# Patient Record
Sex: FEMALE | Race: BLACK OR AFRICAN AMERICAN | NOT HISPANIC OR LATINO | Employment: UNEMPLOYED | URBAN - METROPOLITAN AREA
[De-identification: names, ages, dates, MRNs, and addresses within clinical notes are randomized per-mention and may not be internally consistent; named-entity substitution may affect disease eponyms.]

---

## 2020-08-07 ENCOUNTER — HOSPITAL ENCOUNTER (EMERGENCY)
Facility: HOSPITAL | Age: 55
Discharge: HOME | End: 2020-08-07
Attending: EMERGENCY MEDICINE
Payer: COMMERCIAL

## 2020-08-07 VITALS
TEMPERATURE: 98.1 F | RESPIRATION RATE: 17 BRPM | HEART RATE: 74 BPM | SYSTOLIC BLOOD PRESSURE: 192 MMHG | DIASTOLIC BLOOD PRESSURE: 93 MMHG | BODY MASS INDEX: 37.2 KG/M2 | OXYGEN SATURATION: 99 % | WEIGHT: 237 LBS | HEIGHT: 67 IN
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
## 2020-08-07 DIAGNOSIS — L30.9 DERMATITIS: Primary | ICD-10-CM

## 2020-08-07 DIAGNOSIS — R51.9 HEADACHE: ICD-10-CM

## 2020-08-07 PROCEDURE — 99284 EMERGENCY DEPT VISIT MOD MDM: CPT | Performed by: EMERGENCY MEDICINE

## 2020-08-07 PROCEDURE — 99284 EMERGENCY DEPT VISIT MOD MDM: CPT

## 2020-08-07 RX ORDER — TRIAMCINOLONE ACETONIDE 1 MG/G
CREAM TOPICAL 2 TIMES DAILY
Qty: 30 G | Refills: 0 | Status: SHIPPED | OUTPATIENT
Start: 2020-08-07 | End: 2021-12-09
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
## 2020-08-08 NOTE — ED NOTES
Discharged reviewed with pt  Pt verbalized understanding and has no further questions at this time  Pt ambulatory off unit with steady gait       Stormy Neighbor, RN  08/07/20 8591

## 2021-01-10 ENCOUNTER — APPOINTMENT (EMERGENCY)
Dept: RADIOLOGY | Facility: HOSPITAL | Age: 56
DRG: 177 | End: 2021-01-10
Payer: COMMERCIAL

## 2021-01-10 ENCOUNTER — HOSPITAL ENCOUNTER (INPATIENT)
Facility: HOSPITAL | Age: 56
LOS: 7 days | Discharge: HOME HEALTH | DRG: 177 | End: 2021-01-18
Attending: EMERGENCY MEDICINE | Admitting: INTERNAL MEDICINE
Payer: COMMERCIAL

## 2021-01-10 DIAGNOSIS — R06.00 DYSPNEA: Primary | ICD-10-CM

## 2021-01-10 DIAGNOSIS — J18.9 PNEUMONIA: ICD-10-CM

## 2021-01-10 DIAGNOSIS — U07.1 COVID-19: ICD-10-CM

## 2021-01-10 DIAGNOSIS — E11.9 TYPE 2 DIABETES MELLITUS (HCC): ICD-10-CM

## 2021-01-10 PROBLEM — E87.6 HYPOKALEMIA: Status: ACTIVE | Noted: 2021-01-10

## 2021-01-10 PROBLEM — R73.09 ELEVATED GLUCOSE: Status: ACTIVE | Noted: 2021-01-10

## 2021-01-10 PROBLEM — J12.82 PNEUMONIA DUE TO COVID-19 VIRUS: Status: ACTIVE | Noted: 2021-01-10

## 2021-01-10 LAB
ABO GROUP BLD: NORMAL
ALBUMIN SERPL BCP-MCNC: 3.2 G/DL (ref 3.5–5)
ALP SERPL-CCNC: 84 U/L (ref 46–116)
ALT SERPL W P-5'-P-CCNC: 43 U/L (ref 12–78)
ANION GAP SERPL CALCULATED.3IONS-SCNC: 10 MMOL/L (ref 4–13)
ANION GAP SERPL CALCULATED.3IONS-SCNC: 9 MMOL/L (ref 4–13)
AST SERPL W P-5'-P-CCNC: 35 U/L (ref 5–45)
ATRIAL RATE: 98 BPM
BASOPHILS # BLD AUTO: 0.01 THOUSANDS/ΜL (ref 0–0.1)
BASOPHILS NFR BLD AUTO: 0 % (ref 0–1)
BILIRUB DIRECT SERPL-MCNC: 0.11 MG/DL (ref 0–0.2)
BILIRUB SERPL-MCNC: 0.3 MG/DL (ref 0.2–1)
BUN SERPL-MCNC: 10 MG/DL (ref 5–25)
BUN SERPL-MCNC: 9 MG/DL (ref 5–25)
CALCIUM SERPL-MCNC: 8.3 MG/DL (ref 8.3–10.1)
CALCIUM SERPL-MCNC: 8.6 MG/DL (ref 8.3–10.1)
CHLORIDE SERPL-SCNC: 100 MMOL/L (ref 100–108)
CHLORIDE SERPL-SCNC: 101 MMOL/L (ref 100–108)
CK SERPL-CCNC: 84 U/L (ref 26–192)
CO2 SERPL-SCNC: 27 MMOL/L (ref 21–32)
CO2 SERPL-SCNC: 27 MMOL/L (ref 21–32)
CREAT SERPL-MCNC: 0.82 MG/DL (ref 0.6–1.3)
CREAT SERPL-MCNC: 0.84 MG/DL (ref 0.6–1.3)
CRP SERPL QL: 48.8 MG/L
D DIMER PPP FEU-MCNC: <0.27 UG/ML FEU
EOSINOPHIL # BLD AUTO: 0 THOUSAND/ΜL (ref 0–0.61)
EOSINOPHIL NFR BLD AUTO: 0 % (ref 0–6)
ERYTHROCYTE [DISTWIDTH] IN BLOOD BY AUTOMATED COUNT: 12.7 % (ref 11.6–15.1)
EST. AVERAGE GLUCOSE BLD GHB EST-MCNC: 269 MG/DL
FERRITIN SERPL-MCNC: 176 NG/ML (ref 8–388)
FLUAV RNA RESP QL NAA+PROBE: NEGATIVE
FLUBV RNA RESP QL NAA+PROBE: NEGATIVE
GFR SERPL CREATININE-BSD FRML MDRD: 89 ML/MIN/1.73SQ M
GFR SERPL CREATININE-BSD FRML MDRD: 92 ML/MIN/1.73SQ M
GLUCOSE P FAST SERPL-MCNC: 269 MG/DL (ref 65–99)
GLUCOSE SERPL-MCNC: 181 MG/DL (ref 65–140)
GLUCOSE SERPL-MCNC: 269 MG/DL (ref 65–140)
HBA1C MFR BLD: 11 %
HBV CORE AB SER QL: NORMAL
HBV CORE IGM SER QL: NORMAL
HBV SURFACE AG SER QL: NORMAL
HCT VFR BLD AUTO: 42.1 % (ref 34.8–46.1)
HCV AB SER QL: NORMAL
HGB BLD-MCNC: 14.1 G/DL (ref 11.5–15.4)
IMM GRANULOCYTES # BLD AUTO: 0.02 THOUSAND/UL (ref 0–0.2)
IMM GRANULOCYTES NFR BLD AUTO: 1 % (ref 0–2)
LYMPHOCYTES # BLD AUTO: 1.39 THOUSANDS/ΜL (ref 0.6–4.47)
LYMPHOCYTES NFR BLD AUTO: 33 % (ref 14–44)
MAGNESIUM SERPL-MCNC: 2.6 MG/DL (ref 1.6–2.6)
MCH RBC QN AUTO: 30.8 PG (ref 26.8–34.3)
MCHC RBC AUTO-ENTMCNC: 33.5 G/DL (ref 31.4–37.4)
MCV RBC AUTO: 92 FL (ref 82–98)
MONOCYTES # BLD AUTO: 0.38 THOUSAND/ΜL (ref 0.17–1.22)
MONOCYTES NFR BLD AUTO: 9 % (ref 4–12)
NEUTROPHILS # BLD AUTO: 2.45 THOUSANDS/ΜL (ref 1.85–7.62)
NEUTS SEG NFR BLD AUTO: 57 % (ref 43–75)
NRBC BLD AUTO-RTO: 0 /100 WBCS
NT-PROBNP SERPL-MCNC: 70 PG/ML
P AXIS: 52 DEGREES
PLATELET # BLD AUTO: 206 THOUSANDS/UL (ref 149–390)
PMV BLD AUTO: 9.5 FL (ref 8.9–12.7)
POTASSIUM SERPL-SCNC: 2.8 MMOL/L (ref 3.5–5.3)
POTASSIUM SERPL-SCNC: 4.4 MMOL/L (ref 3.5–5.3)
PR INTERVAL: 160 MS
PROCALCITONIN SERPL-MCNC: <0.05 NG/ML
PROT SERPL-MCNC: 7.8 G/DL (ref 6.4–8.2)
QRS AXIS: 52 DEGREES
QRSD INTERVAL: 92 MS
QT INTERVAL: 344 MS
QTC INTERVAL: 439 MS
RBC # BLD AUTO: 4.58 MILLION/UL (ref 3.81–5.12)
RH BLD: NEGATIVE
RSV RNA RESP QL NAA+PROBE: NEGATIVE
SARS-COV-2 RNA RESP QL NAA+PROBE: POSITIVE
SODIUM SERPL-SCNC: 137 MMOL/L (ref 136–145)
SODIUM SERPL-SCNC: 137 MMOL/L (ref 136–145)
T WAVE AXIS: -30 DEGREES
TROPONIN I SERPL-MCNC: <0.02 NG/ML
VENTRICULAR RATE: 98 BPM
WBC # BLD AUTO: 4.25 THOUSAND/UL (ref 4.31–10.16)

## 2021-01-10 PROCEDURE — 86900 BLOOD TYPING SEROLOGIC ABO: CPT | Performed by: PHYSICIAN ASSISTANT

## 2021-01-10 PROCEDURE — 82728 ASSAY OF FERRITIN: CPT | Performed by: PHYSICIAN ASSISTANT

## 2021-01-10 PROCEDURE — 71045 X-RAY EXAM CHEST 1 VIEW: CPT

## 2021-01-10 PROCEDURE — 94640 AIRWAY INHALATION TREATMENT: CPT

## 2021-01-10 PROCEDURE — 93010 ELECTROCARDIOGRAM REPORT: CPT | Performed by: INTERNAL MEDICINE

## 2021-01-10 PROCEDURE — 96374 THER/PROPH/DIAG INJ IV PUSH: CPT

## 2021-01-10 PROCEDURE — 36415 COLL VENOUS BLD VENIPUNCTURE: CPT | Performed by: EMERGENCY MEDICINE

## 2021-01-10 PROCEDURE — 99285 EMERGENCY DEPT VISIT HI MDM: CPT | Performed by: EMERGENCY MEDICINE

## 2021-01-10 PROCEDURE — 85379 FIBRIN DEGRADATION QUANT: CPT | Performed by: PHYSICIAN ASSISTANT

## 2021-01-10 PROCEDURE — 0241U HB NFCT DS VIR RESP RNA 4 TRGT: CPT | Performed by: EMERGENCY MEDICINE

## 2021-01-10 PROCEDURE — 83735 ASSAY OF MAGNESIUM: CPT | Performed by: PHYSICIAN ASSISTANT

## 2021-01-10 PROCEDURE — 99220 PR INITIAL OBSERVATION CARE/DAY 70 MINUTES: CPT | Performed by: INTERNAL MEDICINE

## 2021-01-10 PROCEDURE — 83036 HEMOGLOBIN GLYCOSYLATED A1C: CPT | Performed by: PHYSICIAN ASSISTANT

## 2021-01-10 PROCEDURE — 82550 ASSAY OF CK (CPK): CPT | Performed by: PHYSICIAN ASSISTANT

## 2021-01-10 PROCEDURE — 86803 HEPATITIS C AB TEST: CPT | Performed by: PHYSICIAN ASSISTANT

## 2021-01-10 PROCEDURE — 80048 BASIC METABOLIC PNL TOTAL CA: CPT | Performed by: PHYSICIAN ASSISTANT

## 2021-01-10 PROCEDURE — 85025 COMPLETE CBC W/AUTO DIFF WBC: CPT | Performed by: EMERGENCY MEDICINE

## 2021-01-10 PROCEDURE — 84484 ASSAY OF TROPONIN QUANT: CPT | Performed by: EMERGENCY MEDICINE

## 2021-01-10 PROCEDURE — 83880 ASSAY OF NATRIURETIC PEPTIDE: CPT | Performed by: EMERGENCY MEDICINE

## 2021-01-10 PROCEDURE — 87340 HEPATITIS B SURFACE AG IA: CPT | Performed by: PHYSICIAN ASSISTANT

## 2021-01-10 PROCEDURE — 86901 BLOOD TYPING SEROLOGIC RH(D): CPT | Performed by: PHYSICIAN ASSISTANT

## 2021-01-10 PROCEDURE — 80076 HEPATIC FUNCTION PANEL: CPT | Performed by: EMERGENCY MEDICINE

## 2021-01-10 PROCEDURE — 93005 ELECTROCARDIOGRAM TRACING: CPT

## 2021-01-10 PROCEDURE — 84145 PROCALCITONIN (PCT): CPT | Performed by: PHYSICIAN ASSISTANT

## 2021-01-10 PROCEDURE — 86705 HEP B CORE ANTIBODY IGM: CPT | Performed by: PHYSICIAN ASSISTANT

## 2021-01-10 PROCEDURE — 80048 BASIC METABOLIC PNL TOTAL CA: CPT | Performed by: EMERGENCY MEDICINE

## 2021-01-10 PROCEDURE — 99285 EMERGENCY DEPT VISIT HI MDM: CPT

## 2021-01-10 PROCEDURE — 86140 C-REACTIVE PROTEIN: CPT | Performed by: PHYSICIAN ASSISTANT

## 2021-01-10 PROCEDURE — 86704 HEP B CORE ANTIBODY TOTAL: CPT | Performed by: PHYSICIAN ASSISTANT

## 2021-01-10 RX ORDER — ACETAMINOPHEN 325 MG/1
975 TABLET ORAL EVERY 6 HOURS PRN
Status: DISCONTINUED | OUTPATIENT
Start: 2021-01-10 | End: 2021-01-18 | Stop reason: HOSPADM

## 2021-01-10 RX ORDER — MAGNESIUM SULFATE HEPTAHYDRATE 40 MG/ML
2 INJECTION, SOLUTION INTRAVENOUS ONCE
Status: COMPLETED | OUTPATIENT
Start: 2021-01-10 | End: 2021-01-10

## 2021-01-10 RX ORDER — DOXYCYCLINE HYCLATE 100 MG/1
100 CAPSULE ORAL EVERY 12 HOURS SCHEDULED
Status: DISCONTINUED | OUTPATIENT
Start: 2021-01-10 | End: 2021-01-11

## 2021-01-10 RX ORDER — ALBUTEROL SULFATE 2.5 MG/3ML
5 SOLUTION RESPIRATORY (INHALATION) ONCE
Status: COMPLETED | OUTPATIENT
Start: 2021-01-10 | End: 2021-01-10

## 2021-01-10 RX ORDER — MULTIVITAMIN/IRON/FOLIC ACID 18MG-0.4MG
1 TABLET ORAL DAILY
Status: DISCONTINUED | OUTPATIENT
Start: 2021-01-17 | End: 2021-01-18 | Stop reason: HOSPADM

## 2021-01-10 RX ORDER — LANOLIN ALCOHOL/MO/W.PET/CERES
6 CREAM (GRAM) TOPICAL
Status: DISCONTINUED | OUTPATIENT
Start: 2021-01-10 | End: 2021-01-12

## 2021-01-10 RX ORDER — CALCIUM CARBONATE 200(500)MG
1000 TABLET,CHEWABLE ORAL DAILY PRN
Status: DISCONTINUED | OUTPATIENT
Start: 2021-01-10 | End: 2021-01-18 | Stop reason: HOSPADM

## 2021-01-10 RX ORDER — POTASSIUM CHLORIDE 20 MEQ/1
40 TABLET, EXTENDED RELEASE ORAL
Status: COMPLETED | OUTPATIENT
Start: 2021-01-10 | End: 2021-01-10

## 2021-01-10 RX ORDER — DEXAMETHASONE SODIUM PHOSPHATE 10 MG/ML
10 INJECTION, SOLUTION INTRAMUSCULAR; INTRAVENOUS ONCE
Status: COMPLETED | OUTPATIENT
Start: 2021-01-10 | End: 2021-01-10

## 2021-01-10 RX ORDER — ASCORBIC ACID 500 MG
1000 TABLET ORAL EVERY 12 HOURS SCHEDULED
Status: COMPLETED | OUTPATIENT
Start: 2021-01-10 | End: 2021-01-16

## 2021-01-10 RX ORDER — ZINC SULFATE 50(220)MG
220 CAPSULE ORAL DAILY
Status: COMPLETED | OUTPATIENT
Start: 2021-01-10 | End: 2021-01-16

## 2021-01-10 RX ORDER — GUAIFENESIN/DEXTROMETHORPHAN 100-10MG/5
10 SYRUP ORAL EVERY 4 HOURS PRN
Status: DISCONTINUED | OUTPATIENT
Start: 2021-01-10 | End: 2021-01-18 | Stop reason: HOSPADM

## 2021-01-10 RX ORDER — FAMOTIDINE 20 MG/1
20 TABLET, FILM COATED ORAL 2 TIMES DAILY
Status: DISCONTINUED | OUTPATIENT
Start: 2021-01-10 | End: 2021-01-18 | Stop reason: HOSPADM

## 2021-01-10 RX ORDER — GUAIFENESIN 600 MG
600 TABLET, EXTENDED RELEASE 12 HR ORAL EVERY 12 HOURS SCHEDULED
Status: DISCONTINUED | OUTPATIENT
Start: 2021-01-10 | End: 2021-01-18 | Stop reason: HOSPADM

## 2021-01-10 RX ORDER — MELATONIN
2000 DAILY
Status: DISCONTINUED | OUTPATIENT
Start: 2021-01-10 | End: 2021-01-18 | Stop reason: HOSPADM

## 2021-01-10 RX ORDER — ONDANSETRON 2 MG/ML
4 INJECTION INTRAMUSCULAR; INTRAVENOUS EVERY 6 HOURS PRN
Status: DISCONTINUED | OUTPATIENT
Start: 2021-01-10 | End: 2021-01-18 | Stop reason: HOSPADM

## 2021-01-10 RX ORDER — ALBUTEROL SULFATE 90 UG/1
2 AEROSOL, METERED RESPIRATORY (INHALATION) EVERY 4 HOURS PRN
Status: DISCONTINUED | OUTPATIENT
Start: 2021-01-10 | End: 2021-01-18 | Stop reason: HOSPADM

## 2021-01-10 RX ORDER — LOSARTAN POTASSIUM 100 MG/1
100 TABLET ORAL
COMMUNITY

## 2021-01-10 RX ADMIN — ALBUTEROL SULFATE 5 MG: 2.5 SOLUTION RESPIRATORY (INHALATION) at 01:13

## 2021-01-10 RX ADMIN — Medication 2000 UNITS: at 08:34

## 2021-01-10 RX ADMIN — OXYCODONE HYDROCHLORIDE AND ACETAMINOPHEN 1000 MG: 500 TABLET ORAL at 20:25

## 2021-01-10 RX ADMIN — MAGNESIUM SULFATE HEPTAHYDRATE 2 G: 40 INJECTION, SOLUTION INTRAVENOUS at 04:19

## 2021-01-10 RX ADMIN — DOXYCYCLINE 100 MG: 100 CAPSULE ORAL at 03:31

## 2021-01-10 RX ADMIN — IPRATROPIUM BROMIDE 0.5 MG: 0.5 SOLUTION RESPIRATORY (INHALATION) at 01:13

## 2021-01-10 RX ADMIN — FAMOTIDINE 20 MG: 20 TABLET ORAL at 18:19

## 2021-01-10 RX ADMIN — ZINC SULFATE 220 MG (50 MG) CAPSULE 220 MG: CAPSULE at 08:35

## 2021-01-10 RX ADMIN — POTASSIUM CHLORIDE 40 MEQ: 1500 TABLET, EXTENDED RELEASE ORAL at 06:26

## 2021-01-10 RX ADMIN — CEFTRIAXONE SODIUM 1000 MG: 10 INJECTION, POWDER, FOR SOLUTION INTRAVENOUS at 03:42

## 2021-01-10 RX ADMIN — GUAIFENESIN 600 MG: 600 TABLET ORAL at 08:35

## 2021-01-10 RX ADMIN — POTASSIUM CHLORIDE 40 MEQ: 1500 TABLET, EXTENDED RELEASE ORAL at 03:30

## 2021-01-10 RX ADMIN — GUAIFENESIN AND DEXTROMETHORPHAN 10 ML: 100; 10 SYRUP ORAL at 08:37

## 2021-01-10 RX ADMIN — ENOXAPARIN SODIUM 40 MG: 40 INJECTION SUBCUTANEOUS at 08:36

## 2021-01-10 RX ADMIN — GUAIFENESIN 600 MG: 600 TABLET ORAL at 20:25

## 2021-01-10 RX ADMIN — FAMOTIDINE 20 MG: 20 TABLET ORAL at 08:34

## 2021-01-10 RX ADMIN — DEXAMETHASONE SODIUM PHOSPHATE 10 MG: 10 INJECTION, SOLUTION INTRAMUSCULAR; INTRAVENOUS at 01:10

## 2021-01-10 RX ADMIN — MELATONIN 6 MG: at 20:25

## 2021-01-10 RX ADMIN — DOXYCYCLINE 100 MG: 100 CAPSULE ORAL at 15:49

## 2021-01-10 RX ADMIN — OXYCODONE HYDROCHLORIDE AND ACETAMINOPHEN 1000 MG: 500 TABLET ORAL at 08:35

## 2021-01-10 RX ADMIN — ACETAMINOPHEN 975 MG: 325 TABLET, FILM COATED ORAL at 20:25

## 2021-01-10 NOTE — H&P
H&P- Rachel Worthy 1965, 60 y o  female MRN: [id number]    Unit/Bed#: ED 13 Encounter: [id number]    Primary Care Provider: No primary care provider on file  Date and time admitted to hospital: 1/10/2021 12:22 AM        * Pneumonia due to COVID-19 virus  Assessment & Plan  · Reports productive cough and chest congestion x 2 days  Reports has had several episodes where she coughs until vomits phlegm  Denies SOB, but states "sometimes I just have a hard time breathing " (+) diarrhea  (+) decreased appetite  O2 sat 90% on RA after breathing treatment  (+) hx of mild asthma not on maintenance meds  · COVID (+) in ED  · CXR with hazy area to LLL  Awaiting official read  · Mild disease pathway initiated  Initial labs pending  · While initiate on Rocephin and Doxycycline pending results of procalcitonin levels  · Respiratory protocol  · Incentive spirometry  · Mucinex BID  · Robitussin PRN  · Maintain precautions    Hypokalemia  Assessment & Plan  · Potassium 2 8 on admission  · K dur 20meq PO x 2 doses  · Mag 2g IV x 1  · Repeat BMP at 0800  · Telemetry monitoring    Elevated glucose  Assessment & Plan  · Glucose 181 on admission  No history of DM  · A1C in am      VTE Prophylaxis: Enoxaparin (Lovenox)  / sequential compression device   Code Status: Level 1 Full Code  POLST: POLST form is not discussed and not completed at this time  Discussion with family: NA    Anticipated Length of Stay:  Patient will be admitted on an Observation basis with an anticipated length of stay of  Less than 2 midnights  Justification for Hospital Stay: See AP above    Total Time for Visit, including Counseling / Coordination of Care: 45 minutes  Greater than 50% of this total time spent on direct patient counseling and coordination of care  Chief Complaint:   cough    History of Present Illness:    Rachel Worthy is a 60 y o  female who presents with productive cough and chest congestion x 2 days   Reports has had several episodes where she coughs until vomits phlegm  Denies SOB, but states "sometimes I just have a hard time breathing " (+) diarrhea  (+) decreased appetite  O2 sat 90% on RA after breathing treatment  (+) hx of mild asthma not on maintenance meds  Review of Systems:    Review of Systems   Constitutional: Positive for appetite change  Negative for chills and fever  HENT: Positive for congestion  Negative for ear pain, sinus pressure and sore throat  Eyes: Negative for visual disturbance  Respiratory: Positive for cough  Negative for shortness of breath and wheezing  Cardiovascular: Negative for chest pain, palpitations and leg swelling  Gastrointestinal: Positive for diarrhea and vomiting  Negative for abdominal pain, constipation and nausea  Genitourinary: Negative for difficulty urinating, dysuria, frequency and urgency  Musculoskeletal: Negative for neck pain and neck stiffness  Neurological: Negative for dizziness, syncope, light-headedness and headaches  All other systems reviewed and are negative  Past Medical and Surgical History:     Past Medical History:   Diagnosis Date    Asthma        Past Surgical History:   Procedure Laterality Date    HERNIA REPAIR         Meds/Allergies:    Prior to Admission medications    Medication Sig Start Date End Date Taking? Authorizing Provider   triamcinolone (KENALOG) 0 1 % cream Apply topically 2 (two) times a day for 7 days 8/7/20 8/14/20  Alex Rajput MD     I have reviewed home medications with patient personally  Allergies:    Allergies   Allergen Reactions    Sulfa Antibiotics Rash       Social History:     Marital Status: Single   Patient Pre-hospital Living Situation: Lives w son  Patient Pre-hospital Level of Mobility: Ambulatory  Patient Pre-hospital Diet Restrictions: None  Substance Use History:   Social History     Substance and Sexual Activity   Alcohol Use Yes    Frequency: 2-4 times a month    Binge frequency: Never Social History     Tobacco Use   Smoking Status Former Smoker   Smokeless Tobacco Never Used     Social History     Substance and Sexual Activity   Drug Use Never       Family History:    Family History   Family history unknown: Yes       Physical Exam:     Vitals:   Blood Pressure: 151/81 (01/10/21 0027)  Pulse: 101 (01/10/21 0200)  Temperature: 99 7 °F (37 6 °C) (01/10/21 0028)  Temp Source: Oral (01/10/21 0028)  Respirations: (!) 26 (01/10/21 0200)  SpO2: 97 % (01/10/21 0200)    Physical Exam  Constitutional:       General: She is not in acute distress  Appearance: Normal appearance  HENT:      Mouth/Throat:      Mouth: Mucous membranes are moist    Eyes:      Extraocular Movements: Extraocular movements intact  Neck:      Musculoskeletal: Normal range of motion and neck supple  Cardiovascular:      Rate and Rhythm: Regular rhythm  Tachycardia present  Pulses: Normal pulses  Heart sounds: Normal heart sounds  No murmur  No friction rub  No gallop  Pulmonary:      Effort: Pulmonary effort is normal  No respiratory distress  Breath sounds: Normal breath sounds  No wheezing or rhonchi  Abdominal:      Palpations: Abdomen is soft  Tenderness: There is no abdominal tenderness  There is no guarding or rebound  Musculoskeletal: Normal range of motion  General: No swelling or tenderness  Skin:     General: Skin is warm and dry  Neurological:      General: No focal deficit present  Mental Status: She is alert and oriented to person, place, and time  Psychiatric:         Mood and Affect: Mood normal          Behavior: Behavior normal          Thought Content: Thought content normal        Additional Data:     Lab Results: I have personally reviewed pertinent reports        Results from last 7 days   Lab Units 01/10/21  0059   WBC Thousand/uL 4 25*   HEMOGLOBIN g/dL 14 1   HEMATOCRIT % 42 1   PLATELETS Thousands/uL 206   NEUTROS PCT % 57   LYMPHS PCT % 33   MONOS PCT % 9   EOS PCT % 0     Results from last 7 days   Lab Units 01/10/21  0059   SODIUM mmol/L 137   POTASSIUM mmol/L 2 8*   CHLORIDE mmol/L 100   CO2 mmol/L 27   BUN mg/dL 9   CREATININE mg/dL 0 82   ANION GAP mmol/L 10   CALCIUM mg/dL 8 3   ALBUMIN g/dL 3 2*   TOTAL BILIRUBIN mg/dL 0 30   ALK PHOS U/L 84   ALT U/L 43   AST U/L 35   GLUCOSE RANDOM mg/dL 181*                       Imaging: I have personally reviewed pertinent films in PACS    XR chest 1 view portable    (Results Pending)       EKG, Pathology, and Other Studies Reviewed on Admission:   · EKG: NA    Allscripts / Epic Records Reviewed: Yes     ** Please Note: This note has been constructed using a voice recognition system   **

## 2021-01-10 NOTE — ASSESSMENT & PLAN NOTE
· Potassium 2 8 on admission  · K dur 20meq PO x 2 doses  · Mag 2g IV x 1  · Repeat BMP at 0800  · Telemetry monitoring

## 2021-01-10 NOTE — ASSESSMENT & PLAN NOTE
· Reports productive cough and chest congestion x 2 days  Reports has had several episodes where she coughs until vomits phlegm  Denies SOB, but states "sometimes I just have a hard time breathing " (+) diarrhea  (+) decreased appetite  O2 sat 90% on RA after breathing treatment  (+) hx of mild asthma not on maintenance meds  · COVID (+) in ED  · CXR with hazy area to LLL  Awaiting official read  · Mild disease pathway initiated   Initial labs pending  · While initiate on Rocephin and Doxycycline pending results of procalcitonin levels  · Respiratory protocol  · Incentive spirometry  · Mucinex BID  · Robitussin PRN  · Maintain precautions

## 2021-01-10 NOTE — ED NOTES
SLIM at bedside     [facility], [address] [place] [address]  01/10/21 9471

## 2021-01-10 NOTE — UTILIZATION REVIEW
Initial Clinical Review    Admission: Date/Time/Statement:   Admission Orders (From admission, onward)     Ordered        01/10/21 0205  Place in Observation  Once                   Orders Placed This Encounter   Procedures    Place in Observation     Standing Status:   Standing     Number of Occurrences:   1     Order Specific Question:   Admitting Physician     Answer:   Suzy Scott [[id number]]     Order Specific Question:   Level of Care     Answer:   Med Surg [16]     ED Arrival Information     Expected Arrival Acuity Means of Arrival Escorted By Service Admission Type    - 1/10/2021 00:15 Urgent Walk-In Family Member General Medicine Urgent    Arrival Complaint    cough sob        Chief Complaint   Patient presents with    Shortness of Breath     SOB x2 days states that when she coughs she spits up   Assessment/Plan:   58y Female to ED presents with  productive cough and chest congestion x 2 days  In addition c/o multiple episodes of cough with vomiting phlegm, (+) diarrhea  (+) decreased appetite  O2 sat 90% on RA after breathing treatment  (+) hx of mild asthma not on maintenance meds  COVID (+) in ED  Admit Observation level of care for Pneumonia due to COVID-19 virus  Hypokalemia and Elevated glucose  Start COVID treatment  Initiate Iv antibiotics  Procal pending  Incentive spirometry  Mucinex BID and Robitussin prn  K 2 8 on admit, replace with K dur 20 meq po x2 doses  Mag 2g IV x1  Repeat BMP at 0800  Tele monitoring  Glucose 181 on admit  A1c in am 1/11  Contact and Airborne isolation      ED Triage Vitals   Temperature Pulse Respirations Blood Pressure SpO2   01/10/21 0028 01/10/21 0027 01/10/21 0027 01/10/21 0027 01/10/21 0027   99 7 °F (37 6 °C) 94 18 151/81 95 %      Temp Source Heart Rate Source Patient Position - Orthostatic VS BP Location FiO2 (%)   01/10/21 0028 01/10/21 0027 01/10/21 0027 01/10/21 0027 --   Oral Monitor Lying Right arm       Pain Score       01/10/21 0445       No Pain Wt Readings from Last 1 Encounters:   08/07/20 108 kg (237 lb)     Additional Vital Signs:   01/10/21 0815  --  93  22  139/77  101  94 %  28  2 L/min  Nasal cannula  Lying   01/10/21 0627  --  81  26Abnormal   142/67  --  95 %  28  2 L/min  Nasal cannula  Lying   01/10/21 0200  --  101  26Abnormal   --  --  97 %  28  2 L/min  Nasal cannula  --   01/10/21 0145  --  103  26Abnormal   --  --  90 %  --  --  None (Room air)       Pertinent Labs/Diagnostic Test Results:   1/10 PCXR -     Results from last 7 days   Lab Units 01/10/21  0058   SARS-COV-2  Positive*     Results from last 7 days   Lab Units 01/10/21  0059   WBC Thousand/uL 4 25*   HEMOGLOBIN g/dL 14 1   HEMATOCRIT % 42 1   PLATELETS Thousands/uL 206   NEUTROS ABS Thousands/µL 2 45         Results from last 7 days   Lab Units 01/10/21  0803 01/10/21  0059   SODIUM mmol/L 137 137   POTASSIUM mmol/L 4 4 2 8*   CHLORIDE mmol/L 101 100   CO2 mmol/L 27 27   ANION GAP mmol/L 9 10   BUN mg/dL 10 9   CREATININE mg/dL 0 84 0 82   EGFR ml/min/1 73sq m 89 92   CALCIUM mg/dL 8 6 8 3   MAGNESIUM mg/dL 2 6  --      Results from last 7 days   Lab Units 01/10/21  0059   AST U/L 35   ALT U/L 43   ALK PHOS U/L 84   TOTAL PROTEIN g/dL 7 8   ALBUMIN g/dL 3 2*   TOTAL BILIRUBIN mg/dL 0 30   BILIRUBIN DIRECT mg/dL 0 11         Results from last 7 days   Lab Units 01/10/21  0803 01/10/21  0059   GLUCOSE RANDOM mg/dL 269* 181*       Results from last 7 days   Lab Units 01/10/21  0342   CK TOTAL U/L 84     Results from last 7 days   Lab Units 01/10/21  0059   TROPONIN I ng/mL <0 02     Results from last 7 days   Lab Units 01/10/21  0342   D-DIMER QUANTITATIVE ug/ml FEU <0 27       Results from last 7 days   Lab Units 01/10/21  0059   NT-PRO BNP pg/mL 70       Results from last 7 days   Lab Units 01/10/21  0342   CRP mg/L 48 8*       Results from last 7 days   Lab Units 01/10/21  0058   INFLUENZA A PCR  Negative   INFLUENZA B PCR  Negative   RSV PCR  Negative       ED Treatment: Medication Administration from 01/10/2021 0014 to 01/10/2021 0932       Date/Time Order Dose Route Action     01/10/2021 0113 ipratropium (ATROVENT) 0 02 % inhalation solution 0 5 mg 0 5 mg Nebulization Given     01/10/2021 0113 albuterol inhalation solution 5 mg 5 mg Nebulization Given     01/10/2021 0110 dexamethasone (PF) (DECADRON) injection 10 mg 10 mg Intravenous Given     01/10/2021 0342 ceftriaxone (ROCEPHIN) 1 g/50 mL in dextrose IVPB 1,000 mg Intravenous New Bag     01/10/2021 0331 doxycycline hyclate (VIBRAMYCIN) capsule 100 mg 100 mg Oral Given     01/10/2021 0419 magnesium sulfate 2 g/50 mL IVPB (premix) 2 g 2 g Intravenous New Bag     01/10/2021 0626 potassium chloride (K-DUR,KLOR-CON) CR tablet 40 mEq 40 mEq Oral Given     01/10/2021 0330 potassium chloride (K-DUR,KLOR-CON) CR tablet 40 mEq 40 mEq Oral Given     01/10/2021 0836 enoxaparin (LOVENOX) subcutaneous injection 40 mg 40 mg Subcutaneous Given     01/10/2021 0834 cholecalciferol (VITAMIN D3) tablet 2,000 Units 2,000 Units Oral Given     01/10/2021 5343 ascorbic acid (VITAMIN C) tablet 1,000 mg 1,000 mg Oral Given     01/10/2021 0835 zinc sulfate (ZINCATE) capsule 220 mg 220 mg Oral Given     01/10/2021 0834 famotidine (PEPCID) tablet 20 mg 20 mg Oral Given     01/10/2021 0835 guaiFENesin (MUCINEX) 12 hr tablet 600 mg 600 mg Oral Given     01/10/2021 0390 dextromethorphan-guaiFENesin (ROBITUSSIN DM) oral syrup 10 mL 10 mL Oral Given        Past Medical History:   Diagnosis Date    Asthma      Present on Admission:   Pneumonia due to COVID-19 virus   Hypokalemia   Elevated glucose      Admitting Diagnosis: SOB (shortness of breath) [R06 02]  Age/Sex: 60 y o  female     Admission Orders:  Scheduled Medications:  ascorbic acid, 1,000 mg, Oral, Q12H [facility]  cefTRIAXone, 1,000 mg, Intravenous, Q24H  cholecalciferol, 2,000 Units, Oral, Daily  doxycycline hyclate, 100 mg, Oral, Q12H [facility]  enoxaparin, 40 mg, Subcutaneous, Daily  famotidine, 20 mg, Oral, BID  guaiFENesin, 600 mg, Oral, Q12H [address]  melatonin, 6 mg, Oral, HS  zinc sulfate, 220 mg, Oral, Daily    Followed by  Annette Lafleur ON 1/17/2021] multivitamin-minerals, 1 tablet, Oral, Daily      Continuous IV Infusions: None     PRN Meds:  acetaminophen, 975 mg, Oral, Q6H PRN  calcium carbonate, 1,000 mg, Oral, Daily PRN  dextromethorphan-guaiFENesin, 10 mL, Oral, Q4H PRN  ondansetron, 4 mg, Intravenous, Q6H PRN      Network Utilization Review Department  ATTENTION: Please call with any questions or concerns to [phone] and carefully listen to the prompts so that you are directed to the right person  All voicemails are confidential   Roque Wynn all requests for admission clinical reviews, approved or denied determinations and any other requests to dedicated fax number below belonging to the campus where the patient is receiving treatment   List of dedicated fax numbers for the Facilities:  [address] DENIALS (Administrative/Medical Necessity) [phone]   [address] (Maternity/NICU/Pediatrics) [phone]   [address] Marnie Esparza 8993 (Henny Islas "Promise" 103) [address] [place] [address] Kiana Chava Galan [address] [place] [address] [phone]

## 2021-01-11 PROBLEM — J96.01 ACUTE HYPOXEMIC RESPIRATORY FAILURE DUE TO COVID-19 (HCC): Status: ACTIVE | Noted: 2021-01-11

## 2021-01-11 PROBLEM — U07.1 ACUTE HYPOXEMIC RESPIRATORY FAILURE DUE TO COVID-19 (HCC): Status: ACTIVE | Noted: 2021-01-11

## 2021-01-11 LAB
ANION GAP SERPL CALCULATED.3IONS-SCNC: 8 MMOL/L (ref 4–13)
BUN SERPL-MCNC: 13 MG/DL (ref 5–25)
CALCIUM SERPL-MCNC: 8.5 MG/DL (ref 8.3–10.1)
CHLORIDE SERPL-SCNC: 105 MMOL/L (ref 100–108)
CO2 SERPL-SCNC: 26 MMOL/L (ref 21–32)
CREAT SERPL-MCNC: 0.67 MG/DL (ref 0.6–1.3)
ERYTHROCYTE [DISTWIDTH] IN BLOOD BY AUTOMATED COUNT: 12.7 % (ref 11.6–15.1)
GFR SERPL CREATININE-BSD FRML MDRD: 113 ML/MIN/1.73SQ M
GLUCOSE P FAST SERPL-MCNC: 248 MG/DL (ref 65–99)
GLUCOSE SERPL-MCNC: 222 MG/DL (ref 65–140)
GLUCOSE SERPL-MCNC: 225 MG/DL (ref 65–140)
GLUCOSE SERPL-MCNC: 248 MG/DL (ref 65–140)
GLUCOSE SERPL-MCNC: 267 MG/DL (ref 65–140)
GLUCOSE SERPL-MCNC: 334 MG/DL (ref 65–140)
HCT VFR BLD AUTO: 41.1 % (ref 34.8–46.1)
HGB BLD-MCNC: 13.6 G/DL (ref 11.5–15.4)
MCH RBC QN AUTO: 31.1 PG (ref 26.8–34.3)
MCHC RBC AUTO-ENTMCNC: 33.1 G/DL (ref 31.4–37.4)
MCV RBC AUTO: 94 FL (ref 82–98)
PLATELET # BLD AUTO: 191 THOUSANDS/UL (ref 149–390)
PMV BLD AUTO: 9.8 FL (ref 8.9–12.7)
POTASSIUM SERPL-SCNC: 3.4 MMOL/L (ref 3.5–5.3)
PROCALCITONIN SERPL-MCNC: <0.05 NG/ML
RBC # BLD AUTO: 4.37 MILLION/UL (ref 3.81–5.12)
SODIUM SERPL-SCNC: 139 MMOL/L (ref 136–145)
WBC # BLD AUTO: 4.56 THOUSAND/UL (ref 4.31–10.16)

## 2021-01-11 PROCEDURE — 82948 REAGENT STRIP/BLOOD GLUCOSE: CPT

## 2021-01-11 PROCEDURE — 84145 PROCALCITONIN (PCT): CPT | Performed by: PHYSICIAN ASSISTANT

## 2021-01-11 PROCEDURE — 80048 BASIC METABOLIC PNL TOTAL CA: CPT | Performed by: INTERNAL MEDICINE

## 2021-01-11 PROCEDURE — XW033E5 INTRODUCTION OF REMDESIVIR ANTI-INFECTIVE INTO PERIPHERAL VEIN, PERCUTANEOUS APPROACH, NEW TECHNOLOGY GROUP 5: ICD-10-PCS | Performed by: STUDENT IN AN ORGANIZED HEALTH CARE EDUCATION/TRAINING PROGRAM

## 2021-01-11 PROCEDURE — 85027 COMPLETE CBC AUTOMATED: CPT | Performed by: INTERNAL MEDICINE

## 2021-01-11 PROCEDURE — 99233 SBSQ HOSP IP/OBS HIGH 50: CPT | Performed by: INTERNAL MEDICINE

## 2021-01-11 RX ORDER — DEXAMETHASONE SODIUM PHOSPHATE 4 MG/ML
6 INJECTION, SOLUTION INTRA-ARTICULAR; INTRALESIONAL; INTRAMUSCULAR; INTRAVENOUS; SOFT TISSUE EVERY 24 HOURS
Status: DISCONTINUED | OUTPATIENT
Start: 2021-01-11 | End: 2021-01-16

## 2021-01-11 RX ORDER — INSULIN GLARGINE 100 [IU]/ML
24 INJECTION, SOLUTION SUBCUTANEOUS
Status: DISCONTINUED | OUTPATIENT
Start: 2021-01-11 | End: 2021-01-13

## 2021-01-11 RX ADMIN — INSULIN LISPRO 2 UNITS: 100 INJECTION, SOLUTION INTRAVENOUS; SUBCUTANEOUS at 18:47

## 2021-01-11 RX ADMIN — ALBUTEROL SULFATE 2 PUFF: 90 AEROSOL, METERED RESPIRATORY (INHALATION) at 22:38

## 2021-01-11 RX ADMIN — FAMOTIDINE 20 MG: 20 TABLET ORAL at 18:47

## 2021-01-11 RX ADMIN — DEXAMETHASONE SODIUM PHOSPHATE 6 MG: 4 INJECTION, SOLUTION INTRAMUSCULAR; INTRAVENOUS at 10:15

## 2021-01-11 RX ADMIN — ALBUTEROL SULFATE 2 PUFF: 90 AEROSOL, METERED RESPIRATORY (INHALATION) at 05:44

## 2021-01-11 RX ADMIN — REMDESIVIR 200 MG: 100 INJECTION, POWDER, LYOPHILIZED, FOR SOLUTION INTRAVENOUS at 11:33

## 2021-01-11 RX ADMIN — Medication 2000 UNITS: at 10:16

## 2021-01-11 RX ADMIN — CEFTRIAXONE SODIUM 1000 MG: 10 INJECTION, POWDER, FOR SOLUTION INTRAVENOUS at 02:38

## 2021-01-11 RX ADMIN — DOXYCYCLINE 100 MG: 100 CAPSULE ORAL at 02:38

## 2021-01-11 RX ADMIN — ACETAMINOPHEN 975 MG: 325 TABLET, FILM COATED ORAL at 05:42

## 2021-01-11 RX ADMIN — OXYCODONE HYDROCHLORIDE AND ACETAMINOPHEN 1000 MG: 500 TABLET ORAL at 20:54

## 2021-01-11 RX ADMIN — MELATONIN 6 MG: at 20:54

## 2021-01-11 RX ADMIN — INSULIN LISPRO 8 UNITS: 100 INJECTION, SOLUTION INTRAVENOUS; SUBCUTANEOUS at 18:47

## 2021-01-11 RX ADMIN — ENOXAPARIN SODIUM 40 MG: 40 INJECTION SUBCUTANEOUS at 10:16

## 2021-01-11 RX ADMIN — ZINC SULFATE 220 MG (50 MG) CAPSULE 220 MG: CAPSULE at 10:16

## 2021-01-11 RX ADMIN — GUAIFENESIN 600 MG: 600 TABLET ORAL at 20:54

## 2021-01-11 RX ADMIN — GUAIFENESIN 600 MG: 600 TABLET ORAL at 10:16

## 2021-01-11 RX ADMIN — INSULIN GLARGINE 24 UNITS: 100 INJECTION, SOLUTION SUBCUTANEOUS at 20:59

## 2021-01-11 RX ADMIN — GUAIFENESIN AND DEXTROMETHORPHAN 10 ML: 100; 10 SYRUP ORAL at 22:37

## 2021-01-11 RX ADMIN — FAMOTIDINE 20 MG: 20 TABLET ORAL at 10:16

## 2021-01-11 RX ADMIN — OXYCODONE HYDROCHLORIDE AND ACETAMINOPHEN 1000 MG: 500 TABLET ORAL at 10:15

## 2021-01-11 NOTE — PROGRESS NOTES
Progress Note - Sabrina Riding 1965, 60 y o  female MRN: [id number]    Unit/Bed#: -01 Encounter: [id number]    Primary Care Provider: No primary care provider on file  Date and time admitted to hospital: 1/10/2021 12:22 AM        Acute hypoxemic respiratory failure due to COVID-19 [facility])  Assessment & Plan  Patient developed hypoxic respiratory failure due to COVID-19 pneumonia and his saturations had dropped to 88%  Yesterday I reduced the oxygen from 2 L to 1 L per the patient became more short of breath today and I had to increase it back to 2 L  Will start the patient on steroids and remdesivir  Changing the patient from observation status to full admit because the patient is having worsening shortness of breath     Elevated glucose  Assessment & Plan  · Glucose 181 on admission  No history of DM  · Will need insulin as the patient is being started on steroids  · Will monitor blood sugars closely    Hypokalemia  Assessment & Plan  · Potassium 2 8 on admission- but improved after supplementation  · Will monitor BMP again tomorrow    * Pneumonia due to COVID-19 virus  Assessment & Plan  · Reports productive cough and chest congestion x 2 days  Reports has had several episodes where she coughs until vomits phlegm  Denies SOB, but states "sometimes I just have a hard time breathing " (+) diarrhea  (+) decreased appetite  O2 sat 90% on RA after breathing treatment  (+) hx of mild asthma not on maintenance meds  · COVID (+) in ED  · CXR with hazy area to LLL  Awaiting official read  · Mild disease pathway initiated  Initial labs pending  · Respiratory protocol  · Incentive spirometry  · Mucinex BID  · Robitussin PRN  · Maintain precautions      TE Pharmacologic Prophylaxis: Enoxaparin (Lovenox)    Patient Centered Rounds: I have performed bedside rounds with nursing staff today    Discussions with Specialists or Other Care Team Provider:  Discussed with nursing team and resident  Education and Discussions with Family / Patient:  With patient    Current Length of Stay: 0 day(s)  Current Patient Status: Inpatient     Certification Statement: The patient will continue to require additional inpatient hospital stay due to Pneumonia due to COVID-19 virus  Discharge Plan / Estimated Discharge Date:  2-3 days    Code Status: Level 1 - Full Code  ______________________________________________________________________________    Subjective:   Patient seen and examined by me  Patient feels worse today compared to yesterday  She says her shortness of breath has worsened  I increase the oxygen from 1 day to 2 L  Patient does not have any chest pain, palpitations or diaphoresis at this point of time  No nausea or vomiting  Patient does have weakness of the weakness is generalized  Patient does not have any high fever  Objective:   Vitals: Blood pressure 102/62, pulse 80, temperature 98 6 °F (37 °C), resp  rate 18, height 5' 6" (1 676 m), weight 108 kg (237 lb), SpO2 95 %      Physical Exam:   General exam- looks a little weak  HEENT - atraumatic and normocephalic  Neck- supple  Skin - no fresh rash  Extremities no fresh focal deformities  Cardiovascular- No visible JVD  Respiratory- no accessory muscles of respiration being used  Skin - no fresh rash  Abdomen - no visible mass  CNS- No fresh focal deficits  Psych- no acute psychosis    Additional Data:   Labs:  Results from last 7 days   Lab Units 01/11/21  0536 01/10/21  0059   WBC Thousand/uL 4 56 4 25*   HEMOGLOBIN g/dL 13 6 14 1   HEMATOCRIT % 41 1 42 1   MCV fL 94 92   PLATELETS Thousands/uL 191 206     Results from last 7 days   Lab Units 01/11/21  0536 01/10/21  0803 01/10/21  0059   SODIUM mmol/L 139 137 137   POTASSIUM mmol/L 3 4* 4 4 2 8*   CHLORIDE mmol/L 105 101 100   CO2 mmol/L 26 27 27   ANION GAP mmol/L 8 9 10   BUN mg/dL 13 10 9   CREATININE mg/dL 0 67 0 84 0 82   CALCIUM mg/dL 8 5 8 6 8 3   ALBUMIN g/dL  --   --  3 2*   TOTAL BILIRUBIN mg/dL  -- --  0 30   ALK PHOS U/L  --   --  84   ALT U/L  --   --  43   AST U/L  --   --  35   EGFR ml/min/1 73sq m 113 89 92   GLUCOSE RANDOM mg/dL 248* 269* 181*     Results from last 7 days   Lab Units 01/10/21  0803   MAGNESIUM mg/dL 2 6     Results from last 7 days   Lab Units 01/10/21  0342 01/10/21  0059   CK TOTAL U/L 84  --    TROPONIN I ng/mL  --  <0 02     Results from last 7 days   Lab Units 01/10/21  0059   NT-PRO BNP pg/mL 70      Results from last 7 days   Lab Units 01/10/21  0342   PROCALCITONIN ng/ml <0 05     Results from last 7 days   Lab Units 01/11/21  0536   POC GLUCOSE mg/dl 222*     Results from last 7 days   Lab Units 01/10/21  0803   HEMOGLOBIN A1C % 11 0*         * I Have Reviewed All Lab Data Listed Above  Cultures:   Results from last 7 days   Lab Units 01/10/21  0058   INFLUENZA A PCR  Negative     Results from last 7 days   Lab Units 01/10/21  0058   INFLUENZA A PCR  Negative   INFLUENZA B PCR  Negative   RSV PCR  Negative         Imaging:  Imaging Reports Reviewed Today Include:   Xr Chest 1 View Portable    Result Date: 1/11/2021  Impression: No acute cardiopulmonary disease   Workstation performed: [id number]     Scheduled Meds:  Current Facility-Administered Medications   Medication Dose Route Frequency Provider Last Rate    acetaminophen  975 mg Oral Q6H PRN Victorino Cali PA-C      albuterol  2 puff Inhalation Q4H PRN Ellie Mejia MD      ascorbic acid  1,000 mg Oral Q12H [address] Victorino Cali PA-C      calcium carbonate  1,000 mg Oral Daily PRN Victorino Cali PA-C      cholecalciferol  2,000 Units Oral Daily [place], [place]      dexamethasone  6 mg Intravenous Q24H Chad Coffye MD      dextromethorphan-guaiFENesin  10 mL Oral Q4H PRN Victorino Cali PA-C      enoxaparin  40 mg Subcutaneous Daily [place], [place]      famotidine  20 mg Oral BID Victorino Cali PA-C      guaiFENesin  600 mg Oral Q12H [address] [facility], PA-C      melatonin  6 mg Oral HS Rizwana Brooks PA-C      zinc sulfate  220 mg Oral Daily Rizwana Brooks PA-C      Followed by   Claudene Durham ON 1/17/2021] multivitamin-minerals  1 tablet Oral Daily Rizwana Brooks PA-C      ondansetron  4 mg Intravenous Q6H PRN Rizwana Brooks PA-C      [START ON 1/12/2021] remdesivir  100 mg Intravenous Q24H MD Mick Jeff MD Mikeal [facility]    ** Please Note: This note has been constructed using a voice recognition system   **

## 2021-01-11 NOTE — ASSESSMENT & PLAN NOTE
Patient developed hypoxic respiratory failure due to COVID-19 pneumonia and his saturations had dropped to 88%  Yesterday I reduced the oxygen from 2 L to 1 L per the patient became more short of breath today and I had to increase it back to 2 L    Will start the patient on steroids and remdesivir  Changing the patient from observation status to full admit because the patient is having worsening shortness of breath

## 2021-01-11 NOTE — QUICK NOTE
Patient's hemoglobin A1c was [place] and patient has been started on IV dexamethasone for her COVID-19 infection    Will start the patient on Lantus 24 units subcu daily and NovoLog 8 units subcu 3 times a day with meals

## 2021-01-11 NOTE — PLAN OF CARE
Problem: Potential for Falls  Goal: Patient will remain free of falls  Description: INTERVENTIONS:  - Assess patient frequently for physical needs  -  Identify cognitive and physical deficits and behaviors that affect risk of falls    -  [place] fall precautions as indicated by assessment   - Educate patient/family on patient safety including physical limitations  - Instruct patient to call for assistance with activity based on assessment  - Modify environment to reduce risk of injury  - Consider OT/PT consult to assist with strengthening/mobility  Outcome: Progressing     Problem: RESPIRATORY - ADULT  Goal: Achieves optimal ventilation and oxygenation  Description: INTERVENTIONS:  - Assess for changes in respiratory status  - Assess for changes in mentation and behavior  - Position to facilitate oxygenation and minimize respiratory effort  - Oxygen administered by appropriate delivery if ordered  - Initiate smoking cessation education as indicated  - Encourage broncho-pulmonary hygiene including cough, deep breathe, Incentive Spirometry  - Assess the need for suctioning and aspirate as needed  - Assess and instruct to report SOB or any respiratory difficulty  - Respiratory Therapy support as indicated  Outcome: Progressing     Problem: METABOLIC, FLUID AND ELECTROLYTES - ADULT  Goal: Electrolytes maintained within normal limits  Description: INTERVENTIONS:  - Monitor labs and assess patient for signs and symptoms of electrolyte imbalances  - Administer electrolyte replacement as ordered  - Monitor response to electrolyte replacements, including repeat lab results as appropriate  - Instruct patient on fluid and nutrition as appropriate  Outcome: Progressing  Goal: Fluid balance maintained  Description: INTERVENTIONS:  - Monitor labs   - Monitor I/O and WT  - Instruct patient on fluid and nutrition as appropriate  - Assess for signs & symptoms of volume excess or deficit  Outcome: Progressing  Goal: Glucose maintained within target range  Description: INTERVENTIONS:  - Monitor Blood Glucose as ordered  - Assess for signs and symptoms of hyperglycemia and hypoglycemia  - Administer ordered medications to maintain glucose within target range  - Assess nutritional intake and initiate nutrition service referral as needed  Outcome: Progressing

## 2021-01-11 NOTE — PLAN OF CARE
Problem: Potential for Falls  Goal: Patient will remain free of falls  Description: INTERVENTIONS:  - Assess patient frequently for physical needs  -  Identify cognitive and physical deficits and behaviors that affect risk of falls    -  [place] fall precautions as indicated by assessment   - Educate patient/family on patient safety including physical limitations  - Instruct patient to call for assistance with activity based on assessment  - Modify environment to reduce risk of injury  - Consider OT/PT consult to assist with strengthening/mobility  Outcome: Progressing     Problem: RESPIRATORY - ADULT  Goal: Achieves optimal ventilation and oxygenation  Description: INTERVENTIONS:  - Assess for changes in respiratory status  - Assess for changes in mentation and behavior  - Position to facilitate oxygenation and minimize respiratory effort  - Oxygen administered by appropriate delivery if ordered  - Initiate smoking cessation education as indicated  - Encourage broncho-pulmonary hygiene including cough, deep breathe, Incentive Spirometry  - Assess the need for suctioning and aspirate as needed  - Assess and instruct to report SOB or any respiratory difficulty  - Respiratory Therapy support as indicated  Outcome: Progressing     Problem: METABOLIC, FLUID AND ELECTROLYTES - ADULT  Goal: Electrolytes maintained within normal limits  Description: INTERVENTIONS:  - Monitor labs and assess patient for signs and symptoms of electrolyte imbalances  - Administer electrolyte replacement as ordered  - Monitor response to electrolyte replacements, including repeat lab results as appropriate  - Instruct patient on fluid and nutrition as appropriate  Outcome: Progressing  Goal: Fluid balance maintained  Description: INTERVENTIONS:  - Monitor labs   - Monitor I/O and WT  - Instruct patient on fluid and nutrition as appropriate  - Assess for signs & symptoms of volume excess or deficit  Outcome: Progressing  Goal: Glucose maintained within target range  Description: INTERVENTIONS:  - Monitor Blood Glucose as ordered  - Assess for signs and symptoms of hyperglycemia and hypoglycemia  - Administer ordered medications to maintain glucose within target range  - Assess nutritional intake and initiate nutrition service referral as needed  Outcome: Progressing

## 2021-01-11 NOTE — CASE MANAGEMENT
CM spoke to pt via phone-she is Covid +  Pt is under OBS status and is on workqueue  OBS status reviewed with pt and questions answered  Copy left in pt's chart and copy to MR for scanning  Pt lives with her mother Shanika Roman and son Balaji Newell in a 2 story house, but her bedroom is on the first floor and there are no RADHA  Balaji Newell is scheduled to leave next week for the [facility] and will not be able to provide assistance  She is able to navigate steps and is independent with ADL's  She has a cane to use prn  She has gone to OP/PT through the [place] for knee issues  She was never an in-patient and has never used [place] services  Denies substance abuse  She does have some depression and was recently started on a anti-depressant through the [place], but she never started the med  Her PCP is through the [place]  She gets her medications through the [facility] and also uses the [employer] in [facility] for immed need medications  She does not have a POA or Advanced Directive  CM left info in chart  Pt does not work-is disabled, but she still drives  Balaji Newell will transport home when she is medically cleared  CM discussed d/c needs and pt feels she will need [place] services on d/c for PT and Nursing  CM will place referrals and keep pt informed of acceptances  Understands that it is her preference and that she has freedom of choice  CM will continue to follow  CM reviewed discharge planning process including the following: identifying help at home, patient preference for discharge planning needs, pharmacy preference, and availability of treatment team to discuss questions or concerns patient and/or family may have regarding understanding medications and recognizing signs and symptoms once discharged  CM also encouraged patient to follow up with all recommended appointments after discharge  Patient advised of importance for patient and family to participate in managing patients medical well being

## 2021-01-11 NOTE — ASSESSMENT & PLAN NOTE
· Glucose 181 on admission   No history of DM  · Will need insulin as the patient is being started on steroids  · Will monitor blood sugars closely

## 2021-01-11 NOTE — ASSESSMENT & PLAN NOTE
· Reports productive cough and chest congestion x 2 days  Reports has had several episodes where she coughs until vomits phlegm  Denies SOB, but states "sometimes I just have a hard time breathing " (+) diarrhea  (+) decreased appetite  O2 sat 90% on RA after breathing treatment  (+) hx of mild asthma not on maintenance meds  · COVID (+) in ED  · CXR with hazy area to LLL  Awaiting official read  · Mild disease pathway initiated   Initial labs pending  · Respiratory protocol  · Incentive spirometry  · Mucinex BID  · Robitussin PRN  · Maintain precautions

## 2021-01-11 NOTE — UTILIZATION REVIEW
Continued Stay Review  OBSERVATION 1/10/21 @ 0205 CONVERTED TO INPATIENT 1/11/21 @ 0949 DUE TO ACUTE HYPOXIC RESPIRATORY FAILURE DUE TO COVID 9, PNEUMONIA DUE TO COVID 19 REQUIRING OXYGEN THERAPY AND IV REMDESIVIR AND STEROIDS  01/11/21 0950  Inpatient Admission Once     Transfer Service: General Medicine       Question Answer Comment   Admitting Physician [facility]    Level of Care Med Surg    Estimated length of stay More than 2 Midnights    Certification I certify that inpatient services are medically necessary for this patient for a duration of greater than two midnights  See H&P and MD Progress Notes for additional information about the patient's course of treatment  covid, hypoxia       01/11/21 1954       Date: 1/11                    Current Patient Class: OBS TO INPATIENT  Current Level of Care: Med/surg    HPI:55 y o  female initially admitted on 1/10    Assessment/Plan:   SHortness of breath worsening  Increase in oxygen demands from 1 liter to [facility]  COntinue with iv steroids and remdesivir  Pertinent Labs/Diagnostic Results:   1/11 CXR: No acute cardiopulmonary disease     Results from last 7 days   Lab Units 01/10/21  0058   SARS-COV-2  Positive*     Results from last 7 days   Lab Units 01/11/21  0536 01/10/21  0059   WBC Thousand/uL 4 56 4 25*   HEMOGLOBIN g/dL 13 6 14 1   HEMATOCRIT % 41 1 42 1   PLATELETS Thousands/uL 191 206   NEUTROS ABS Thousands/µL  --  2 45         Results from last 7 days   Lab Units 01/11/21  0536 01/10/21  0803 01/10/21  0059   SODIUM mmol/L 139 137 137   POTASSIUM mmol/L 3 4* 4 4 2 8*   CHLORIDE mmol/L 105 101 100   CO2 mmol/L 26 27 27   ANION GAP mmol/L 8 9 10   BUN mg/dL 13 10 9   CREATININE mg/dL 0 67 0 84 0 82   EGFR ml/min/1 73sq m 113 89 92   CALCIUM mg/dL 8 5 8 6 8 3   MAGNESIUM mg/dL  --  2 6  --      Results from last 7 days   Lab Units 01/10/21  0059   AST U/L 35   ALT U/L 43   ALK PHOS U/L 84   TOTAL PROTEIN g/dL 7 8   ALBUMIN g/dL 3 2*   TOTAL BILIRUBIN mg/dL 0 30   BILIRUBIN DIRECT mg/dL 0 11     Results from last 7 days   Lab Units 01/11/21  0536   POC GLUCOSE mg/dl 222*     Results from last 7 days   Lab Units 01/11/21  0536 01/10/21  0803 01/10/21  0059   GLUCOSE RANDOM mg/dL 248* 269* 181*         Results from last 7 days   Lab Units 01/10/21  0803   HEMOGLOBIN A1C % 11 0*   EAG mg/dl 269         Results from last 7 days   Lab Units 01/10/21  0342   CK TOTAL U/L 84     Results from last 7 days   Lab Units 01/10/21  0059   TROPONIN I ng/mL <0 02     Results from last 7 days   Lab Units 01/10/21  0342   D-DIMER QUANTITATIVE ug/ml FEU <0 27       Results from last 7 days   Lab Units 01/10/21  0342   PROCALCITONIN ng/ml <0 05     Results from last 7 days   Lab Units 01/10/21  0059   NT-PRO BNP pg/mL 70     Results from last 7 days   Lab Units 01/10/21  0342   FERRITIN ng/mL 176     Results from last 7 days   Lab Units 01/10/21  0342   HEP B S AG  Non-reactive   HEP C AB  Non-reactive   HEP B C IGM  Non-reactive   HEP B C TOTAL AB  Non-reactive     Results from last 7 days   Lab Units 01/10/21  0342   CRP mg/L 48 8*       Results from last 7 days   Lab Units 01/10/21  0058   INFLUENZA A PCR  Negative   INFLUENZA B PCR  Negative   RSV PCR  Negative       Vital Signs:   Time  Temp Pulse Resp  BP  MAP (mmHg)  SpO2  Calculated FIO2 (%) - Nasal Cannula  Nasal Cannula O2 Flow Rate (L/min)  O2 Device Patient Position - Orthostatic VS   01/11/21 14:16:02  98 5 °F (36 9 °C) 95 19  130/87  101  93 %  --  --  -- --   01/11/21 0900  -- -- --  --  --  95 %  28  2 L/min  Nasal cannula --   01/11/21 05:37:50  98 6 °F (37 °C) 80 --  102/62  75  93 %  --  --  -- --   01/10/21 22:18:45  98 3 °F (36 8 °C) 72 18  112/61  78  96 %  --  --  Nasal cannula Lying   01/10/21 22:18:25  98 3 °F (36 8 °C) 75 --  112/61  78  94 %  --  --  -- --   01/10/21 2100  -- -- --  --  --  --  28  2 L/min  Nasal cannula --   01/10/21 20:29:45  97 9 °F (36 6 °C) 74 18  123/70  88  97 %  --  -- -- --   01/10/21 18:19:30  98 4 °F (36 9 °C) 83 19  124/77  93  95 %  --  --  -- --   01/10/21 0815  -- 93 22  139/77  101  94 %  28  2 L/min  Nasal cannula Lying   01/10/21 0627  -- 81 26Abnormal   142/67  --  95 %  28  2 L/min  Nasal cannula Lying   01/10/21 0200  -- 101 26Abnormal   --  --  97 %  28  2 L/min  Nasal cannula --   01/10/21 0145  -- 103 26Abnormal   --  --  90 %  --  --  None (Room air) --   01/10/21 0028  99 7 °F (37 6 °C) -- --  --  --  --  --  --  -- --       Medications:   Scheduled Medications:  ascorbic acid, 1,000 mg, Oral, Q12H [facility]  cholecalciferol, 2,000 Units, Oral, Daily  dexamethasone, 6 mg, Intravenous, Q24H  enoxaparin, 40 mg, Subcutaneous, Daily  famotidine, 20 mg, Oral, BID  guaiFENesin, 600 mg, Oral, Q12H [facility]  melatonin, 6 mg, Oral, HS  zinc sulfate, 220 mg, Oral, Daily    Followed by  Papo Antunez ON 1/17/2021] multivitamin-minerals, 1 tablet, Oral, Daily  [START ON 1/12/2021] remdesivir, 100 mg, Intravenous, Q24H      Continuous IV Infusions:     PRN Meds:  acetaminophen, 975 mg, Oral, Q6H PRN  albuterol, 2 puff, Inhalation, Q4H PRN  calcium carbonate, 1,000 mg, Oral, Daily PRN  dextromethorphan-guaiFENesin, 10 mL, Oral, Q4H PRN  ondansetron, 4 mg, Intravenous, Q6H PRN        Discharge Plan: D  Network Utilization Review Department  ATTENTION: Please call with any questions or concerns to [phone] and carefully listen to the prompts so that you are directed to the right person  All voicemails are confidential   Mat Bicker all requests for admission clinical reviews, approved or denied determinations and any other requests to dedicated fax number below belonging to the campus where the patient is receiving treatment   List of dedicated fax numbers for the Facilities:  [address] DENIALS (Administrative/Medical Necessity) [phone]   [address] (Maternity/NICU/Pediatrics) [address] [place] [address],[facility] Floor [phone]   Iliana Cuevas [address]  [phone]   Jorge Esparza [address] ([address] [address] [place] [address] Kiana Galan [address] [phone]   [place] [id number] [phone]

## 2021-01-12 PROBLEM — E11.9 TYPE 2 DIABETES MELLITUS (HCC): Status: ACTIVE | Noted: 2021-01-12

## 2021-01-12 LAB
ANION GAP SERPL CALCULATED.3IONS-SCNC: 9 MMOL/L (ref 4–13)
BASOPHILS # BLD AUTO: 0.01 THOUSANDS/ΜL (ref 0–0.1)
BASOPHILS NFR BLD AUTO: 0 % (ref 0–1)
BUN SERPL-MCNC: 13 MG/DL (ref 5–25)
CALCIUM SERPL-MCNC: 8.4 MG/DL (ref 8.3–10.1)
CHLORIDE SERPL-SCNC: 103 MMOL/L (ref 100–108)
CO2 SERPL-SCNC: 26 MMOL/L (ref 21–32)
CREAT SERPL-MCNC: 0.73 MG/DL (ref 0.6–1.3)
D DIMER PPP FEU-MCNC: 0.36 UG/ML FEU
EOSINOPHIL # BLD AUTO: 0 THOUSAND/ΜL (ref 0–0.61)
EOSINOPHIL NFR BLD AUTO: 0 % (ref 0–6)
ERYTHROCYTE [DISTWIDTH] IN BLOOD BY AUTOMATED COUNT: 12.8 % (ref 11.6–15.1)
FERRITIN SERPL-MCNC: 143 NG/ML (ref 8–388)
GFR SERPL CREATININE-BSD FRML MDRD: 106 ML/MIN/1.73SQ M
GLUCOSE SERPL-MCNC: 148 MG/DL (ref 65–140)
GLUCOSE SERPL-MCNC: 163 MG/DL (ref 65–140)
GLUCOSE SERPL-MCNC: 169 MG/DL (ref 65–140)
GLUCOSE SERPL-MCNC: 197 MG/DL (ref 65–140)
GLUCOSE SERPL-MCNC: 263 MG/DL (ref 65–140)
GLUCOSE SERPL-MCNC: 321 MG/DL (ref 65–140)
HCT VFR BLD AUTO: 38.6 % (ref 34.8–46.1)
HGB BLD-MCNC: 12.8 G/DL (ref 11.5–15.4)
IMM GRANULOCYTES # BLD AUTO: 0.03 THOUSAND/UL (ref 0–0.2)
IMM GRANULOCYTES NFR BLD AUTO: 1 % (ref 0–2)
LYMPHOCYTES # BLD AUTO: 1.41 THOUSANDS/ΜL (ref 0.6–4.47)
LYMPHOCYTES NFR BLD AUTO: 35 % (ref 14–44)
MCH RBC QN AUTO: 30.8 PG (ref 26.8–34.3)
MCHC RBC AUTO-ENTMCNC: 33.2 G/DL (ref 31.4–37.4)
MCV RBC AUTO: 93 FL (ref 82–98)
MONOCYTES # BLD AUTO: 0.26 THOUSAND/ΜL (ref 0.17–1.22)
MONOCYTES NFR BLD AUTO: 6 % (ref 4–12)
NEUTROPHILS # BLD AUTO: 2.38 THOUSANDS/ΜL (ref 1.85–7.62)
NEUTS SEG NFR BLD AUTO: 58 % (ref 43–75)
NRBC BLD AUTO-RTO: 0 /100 WBCS
PLATELET # BLD AUTO: 202 THOUSANDS/UL (ref 149–390)
PMV BLD AUTO: 9.7 FL (ref 8.9–12.7)
POTASSIUM SERPL-SCNC: 2.9 MMOL/L (ref 3.5–5.3)
PROCALCITONIN SERPL-MCNC: <0.05 NG/ML
RBC # BLD AUTO: 4.15 MILLION/UL (ref 3.81–5.12)
SODIUM SERPL-SCNC: 138 MMOL/L (ref 136–145)
WBC # BLD AUTO: 4.09 THOUSAND/UL (ref 4.31–10.16)

## 2021-01-12 PROCEDURE — 99232 SBSQ HOSP IP/OBS MODERATE 35: CPT | Performed by: STUDENT IN AN ORGANIZED HEALTH CARE EDUCATION/TRAINING PROGRAM

## 2021-01-12 PROCEDURE — 82948 REAGENT STRIP/BLOOD GLUCOSE: CPT

## 2021-01-12 PROCEDURE — 85025 COMPLETE CBC W/AUTO DIFF WBC: CPT | Performed by: INTERNAL MEDICINE

## 2021-01-12 PROCEDURE — 85379 FIBRIN DEGRADATION QUANT: CPT | Performed by: INTERNAL MEDICINE

## 2021-01-12 PROCEDURE — 80048 BASIC METABOLIC PNL TOTAL CA: CPT | Performed by: INTERNAL MEDICINE

## 2021-01-12 PROCEDURE — 84145 PROCALCITONIN (PCT): CPT | Performed by: INTERNAL MEDICINE

## 2021-01-12 PROCEDURE — 82728 ASSAY OF FERRITIN: CPT | Performed by: INTERNAL MEDICINE

## 2021-01-12 RX ORDER — POTASSIUM CHLORIDE 14.9 MG/ML
20 INJECTION INTRAVENOUS ONCE
Status: COMPLETED | OUTPATIENT
Start: 2021-01-12 | End: 2021-01-12

## 2021-01-12 RX ORDER — POTASSIUM CHLORIDE 20 MEQ/1
40 TABLET, EXTENDED RELEASE ORAL 2 TIMES DAILY
Status: DISCONTINUED | OUTPATIENT
Start: 2021-01-12 | End: 2021-01-18 | Stop reason: HOSPADM

## 2021-01-12 RX ORDER — DIPHENHYDRAMINE HCL 25 MG
25 TABLET ORAL
Status: DISCONTINUED | OUTPATIENT
Start: 2021-01-12 | End: 2021-01-18 | Stop reason: HOSPADM

## 2021-01-12 RX ADMIN — GUAIFENESIN 600 MG: 600 TABLET ORAL at 21:14

## 2021-01-12 RX ADMIN — DIPHENHYDRAMINE HYDROCHLORIDE 25 MG: 25 TABLET ORAL at 23:30

## 2021-01-12 RX ADMIN — Medication 2000 UNITS: at 08:45

## 2021-01-12 RX ADMIN — OXYCODONE HYDROCHLORIDE AND ACETAMINOPHEN 1000 MG: 500 TABLET ORAL at 08:45

## 2021-01-12 RX ADMIN — GUAIFENESIN 600 MG: 600 TABLET ORAL at 08:45

## 2021-01-12 RX ADMIN — REMDESIVIR 100 MG: 100 INJECTION, POWDER, LYOPHILIZED, FOR SOLUTION INTRAVENOUS at 15:58

## 2021-01-12 RX ADMIN — ENOXAPARIN SODIUM 40 MG: 40 INJECTION SUBCUTANEOUS at 08:47

## 2021-01-12 RX ADMIN — INSULIN LISPRO 8 UNITS: 100 INJECTION, SOLUTION INTRAVENOUS; SUBCUTANEOUS at 18:01

## 2021-01-12 RX ADMIN — POTASSIUM CHLORIDE 20 MEQ: 14.9 INJECTION, SOLUTION INTRAVENOUS at 11:06

## 2021-01-12 RX ADMIN — OXYCODONE HYDROCHLORIDE AND ACETAMINOPHEN 1000 MG: 500 TABLET ORAL at 21:14

## 2021-01-12 RX ADMIN — ACETAMINOPHEN 975 MG: 325 TABLET, FILM COATED ORAL at 06:28

## 2021-01-12 RX ADMIN — DIPHENHYDRAMINE HYDROCHLORIDE 25 MG: 25 TABLET ORAL at 01:57

## 2021-01-12 RX ADMIN — INSULIN LISPRO 3 UNITS: 100 INJECTION, SOLUTION INTRAVENOUS; SUBCUTANEOUS at 18:00

## 2021-01-12 RX ADMIN — DEXAMETHASONE SODIUM PHOSPHATE 6 MG: 4 INJECTION, SOLUTION INTRAMUSCULAR; INTRAVENOUS at 11:14

## 2021-01-12 RX ADMIN — INSULIN LISPRO 1 UNITS: 100 INJECTION, SOLUTION INTRAVENOUS; SUBCUTANEOUS at 12:54

## 2021-01-12 RX ADMIN — POTASSIUM CHLORIDE 40 MEQ: 1500 TABLET, EXTENDED RELEASE ORAL at 11:06

## 2021-01-12 RX ADMIN — FAMOTIDINE 20 MG: 20 TABLET ORAL at 08:45

## 2021-01-12 RX ADMIN — INSULIN LISPRO 8 UNITS: 100 INJECTION, SOLUTION INTRAVENOUS; SUBCUTANEOUS at 12:55

## 2021-01-12 RX ADMIN — POTASSIUM CHLORIDE 40 MEQ: 1500 TABLET, EXTENDED RELEASE ORAL at 17:56

## 2021-01-12 RX ADMIN — FAMOTIDINE 20 MG: 20 TABLET ORAL at 17:56

## 2021-01-12 RX ADMIN — INSULIN LISPRO 8 UNITS: 100 INJECTION, SOLUTION INTRAVENOUS; SUBCUTANEOUS at 08:49

## 2021-01-12 RX ADMIN — GUAIFENESIN AND DEXTROMETHORPHAN 10 ML: 100; 10 SYRUP ORAL at 23:30

## 2021-01-12 RX ADMIN — ZINC SULFATE 220 MG (50 MG) CAPSULE 220 MG: CAPSULE at 08:45

## 2021-01-12 RX ADMIN — INSULIN LISPRO 2 UNITS: 100 INJECTION, SOLUTION INTRAVENOUS; SUBCUTANEOUS at 21:16

## 2021-01-12 RX ADMIN — INSULIN GLARGINE 24 UNITS: 100 INJECTION, SOLUTION SUBCUTANEOUS at 17:58

## 2021-01-12 RX ADMIN — INSULIN LISPRO 1 UNITS: 100 INJECTION, SOLUTION INTRAVENOUS; SUBCUTANEOUS at 03:55

## 2021-01-12 NOTE — ASSESSMENT & PLAN NOTE
· Reports productive cough and chest congestion x 2 days  Reports has had several episodes where she coughs until vomits phlegm  Denies SOB, but states "sometimes I just have a hard time breathing " (+) diarrhea  (+) decreased appetite  O2 sat 90% on RA after breathing treatment  (+) hx of mild asthma not on maintenance meds  · COVID (+) in ED  · CXR with hazy area to LLL  · Mild disease pathway initiated   Initial labs pending  · Respiratory protocol  · Incentive spirometry  · Mucinex BID  · Robitussin PRN  · Maintain precautions

## 2021-01-12 NOTE — PROGRESS NOTES
Progress Note - Vince Parker 1965, 60 y o  female MRN: [id number]    Unit/Bed#: -Srinivas Encounter: [id number]    Primary Care Provider: No primary care provider on file  Date and time admitted to hospital: 1/10/2021 12:22 AM    Type 2 diabetes mellitus [facility])  Assessment & Plan  Lab Results   Component Value Date    HGBA1C 11 0 (H) 01/10/2021       Recent Labs     01/11/21  2050 01/12/21  0112 01/12/21  0612 01/12/21  1118   POCGLU 225* 197* 148* 169*       Blood Sugar Average: Last 72 hrs:  (P) 223 [id number]     Poorly controlled diabetes mellitus with A1c of 11  Hyperglycemia in the setting of IV steroid use  Continue glargine 24 units daily and 8 units t i d  Lispro with meals, continue correctional scale insulin algorithm 3 add meals and 2 with bedtime  Acute hypoxemic respiratory failure due to COVID-19 [facility])  Assessment & Plan  Patient developed hypoxic respiratory failure due to COVID-19 pneumonia and his saturations had dropped to 88%  Oxygen-continue O2 via nasal cannula to maintain saturations of 88-92%  Zinc/vitamin-C /vitamin-D/statin/ famotidine for 7 days  Dexamethasone 6 mg IV once daily for 10 days-started on 01/11/2021  Remdesivir- for total of 5 doses started on 01/11/2021  Convalescent  plasma-hold off for now  Respiratory protocol-  Anticoagulation - lovenox SC ppx  Trend inflammatory markers, CMP while on remdesivir  Continue contact and airborne precautions        * Pneumonia due to COVID-19 virus  Assessment & Plan  · Reports productive cough and chest congestion x 2 days  Reports has had several episodes where she coughs until vomits phlegm  Denies SOB, but states "sometimes I just have a hard time breathing " (+) diarrhea  (+) decreased appetite  O2 sat 90% on RA after breathing treatment  (+) hx of mild asthma not on maintenance meds  · COVID (+) in ED  · CXR with hazy area to LLL  · Mild disease pathway initiated   Initial labs pending  · Respiratory protocol  · Incentive spirometry  · Mucinex BID  · Robitussin PRN  · Maintain precautions        VTE Pharmacologic Prophylaxis:   Pharmacologic: Enoxaparin (Lovenox)  Mechanical VTE Prophylaxis in Place: Yes      Education and Discussions with Family / Patient:  PATIENT REQUESTS A NOTE TO UPDATE AND THAT [facility]  Current Length of Stay: 1 day(s)    Current Patient Status: Inpatient     Discharge Plan / Estimated Discharge Date:  Pending improvement in hypoxia  Code Status: Level 1 - Full Code      Subjective:   Patient assessed at bedside  Patient reports she had a difficulty breathing overnight  She was febrile overnight with T-max of 101 1° F  Was able to ambulate minimally within the room from bed to bathroom  Feels winded easily  No chest pain  No cough today  No abdominal pain  Tolerating diet  Ambulating unassisted within the room  Objective:     Vitals:   Temp (24hrs), Av 2 °F (37 3 °C), Min:98 2 °F (36 8 °C), Max:101 6 °F (38 7 °C)    Temp:  [98 2 °F (36 8 °C)-101 6 °F (38 7 °C)] 98 8 °F (37 1 °C)  HR:  [83-93] 83  Resp:  [19-24] 24  BP: (113-133)/(67-86) 114/67  SpO2:  [89 %-96 %] 91 %  Body mass index is 38 25 kg/m²  Input and Output Summary (last 24 hours): Intake/Output Summary (Last 24 hours) at 2021 1549  Last data filed at 2021 0500  Gross per 24 hour   Intake 960 ml   Output --   Net 960 ml       Physical Exam:     Physical Exam    General:  Appears stated age,  female, resting in bed with O2 via nasal cannula  HEENT: atraumatic, PERRLA  Respiratory:  Decreased air entry bilaterally  Intermittent rales in the right more than the left  Cardiovascular: RRR, no murmur, Normal S1 and S2, no pedal edema    Abdomen: Soft, non-tender, non-distended, normal bowel sounds  Musculoskeletal: normal ROM in upper and lower extremities  Integumentary: warm, dry  Neurological: a/o x3  Psychiatric: cooperative     Additional Data: Labs:    Results from last 7 days   Lab Units 01/12/21  0754   WBC Thousand/uL 4 09*   HEMOGLOBIN g/dL 12 8   HEMATOCRIT % 38 6   PLATELETS Thousands/uL 202   NEUTROS PCT % 58   LYMPHS PCT % 35   MONOS PCT % 6   EOS PCT % 0     Results from last 7 days   Lab Units 01/12/21  0754  01/10/21  0059   POTASSIUM mmol/L 2 9*   < > 2 8*   CHLORIDE mmol/L 103   < > 100   CO2 mmol/L 26   < > 27   BUN mg/dL 13   < > 9   CREATININE mg/dL 0 73   < > 0 82   CALCIUM mg/dL 8 4   < > 8 3   ALK PHOS U/L  --   --  84   ALT U/L  --   --  43   AST U/L  --   --  35    < > = values in this interval not displayed  * I Have Reviewed All Lab Data Listed Above  * Additional Pertinent Lab Tests Reviewed:  Sophy Rico Admission Reviewed      Recent Cultures (last 7 days):           Last 24 Hours Medication List:   Current Facility-Administered Medications   Medication Dose Route Frequency Provider Last Rate    acetaminophen  975 mg Oral Q6H PRN Beryle Brittle, PA-C      albuterol  2 puff Inhalation Q4H PRN Akbar Reid MD      ascorbic acid  1,000 mg Oral Q12H [facility] Beryle Brittle, PA-C      calcium carbonate  1,000 mg Oral Daily PRN Beryle Brittle, PA-C      cholecalciferol  2,000 Units Oral Daily Beryle Brittle, [place]      dexamethasone  6 mg Intravenous Q24H Gena García MD      dextromethorphan-guaiFENesin  10 mL Oral Q4H PRN Beryle Brittle, PA-C      diphenhydrAMINE  25 mg Oral HS PRN Ava Eduardo PA-C      enoxaparin  40 mg Subcutaneous Daily Marty Salazar PA-C      famotidine  20 mg Oral BID Beryle Brittle, PA-C      guaiFENesin  600 mg Oral Q12H [facility] Beryle Brittle, PA-C      insulin glargine  24 Units Subcutaneous Daily With Kavya Patton MD      insulin lispro  1-5 Units Subcutaneous TID [facility] Gena García MD      insulin lispro  1-5 Units Subcutaneous HS MD Amanda Hammer insulin lispro  8 Units Subcutaneous TID With Meals Elio Lezama MD  zinc sulfate  220 mg Oral Daily Victorino Cali PA-C      Followed by   Tricia Cesar ON 1/17/2021] multivitamin-minerals  1 tablet Oral Daily Vitcorino Cali PA-C      ondansetron  4 mg Intravenous Q6H PRN Victorino Cali PA-C      potassium chloride  40 mEq Oral BID Fatoumata Wilson MD      remdesivir  100 mg Intravenous Q24H Chad Coffey MD          Today, Patient Was Seen By: Pippa Melvin MD    ** Please Note: This note has been constructed using a voice recognition system   **

## 2021-01-12 NOTE — ASSESSMENT & PLAN NOTE
Patient developed hypoxic respiratory failure due to COVID-19 pneumonia and his saturations had dropped to 88%  Oxygen-continue O2 via nasal cannula to maintain saturations of 88-92%  Zinc/vitamin-C /vitamin-D/statin/ famotidine for 7 days  Dexamethasone 6 mg IV once daily for 10 days-started on 01/11/2021  Remdesivir- for total of 5 doses started on 01/11/2021  Convalescent  plasma-hold off for now  Respiratory protocol-  Anticoagulation - lovenox SC ppx  Trend inflammatory markers, CMP while on remdesivir    Continue contact and airborne precautions

## 2021-01-12 NOTE — ASSESSMENT & PLAN NOTE
Lab Results   Component Value Date    HGBA1C 11 0 (H) 01/10/2021       Recent Labs     01/11/21 2050 01/12/21  0112 01/12/21  0612 01/12/21  1118   POCGLU 225* 197* 148* 169*       Blood Sugar Average: Last 72 hrs:  (P) 223 [id number]     Poorly controlled diabetes mellitus with A1c of 11  Hyperglycemia in the setting of IV steroid use  Continue glargine 24 units daily and 8 units t i d  Lispro with meals, continue correctional scale insulin algorithm 3 add meals and 2 with bedtime

## 2021-01-13 LAB
ALBUMIN SERPL BCP-MCNC: 2.5 G/DL (ref 3.5–5)
ALP SERPL-CCNC: 75 U/L (ref 46–116)
ALT SERPL W P-5'-P-CCNC: 39 U/L (ref 12–78)
ANION GAP SERPL CALCULATED.3IONS-SCNC: 8 MMOL/L (ref 4–13)
AST SERPL W P-5'-P-CCNC: 34 U/L (ref 5–45)
BILIRUB SERPL-MCNC: 0.2 MG/DL (ref 0.2–1)
BUN SERPL-MCNC: 12 MG/DL (ref 5–25)
CALCIUM ALBUM COR SERPL-MCNC: 9.8 MG/DL (ref 8.3–10.1)
CALCIUM SERPL-MCNC: 8.6 MG/DL (ref 8.3–10.1)
CHLORIDE SERPL-SCNC: 107 MMOL/L (ref 100–108)
CO2 SERPL-SCNC: 26 MMOL/L (ref 21–32)
CREAT SERPL-MCNC: 0.69 MG/DL (ref 0.6–1.3)
GFR SERPL CREATININE-BSD FRML MDRD: 112 ML/MIN/1.73SQ M
GLUCOSE SERPL-MCNC: 139 MG/DL (ref 65–140)
GLUCOSE SERPL-MCNC: 206 MG/DL (ref 65–140)
GLUCOSE SERPL-MCNC: 227 MG/DL (ref 65–140)
GLUCOSE SERPL-MCNC: 289 MG/DL (ref 65–140)
GLUCOSE SERPL-MCNC: 310 MG/DL (ref 65–140)
POTASSIUM SERPL-SCNC: 3.4 MMOL/L (ref 3.5–5.3)
PROCALCITONIN SERPL-MCNC: <0.05 NG/ML
PROT SERPL-MCNC: 6.9 G/DL (ref 6.4–8.2)
SODIUM SERPL-SCNC: 141 MMOL/L (ref 136–145)

## 2021-01-13 PROCEDURE — 99232 SBSQ HOSP IP/OBS MODERATE 35: CPT | Performed by: STUDENT IN AN ORGANIZED HEALTH CARE EDUCATION/TRAINING PROGRAM

## 2021-01-13 PROCEDURE — 84145 PROCALCITONIN (PCT): CPT | Performed by: INTERNAL MEDICINE

## 2021-01-13 PROCEDURE — 80053 COMPREHEN METABOLIC PANEL: CPT | Performed by: INTERNAL MEDICINE

## 2021-01-13 PROCEDURE — 82948 REAGENT STRIP/BLOOD GLUCOSE: CPT

## 2021-01-13 RX ORDER — INSULIN LISPRO 100 [IU]/ML
5 INJECTION, SOLUTION INTRAVENOUS; SUBCUTANEOUS
Qty: 5 PEN | Refills: 0 | Status: SHIPPED | OUTPATIENT
Start: 2021-01-13 | End: 2021-01-15 | Stop reason: SDUPTHER

## 2021-01-13 RX ORDER — GLUCOSAMINE HCL/CHONDROITIN SU 500-400 MG
CAPSULE ORAL 4 TIMES DAILY
Qty: 120 EACH | Refills: 2 | Status: SHIPPED | OUTPATIENT
Start: 2021-01-13 | End: 2021-01-18 | Stop reason: SDUPTHER

## 2021-01-13 RX ORDER — INSULIN GLARGINE 100 [IU]/ML
28 INJECTION, SOLUTION SUBCUTANEOUS
Status: DISCONTINUED | OUTPATIENT
Start: 2021-01-13 | End: 2021-01-15

## 2021-01-13 RX ORDER — POLYVINYL ALCOHOL 14 MG/ML
1 SOLUTION/ DROPS OPHTHALMIC
Status: DISCONTINUED | OUTPATIENT
Start: 2021-01-13 | End: 2021-01-18 | Stop reason: HOSPADM

## 2021-01-13 RX ORDER — INSULIN GLARGINE 100 [IU]/ML
25 INJECTION, SOLUTION SUBCUTANEOUS
Qty: 5 PEN | Refills: 0 | Status: SHIPPED | OUTPATIENT
Start: 2021-01-13 | End: 2021-01-15 | Stop reason: SDUPTHER

## 2021-01-13 RX ADMIN — DEXAMETHASONE SODIUM PHOSPHATE 6 MG: 4 INJECTION, SOLUTION INTRAMUSCULAR; INTRAVENOUS at 09:50

## 2021-01-13 RX ADMIN — GUAIFENESIN 600 MG: 600 TABLET ORAL at 21:18

## 2021-01-13 RX ADMIN — GUAIFENESIN 600 MG: 600 TABLET ORAL at 09:44

## 2021-01-13 RX ADMIN — ZINC SULFATE 220 MG (50 MG) CAPSULE 220 MG: CAPSULE at 09:44

## 2021-01-13 RX ADMIN — INSULIN GLARGINE 28 UNITS: 100 INJECTION, SOLUTION SUBCUTANEOUS at 21:18

## 2021-01-13 RX ADMIN — FAMOTIDINE 20 MG: 20 TABLET ORAL at 18:04

## 2021-01-13 RX ADMIN — INSULIN LISPRO 3 UNITS: 100 INJECTION, SOLUTION INTRAVENOUS; SUBCUTANEOUS at 21:18

## 2021-01-13 RX ADMIN — POTASSIUM CHLORIDE 40 MEQ: 1500 TABLET, EXTENDED RELEASE ORAL at 09:44

## 2021-01-13 RX ADMIN — POLYVINYL ALCOHOL 1 DROP: 14 SOLUTION/ DROPS OPHTHALMIC at 22:45

## 2021-01-13 RX ADMIN — OXYCODONE HYDROCHLORIDE AND ACETAMINOPHEN 1000 MG: 500 TABLET ORAL at 09:44

## 2021-01-13 RX ADMIN — Medication 2000 UNITS: at 09:44

## 2021-01-13 RX ADMIN — OXYCODONE HYDROCHLORIDE AND ACETAMINOPHEN 1000 MG: 500 TABLET ORAL at 21:18

## 2021-01-13 RX ADMIN — ENOXAPARIN SODIUM 40 MG: 40 INJECTION SUBCUTANEOUS at 09:55

## 2021-01-13 RX ADMIN — REMDESIVIR 100 MG: 100 INJECTION, POWDER, LYOPHILIZED, FOR SOLUTION INTRAVENOUS at 13:50

## 2021-01-13 RX ADMIN — INSULIN LISPRO 1 UNITS: 100 INJECTION, SOLUTION INTRAVENOUS; SUBCUTANEOUS at 09:45

## 2021-01-13 RX ADMIN — DIPHENHYDRAMINE HYDROCHLORIDE 25 MG: 25 TABLET ORAL at 22:45

## 2021-01-13 RX ADMIN — FAMOTIDINE 20 MG: 20 TABLET ORAL at 09:44

## 2021-01-13 RX ADMIN — POTASSIUM CHLORIDE 40 MEQ: 1500 TABLET, EXTENDED RELEASE ORAL at 18:04

## 2021-01-13 RX ADMIN — INSULIN LISPRO 2 UNITS: 100 INJECTION, SOLUTION INTRAVENOUS; SUBCUTANEOUS at 18:07

## 2021-01-13 RX ADMIN — GUAIFENESIN AND DEXTROMETHORPHAN 10 ML: 100; 10 SYRUP ORAL at 15:24

## 2021-01-13 RX ADMIN — GUAIFENESIN AND DEXTROMETHORPHAN 10 ML: 100; 10 SYRUP ORAL at 22:45

## 2021-01-13 NOTE — ASSESSMENT & PLAN NOTE
Likely secondary to diarrhea, which seems to be much better controlled  Persists, with a potassium today of 2 9     - Continue potassium supplementation  - monitor BMP

## 2021-01-13 NOTE — CASE MANAGEMENT
[facility] with [facility] to call [facility] regarding the possible [facility] and O2 need on [phone]    Awaiting call back

## 2021-01-13 NOTE — CASE MANAGEMENT
CM was directed to call the [place] through the [facility] in [facility] where this pt is [phone]  Pt's PCP through the [place] is Dr Loren Velasco ([email or web address])  CM had put a referral in for [facility] to see pt and received message that the [place] PCP had to be contacted if pt wanted the [place] to pay for VNA services  CM spoke to [facility] who will put the message into Dr Loren Velasco that pt is a tentative d/c for tomorrow and will need VNA services and possible home O2  CM will await call back

## 2021-01-13 NOTE — PLAN OF CARE
Problem: Potential for Falls  Goal: Patient will remain free of falls  Description: INTERVENTIONS:  - Assess patient frequently for physical needs  -  Identify cognitive and physical deficits and behaviors that affect risk of falls    -  [place] fall precautions as indicated by assessment   - Educate patient/family on patient safety including physical limitations  - Instruct patient to call for assistance with activity based on assessment  - Modify environment to reduce risk of injury  - Consider OT/PT consult to assist with strengthening/mobility  Outcome: Progressing     Problem: RESPIRATORY - ADULT  Goal: Achieves optimal ventilation and oxygenation  Description: INTERVENTIONS:  - Assess for changes in respiratory status  - Assess for changes in mentation and behavior  - Position to facilitate oxygenation and minimize respiratory effort  - Oxygen administered by appropriate delivery if ordered  - Initiate smoking cessation education as indicated  - Encourage broncho-pulmonary hygiene including cough, deep breathe, Incentive Spirometry  - Assess the need for suctioning and aspirate as needed  - Assess and instruct to report SOB or any respiratory difficulty  - Respiratory Therapy support as indicated  Outcome: Progressing     Problem: METABOLIC, FLUID AND ELECTROLYTES - ADULT  Goal: Electrolytes maintained within normal limits  Description: INTERVENTIONS:  - Monitor labs and assess patient for signs and symptoms of electrolyte imbalances  - Administer electrolyte replacement as ordered  - Monitor response to electrolyte replacements, including repeat lab results as appropriate  - Instruct patient on fluid and nutrition as appropriate  Outcome: Progressing  Goal: Fluid balance maintained  Description: INTERVENTIONS:  - Monitor labs   - Monitor I/O and WT  - Instruct patient on fluid and nutrition as appropriate  - Assess for signs & symptoms of volume excess or deficit  Outcome: Progressing  Goal: Glucose maintained within target range  Description: INTERVENTIONS:  - Monitor Blood Glucose as ordered  - Assess for signs and symptoms of hyperglycemia and hypoglycemia  - Administer ordered medications to maintain glucose within target range  - Assess nutritional intake and initiate nutrition service referral as needed  Outcome: Progressing     Problem: Nutrition/Hydration-ADULT  Goal: Nutrient/Hydration intake appropriate for improving, restoring or maintaining nutritional needs  Description: Monitor and assess patient's nutrition/hydration status for malnutrition  Collaborate with interdisciplinary team and initiate plan and interventions as ordered  Monitor patient's weight and dietary intake as ordered or per policy  Utilize nutrition screening tool and intervene as necessary  Determine patient's food preferences and provide high-protein, high-caloric foods as appropriate       INTERVENTIONS:  - Monitor oral intake, urinary output, labs, and treatment plans  - Assess nutrition and hydration status and recommend course of action  - Evaluate amount of meals eaten  - Assist patient with eating if necessary   - Allow adequate time for meals  - Recommend/ encourage appropriate diets, oral nutritional supplements, and vitamin/mineral supplements  - Order, calculate, and assess calorie counts as needed  - Recommend, monitor, and adjust tube feedings based on assessed needs  - Assess need for intravenous fluids  - Provide  nutrition/hydration education as appropriate  - Include patient/family/caregiver in decisions related to nutrition  Outcome: Progressing

## 2021-01-13 NOTE — CASE MANAGEMENT
CM spoke to Dr Zackery Cortez via phone and she will put in a referrals to VNA ([facility]) and she will also contact pt on discharge and make sure she f/u with pulmonary at the [facility]  She states that if O2 needs to be ordered, [facility] uses [facility] out of [place]  She was not able to provide any additional info

## 2021-01-13 NOTE — ASSESSMENT & PLAN NOTE
Patient admitted with secondary pneumonia as result of COVID-19 infection  Patient status post antibiotic therapy with doxycycline ceftriaxone a significant improvement on moderate COVID pathway  No respiratory distress reported patient saturating around 93%  1 L of oxygen via nasal cannula  Patient remains afebrile and white blood cell count within normal limits  Patient did however indicated episode of desaturations on attempt to ambulate the restroom; reports that her O2 saturations dropped to  82%  - Continue to monitor vitals, O2 saturations  - Patient currently receiving supplemental oxygen at 1 L/min by nasal cannula  Obtain home O2 evaluation prior to discharge  - Patient on day 3 of remdesivir  Will continue until time of discharge  - Continue dexamethasone 6 mg IV acute 24 hours; will discharge on a tapering dose of prednisone   - Continue VTE prophylaxis with enoxaparin   - Continue vitamins C, D and famotidine

## 2021-01-13 NOTE — ASSESSMENT & PLAN NOTE
Respiratory status stable  Currently on 1 L of oxygen per minute via nasal cannula and saturating at 90%; reference assessment and plan for pneumonia due to COVID-19 virus

## 2021-01-13 NOTE — ASSESSMENT & PLAN NOTE
Lab Results   Component Value Date    HGBA1C 11 0 (H) 01/10/2021       Recent Labs     01/12/21  1700 01/12/21  2048 01/13/21  0558 01/13/21  1141   POCGLU 321* 263* 206* 139       Blood Sugar Average: Last 72 hrs:  (P) 229 [id number]     Patient has history of type 2 diabetes mellitus most recent A1c of 11  Patient currently on IV steroids for COVID-19 treatment, which predispose the patient to hyperglycemia  Patient switched to insulin basal-bolus plus sliding-scale correction on admission, with her preadmission metformin held  Discussion about uncontrolled DM type 2 and insulin requirement was conducted with patient   Patient consents for continuation of insulin regimen on d/c     - Given patient's elevated hemoglobin A1c and concurrent use of steroids patient will be discharged with insulin regimen, in addition to restarting her metformin 500 mg p o  B i d   - Provide insulin-dependent diabetic education  - Continue basal bolus regimen and hospital   - Monitor POC glucose a c  and HS

## 2021-01-13 NOTE — QUICK NOTE
Patient's son Lilia Quiroga) was contacted today and an update was provided with regards to Ms Roberts medical status and informed of potential discharge tomorrow; All questions and concerns were  addressed at this time

## 2021-01-14 LAB
ALBUMIN SERPL BCP-MCNC: 2.5 G/DL (ref 3.5–5)
ALP SERPL-CCNC: 79 U/L (ref 46–116)
ALT SERPL W P-5'-P-CCNC: 42 U/L (ref 12–78)
ANION GAP SERPL CALCULATED.3IONS-SCNC: 8 MMOL/L (ref 4–13)
AST SERPL W P-5'-P-CCNC: 38 U/L (ref 5–45)
BASOPHILS # BLD AUTO: 0.02 THOUSANDS/ΜL (ref 0–0.1)
BASOPHILS NFR BLD AUTO: 0 % (ref 0–1)
BILIRUB SERPL-MCNC: 0.2 MG/DL (ref 0.2–1)
BUN SERPL-MCNC: 14 MG/DL (ref 5–25)
CALCIUM ALBUM COR SERPL-MCNC: 9.9 MG/DL (ref 8.3–10.1)
CALCIUM SERPL-MCNC: 8.7 MG/DL (ref 8.3–10.1)
CHLORIDE SERPL-SCNC: 106 MMOL/L (ref 100–108)
CO2 SERPL-SCNC: 26 MMOL/L (ref 21–32)
CREAT SERPL-MCNC: 0.71 MG/DL (ref 0.6–1.3)
EOSINOPHIL # BLD AUTO: 0.02 THOUSAND/ΜL (ref 0–0.61)
EOSINOPHIL NFR BLD AUTO: 0 % (ref 0–6)
ERYTHROCYTE [DISTWIDTH] IN BLOOD BY AUTOMATED COUNT: 12.8 % (ref 11.6–15.1)
GFR SERPL CREATININE-BSD FRML MDRD: 109 ML/MIN/1.73SQ M
GLUCOSE SERPL-MCNC: 207 MG/DL (ref 65–140)
GLUCOSE SERPL-MCNC: 209 MG/DL (ref 65–140)
GLUCOSE SERPL-MCNC: 233 MG/DL (ref 65–140)
GLUCOSE SERPL-MCNC: 329 MG/DL (ref 65–140)
GLUCOSE SERPL-MCNC: 382 MG/DL (ref 65–140)
HCT VFR BLD AUTO: 39.7 % (ref 34.8–46.1)
HGB BLD-MCNC: 13.2 G/DL (ref 11.5–15.4)
IMM GRANULOCYTES # BLD AUTO: 0.05 THOUSAND/UL (ref 0–0.2)
IMM GRANULOCYTES NFR BLD AUTO: 1 % (ref 0–2)
LYMPHOCYTES # BLD AUTO: 2.18 THOUSANDS/ΜL (ref 0.6–4.47)
LYMPHOCYTES NFR BLD AUTO: 48 % (ref 14–44)
MCH RBC QN AUTO: 31.3 PG (ref 26.8–34.3)
MCHC RBC AUTO-ENTMCNC: 33.2 G/DL (ref 31.4–37.4)
MCV RBC AUTO: 94 FL (ref 82–98)
MONOCYTES # BLD AUTO: 0.3 THOUSAND/ΜL (ref 0.17–1.22)
MONOCYTES NFR BLD AUTO: 7 % (ref 4–12)
NEUTROPHILS # BLD AUTO: 2.05 THOUSANDS/ΜL (ref 1.85–7.62)
NEUTS SEG NFR BLD AUTO: 44 % (ref 43–75)
NRBC BLD AUTO-RTO: 0 /100 WBCS
PLATELET # BLD AUTO: 230 THOUSANDS/UL (ref 149–390)
PMV BLD AUTO: 9.5 FL (ref 8.9–12.7)
POTASSIUM SERPL-SCNC: 3.6 MMOL/L (ref 3.5–5.3)
PROT SERPL-MCNC: 6.9 G/DL (ref 6.4–8.2)
RBC # BLD AUTO: 4.22 MILLION/UL (ref 3.81–5.12)
SODIUM SERPL-SCNC: 140 MMOL/L (ref 136–145)
WBC # BLD AUTO: 4.62 THOUSAND/UL (ref 4.31–10.16)

## 2021-01-14 PROCEDURE — 82948 REAGENT STRIP/BLOOD GLUCOSE: CPT

## 2021-01-14 PROCEDURE — 94762 N-INVAS EAR/PLS OXIMTRY CONT: CPT

## 2021-01-14 PROCEDURE — 80053 COMPREHEN METABOLIC PANEL: CPT | Performed by: INTERNAL MEDICINE

## 2021-01-14 PROCEDURE — 99232 SBSQ HOSP IP/OBS MODERATE 35: CPT | Performed by: STUDENT IN AN ORGANIZED HEALTH CARE EDUCATION/TRAINING PROGRAM

## 2021-01-14 PROCEDURE — 85025 COMPLETE CBC W/AUTO DIFF WBC: CPT | Performed by: INTERNAL MEDICINE

## 2021-01-14 RX ADMIN — OXYCODONE HYDROCHLORIDE AND ACETAMINOPHEN 1000 MG: 500 TABLET ORAL at 21:41

## 2021-01-14 RX ADMIN — OXYCODONE HYDROCHLORIDE AND ACETAMINOPHEN 1000 MG: 500 TABLET ORAL at 09:35

## 2021-01-14 RX ADMIN — FAMOTIDINE 20 MG: 20 TABLET ORAL at 09:35

## 2021-01-14 RX ADMIN — GUAIFENESIN 600 MG: 600 TABLET ORAL at 21:41

## 2021-01-14 RX ADMIN — INSULIN GLARGINE 28 UNITS: 100 INJECTION, SOLUTION SUBCUTANEOUS at 21:42

## 2021-01-14 RX ADMIN — GUAIFENESIN 600 MG: 600 TABLET ORAL at 09:35

## 2021-01-14 RX ADMIN — ZINC SULFATE 220 MG (50 MG) CAPSULE 220 MG: CAPSULE at 09:35

## 2021-01-14 RX ADMIN — INSULIN LISPRO 4 UNITS: 100 INJECTION, SOLUTION INTRAVENOUS; SUBCUTANEOUS at 17:23

## 2021-01-14 RX ADMIN — POTASSIUM CHLORIDE 40 MEQ: 1500 TABLET, EXTENDED RELEASE ORAL at 09:35

## 2021-01-14 RX ADMIN — INSULIN LISPRO 3 UNITS: 100 INJECTION, SOLUTION INTRAVENOUS; SUBCUTANEOUS at 21:42

## 2021-01-14 RX ADMIN — FAMOTIDINE 20 MG: 20 TABLET ORAL at 17:22

## 2021-01-14 RX ADMIN — REMDESIVIR 100 MG: 100 INJECTION, POWDER, LYOPHILIZED, FOR SOLUTION INTRAVENOUS at 12:26

## 2021-01-14 RX ADMIN — INSULIN LISPRO 1 UNITS: 100 INJECTION, SOLUTION INTRAVENOUS; SUBCUTANEOUS at 12:27

## 2021-01-14 RX ADMIN — DEXAMETHASONE SODIUM PHOSPHATE 6 MG: 4 INJECTION, SOLUTION INTRAMUSCULAR; INTRAVENOUS at 09:35

## 2021-01-14 RX ADMIN — POTASSIUM CHLORIDE 40 MEQ: 1500 TABLET, EXTENDED RELEASE ORAL at 17:22

## 2021-01-14 RX ADMIN — INSULIN LISPRO 1 UNITS: 100 INJECTION, SOLUTION INTRAVENOUS; SUBCUTANEOUS at 09:36

## 2021-01-14 RX ADMIN — ENOXAPARIN SODIUM 40 MG: 40 INJECTION SUBCUTANEOUS at 09:35

## 2021-01-14 RX ADMIN — DIPHENHYDRAMINE HYDROCHLORIDE 25 MG: 25 TABLET ORAL at 21:51

## 2021-01-14 RX ADMIN — Medication 2000 UNITS: at 09:35

## 2021-01-14 RX ADMIN — GUAIFENESIN AND DEXTROMETHORPHAN 10 ML: 100; 10 SYRUP ORAL at 21:51

## 2021-01-14 NOTE — ASSESSMENT & PLAN NOTE
Patient admitted with secondary pneumonia as result of COVID-19 infection  Patient status post antibiotic therapy with doxycycline ceftriaxone a significant improvement on moderate COVID pathway  No respiratory distress reported  patient saturating between 90-95% on  1 5 L of oxygen via nasal cannula  Patient remains afebrile and white blood cell count within normal limits  Indicates some dyspnea on today's encounter and patient noticed that she was desaturating when ambulating, in addition to cough  Mild expiratory wheezes appreciated and decreased air movement on PEx  Patient has a history of asthma, however had not received prn albuterol inhaler in 3 days  Home oxygen evaluation indicated patient will need 2L/min oxygen on discharge  Patient is with the [place] and will get meds and therapy through [place] on d/c     - F/u with case management about home oxygen distribution from the [place] in anticipation of d/c   - Provide albuterol prn and robitussin prn for cough and wheezing   - Continue to monitor vitals, O2 saturations  - Patient on day 4/5 of remdesivir    - Continue dexamethasone 6 mg IV acute 24 hours; will discharge on a tapering dose of prednisone   - Continue VTE prophylaxis with enoxaparin   - Continue vitamins C, D and famotidine

## 2021-01-14 NOTE — ASSESSMENT & PLAN NOTE
Likely secondary to diarrhea  Patient states that very high resolved  Potassium improved with a potassium today of 3 4, increased from 2 9 yesterday  - Continue potassium supplementation with potassium chloride 40 mEq b i d   - monitor BMP

## 2021-01-14 NOTE — QUICK NOTE
Patients' son Bianca Rojas was contacted today, by this author, and updated with regards to his mother's medical status  All questions and concerns were answered at this time

## 2021-01-14 NOTE — ASSESSMENT & PLAN NOTE
Mild dyspnea on exertion but in no overt respiratory distress  Currently on 1 5 L of oxygen per minute via nasal cannula and saturating at 90%-95%; reference assessment and plan for pneumonia due to COVID-19 virus

## 2021-01-14 NOTE — RESPIRATORY THERAPY NOTE
Home Oxygen Qualifying Test       Patient name: oCreen Mars        : 1965   Date of Test:  2021  Diagnosis: covid     Home Oxygen Test:    **Medicare Guidelines require item(s) 1-5 on all ambulatory patients or 1 and 2 on non-ambulatory patients  1   Baseline SPO2 on Room Air at rest 90 %  2   SPO2 during exercise on Room Air 86 %  During exercise monitor SpO2  If SPO2 increases >=89% with ambulation do not add supplemental             oxygen  If <= 88% on room air add O2 via NC and titrate patient  Patient must be ambulated with O2 and titrated to > 88% with exertion  3   SPO2 on Oxygen at rest 94 % 2 lpm     4   SPO2 during exercise on Oxygen  88% a liter flow of 2 lpm     5   Exercise performed:          walking, duration 5 (min)          [x]  Supplemental Home Oxygen is indicated  []  Client does not qualify for home oxygen  Respiratory Additional Notes- Spo2 drops to 86% during walk on room air  2LNC placed on patient  Patient maintain an spo2 of 88% or better for duration of walk       Josef Morton, RT

## 2021-01-14 NOTE — ASSESSMENT & PLAN NOTE
Lab Results   Component Value Date    HGBA1C 11 0 (H) 01/10/2021       Recent Labs     01/13/21  1548 01/13/21  2037 01/14/21  0614 01/14/21  1107   POCGLU 289* 310* 207* 209*       Blood Sugar Average: Last 72 hrs:  (P) 149 [id number]     Patient has history of type 2 diabetes mellitus most recent A1c of 11  Patient currently on IV steroids for COVID-19 treatment, which predispose the patient to hyperglycemia  Patient switched to insulin basal-bolus plus sliding-scale correction on admission, with her preadmission metformin held  Discussion about uncontrolled DM type 2 and insulin requirement was conducted with patient  Patient consents for continuation of insulin regimen on d/c       - Follow-up with case management regards to availability for coverage of insulin pen from the [facility] in preparation for discharge    - Given patient's elevated hemoglobin A1c and concurrent use of steroids patient will be discharged with insulin regimen, in addition to restarting her metformin 500 mg p o  B i d   - Provide insulin-dependent diabetic education  - Continue basal bolus regimen and hospital; currently receiving glargine 28 units HS + lispro 15 units t i d  A c  + sliding scale correction  - Monitor POC glucose a c  and HS

## 2021-01-14 NOTE — PROGRESS NOTES
Progress Note - Pasha Solid 1965, 60 y o  female MRN: [id number]    Unit/Bed#: -01 Encounter: [id number]    Primary Care Provider: No primary care provider on file  Date and time admitted to hospital: 1/10/2021 12:22 AM        * Pneumonia due to COVID-19 virus  Assessment & Plan  Patient admitted with secondary pneumonia as result of COVID-19 infection  Patient status post antibiotic therapy with doxycycline ceftriaxone a significant improvement on moderate COVID pathway  No respiratory distress reported  patient saturating between 90-95% on  1 5 L of oxygen via nasal cannula  Patient remains afebrile and white blood cell count within normal limits  Indicates some dyspnea on today's encounter and patient noticed that she was desaturating when ambulating, in addition to cough  Mild expiratory wheezes appreciated and decreased air movement on PEx  Patient has a history of asthma, however had not received prn albuterol inhaler in 3 days  Home oxygen evaluation indicated patient will need 2L/min oxygen on discharge  Patient is with the [place] and will get meds and therapy through [place] on d/c     - F/u with case management about home oxygen distribution from the [place] in anticipation of d/c   - Provide albuterol prn and robitussin prn for cough and wheezing   - Continue to monitor vitals, O2 saturations  - Patient on day 4/5 of remdesivir    - Continue dexamethasone 6 mg IV acute 24 hours; will discharge on a tapering dose of prednisone   - Continue VTE prophylaxis with enoxaparin   - Continue vitamins C, D and famotidine  Acute hypoxemic respiratory failure due to COVID-19 [facility])  Assessment & Plan  Mild dyspnea on exertion but in no overt respiratory distress  Currently on 1 5 L of oxygen per minute via nasal cannula and saturating at 90%-95%; reference assessment and plan for pneumonia due to COVID-19 virus      Type 2 diabetes mellitus [facility])  Assessment & Plan  Lab Results   Component Value Date HGBA1C 11 0 (H) 01/10/2021       Recent Labs     01/13/21  1548 01/13/21  2037 01/14/21  0614 01/14/21  1107   POCGLU 289* 310* 207* 209*       Blood Sugar Average: Last 72 hrs:  (P) 749 [id number]     Patient has history of type 2 diabetes mellitus most recent A1c of 11  Patient currently on IV steroids for COVID-19 treatment, which predispose the patient to hyperglycemia  Patient switched to insulin basal-bolus plus sliding-scale correction on admission, with her preadmission metformin held  Discussion about uncontrolled DM type 2 and insulin requirement was conducted with patient  Patient consents for continuation of insulin regimen on d/c       - Follow-up with case management regards to availability for coverage of insulin pen from the [facility] in preparation for discharge  - Given patient's elevated hemoglobin A1c and concurrent use of steroids patient will be discharged with insulin regimen, in addition to restarting her metformin 500 mg p o  B i d   - Provide insulin-dependent diabetic education  - Continue basal bolus regimen and hospital; currently receiving glargine 28 units HS + lispro 15 units t i d  A c  + sliding scale correction  - Monitor POC glucose a c  and HS    Elevated glucose  Assessment & Plan  Reference assessment and plan for type 2 diabetes mellitus  Hypokalemia  Assessment & Plan  Likely secondary to diarrhea  Patient states that very high resolved  Potassium improved with a potassium today of 3 4, increased from 2 9 yesterday  - Continue potassium supplementation with potassium chloride 40 mEq b i d   - monitor BMP  VTE Pharmacologic Prophylaxis:   Pharmacologic: Enoxaparin (Lovenox)  Mechanical VTE Prophylaxis in Place: Yes    Patient Centered Rounds: I have performed bedside rounds with nursing staff today  Discussions with Specialists or Other Care Team Provider:  Case management input appreciated      Education and Discussions with Family / Patient: None     Time Spent for Care: 45 minutes  More than 50% of total time spent on counseling and coordination of care as described above  Current Length of Stay: 3 day(s)    Current Patient Status: Inpatient   Certification Statement: The patient will continue to require additional inpatient hospital stay due to Management of COVID-19 infection and authorization for necessary post-discharge therapeutics  Discharge Plan / Estimated Discharge Date:  As above /estimated discharge date on or before 2021  Code Status: Level 1 - Full Code      Subjective:   Patient interviewed at the bedside necessary airborne precuations taken  Patient states that she he has some shortness of breath while trying to ambulate earlier which was mild, and noticed that she desaturated to the mid 80s  At the time of the encounter patient reported mild shortness of breath, but indicated no respiratory distress  Patient states she has a cough  Denied any fevers, chills, chest pain, palpitations, headaches, dizziness or generalized weakness  Patient provided fax number for her  [facility] doctor, Dr Kena Morales requested that her discharge summary be sent to her PCP [phone]  Objective:     Vitals:   Temp (24hrs), Av 1 °F (36 7 °C), Min:97 2 °F (36 2 °C), Max:98 5 °F (36 9 °C)    Temp:  [97 2 °F (36 2 °C)-98 5 °F (36 9 °C)] 98 1 °F (36 7 °C)  HR:  [68-79] 79  BP: (114-131)/(64-75) 123/74  SpO2:  [88 %-95 %] 90 %  Body mass index is 38 25 kg/m²  Input and Output Summary (last 24 hours): Intake/Output Summary (Last 24 hours) at 2021 1528  Last data filed at 2021 2201  Gross per 24 hour   Intake 840 ml   Output --   Net 840 ml       Physical Exam:     Physical Exam  Constitutional:       General: She is not in acute distress  Appearance: Normal appearance  She is obese  She is not toxic-appearing  HENT:      Head: Normocephalic and atraumatic        Nose:      Comments: Nasal cannula in place  delivering oxygen at 1 5 L/ min  Mouth/Throat:      Mouth: Mucous membranes are moist    Eyes:      General: No scleral icterus  Extraocular Movements: Extraocular movements intact  Neck:      Musculoskeletal: Neck supple  Cardiovascular:      Rate and Rhythm: Normal rate and regular rhythm  Heart sounds: S1 normal and S2 normal    Pulmonary:      Effort: No tachypnea, accessory muscle usage, prolonged expiration, respiratory distress or retractions  Breath sounds: Decreased air movement present  No stridor  Examination of the right-middle field reveals wheezing  Examination of the left-middle field reveals wheezing  Examination of the right-lower field reveals wheezing  Examination of the left-lower field reveals wheezing  Wheezing present  Abdominal:      Palpations: Abdomen is soft  Musculoskeletal: Normal range of motion  Skin:     General: Skin is dry  Neurological:      Mental Status: She is alert and oriented to person, place, and time  Psychiatric:         Mood and Affect: Mood normal          Behavior: Behavior normal          Additional Data:     Labs:    Results from last 7 days   Lab Units 01/14/21  0703   WBC Thousand/uL 4 62   HEMOGLOBIN g/dL 13 2   HEMATOCRIT % 39 7   PLATELETS Thousands/uL 230   NEUTROS PCT % 44   LYMPHS PCT % 48*   MONOS PCT % 7   EOS PCT % 0     Results from last 7 days   Lab Units 01/14/21  0703   POTASSIUM mmol/L 3 6   CHLORIDE mmol/L 106   CO2 mmol/L 26   BUN mg/dL 14   CREATININE mg/dL 0 71   CALCIUM mg/dL 8 7   ALK PHOS U/L 79   ALT U/L 42   AST U/L 38           * I Have Reviewed All Lab Data Listed Above  * Additional Pertinent Lab Tests Reviewed: All Labs Within Last 24 Hours Reviewed    Imaging:    Imaging Reports Reviewed Today Include:  None  Imaging Personally Reviewed by Myself Includes:  None        Recent Cultures (last 7 days):           Last 24 Hours Medication List:   Current Facility-Administered Medications   Medication Dose Route Frequency Provider Last Rate    acetaminophen  975 mg Oral Q6H PRN Majorie Cheadle, PA-C      albuterol  2 puff Inhalation Q4H PRN Sury Camejo MD      ascorbic acid  1,000 mg Oral Q12H [address] Majorie Cheadle, [place]      calcium carbonate  1,000 mg Oral Daily PRN Majorie Cheadle, PA-C      cholecalciferol  2,000 Units Oral Daily Majorie Cheadle, [place]      dexamethasone  6 mg Intravenous Q24H Evelyn Castaneda MD      dextromethorphan-guaiFENesin  10 mL Oral Q4H PRN Majorie Cheadle, PA-C      diphenhydrAMINE  25 mg Oral HS PRN Irma Rodriguez PA-C      enoxaparin  40 mg Subcutaneous Daily Marty BerumenDana, [place]      famotidine  20 mg Oral BID Majorie Cheadle, [place]      guaiFENesin  600 mg Oral Q12H [address] Majorie Cheadle, [place]      insulin glargine  28 Units Subcutaneous HS Judith Abraham MD      insulin lispro  1-5 Units Subcutaneous TID [facility] Evelyn Castaneda MD     [facility] insulin lispro  1-5 Units Subcutaneous HS Evelyn Castaneda MD     Soniamayelin Garcia insulin lispro  15 Units Subcutaneous TID With Meals Judith Abraham MD      zinc sulfate  220 mg Oral Daily Majorie Cheadle, PA-C      Followed by   Maurizio Storey ON 1/17/2021] multivitamin-minerals  1 tablet Oral Daily Majorie Cheadle, PA-C      ondansetron  4 mg Intravenous Q6H PRN Majorie Cheadle, PA-C      polyvinyl alcohol  1 drop Both Eyes Q3H PRN Majorie Cheadle, PA-C      potassium chloride  40 mEq Oral BID Jerome Neil MD      remdesivir  100 mg Intravenous Q24H Evelyn Castaneda  mg (01/12/21 7790)        Today, Patient Was Seen By: Lakshmi Bernal MD    ** Please Note: Dragon 360 Dictation voice to text software may have been used in the creation of this document   **

## 2021-01-15 PROBLEM — B37.31 VAGINAL CANDIDIASIS: Status: ACTIVE | Noted: 2021-01-15

## 2021-01-15 PROBLEM — B37.3 VAGINAL CANDIDIASIS: Status: ACTIVE | Noted: 2021-01-15

## 2021-01-15 LAB
ALBUMIN SERPL BCP-MCNC: 2.5 G/DL (ref 3.5–5)
ALP SERPL-CCNC: 75 U/L (ref 46–116)
ALT SERPL W P-5'-P-CCNC: 40 U/L (ref 12–78)
ANION GAP SERPL CALCULATED.3IONS-SCNC: 8 MMOL/L (ref 4–13)
AST SERPL W P-5'-P-CCNC: 41 U/L (ref 5–45)
BASOPHILS # BLD MANUAL: 0 THOUSAND/UL (ref 0–0.1)
BASOPHILS NFR MAR MANUAL: 0 % (ref 0–1)
BILIRUB SERPL-MCNC: 0.3 MG/DL (ref 0.2–1)
BUN SERPL-MCNC: 15 MG/DL (ref 5–25)
CALCIUM ALBUM COR SERPL-MCNC: 10 MG/DL (ref 8.3–10.1)
CALCIUM SERPL-MCNC: 8.8 MG/DL (ref 8.3–10.1)
CHLORIDE SERPL-SCNC: 105 MMOL/L (ref 100–108)
CO2 SERPL-SCNC: 27 MMOL/L (ref 21–32)
CREAT SERPL-MCNC: 0.64 MG/DL (ref 0.6–1.3)
EOSINOPHIL # BLD MANUAL: 0 THOUSAND/UL (ref 0–0.4)
EOSINOPHIL NFR BLD MANUAL: 0 % (ref 0–6)
ERYTHROCYTE [DISTWIDTH] IN BLOOD BY AUTOMATED COUNT: 12.7 % (ref 11.6–15.1)
GFR SERPL CREATININE-BSD FRML MDRD: 115 ML/MIN/1.73SQ M
GLUCOSE SERPL-MCNC: 181 MG/DL (ref 65–140)
GLUCOSE SERPL-MCNC: 203 MG/DL (ref 65–140)
GLUCOSE SERPL-MCNC: 212 MG/DL (ref 65–140)
GLUCOSE SERPL-MCNC: 418 MG/DL (ref 65–140)
GLUCOSE SERPL-MCNC: 429 MG/DL (ref 65–140)
HCT VFR BLD AUTO: 39.9 % (ref 34.8–46.1)
HGB BLD-MCNC: 13.2 G/DL (ref 11.5–15.4)
LYMPHOCYTES # BLD AUTO: 1.57 THOUSAND/UL (ref 0.6–4.47)
LYMPHOCYTES # BLD AUTO: 28 % (ref 14–44)
MCH RBC QN AUTO: 31.3 PG (ref 26.8–34.3)
MCHC RBC AUTO-ENTMCNC: 33.1 G/DL (ref 31.4–37.4)
MCV RBC AUTO: 95 FL (ref 82–98)
METAMYELOCYTES NFR BLD MANUAL: 2 % (ref 0–1)
MONOCYTES # BLD AUTO: 0.34 THOUSAND/UL (ref 0–1.22)
MONOCYTES NFR BLD: 6 % (ref 4–12)
NEUTROPHILS # BLD MANUAL: 3.24 THOUSAND/UL (ref 1.85–7.62)
NEUTS BAND NFR BLD MANUAL: 10 % (ref 0–8)
NEUTS SEG NFR BLD AUTO: 48 % (ref 43–75)
NRBC BLD AUTO-RTO: 0 /100 WBCS
PLATELET # BLD AUTO: 264 THOUSANDS/UL (ref 149–390)
PLATELET BLD QL SMEAR: ADEQUATE
PMV BLD AUTO: 9.9 FL (ref 8.9–12.7)
POTASSIUM SERPL-SCNC: 4.3 MMOL/L (ref 3.5–5.3)
PROT SERPL-MCNC: 7 G/DL (ref 6.4–8.2)
RBC # BLD AUTO: 4.22 MILLION/UL (ref 3.81–5.12)
SODIUM SERPL-SCNC: 140 MMOL/L (ref 136–145)
TOTAL CELLS COUNTED SPEC: 100
VARIANT LYMPHS # BLD AUTO: 6 %
WBC # BLD AUTO: 5.59 THOUSAND/UL (ref 4.31–10.16)

## 2021-01-15 PROCEDURE — 99232 SBSQ HOSP IP/OBS MODERATE 35: CPT | Performed by: STUDENT IN AN ORGANIZED HEALTH CARE EDUCATION/TRAINING PROGRAM

## 2021-01-15 PROCEDURE — 85007 BL SMEAR W/DIFF WBC COUNT: CPT | Performed by: INTERNAL MEDICINE

## 2021-01-15 PROCEDURE — 85027 COMPLETE CBC AUTOMATED: CPT | Performed by: INTERNAL MEDICINE

## 2021-01-15 PROCEDURE — 80053 COMPREHEN METABOLIC PANEL: CPT | Performed by: INTERNAL MEDICINE

## 2021-01-15 PROCEDURE — 82948 REAGENT STRIP/BLOOD GLUCOSE: CPT

## 2021-01-15 RX ORDER — INSULIN GLARGINE 100 [IU]/ML
35 INJECTION, SOLUTION SUBCUTANEOUS
Status: DISCONTINUED | OUTPATIENT
Start: 2021-01-15 | End: 2021-01-16

## 2021-01-15 RX ORDER — INSULIN LISPRO 100 [IU]/ML
10 INJECTION, SOLUTION INTRAVENOUS; SUBCUTANEOUS
Qty: 5 PEN | Refills: 0 | Status: SHIPPED | OUTPATIENT
Start: 2021-01-15 | End: 2021-01-18 | Stop reason: HOSPADM

## 2021-01-15 RX ORDER — INSULIN GLARGINE 100 [IU]/ML
25 INJECTION, SOLUTION SUBCUTANEOUS
Qty: 5 PEN | Refills: 0 | Status: SHIPPED | OUTPATIENT
Start: 2021-01-15 | End: 2021-01-18 | Stop reason: SDUPTHER

## 2021-01-15 RX ORDER — INSULIN GLARGINE 100 [IU]/ML
32 INJECTION, SOLUTION SUBCUTANEOUS
Status: DISCONTINUED | OUTPATIENT
Start: 2021-01-15 | End: 2021-01-15

## 2021-01-15 RX ORDER — INSULIN LISPRO 100 [IU]/ML
5 INJECTION, SOLUTION INTRAVENOUS; SUBCUTANEOUS
Qty: 5 PEN | Refills: 0 | Status: SHIPPED | OUTPATIENT
Start: 2021-01-15 | End: 2021-01-15 | Stop reason: SDUPTHER

## 2021-01-15 RX ORDER — FLUCONAZOLE 100 MG/1
200 TABLET ORAL DAILY
Status: DISCONTINUED | OUTPATIENT
Start: 2021-01-15 | End: 2021-01-18 | Stop reason: HOSPADM

## 2021-01-15 RX ORDER — INSULIN GLARGINE 100 [IU]/ML
25 INJECTION, SOLUTION SUBCUTANEOUS
Qty: 5 PEN | Refills: 0 | Status: SHIPPED | OUTPATIENT
Start: 2021-01-15 | End: 2021-01-15 | Stop reason: SDUPTHER

## 2021-01-15 RX ADMIN — ALBUTEROL SULFATE 2 PUFF: 90 AEROSOL, METERED RESPIRATORY (INHALATION) at 08:37

## 2021-01-15 RX ADMIN — GUAIFENESIN 600 MG: 600 TABLET ORAL at 08:39

## 2021-01-15 RX ADMIN — Medication 2000 UNITS: at 08:39

## 2021-01-15 RX ADMIN — ZINC SULFATE 220 MG (50 MG) CAPSULE 220 MG: CAPSULE at 08:39

## 2021-01-15 RX ADMIN — INSULIN LISPRO 4 UNITS: 100 INJECTION, SOLUTION INTRAVENOUS; SUBCUTANEOUS at 21:13

## 2021-01-15 RX ADMIN — INSULIN LISPRO 2 UNITS: 100 INJECTION, SOLUTION INTRAVENOUS; SUBCUTANEOUS at 08:51

## 2021-01-15 RX ADMIN — INSULIN LISPRO 4 UNITS: 100 INJECTION, SOLUTION INTRAVENOUS; SUBCUTANEOUS at 17:18

## 2021-01-15 RX ADMIN — DIPHENHYDRAMINE HYDROCHLORIDE 25 MG: 25 TABLET ORAL at 21:13

## 2021-01-15 RX ADMIN — POTASSIUM CHLORIDE 40 MEQ: 1500 TABLET, EXTENDED RELEASE ORAL at 17:19

## 2021-01-15 RX ADMIN — FAMOTIDINE 20 MG: 20 TABLET ORAL at 08:38

## 2021-01-15 RX ADMIN — OXYCODONE HYDROCHLORIDE AND ACETAMINOPHEN 1000 MG: 500 TABLET ORAL at 08:39

## 2021-01-15 RX ADMIN — OXYCODONE HYDROCHLORIDE AND ACETAMINOPHEN 1000 MG: 500 TABLET ORAL at 21:13

## 2021-01-15 RX ADMIN — FAMOTIDINE 20 MG: 20 TABLET ORAL at 17:19

## 2021-01-15 RX ADMIN — ENOXAPARIN SODIUM 40 MG: 40 INJECTION SUBCUTANEOUS at 08:48

## 2021-01-15 RX ADMIN — INSULIN GLARGINE 35 UNITS: 100 INJECTION, SOLUTION SUBCUTANEOUS at 21:23

## 2021-01-15 RX ADMIN — INSULIN LISPRO 1 UNITS: 100 INJECTION, SOLUTION INTRAVENOUS; SUBCUTANEOUS at 12:02

## 2021-01-15 RX ADMIN — POTASSIUM CHLORIDE 40 MEQ: 1500 TABLET, EXTENDED RELEASE ORAL at 08:39

## 2021-01-15 RX ADMIN — GUAIFENESIN 600 MG: 600 TABLET ORAL at 21:13

## 2021-01-15 RX ADMIN — REMDESIVIR 100 MG: 100 INJECTION, POWDER, LYOPHILIZED, FOR SOLUTION INTRAVENOUS at 10:26

## 2021-01-15 RX ADMIN — FLUCONAZOLE 200 MG: 100 TABLET ORAL at 10:23

## 2021-01-15 RX ADMIN — DEXAMETHASONE SODIUM PHOSPHATE 6 MG: 4 INJECTION, SOLUTION INTRAMUSCULAR; INTRAVENOUS at 10:25

## 2021-01-15 NOTE — CASE MANAGEMENT
SHAHBAZ spoke to Dr Zackery Cortez via phone  She spoke to her pulmonary department at the [place] and they will order O2  [facility] will deliver portable tank to the hospital for transport home  Dr Zackery Cortez will also call [facility] and verbally give the ok to start [facility]

## 2021-01-15 NOTE — CASE MANAGEMENT
CM spoke to [facility] who informs CM that they must have a verbal authorization from PCP-Dr Zackery Cortez [facility]  Dr Zackery Cortez needs to call [phone] to authorize  -Pt will need O2-2lpm on discharge  CM contacted [facility] and they will be able to deliver a portable tank to the hospital (it will take 2 hrs) but need the order to come from PCP-Dr Ford Leslie will approve an insulin Pen and the rx has to be faxed to [phone] with instructions  That pt needs to  from local pharmacy  -CM sent an E-mail to Dr Zackery Cortez along with O2 testing results and also left a message for her on her voicemail to call back to discuss the discharge needs

## 2021-01-15 NOTE — PLAN OF CARE
Problem: Potential for Falls  Goal: Patient will remain free of falls  Description: INTERVENTIONS:  - Assess patient frequently for physical needs  -  Identify cognitive and physical deficits and behaviors that affect risk of falls    -  [place] fall precautions as indicated by assessment   - Educate patient/family on patient safety including physical limitations  - Instruct patient to call for assistance with activity based on assessment  - Modify environment to reduce risk of injury  - Consider OT/PT consult to assist with strengthening/mobility  Outcome: Progressing     Problem: RESPIRATORY - ADULT  Goal: Achieves optimal ventilation and oxygenation  Description: INTERVENTIONS:  - Assess for changes in respiratory status  - Assess for changes in mentation and behavior  - Position to facilitate oxygenation and minimize respiratory effort  - Oxygen administered by appropriate delivery if ordered  - Initiate smoking cessation education as indicated  - Encourage broncho-pulmonary hygiene including cough, deep breathe, Incentive Spirometry  - Assess the need for suctioning and aspirate as needed  - Assess and instruct to report SOB or any respiratory difficulty  - Respiratory Therapy support as indicated  Outcome: Progressing     Problem: METABOLIC, FLUID AND ELECTROLYTES - ADULT  Goal: Electrolytes maintained within normal limits  Description: INTERVENTIONS:  - Monitor labs and assess patient for signs and symptoms of electrolyte imbalances  - Administer electrolyte replacement as ordered  - Monitor response to electrolyte replacements, including repeat lab results as appropriate  - Instruct patient on fluid and nutrition as appropriate  Outcome: Progressing  Goal: Fluid balance maintained  Description: INTERVENTIONS:  - Monitor labs   - Monitor I/O and WT  - Instruct patient on fluid and nutrition as appropriate  - Assess for signs & symptoms of volume excess or deficit  Outcome: Progressing  Goal: Glucose maintained within target range  Description: INTERVENTIONS:  - Monitor Blood Glucose as ordered  - Assess for signs and symptoms of hyperglycemia and hypoglycemia  - Administer ordered medications to maintain glucose within target range  - Assess nutritional intake and initiate nutrition service referral as needed  Outcome: Progressing     Problem: Nutrition/Hydration-ADULT  Goal: Nutrient/Hydration intake appropriate for improving, restoring or maintaining nutritional needs  Description: Monitor and assess patient's nutrition/hydration status for malnutrition  Collaborate with interdisciplinary team and initiate plan and interventions as ordered  Monitor patient's weight and dietary intake as ordered or per policy  Utilize nutrition screening tool and intervene as necessary  Determine patient's food preferences and provide high-protein, high-caloric foods as appropriate       INTERVENTIONS:  - Monitor oral intake, urinary output, labs, and treatment plans  - Assess nutrition and hydration status and recommend course of action  - Evaluate amount of meals eaten  - Assist patient with eating if necessary   - Allow adequate time for meals  - Recommend/ encourage appropriate diets, oral nutritional supplements, and vitamin/mineral supplements  - Order, calculate, and assess calorie counts as needed  - Recommend, monitor, and adjust tube feedings based on assessed needs  - Assess need for intravenous fluids  - Provide  nutrition/hydration education as appropriate  - Include patient/family/caregiver in decisions related to nutrition  Outcome: Progressing     Problem: PAIN - ADULT  Goal: Verbalizes/displays adequate comfort level or baseline comfort level  Description: Interventions:  - Encourage patient to monitor pain and request assistance  - Assess pain using appropriate pain scale  - Administer analgesics based on type and severity of pain and evaluate response  - Implement non-pharmacological measures as appropriate and evaluate response  - Consider cultural and social influences on pain and pain management  - Notify physician/advanced practitioner if interventions unsuccessful or patient reports new pain  Outcome: Progressing     Problem: INFECTION - ADULT  Goal: Absence or prevention of progression during hospitalization  Description: INTERVENTIONS:  - Assess and monitor for signs and symptoms of infection  - Monitor lab/diagnostic results  - Monitor all insertion sites, i e  indwelling lines, tubes, and drains  - Monitor endotracheal if appropriate and nasal secretions for changes in amount and color  - [place] appropriate cooling/warming therapies per order  - Administer medications as ordered  - Instruct and encourage patient and family to use good hand hygiene technique  - Identify and instruct in appropriate isolation precautions for identified infection/condition  Outcome: Progressing     Problem: SAFETY ADULT  Goal: Patient will remain free of falls  Description: INTERVENTIONS:  - Assess patient frequently for physical needs  -  Identify cognitive and physical deficits and behaviors that affect risk of falls    -  [place] fall precautions as indicated by assessment   - Educate patient/family on patient safety including physical limitations  - Instruct patient to call for assistance with activity based on assessment  - Modify environment to reduce risk of injury  - Consider OT/PT consult to assist with strengthening/mobility  Outcome: Progressing  Goal: Maintain or return to baseline ADL function  Description: INTERVENTIONS:  -  Assess patient's ability to carry out ADLs; assess patient's baseline for ADL function and identify physical deficits which impact ability to perform ADLs (bathing, care of mouth/teeth, toileting, grooming, dressing, etc )  - Assess/evaluate cause of self-care deficits   - Assess range of motion  - Assess patient's mobility; develop plan if impaired  - Assess patient's need for assistive devices and provide as appropriate  - Encourage maximum independence but intervene and supervise when necessary  - Involve family in performance of ADLs  - Assess for home care needs following discharge   - Consider OT consult to assist with ADL evaluation and planning for discharge  - Provide patient education as appropriate  Outcome: Progressing  Goal: Maintain or return mobility status to optimal level  Description: INTERVENTIONS:  - Assess patient's baseline mobility status (ambulation, transfers, stairs, etc )    - Identify cognitive and physical deficits and behaviors that affect mobility  - Identify mobility aids required to assist with transfers and/or ambulation (gait belt, sit-to-stand, lift, walker, cane, etc )  - [place] fall precautions as indicated by assessment  - Record patient progress and toleration of activity level on Mobility SBAR; progress patient to next Phase/Stage  - Instruct patient to call for assistance with activity based on assessment  - Consider rehabilitation consult to assist with strengthening/weightbearing, etc   Outcome: Progressing     Problem: DISCHARGE PLANNING  Goal: Discharge to home or other facility with appropriate resources  Description: INTERVENTIONS:  - Identify barriers to discharge w/patient and caregiver  - Arrange for needed discharge resources and transportation as appropriate  - Identify discharge learning needs (meds, wound care, etc )  - Arrange for interpretive services to assist at discharge as needed  - Refer to Case Management Department for coordinating discharge planning if the patient needs post-hospital services based on physician/advanced practitioner order or complex needs related to functional status, cognitive ability, or social support system  Outcome: Progressing     Problem: Knowledge Deficit  Goal: Patient/family/caregiver demonstrates understanding of disease process, treatment plan, medications, and discharge instructions  Description: Complete learning assessment and assess knowledge base    Interventions:  - Provide teaching at level of understanding  - Provide teaching via preferred learning methods  Outcome: Progressing

## 2021-01-15 NOTE — QUICK NOTE
Patient's son, Sanchez Senkelly was contacted today and an update with regards to the patient's health status provided  All questions and concerns answered at this time

## 2021-01-15 NOTE — ASSESSMENT & PLAN NOTE
Patient admitted with secondary pneumonia as result of COVID-19 infection  Patient status post antibiotic therapy with doxycycline ceftriaxone a significant improvement on moderate COVID pathway  No respiratory distress reported  patient saturating around 93% on  1 5 - 2L of oxygen via nasal cannula  Patient remains afebrile and white blood cell count within normal limits  Still indicates some shortness of breath and desaturation with ambulation  Patient is stable enough from a medical standpoint to be discharged with home oxygen of 2L/min via [place] as determined from home oxygen eval, however she obtains her medications through the [place] and will get meds from this source;  this process is limiting her discharge  - F/u with case management about home oxygen distribution from the [place]:  Scripts provided to case management to fax to [place] for insulin and supplies  - Provide albuterol prn and robitussin prn for cough and wheezing   - Continue to monitor vitals, O2 saturations  - Remdesivir completed today  - Continue dexamethasone 6 mg IV acute 24 hours; will discharge on a tapering dose of prednisone   - Continue VTE prophylaxis with enoxaparin   - Continue vitamins C, D and famotidine

## 2021-01-15 NOTE — CASE MANAGEMENT
SHAHBAZ contacted [facility] and they inform CM that insulin has to be ordered by Dr Salina Howard CM E-mailed the rxs to her so she could see what was to be ordered  CM contacted [facility] and they still have not received the order for O2  SHAHBAZ spoke to numerous people at the [facility] and eventually was transferred to [address] [place] [address] Nab  [email or web address])  She will try to assist with coordination of discharge needs

## 2021-01-15 NOTE — ASSESSMENT & PLAN NOTE
Lab Results   Component Value Date    HGBA1C 11 0 (H) 01/10/2021       Recent Labs     01/14/21  2031 01/15/21  0701 01/15/21  1156 01/15/21  1550   POCGLU 329* 203* 181* 429*       Blood Sugar Average: Last 72 hrs:  (P) 248 875     Patient has history of type 2 diabetes mellitus most recent A1c of 11  Patient currently on IV steroids for COVID-19 treatment, which predispose the patient to hyperglycemia  Patient switched to insulin basal-bolus plus sliding-scale correction on admission, with her preadmission metformin held  Discussion about uncontrolled DM type 2 and insulin requirement was conducted with patient  Patient consents for continuation of insulin regimen on d/c  Blood sugar continues to be outside of target        - Rx for insulin and supplies provided to  and to be faxed to [place]; [place] does not process Rx overnight   - Given patient's elevated hemoglobin A1c and concurrent use of steroids patient will be discharged with insulin regimen, in addition to restarting her metformin 500 mg p o  B i d   - Provide insulin-dependent diabetic education  - Continue basal bolus regimen and hospital; currently increased regimen to glargine 35 units HS + lispro 22 units t i d  A c  + sliding scale correction  - Monitor POC glucose a c  and HS

## 2021-01-15 NOTE — PROGRESS NOTES
Progress Note - Fer Saavedra 1965, 60 y o  female MRN: [id number]    Unit/Bed#: -[facility]rin[facility] Encounter: [id number]    Primary Care Provider: No primary care provider on file  Date and time admitted to hospital: 1/10/2021 12:22 AM        * Pneumonia due to COVID-19 virus  Assessment & Plan  Patient admitted with secondary pneumonia as result of COVID-19 infection  Patient status post antibiotic therapy with doxycycline ceftriaxone a significant improvement on moderate COVID pathway  No respiratory distress reported  patient saturating around 93% on  1 5 - 2L of oxygen via nasal cannula  Patient remains afebrile and white blood cell count within normal limits  Still indicates some shortness of breath and desaturation with ambulation  Patient is stable enough from a medical standpoint to be discharged with home oxygen of 2L/min via [place] as determined from home oxygen eval, however she obtains her medications through the [facility] and will get meds from this source;  this process is limiting her discharge  - F/u with case management about home oxygen distribution from the [facility]:  Scripts provided to case management to fax to [facility] for insulin and supplies  - Provide albuterol prn and robitussin prn for cough and wheezing   - Continue to monitor vitals, O2 saturations  - Remdesivir completed today  - Continue dexamethasone 6 mg IV acute 24 hours; will discharge on a tapering dose of prednisone   - Continue VTE prophylaxis with enoxaparin   - Continue vitamins C, D and famotidine          Acute hypoxemic respiratory failure due to COVID-19 [facility])  Assessment & Plan  See assessment and plan for 'pneumonia due to COVID-19 virus'    Type 2 diabetes mellitus [facility])  Assessment & Plan  Lab Results   Component Value Date    HGBA1C 11 0 (H) 01/10/2021       Recent Labs     01/14/21  2031 01/15/21  0701 01/15/21  1156 01/15/21  1550   POCGLU 329* 203* 181* 429*       Blood Sugar Average: Last 72 hrs:  (P) 248 875     Patient has history of type 2 diabetes mellitus most recent A1c of 11  Patient currently on IV steroids for COVID-19 treatment, which predispose the patient to hyperglycemia  Patient switched to insulin basal-bolus plus sliding-scale correction on admission, with her preadmission metformin held  Discussion about uncontrolled DM type 2 and insulin requirement was conducted with patient  Patient consents for continuation of insulin regimen on d/c  Blood sugar continues to be outside of target  - Rx for insulin and supplies provided to  and to be faxed to [place]; [place] does not process Rx overnight   - Given patient's elevated hemoglobin A1c and concurrent use of steroids patient will be discharged with insulin regimen, in addition to restarting her metformin 500 mg p o  B i d   - Provide insulin-dependent diabetic education  - Continue basal bolus regimen and hospital; currently increased regimen to glargine 35 units HS + lispro 22 units t i d  A c  + sliding scale correction  - Monitor POC glucose a c  and HS    Hypokalemia  Assessment & Plan  Likely secondary to diarrhea  Patient states that very high resolved  Potassium replacement provided with improvement to a potassium today of 4 3 increased from 3 4 yesterday  - Resolved; may hold potassium supplementation  Vaginal candidiasis  Assessment & Plan  Provide Diflucan  VTE Pharmacologic Prophylaxis:   Pharmacologic: Enoxaparin (Lovenox)  Mechanical VTE Prophylaxis in Place: Yes    Patient Centered Rounds: I have performed bedside rounds with nursing staff today  Discussions with Specialists or Other Care Team Provider:  None  Education and Discussions with Family / Patient:  None  Time Spent for Care: 45 minutes  More than 50% of total time spent on counseling and coordination of care as described above      Current Length of Stay: 4 day(s)    Current Patient Status: Inpatient   Certification Statement: The patient will continue to require additional inpatient hospital stay due to discharge currently limited by patient's ability to get post-discharge medications and oxygen supplies  Discharge Plan / Estimated Discharge Date:  As above /estimated discharge date on or before 2020  Code Status: Level 1 - Full Code      Subjective:   Patient reports some mild dyspnea on exertion, but in no respiratory distress  Indicates that she has a vaginal yeast infection  Denies fever, chills, chest pain, palpitations abdominal pain, diarrhea or generalized weakness  Objective:     Vitals:   Temp (24hrs), Av 5 °F (36 4 °C), Min:96 7 °F (35 9 °C), Max:98 3 °F (36 8 °C)    Temp:  [96 7 °F (35 9 °C)-98 3 °F (36 8 °C)] 98 3 °F (36 8 °C)  HR:  [61-83] 78  Resp:  [16-18] 16  BP: (125-132)/(72-85) 132/85  SpO2:  [89 %-94 %] 90 %  Body mass index is 38 25 kg/m²  Input and Output Summary (last 24 hours): Intake/Output Summary (Last 24 hours) at 1/15/2021 1749  Last data filed at 1/15/2021 0319  Gross per 24 hour   Intake 1440 ml   Output --   Net 1440 ml       Physical Exam:     Physical Exam  Vitals signs reviewed  Constitutional:       Appearance: Normal appearance  She is obese  HENT:      Head: Normocephalic and atraumatic  Eyes:      General: No scleral icterus  Neck:      Musculoskeletal: Neck supple  Cardiovascular:      Heart sounds: S1 normal and S2 normal    Pulmonary:      Effort: Pulmonary effort is normal  No tachypnea or respiratory distress  Abdominal:      Palpations: Abdomen is soft  Musculoskeletal:         General: No swelling or tenderness  Skin:     General: Skin is warm and dry  Neurological:      Mental Status: She is alert and oriented to person, place, and time     Psychiatric:         Mood and Affect: Mood normal          Behavior: Behavior normal          Additional Data:     Labs:    Results from last 7 days   Lab Units 01/15/21  0454 21  0703   WBC Thousand/uL 5 59 4 62   HEMOGLOBIN g/dL 13 2 13 2 HEMATOCRIT % 39 9 39 7   PLATELETS Thousands/uL 264 230   NEUTROS PCT %  --  44   LYMPHS PCT %  --  48*   LYMPHO PCT % 28  --    MONOS PCT %  --  7   MONO PCT % 6  --    EOS PCT % 0 0     Results from last 7 days   Lab Units 01/15/21  0454   POTASSIUM mmol/L 4 3   CHLORIDE mmol/L 105   CO2 mmol/L 27   BUN mg/dL 15   CREATININE mg/dL 0 64   CALCIUM mg/dL 8 8   ALK PHOS U/L 75   ALT U/L 40   AST U/L 41           * I Have Reviewed All Lab Data Listed Above  * Additional Pertinent Lab Tests Reviewed: All Labs Within Last 24 Hours Reviewed    Imaging:    Imaging Reports Reviewed Today Include:  None  Imaging Personally Reviewed by Myself Includes:  None        Recent Cultures (last 7 days):           Last 24 Hours Medication List:   Current Facility-Administered Medications   Medication Dose Route Frequency Provider Last Rate    acetaminophen  975 mg Oral Q6H PRN Guero Jung PA-C      albuterol  2 puff Inhalation Q4H PRN Quin Issa MD      ascorbic acid  1,000 mg Oral Q12H [facility] Guero Jung PA-C      calcium carbonate  1,000 mg Oral Daily PRN Guero Jugn PA-C      cholecalciferol  2,000 Units Oral Daily Ethan Cutler      dexamethasone  6 mg Intravenous Q24H Dereck Bergman MD      dextromethorphan-guaiFENesin  10 mL Oral Q4H PRN Guero Jung PA-C      diphenhydrAMINE  25 mg Oral HS PRN Boy Bazzi PA-C      enoxaparin  40 mg Subcutaneous Daily Marty Salazar PA-C      famotidine  20 mg Oral BID Guero Jung PA-C      fluconazole  200 mg Oral Daily Be Goel MD      guaiFENesin  600 mg Oral Q12H [facility] Marty Salazar PA-C      insulin glargine  35 Units Subcutaneous HS Noé WELCH MD      insulin lispro  1-5 Units Subcutaneous TID AC Dereck Bergman MD      insulin lispro  1-5 Units Subcutaneous HS Dereck Bergman MD      insulin lispro  22 Units Subcutaneous TID With Meals Josi Nguyen MD      zinc sulfate  220 mg Oral Daily Brenda Millan PA-C      Followed by   Ozie Po ON 1/17/2021] multivitamin-minerals  1 tablet Oral Daily Brenda Millan PA-C      ondansetron  4 mg Intravenous Q6H PRN Brenda Millan PA-C      polyvinyl alcohol  1 drop Both Eyes Q3H PRN Brenda Millan PA-C      potassium chloride  40 mEq Oral BID Albert Gomez MD          Today, Patient Was Seen By: Joyce Scanlon MD    ** Please Note: Dragon 360 Dictation voice to text software may have been used in the creation of this document   **

## 2021-01-15 NOTE — ASSESSMENT & PLAN NOTE
Likely secondary to diarrhea  Patient states that very high resolved  Potassium replacement provided with improvement to a potassium today of 4 3 increased from 3 4 yesterday  - Resolved; may hold potassium supplementation

## 2021-01-15 NOTE — CASE MANAGEMENT
SHAHBAZ contacted Shari Delaney at [facility] and he confirms that he has received the order for O2, but the concentrator and tanks are being delivered to the pt's home- no portable tank to the hospital   CM asked if a tank could be delivered to the hospital and was told that not without the PCP order  CM e-mailed Dr Eriberto Melendez to see if she could order a portable tank to be delivered to the hospital for transport home  CM will also contact family to bring portable tank to the hospital on discharge if no tanks are delivered to the hospital   -SHAHBAZ contacted [facility] [phone] and also received an e-mail from Dr Eriberto Melendez that the [place] does not overnight medications  CM e-mailed Dr Eriberto Melendez to make sure she ordered insulin and also the Pen Needles-32G x 6 MM  CM left message with [employer] to make sure they definitely received the order from Dr Eriberto Melendez for [facility] services  Awaiting call back

## 2021-01-15 NOTE — ED PROVIDER NOTES
History  Chief Complaint   Patient presents with    Shortness of Breath     SOB x2 days states that when she coughs she spits up   60year old female patient presents emergency department for evaluation of shortness of breath  The patient has a history of asthma, states that she felt that she is having a typical asthma exacerbation  She has unilateral adventitious breath sounds indicative of pneumonia  History provided by:  Patient   used: No    Shortness of Breath  Severity:  Mild  Onset quality:  Gradual  Timing:  Constant  Progression:  Worsening  Chronicity:  New  Context: not animal exposure, not emotional upset and not weather changes    Relieved by:  Nothing  Worsened by:  Nothing  Ineffective treatments:  None tried  Associated symptoms: no claudication, no PND and no syncope        Prior to Admission Medications   Prescriptions Last Dose Informant Patient Reported? Taking?   losartan (COZAAR) 100 MG tablet   Yes Yes   Sig: Take 100 mg by mouth daily before dinner   metFORMIN (GLUCOPHAGE) 1000 MG tablet  Self Yes Yes   Sig: Take 1,000 mg by mouth 2 (two) times a day with meals   triamcinolone (KENALOG) 0 1 % cream   No No   Sig: Apply topically 2 (two) times a day for 7 days      Facility-Administered Medications: None       Past Medical History:   Diagnosis Date    Asthma        Past Surgical History:   Procedure Laterality Date    HERNIA REPAIR         Family History   Family history unknown: Yes     I have reviewed and agree with the history as documented      E-Cigarette/Vaping    E-Cigarette Use Never User      E-Cigarette/Vaping Substances    Nicotine No     THC No     CBD No     Flavoring No     Other No     Unknown No      Social History     Tobacco Use    Smoking status: Former Smoker    Smokeless tobacco: Never Used   Substance Use Topics    Alcohol use: Yes     Frequency: 2-4 times a month     Binge frequency: Never    Drug use: Never       Review of Systems   Respiratory: Positive for shortness of breath  Cardiovascular: Negative for claudication, syncope and PND  All other systems reviewed and are negative  Physical Exam  Physical Exam  Vitals signs and nursing note reviewed  Constitutional:       Appearance: She is well-developed  HENT:      Head: Normocephalic and atraumatic  Right Ear: External ear normal       Left Ear: External ear normal    Eyes:      Conjunctiva/sclera: Conjunctivae normal    Neck:      Thyroid: No thyromegaly  Vascular: No JVD  Trachea: No tracheal deviation  Cardiovascular:      Rate and Rhythm: Normal rate  Pulmonary:      Effort: Pulmonary effort is normal       Breath sounds: No stridor  Wheezing and rhonchi present  Abdominal:      General: There is no distension  Palpations: Abdomen is soft  There is no mass  Tenderness: There is no abdominal tenderness  There is no guarding  Hernia: No hernia is present  Musculoskeletal: Normal range of motion  General: No tenderness or deformity  Lymphadenopathy:      Cervical: No cervical adenopathy  Skin:     General: Skin is warm  Coloration: Skin is not pale  Findings: No erythema or rash  Neurological:      Mental Status: She is alert and oriented to person, place, and time     Psychiatric:         Behavior: Behavior normal          Vital Signs  ED Triage Vitals   Temperature Pulse Respirations Blood Pressure SpO2   01/10/21 0028 01/10/21 0027 01/10/21 0027 01/10/21 0027 01/10/21 0027   99 7 °F (37 6 °C) 94 18 151/81 95 %      Temp Source Heart Rate Source Patient Position - Orthostatic VS BP Location FiO2 (%)   01/10/21 0028 01/10/21 0027 01/10/21 0027 01/10/21 0027 --   Oral Monitor Lying Right arm       Pain Score       01/10/21 0445       No Pain           Vitals:    01/13/21 2122 01/13/21 2240 01/14/21 0703 01/14/21 1402   BP: 117/64 131/75 114/68 123/74   Pulse: 71 70 68 79   Patient Position - Orthostatic VS: Visual Acuity  Visual Acuity      Most Recent Value   L Pupil Size (mm)  3   R Pupil Size (mm)  3   L Pupil Shape  Round   R Pupil Shape  Round          ED Medications  Medications   acetaminophen (TYLENOL) tablet 975 mg (975 mg Oral Given 1/12/21 0628)   ondansetron (ZOFRAN) injection 4 mg (has no administration in time range)   calcium carbonate (TUMS) chewable tablet 1,000 mg (has no administration in time range)   enoxaparin (LOVENOX) subcutaneous injection 40 mg (40 mg Subcutaneous Given 1/14/21 0935)   cholecalciferol (VITAMIN D3) tablet 2,000 Units (2,000 Units Oral Given 1/14/21 0935)   ascorbic acid (VITAMIN C) tablet 1,000 mg (1,000 mg Oral Given 1/14/21 0935)   zinc sulfate (ZINCATE) capsule 220 mg (220 mg Oral Given 1/14/21 0935)     Followed by   multivitamin-minerals (CENTRUM ADULTS) tablet 1 tablet (has no administration in time range)   famotidine (PEPCID) tablet 20 mg (20 mg Oral Given 1/14/21 1722)   guaiFENesin (MUCINEX) 12 hr tablet 600 mg (600 mg Oral Given 1/14/21 0935)   dextromethorphan-guaiFENesin (ROBITUSSIN DM) oral syrup 10 mL (10 mL Oral Given 1/13/21 2245)   albuterol (PROVENTIL HFA,VENTOLIN HFA) inhaler 2 puff (2 puffs Inhalation Given 1/11/21 2238)   remdesivir (Veklury) 200 mg in sodium chloride 0 9 % 250 mL IVPB (0 mg Intravenous Stopped 1/12/21 0048)     Followed by   remdesivir Zamudio Griffin) 100 mg in sodium chloride 0 9 % 250 mL IVPB (100 mg Intravenous New Bag 1/14/21 1226)   dexamethasone (DECADRON) injection 6 mg (6 mg Intravenous Given 1/14/21 0935)   insulin lispro (HumaLOG) 100 units/mL subcutaneous injection 1-5 Units (4 Units Subcutaneous Given 1/14/21 1723)   insulin lispro (HumaLOG) 100 units/mL subcutaneous injection 1-5 Units (3 Units Subcutaneous Given 1/13/21 2118)   diphenhydrAMINE (BENADRYL) tablet 25 mg (25 mg Oral Given 1/13/21 8575)   potassium chloride (K-DUR,KLOR-CON) CR tablet 40 mEq (40 mEq Oral Given 1/14/21 1722)   insulin glargine (LANTUS) subcutaneous injection 28 Units 0 28 mL (28 Units Subcutaneous Given 1/13/21 2118)   polyvinyl alcohol (LIQUIFILM TEARS) 1 4 % ophthalmic solution 1 drop (1 drop Both Eyes Given 1/13/21 2245)   insulin lispro (HumaLOG) 100 units/mL subcutaneous injection 15 Units (15 Units Subcutaneous Given 1/14/21 1722)   ipratropium (ATROVENT) 0 02 % inhalation solution 0 5 mg (0 5 mg Nebulization Given 1/10/21 0113)   albuterol inhalation solution 5 mg (5 mg Nebulization Given 1/10/21 0113)   dexamethasone (PF) (DECADRON) injection 10 mg (10 mg Intravenous Given 1/10/21 0110)   magnesium sulfate 2 g/50 mL IVPB (premix) 2 g (0 g Intravenous Stopped 1/10/21 0603)   potassium chloride (K-DUR,KLOR-CON) CR tablet 40 mEq (40 mEq Oral Given 1/10/21 0626)   potassium chloride 20 mEq IVPB (premix) (20 mEq Intravenous New Bag 1/12/21 1106)       Diagnostic Studies  Results Reviewed     Procedure Component Value Units Date/Time    Procalcitonin Reflex [[id number]]  (Normal) Collected: 01/11/21 0924    Lab Status: Final result Specimen: Blood from Hand, Left Updated: 01/11/21 1526     Procalcitonin <0 05 ng/ml     Basic metabolic panel [[id number]]  (Abnormal) Collected: 01/11/21 0536    Lab Status: Final result Specimen: Blood from Arm, Right Updated: 01/11/21 0618     Sodium 139 mmol/L      Potassium 3 4 mmol/L      Chloride 105 mmol/L      CO2 26 mmol/L      ANION GAP 8 mmol/L      BUN 13 mg/dL      Creatinine 0 67 mg/dL      Glucose 248 mg/dL      Glucose, Fasting 248 mg/dL      Calcium 8 5 mg/dL      eGFR 113 ml/min/1 73sq m     Narrative:      [place] guidelines for Chronic Kidney Disease (CKD):     Stage 1 with normal or high GFR (GFR > 90 mL/min/1 73 square meters)    Stage 2 Mild CKD (GFR = 60-89 mL/min/1 73 square meters)    Stage 3A Moderate CKD (GFR = 45-59 mL/min/1 73 square meters)    Stage 3B Moderate CKD (GFR = 30-44 mL/min/1 73 square meters)    Stage 4 Severe CKD (GFR = 15-29 mL/min/1 73 square meters)    Stage 5 End Stage CKD (GFR <15 mL/min/1 73 square meters)  Note: GFR calculation is accurate only with a steady state creatinine    CBC [[id number]]  (Normal) Collected: 01/11/21 0536    Lab Status: Final result Specimen: Blood from Arm, Right Updated: 01/11/21 0555     WBC 4 56 Thousand/uL      RBC 4 37 Million/uL      Hemoglobin 13 6 g/dL      Hematocrit 41 1 %      MCV 94 fL      MCH 31 1 pg      MCHC 33 1 g/dL      RDW 12 7 %      Platelets 465 Thousands/uL      MPV 9 8 fL     Procalcitonin with AM Reflex [[id number]]  (Normal) Collected: 01/10/21 0342    Lab Status: Final result Specimen: Blood from Arm, Right Updated: 01/10/21 1444     Procalcitonin <0 05 ng/ml     Chronic Hepatitis Panel [[id number]]  (Normal) Collected: 01/10/21 0342    Lab Status: Final result Specimen: Blood from Arm, Right Updated: 01/10/21 1414     Hepatitis B Surface Ag Non-reactive     Hepatitis C Ab Non-reactive     Hep B C IgM Non-reactive     Hep B Core Total Ab Non-reactive    Hemoglobin A1C w/ EAG Estimation [[id number]]  (Abnormal) Collected: 01/10/21 0803    Lab Status: Final result Specimen: Blood from Arm, Right Updated: 01/10/21 1357     Hemoglobin A1C 11 0 %       mg/dl     Ferritin [[id number]]  (Normal) Collected: 01/10/21 0342    Lab Status: Final result Specimen: Blood from Arm, Right Updated: 01/10/21 1327     Ferritin 176 ng/mL     Basic metabolic panel [[id number]]  (Abnormal) Collected: 01/10/21 0803    Lab Status: Final result Specimen: Blood from Arm, Right Updated: 01/10/21 0835     Sodium 137 mmol/L      Potassium 4 4 mmol/L      Chloride 101 mmol/L      CO2 27 mmol/L      ANION GAP 9 mmol/L      BUN 10 mg/dL      Creatinine 0 84 mg/dL      Glucose 269 mg/dL      Glucose, Fasting 269 mg/dL      Calcium 8 6 mg/dL      eGFR 89 ml/min/1 73sq m     Narrative:      [place] guidelines for Chronic Kidney Disease (CKD):     Stage 1 with normal or high GFR (GFR > 90 mL/min/1 73 square meters)    Stage 2 Mild CKD (GFR = 60-89 mL/min/1 73 square meters)    Stage 3A Moderate CKD (GFR = 45-59 mL/min/1 73 square meters)    Stage 3B Moderate CKD (GFR = 30-44 mL/min/1 73 square meters)    Stage 4 Severe CKD (GFR = 15-29 mL/min/1 73 square meters)    Stage 5 End Stage CKD (GFR <15 mL/min/1 73 square meters)  Note: GFR calculation is accurate only with a steady state creatinine    Magnesium [[id number]]  (Normal) Collected: 01/10/21 0803    Lab Status: Final result Specimen: Blood from Arm, Right Updated: 01/10/21 0835     Magnesium 2 6 mg/dL     C-reactive protein [[id number]]  (Abnormal) Collected: 01/10/21 0342    Lab Status: Final result Specimen: Blood from Arm, Right Updated: 01/10/21 0433     CRP 48 8 mg/L     CK (with reflex to MB) [[id number]]  (Normal) Collected: 01/10/21 0342    Lab Status: Final result Specimen: Blood from Arm, Right Updated: 01/10/21 0430     Total CK 84 U/L     D-dimer, quantitative [[id number]]  (Normal) Collected: 01/10/21 0342    Lab Status: Final result Specimen: Blood from Arm, Right Updated: 01/10/21 0416     D-Dimer, Quant <0 27 ug/ml FEU     COVID19, Influenza A/B, RSV PCR, [facility] [[id number]]  (Abnormal) Collected: 01/10/21 0058    Lab Status: Final result Specimen: Nares from Nasopharyngeal Swab Updated: 01/10/21 0145     SARS-CoV-2 Positive     INFLUENZA A PCR Negative     INFLUENZA B PCR Negative     RSV PCR Negative    Narrative: This test has been authorized by FDA under an EUA (Emergency Use Assay) for use by authorized laboratories  Clinical caution and judgement should be used with the interpretation of these results with consideration of the clinical impression and other laboratory testing  Testing reported as "Positive" or "Negative" has been proven to be accurate according to standard laboratory validation requirements  All testing is performed with control materials showing appropriate reactivity at standard intervals  Hepatic function panel [[id number]]  (Abnormal) Collected: 01/10/21 0059    Lab Status: Final result Specimen: Blood from Arm, Right Updated: 01/10/21 0128     Total Bilirubin 0 30 mg/dL      Bilirubin, Direct 0 11 mg/dL      Alkaline Phosphatase 84 U/L      AST 35 U/L      ALT 43 U/L      Total Protein 7 8 g/dL      Albumin 3 2 g/dL     NT-BNP PRO [[id number]]  (Normal) Collected: 01/10/21 0059    Lab Status: Final result Specimen: Blood from Arm, Right Updated: 01/10/21 0128     NT-proBNP 70 pg/mL     Troponin I [[id number]]  (Normal) Collected: 01/10/21 0059    Lab Status: Final result Specimen: Blood from Arm, Right Updated: 01/10/21 0124     Troponin I <0 02 ng/mL     Basic metabolic panel [[id number]]  (Abnormal) Collected: 01/10/21 0059    Lab Status: Final result Specimen: Blood from Arm, Right Updated: 01/10/21 0121     Sodium 137 mmol/L      Potassium 2 8 mmol/L      Chloride 100 mmol/L      CO2 27 mmol/L      ANION GAP 10 mmol/L      BUN 9 mg/dL      Creatinine 0 82 mg/dL      Glucose 181 mg/dL      Calcium 8 3 mg/dL      eGFR 92 ml/min/1 73sq m     Narrative:      [place] guidelines for Chronic Kidney Disease (CKD):     Stage 1 with normal or high GFR (GFR > 90 mL/min/1 73 square meters)    Stage 2 Mild CKD (GFR = 60-89 mL/min/1 73 square meters)    Stage 3A Moderate CKD (GFR = 45-59 mL/min/1 73 square meters)    Stage 3B Moderate CKD (GFR = 30-44 mL/min/1 73 square meters)    Stage 4 Severe CKD (GFR = 15-29 mL/min/1 73 square meters)    Stage 5 End Stage CKD (GFR <15 mL/min/1 73 square meters)  Note: GFR calculation is accurate only with a steady state creatinine    CBC and differential [[id number]]  (Abnormal) Collected: 01/10/21 0059    Lab Status: Final result Specimen: Blood from Arm, Right Updated: 01/10/21 0105     WBC 4 25 Thousand/uL      RBC 4 58 Million/uL      Hemoglobin 14 1 g/dL      Hematocrit 42 1 %      MCV 92 fL      MCH 30 8 pg      MCHC 33 5 g/dL      RDW 12 7 %      MPV 9 5 fL      Platelets 232 Thousands/uL      nRBC 0 /100 WBCs      Neutrophils Relative 57 %      Immat GRANS % 1 %      Lymphocytes Relative 33 %      Monocytes Relative 9 %      Eosinophils Relative 0 %      Basophils Relative 0 %      Neutrophils Absolute 2 45 Thousands/µL      Immature Grans Absolute 0 02 Thousand/uL      Lymphocytes Absolute 1 39 Thousands/µL      Monocytes Absolute 0 38 Thousand/µL      Eosinophils Absolute 0 00 Thousand/µL      Basophils Absolute 0 01 Thousands/µL                  XR chest 1 view portable   Final Result by Babar Almaguer MD (01/11 0726)      No acute cardiopulmonary disease  Workstation performed: [id number]                    Procedures  Procedures         ED Course                             SBIRT 20yo+      Most Recent Value   SBIRT (22 yo +)   In order to provide better care to our patients, we are screening all of our patients for alcohol and drug use  Would it be okay to ask you these screening questions? Yes Filed at: 01/10/2021 0031   Initial Alcohol Screen: US AUDIT-C    1  How often do you have a drink containing alcohol?  0 Filed at: 01/10/2021 0031   2  How many drinks containing alcohol do you have on a typical day you are drinking? 0 Filed at: 01/10/2021 0031   3b  FEMALE Any Age, or MALE 65+: How often do you have 4 or more drinks on one occassion? 0 Filed at: 01/10/2021 0031   Audit-C Score  0 Filed at: 01/10/2021 0031   RODGER: How many times in the past year have you    Used an illegal drug or used a prescription medication for non-medical reasons?   Never Filed at: 01/10/2021 0031                    MDM  Number of Diagnoses or Management Options  COVID-19: new and requires workup  Dyspnea: new and requires workup  Pneumonia: new and requires workup     Amount and/or Complexity of Data Reviewed  Clinical lab tests: ordered and reviewed  Tests in the radiology section of CPT®: ordered and reviewed  Decide to obtain previous medical records or to obtain history from someone other than the patient: yes  Review and summarize past medical records: yes    Patient Progress  Patient progress: stable      Disposition  Final diagnoses:   Dyspnea   Pneumonia   COVID-19     Time reflects when diagnosis was documented in both MDM as applicable and the Disposition within this note     Time User Action Codes Description Comment    1/10/2021  2:04 AM Sree Beck Add [R06 00] Dyspnea     1/10/2021  2:04 AM Sree Beck Add [J18 9] Pneumonia     1/10/2021  2:04 AM Sree Beck Add [U07 1] COVID-19     1/13/2021  1:13 PM [place], MARTÍNcourtney[address] [E11 9] Type 2 diabetes mellitus [facility])       ED Disposition     ED Disposition Condition Date/Time Comment    Admit Stable Sun Michael 10, 2021  2:04 AM Case was discussed with Author Ortega and the patient's admission status was agreed to be Admission Status: observation status to the service of Dr Solomon Bhatti           Follow-up Information    None         Current Discharge Medication List      START taking these medications    Details   Alcohol Swabs 70 % PADS Use 4 (four) times a day  Qty: 120 each, Refills: 2    Associated Diagnoses: Type 2 diabetes mellitus (HCC)      insulin glargine (Basaglar KwikPen) 100 units/mL injection pen Inject 25 Units under the skin daily at bedtime  Qty: 5 pen, Refills: 0    Associated Diagnoses: Type 2 diabetes mellitus (HCC)      insulin lispro (HumaLOG KwikPen) 100 units/mL injection pen Inject 5 Units under the skin 3 (three) times a day with meals  Qty: 5 pen, Refills: 0    Associated Diagnoses: Type 2 diabetes mellitus (HCC)      Insulin Pen Needle 32G X 6 MM MISC Use 4 (four) times a day  Qty: 100 each, Refills: 1    Associated Diagnoses: Type 2 diabetes mellitus ([address] )         CONTINUE these medications which have NOT CHANGED    Details   losartan (COZAAR) 100 MG tablet Take 100 mg by mouth daily before dinner      metFORMIN (GLUCOPHAGE) 1000 MG tablet Take 1,000 mg by mouth 2 (two) times a day with meals      triamcinolone (KENALOG) 0 1 % cream Apply topically 2 (two) times a day for 7 days  Qty: 30 g, Refills: 0    Associated Diagnoses: Dermatitis           Outpatient Discharge Orders   Glucometer     Glucometer test strips     Lancets       PDMP Review     None          ED Provider  Electronically Signed by           Heath Gaona DO  01/14/21 4435

## 2021-01-16 LAB
ANION GAP SERPL CALCULATED.3IONS-SCNC: 9 MMOL/L (ref 4–13)
BASOPHILS # BLD MANUAL: 0.07 THOUSAND/UL (ref 0–0.1)
BASOPHILS NFR MAR MANUAL: 1 % (ref 0–1)
BUN SERPL-MCNC: 14 MG/DL (ref 5–25)
CALCIUM SERPL-MCNC: 9.3 MG/DL (ref 8.3–10.1)
CHLORIDE SERPL-SCNC: 103 MMOL/L (ref 100–108)
CO2 SERPL-SCNC: 28 MMOL/L (ref 21–32)
CREAT SERPL-MCNC: 0.76 MG/DL (ref 0.6–1.3)
EOSINOPHIL # BLD MANUAL: 0 THOUSAND/UL (ref 0–0.4)
EOSINOPHIL NFR BLD MANUAL: 0 % (ref 0–6)
ERYTHROCYTE [DISTWIDTH] IN BLOOD BY AUTOMATED COUNT: 12.7 % (ref 11.6–15.1)
GFR SERPL CREATININE-BSD FRML MDRD: 101 ML/MIN/1.73SQ M
GLUCOSE SERPL-MCNC: 234 MG/DL (ref 65–140)
GLUCOSE SERPL-MCNC: 254 MG/DL (ref 65–140)
GLUCOSE SERPL-MCNC: 319 MG/DL (ref 65–140)
GLUCOSE SERPL-MCNC: 336 MG/DL (ref 65–140)
GLUCOSE SERPL-MCNC: 453 MG/DL (ref 65–140)
HCT VFR BLD AUTO: 41.8 % (ref 34.8–46.1)
HGB BLD-MCNC: 13.6 G/DL (ref 11.5–15.4)
LG PLATELETS BLD QL SMEAR: PRESENT
LYMPHOCYTES # BLD AUTO: 1.76 THOUSAND/UL (ref 0.6–4.47)
LYMPHOCYTES # BLD AUTO: 26 % (ref 14–44)
MCH RBC QN AUTO: 30.7 PG (ref 26.8–34.3)
MCHC RBC AUTO-ENTMCNC: 32.5 G/DL (ref 31.4–37.4)
MCV RBC AUTO: 94 FL (ref 82–98)
MONOCYTES # BLD AUTO: 0.47 THOUSAND/UL (ref 0–1.22)
MONOCYTES NFR BLD: 7 % (ref 4–12)
NEUTROPHILS # BLD MANUAL: 4.27 THOUSAND/UL (ref 1.85–7.62)
NEUTS BAND NFR BLD MANUAL: 1 % (ref 0–8)
NEUTS SEG NFR BLD AUTO: 62 % (ref 43–75)
NRBC BLD AUTO-RTO: 0 /100 WBCS
PLATELET # BLD AUTO: 287 THOUSANDS/UL (ref 149–390)
PLATELET BLD QL SMEAR: ADEQUATE
PMV BLD AUTO: 9.7 FL (ref 8.9–12.7)
POTASSIUM SERPL-SCNC: 3.9 MMOL/L (ref 3.5–5.3)
RBC # BLD AUTO: 4.43 MILLION/UL (ref 3.81–5.12)
SODIUM SERPL-SCNC: 140 MMOL/L (ref 136–145)
TOTAL CELLS COUNTED SPEC: 100
VARIANT LYMPHS # BLD AUTO: 3 %
WBC # BLD AUTO: 6.77 THOUSAND/UL (ref 4.31–10.16)

## 2021-01-16 PROCEDURE — 80048 BASIC METABOLIC PNL TOTAL CA: CPT | Performed by: INTERNAL MEDICINE

## 2021-01-16 PROCEDURE — 82948 REAGENT STRIP/BLOOD GLUCOSE: CPT

## 2021-01-16 PROCEDURE — 85007 BL SMEAR W/DIFF WBC COUNT: CPT | Performed by: INTERNAL MEDICINE

## 2021-01-16 PROCEDURE — 99232 SBSQ HOSP IP/OBS MODERATE 35: CPT | Performed by: STUDENT IN AN ORGANIZED HEALTH CARE EDUCATION/TRAINING PROGRAM

## 2021-01-16 PROCEDURE — 85027 COMPLETE CBC AUTOMATED: CPT | Performed by: INTERNAL MEDICINE

## 2021-01-16 RX ORDER — INSULIN GLARGINE 100 [IU]/ML
40 INJECTION, SOLUTION SUBCUTANEOUS
Status: DISCONTINUED | OUTPATIENT
Start: 2021-01-16 | End: 2021-01-18

## 2021-01-16 RX ADMIN — FLUCONAZOLE 200 MG: 100 TABLET ORAL at 09:41

## 2021-01-16 RX ADMIN — DEXAMETHASONE SODIUM PHOSPHATE 6 MG: 4 INJECTION, SOLUTION INTRAMUSCULAR; INTRAVENOUS at 09:37

## 2021-01-16 RX ADMIN — POTASSIUM CHLORIDE 40 MEQ: 1500 TABLET, EXTENDED RELEASE ORAL at 18:27

## 2021-01-16 RX ADMIN — ZINC SULFATE 220 MG (50 MG) CAPSULE 220 MG: CAPSULE at 09:41

## 2021-01-16 RX ADMIN — OXYCODONE HYDROCHLORIDE AND ACETAMINOPHEN 1000 MG: 500 TABLET ORAL at 21:47

## 2021-01-16 RX ADMIN — GUAIFENESIN 600 MG: 600 TABLET ORAL at 21:47

## 2021-01-16 RX ADMIN — POTASSIUM CHLORIDE 40 MEQ: 1500 TABLET, EXTENDED RELEASE ORAL at 09:42

## 2021-01-16 RX ADMIN — ENOXAPARIN SODIUM 40 MG: 40 INJECTION SUBCUTANEOUS at 09:39

## 2021-01-16 RX ADMIN — Medication 2000 UNITS: at 09:41

## 2021-01-16 RX ADMIN — GUAIFENESIN AND DEXTROMETHORPHAN 10 ML: 100; 10 SYRUP ORAL at 21:54

## 2021-01-16 RX ADMIN — INSULIN LISPRO 3 UNITS: 100 INJECTION, SOLUTION INTRAVENOUS; SUBCUTANEOUS at 21:48

## 2021-01-16 RX ADMIN — INSULIN LISPRO 2 UNITS: 100 INJECTION, SOLUTION INTRAVENOUS; SUBCUTANEOUS at 09:40

## 2021-01-16 RX ADMIN — INSULIN LISPRO 3 UNITS: 100 INJECTION, SOLUTION INTRAVENOUS; SUBCUTANEOUS at 12:06

## 2021-01-16 RX ADMIN — INSULIN LISPRO 4 UNITS: 100 INJECTION, SOLUTION INTRAVENOUS; SUBCUTANEOUS at 18:26

## 2021-01-16 RX ADMIN — FAMOTIDINE 20 MG: 20 TABLET ORAL at 09:42

## 2021-01-16 RX ADMIN — GUAIFENESIN 600 MG: 600 TABLET ORAL at 09:41

## 2021-01-16 RX ADMIN — INSULIN GLARGINE 40 UNITS: 100 INJECTION, SOLUTION SUBCUTANEOUS at 21:47

## 2021-01-16 RX ADMIN — FAMOTIDINE 20 MG: 20 TABLET ORAL at 18:28

## 2021-01-16 RX ADMIN — DIPHENHYDRAMINE HYDROCHLORIDE 25 MG: 25 TABLET ORAL at 21:47

## 2021-01-16 RX ADMIN — OXYCODONE HYDROCHLORIDE AND ACETAMINOPHEN 1000 MG: 500 TABLET ORAL at 09:42

## 2021-01-16 NOTE — CASE MANAGEMENT
CM spoke to pt and she informs CM that Dr Alireza Card called her last night and explained that the O2 was ordered and will be delivered to her home  Her  will bring in the portable tank when she is discharged  She also confirmed that [facility] was called and [facility] will start on d/c  The only issue, is that the [place] cannot overnight insulin and it will take 10 days for her to receive it in the mail  Dr Alireza Card was hoping we could give her  a 2 wk supply to tide her over till she receives it from the [place]    CM contacted [facility] in [place] and the pharmacist will call back with the cost

## 2021-01-16 NOTE — ASSESSMENT & PLAN NOTE
Lab Results   Component Value Date    HGBA1C 11 0 (H) 01/10/2021       Recent Labs     01/15/21  1550 01/15/21  2023 01/16/21  0631 01/16/21  1105   POCGLU 429* 418* 234* 336*       Blood Sugar Average: Last 72 hrs:  (P) 276 [id number]     Poorly controlled diabetes mellitus with A1c of 11  Hyperglycemia in the setting of IV steroid use  Continue glargine 24 units daily and 8 units t i d  Lispro with meals, continue correctional scale insulin algorithm 3 add meals and 2 with bedtime

## 2021-01-16 NOTE — CASE MANAGEMENT
CM spoke to [facility] and she informs CM that a 2 wk supply of Levar Dotter is $139 08 and Humalog KwikPen is $59 33  The pt feels this is too expensive  CM will speak to CM Manager-Shruthi to see if [facility] could provide this even though the pt has insurance through the [place]

## 2021-01-16 NOTE — PROGRESS NOTES
Progress Note - Mozella Kehr 1965, 60 y o  female MRN: [id number]    Unit/Bed#: -01 Encounter: [id number]    Primary Care Provider: No primary care provider on file  Date and time admitted to hospital: 1/10/2021 12:22 AM        * Pneumonia due to COVID-19 virus  Assessment & Plan  · Reports productive cough and chest congestion x 2 days  Reports has had several episodes where she coughs until vomits phlegm  Denies SOB, but states "sometimes I just have a hard time breathing " (+) diarrhea  (+) decreased appetite  O2 sat 90% on RA after breathing treatment  (+) hx of mild asthma not on maintenance meds  · COVID (+) in ED  · CXR with hazy area to LLL  · Completed 5 days of remdesivir treatment  · Improving slowly  · Continue IV Decadron  · Transition to oral Decadron from tomorrow  · Continue with mild disease pathway  · Respiratory protocol  · Incentive spirometry  · Mucinex BID  · Robitussin PRN  · Maintain precautions    Acute hypoxemic respiratory failure due to COVID-19 [facility])  Assessment & Plan  Secondary to COVID-19 pneumonia  Completed 5 days of remdesivir treatment  Improving slowly  Currently O2 saturation 91 to 93% on 1-2 L nasal cannula  Zinc/vitamin-C /vitamin-D/statin/ famotidine for 7 days  Dexamethasone 6 mg IV once daily for 10 days-started on 01/11/2021  Remdesivir- for total of 5 doses started on 01/11/2021  Respiratory protocol-  Anticoagulation - lovenox SC ppx  Continue contact and airborne precautions  Continue supportive care      Type 2 diabetes mellitus [facility])  Assessment & Plan  Lab Results   Component Value Date    HGBA1C 11 0 (H) 01/10/2021       Recent Labs     01/15/21  1550 01/15/21  2023 01/16/21  0631 01/16/21  1105   POCGLU 429* 418* 234* 336*       Blood Sugar Average: Last 72 hrs:  (P) 276 [id number]     Poorly controlled diabetes mellitus with A1c of 11  Hyperglycemia in the setting of IV steroid use  Increase Lantus to 40 units q h s , lispro 26 units t i d  With meals  Continue sliding scale coverage  Patient is otherwise hemodynamically stable for discharge but currently awaiting oxygen delivery as well as insulin coverage her insurance is from [employer]  Elevated glucose  Assessment & Plan  · Glucose 181 on admission  No history of DM  · Will need insulin as the patient is being started on steroids  · Will monitor blood sugars closely    Hypokalemia  Assessment & Plan  · Now resolved  VTE Pharmacologic Prophylaxis:   Pharmacologic: Enoxaparin (Lovenox)  Patient Centered Rounds: I have performed bedside rounds with nursing staff today  Education and Discussions with Family / Patient: left a voice message at leia Wh[facility] Phone [phone] at 5:22pm      Time Spent for Care: 30 minutes  More than 50% of total time spent on counseling and coordination of care as described above  Current Length of Stay: 5 day(s)    Current Patient Status: Inpatient   Certification Statement: The patient will continue to require additional inpatient hospital stay due to Above, awaiting oxygen delivery as well as insulin coverage from [employer]    Discharge Plan:      Code Status: Level 1 - Full Code      Subjective:   Afebrile, hemodynamically stable  O2 saturation 91-93% on 1 to 2 L nasal cannula  Clinically not in distress  Stable at her baseline  No other events reported  Objective:     Vitals:   Temp (24hrs), Av 1 °F (36 7 °C), Min:97 9 °F (36 6 °C), Max:98 2 °F (36 8 °C)    Temp:  [97 9 °F (36 6 °C)-98 2 °F (36 8 °C)] 98 2 °F (36 8 °C)  HR:  [45-71] 71  Resp:  [16-18] 16  BP: (112-135)/(68-78) 112/78  SpO2:  [88 %-93 %] 92 %  Body mass index is 38 25 kg/m²  Input and Output Summary (last 24 hours):     No intake or output data in the 24 hours ending 21 1748    Physical Exam:     Physical Exam  Constitutional:       General: She is not in acute distress  Appearance: Normal appearance  She is not ill-appearing  Comments: Examined patient via video/visual observation and with Nurse's assistance and remoting in due to COVID 19 Precaution  Obese female patient not in acute distress  Eyes:      Extraocular Movements: Extraocular movements intact  Neck:      Musculoskeletal: Normal range of motion and neck supple  Cardiovascular:      Rate and Rhythm: Normal rate and regular rhythm  Pulses: Normal pulses  Heart sounds: Normal heart sounds  Pulmonary:      Effort: Pulmonary effort is normal  No respiratory distress  Breath sounds: Normal breath sounds  Abdominal:      General: Bowel sounds are normal       Palpations: Abdomen is soft  Tenderness: There is no abdominal tenderness  Musculoskeletal: Normal range of motion  Neurological:      General: No focal deficit present  Mental Status: She is alert and oriented to person, place, and time  Mental status is at baseline  Psychiatric:         Mood and Affect: Mood normal          Behavior: Behavior normal            Additional Data:     Labs:    Results from last 7 days   Lab Units 01/16/21  0627 01/14/21  0703   WBC Thousand/uL 6 77   < > 4 62   HEMOGLOBIN g/dL 13 6   < > 13 2   HEMATOCRIT % 41 8   < > 39 7   PLATELETS Thousands/uL 287   < > 230   BANDS PCT % 1   < >  --    NEUTROS PCT %  --   --  44   LYMPHS PCT %  --   --  48*   LYMPHO PCT % 26   < >  --    MONOS PCT %  --   --  7   MONO PCT % 7   < >  --    EOS PCT % 0   < > 0    < > = values in this interval not displayed       Results from last 7 days   Lab Units 01/16/21  0627 01/15/21  0454   SODIUM mmol/L 140 140   POTASSIUM mmol/L 3 9 4 3   CHLORIDE mmol/L 103 105   CO2 mmol/L 28 27   BUN mg/dL 14 15   CREATININE mg/dL 0 76 0 64   ANION GAP mmol/L 9 8   CALCIUM mg/dL 9 3 8 8   ALBUMIN g/dL  --  2 5*   TOTAL BILIRUBIN mg/dL  --  0 30   ALK PHOS U/L  --  75   ALT U/L  --  40   AST U/L  --  41   GLUCOSE RANDOM mg/dL 254* 212*         Results from last 7 days   Lab Units 01/16/21  1105 01/16/21  0631 01/15/21  2023 01/15/21  1550 01/15/21  1156 01/15/21  0701 01/14/21  2031 01/14/21  1611 01/14/21  1107 01/14/21  0614 01/13/21  2037 01/13/21  1548   POC GLUCOSE mg/dl 336* 234* 418* 429* 181* 203* 329* 382* 209* 207* 310* 289*     Results from last 7 days   Lab Units 01/10/21  0803   HEMOGLOBIN A1C % 11 0*     Results from last 7 days   Lab Units 01/13/21  0557 01/12/21  0754 01/11/21  0924 01/10/21  0342   PROCALCITONIN ng/ml <0 05 <0 05 <0 05 <0 05           * I Have Reviewed All Lab Data Listed Above  * Additional Pertinent Lab Tests Reviewed:  All Labs Within Last 24 Hours Reviewed      Recent Cultures (last 7 days):           Last 24 Hours Medication List:   Current Facility-Administered Medications   Medication Dose Route Frequency Provider Last Rate    acetaminophen  975 mg Oral Q6H PRN Majorie Cheadle, PA-C      albuterol  2 puff Inhalation Q4H PRN Sury Camejo MD      ascorbic acid  1,000 mg Oral Q12H [address] Majorie Cheadle, PA-C      calcium carbonate  1,000 mg Oral Daily PRN Majorie Cheadle, PA-C      cholecalciferol  2,000 Units Oral Daily Majorie Cheadle, [place]      dexamethasone  6 mg Intravenous Q24H Evelyn Castaneda MD      dextromethorphan-guaiFENesin  10 mL Oral Q4H PRN Majorie Cheadle, PA-C      diphenhydrAMINE  25 mg Oral HS PRN Irma Rodriguez PA-C      enoxaparin  40 mg Subcutaneous Daily Marty Salazar PA-C      famotidine  20 mg Oral BID Majorie Cheadle, PA-C      fluconazole  200 mg Oral Daily Lakshmi Bernal MD      guaiFENesin  600 mg Oral Q12H [address] Asenath Catching Maryanne Rodriguez PA-C      insulin glargine  40 Units Subcutaneous HS Néo WELCH MD      insulin lispro  1-5 Units Subcutaneous TID [facility] Evelyn Castaneda MD      insulin lispro  1-5 Units Subcutaneous HS Evelyn Castaneda MD     [facility] ON 1/17/2021] insulin lispro  26 Units Subcutaneous TID With Meals Jerome Neil MD      [START ON 1/17/2021] multivitamin-minerals  1 tablet Oral Daily Sabra Oas, PA-C      ondansetron  4 mg Intravenous Q6H PRN Sabra Oas, PA-C      polyvinyl alcohol  1 drop Both Eyes Q3H PRN Sabra Oas, PA-C      potassium chloride  40 mEq Oral BID Payam Laboy MD          Today, Patient Was Seen By: Rolf Medina MD    ** Please Note: Dictation voice to text software may have been used in the creation of this document   **

## 2021-01-17 LAB
GLUCOSE SERPL-MCNC: 235 MG/DL (ref 65–140)
GLUCOSE SERPL-MCNC: 298 MG/DL (ref 65–140)
GLUCOSE SERPL-MCNC: 400 MG/DL (ref 65–140)
GLUCOSE SERPL-MCNC: 444 MG/DL (ref 65–140)

## 2021-01-17 PROCEDURE — 82948 REAGENT STRIP/BLOOD GLUCOSE: CPT

## 2021-01-17 PROCEDURE — 99232 SBSQ HOSP IP/OBS MODERATE 35: CPT | Performed by: STUDENT IN AN ORGANIZED HEALTH CARE EDUCATION/TRAINING PROGRAM

## 2021-01-17 RX ADMIN — FAMOTIDINE 20 MG: 20 TABLET ORAL at 17:55

## 2021-01-17 RX ADMIN — ENOXAPARIN SODIUM 40 MG: 40 INJECTION SUBCUTANEOUS at 08:25

## 2021-01-17 RX ADMIN — INSULIN LISPRO 2 UNITS: 100 INJECTION, SOLUTION INTRAVENOUS; SUBCUTANEOUS at 12:10

## 2021-01-17 RX ADMIN — DIPHENHYDRAMINE HYDROCHLORIDE 25 MG: 25 TABLET ORAL at 21:26

## 2021-01-17 RX ADMIN — INSULIN LISPRO 4 UNITS: 100 INJECTION, SOLUTION INTRAVENOUS; SUBCUTANEOUS at 17:56

## 2021-01-17 RX ADMIN — INSULIN LISPRO 4 UNITS: 100 INJECTION, SOLUTION INTRAVENOUS; SUBCUTANEOUS at 21:26

## 2021-01-17 RX ADMIN — FLUCONAZOLE 200 MG: 100 TABLET ORAL at 08:25

## 2021-01-17 RX ADMIN — GUAIFENESIN AND DEXTROMETHORPHAN 10 ML: 100; 10 SYRUP ORAL at 21:25

## 2021-01-17 RX ADMIN — INSULIN LISPRO 2 UNITS: 100 INJECTION, SOLUTION INTRAVENOUS; SUBCUTANEOUS at 08:26

## 2021-01-17 RX ADMIN — Medication 2000 UNITS: at 08:25

## 2021-01-17 RX ADMIN — INSULIN GLARGINE 40 UNITS: 100 INJECTION, SOLUTION SUBCUTANEOUS at 21:23

## 2021-01-17 RX ADMIN — Medication 1 TABLET: at 08:25

## 2021-01-17 RX ADMIN — POTASSIUM CHLORIDE 40 MEQ: 1500 TABLET, EXTENDED RELEASE ORAL at 17:55

## 2021-01-17 RX ADMIN — POTASSIUM CHLORIDE 40 MEQ: 1500 TABLET, EXTENDED RELEASE ORAL at 08:25

## 2021-01-17 RX ADMIN — DEXAMETHASONE 6 MG: 2 TABLET ORAL at 08:25

## 2021-01-17 RX ADMIN — FAMOTIDINE 20 MG: 20 TABLET ORAL at 08:25

## 2021-01-17 RX ADMIN — GUAIFENESIN 600 MG: 600 TABLET ORAL at 08:25

## 2021-01-17 RX ADMIN — GUAIFENESIN 600 MG: 600 TABLET ORAL at 21:23

## 2021-01-17 NOTE — PLAN OF CARE
Problem: Potential for Falls  Goal: Patient will remain free of falls  Description: INTERVENTIONS:  - Assess patient frequently for physical needs  -  Identify cognitive and physical deficits and behaviors that affect risk of falls    -  [place] fall precautions as indicated by assessment   - Educate patient/family on patient safety including physical limitations  - Instruct patient to call for assistance with activity based on assessment  - Modify environment to reduce risk of injury  - Consider OT/PT consult to assist with strengthening/mobility  Outcome: Progressing     Problem: RESPIRATORY - ADULT  Goal: Achieves optimal ventilation and oxygenation  Description: INTERVENTIONS:  - Assess for changes in respiratory status  - Assess for changes in mentation and behavior  - Position to facilitate oxygenation and minimize respiratory effort  - Oxygen administered by appropriate delivery if ordered  - Initiate smoking cessation education as indicated  - Encourage broncho-pulmonary hygiene including cough, deep breathe, Incentive Spirometry  - Assess the need for suctioning and aspirate as needed  - Assess and instruct to report SOB or any respiratory difficulty  - Respiratory Therapy support as indicated  Outcome: Progressing     Problem: METABOLIC, FLUID AND ELECTROLYTES - ADULT  Goal: Electrolytes maintained within normal limits  Description: INTERVENTIONS:  - Monitor labs and assess patient for signs and symptoms of electrolyte imbalances  - Administer electrolyte replacement as ordered  - Monitor response to electrolyte replacements, including repeat lab results as appropriate  - Instruct patient on fluid and nutrition as appropriate  Outcome: Progressing  Goal: Fluid balance maintained  Description: INTERVENTIONS:  - Monitor labs   - Monitor I/O and WT  - Instruct patient on fluid and nutrition as appropriate  - Assess for signs & symptoms of volume excess or deficit  Outcome: Progressing  Goal: Glucose maintained within target range  Description: INTERVENTIONS:  - Monitor Blood Glucose as ordered  - Assess for signs and symptoms of hyperglycemia and hypoglycemia  - Administer ordered medications to maintain glucose within target range  - Assess nutritional intake and initiate nutrition service referral as needed  Outcome: Progressing     Problem: Nutrition/Hydration-ADULT  Goal: Nutrient/Hydration intake appropriate for improving, restoring or maintaining nutritional needs  Description: Monitor and assess patient's nutrition/hydration status for malnutrition  Collaborate with interdisciplinary team and initiate plan and interventions as ordered  Monitor patient's weight and dietary intake as ordered or per policy  Utilize nutrition screening tool and intervene as necessary  Determine patient's food preferences and provide high-protein, high-caloric foods as appropriate       INTERVENTIONS:  - Monitor oral intake, urinary output, labs, and treatment plans  - Assess nutrition and hydration status and recommend course of action  - Evaluate amount of meals eaten  - Assist patient with eating if necessary   - Allow adequate time for meals  - Recommend/ encourage appropriate diets, oral nutritional supplements, and vitamin/mineral supplements  - Order, calculate, and assess calorie counts as needed  - Recommend, monitor, and adjust tube feedings based on assessed needs  - Assess need for intravenous fluids  - Provide  nutrition/hydration education as appropriate  - Include patient/family/caregiver in decisions related to nutrition  Outcome: Progressing     Problem: PAIN - ADULT  Goal: Verbalizes/displays adequate comfort level or baseline comfort level  Description: Interventions:  - Encourage patient to monitor pain and request assistance  - Assess pain using appropriate pain scale  - Administer analgesics based on type and severity of pain and evaluate response  - Implement non-pharmacological measures as appropriate and evaluate response  - Consider cultural and social influences on pain and pain management  - Notify physician/advanced practitioner if interventions unsuccessful or patient reports new pain  Outcome: Progressing     Problem: INFECTION - ADULT  Goal: Absence or prevention of progression during hospitalization  Description: INTERVENTIONS:  - Assess and monitor for signs and symptoms of infection  - Monitor lab/diagnostic results  - Monitor all insertion sites, i e  indwelling lines, tubes, and drains  - Monitor endotracheal if appropriate and nasal secretions for changes in amount and color  - [place] appropriate cooling/warming therapies per order  - Administer medications as ordered  - Instruct and encourage patient and family to use good hand hygiene technique  - Identify and instruct in appropriate isolation precautions for identified infection/condition  Outcome: Progressing     Problem: SAFETY ADULT  Goal: Patient will remain free of falls  Description: INTERVENTIONS:  - Assess patient frequently for physical needs  -  Identify cognitive and physical deficits and behaviors that affect risk of falls    -  [place] fall precautions as indicated by assessment   - Educate patient/family on patient safety including physical limitations  - Instruct patient to call for assistance with activity based on assessment  - Modify environment to reduce risk of injury  - Consider OT/PT consult to assist with strengthening/mobility  Outcome: Progressing  Goal: Maintain or return to baseline ADL function  Description: INTERVENTIONS:  -  Assess patient's ability to carry out ADLs; assess patient's baseline for ADL function and identify physical deficits which impact ability to perform ADLs (bathing, care of mouth/teeth, toileting, grooming, dressing, etc )  - Assess/evaluate cause of self-care deficits   - Assess range of motion  - Assess patient's mobility; develop plan if impaired  - Assess patient's need for assistive devices and provide as appropriate  - Encourage maximum independence but intervene and supervise when necessary  - Involve family in performance of ADLs  - Assess for home care needs following discharge   - Consider OT consult to assist with ADL evaluation and planning for discharge  - Provide patient education as appropriate  Outcome: Progressing  Goal: Maintain or return mobility status to optimal level  Description: INTERVENTIONS:  - Assess patient's baseline mobility status (ambulation, transfers, stairs, etc )    - Identify cognitive and physical deficits and behaviors that affect mobility  - Identify mobility aids required to assist with transfers and/or ambulation (gait belt, sit-to-stand, lift, walker, cane, etc )  - [place] fall precautions as indicated by assessment  - Record patient progress and toleration of activity level on Mobility SBAR; progress patient to next Phase/Stage  - Instruct patient to call for assistance with activity based on assessment  - Consider rehabilitation consult to assist with strengthening/weightbearing, etc   Outcome: Progressing     Problem: DISCHARGE PLANNING  Goal: Discharge to home or other facility with appropriate resources  Description: INTERVENTIONS:  - Identify barriers to discharge w/patient and caregiver  - Arrange for needed discharge resources and transportation as appropriate  - Identify discharge learning needs (meds, wound care, etc )  - Arrange for interpretive services to assist at discharge as needed  - Refer to Case Management Department for coordinating discharge planning if the patient needs post-hospital services based on physician/advanced practitioner order or complex needs related to functional status, cognitive ability, or social support system  Outcome: Progressing     Problem: Knowledge Deficit  Goal: Patient/family/caregiver demonstrates understanding of disease process, treatment plan, medications, and discharge instructions  Description: Complete learning assessment and assess knowledge base    Interventions:  - Provide teaching at level of understanding  - Provide teaching via preferred learning methods  Outcome: Progressing

## 2021-01-17 NOTE — QUICK NOTE
Patient's significant other Shadia Bender ([phone]) and son Renetta Limon updated with regards to the patient's medical status  Patient's son Joceline Giron ( [phone]) who resides locally was also updated with regards to his mother's medical status, and confirmation received of home oxygen delivery; Joceline Giron will provide home oxygen at the time of discharge  All questions and concerns addressed at this time

## 2021-01-17 NOTE — CASE MANAGEMENT
Call from [facility]  Patient ready for discharge today and needs 2 wk supply of Basaglar KwikPen  and Humalog KwikPen  She get her meds from [place]  CM dept had run a price check on meds at [address] [place] [address] (2 wk supply of Ny Sabi is $139 08 and Humalog KwikPen is $59 33) and patient felt that was too expensive  CM completed a search on [employer] web site for meds  2 wk supply of Basaglar KwikPen is $250 91 and Humalog KwikPen is $79 00

## 2021-01-17 NOTE — ASSESSMENT & PLAN NOTE
Patient admitted with secondary pneumonia as result of COVID-19 infection  Patient status post antibiotic therapy with doxycycline ceftriaxone a significant improvement on moderate COVID pathway  No respiratory distress reported  patient saturating around 91% on  1 5L of oxygen via nasal cannula  Patient remains afebrile and white blood cell count within normal limits  Still indicates some shortness of breath and desaturation with ambulation  Patient is stable enough from a medical standpoint to be discharged with home oxygen of 2L/min via [place] as determined from home oxygen eval, however she obtains her medications through the [place] and will get meds from this source;  this process is limiting her discharge  - F/u with case management about home oxygen distribution from the [place]:  Scripts provided to case management to fax to [place] for insulin and supplies  [facility] will not be able to provide [facility] for another 2 weeks so will need to be provided by our in-house pharmacy on discharge  - Home oxygen delivered to home; will request it to be provided at the time of discharge  - Provide albuterol prn and robitussin prn for cough and wheezing   - Continue to monitor vitals, O2 saturations  - Remdesivir completed  - Dexamethasone 6 mg IV discontinued; continue dexamethsone 6 mg po qd  D/c on tapering dose of prednisone   - Continue VTE prophylaxis with enoxaparin   - Continue 2000 U of vitamin D, multivitamin and famotidine

## 2021-01-17 NOTE — PROGRESS NOTES
Progress Note - Sandee Walters 1965, 60 y o  female MRN: [id number]    Unit/Bed#: -01 Encounter: [id number]    Primary Care Provider: No primary care provider on file  Date and time admitted to hospital: 1/10/2021 12:22 AM        * Pneumonia due to COVID-19 virus  Assessment & Plan  Patient admitted with secondary pneumonia as result of COVID-19 infection  Patient status post antibiotic therapy with doxycycline ceftriaxone a significant improvement on moderate COVID pathway  No respiratory distress reported  patient saturating around 91% on  1 5L of oxygen via nasal cannula  Patient remains afebrile and white blood cell count within normal limits  Still indicates some shortness of breath and desaturation with ambulation  Patient is stable enough from a medical standpoint to be discharged with home oxygen of 2L/min via [place] as determined from home oxygen eval, however she obtains her medications through the [facility] and will get meds from this source;  this process is limiting her discharge  - F/u with case management about home oxygen distribution from the [facility]:  Scripts provided to case management to fax to [address] [place] [address] for insulin and supplies  [facility] for another 2 weeks so will need to be provided by our in-house pharmacy on discharge  - Home oxygen delivered to home; will request it to be provided at the time of discharge  - Provide albuterol prn and robitussin prn for cough and wheezing   - Continue to monitor vitals, O2 saturations  - Remdesivir completed  - Dexamethasone 6 mg IV discontinued; continue dexamethsone 6 mg po qd  D/c on tapering dose of prednisone   - Continue VTE prophylaxis with enoxaparin   - Continue [facility] of vitamin D, multivitamin and famotidine          Type 2 diabetes mellitus [facility])  Assessment & Plan  Lab Results   Component Value Date    HGBA1C 11 0 (H) 01/10/2021       Recent Labs     01/16/21  1105 01/16/21  1731 01/16/21  2118 01/17/21  5885 POCGLU 336* 453* 319* 235*       Blood Sugar Average: Last 72 hrs:  (P) 321 [id number]     Patient has history of type 2 diabetes mellitus most recent A1c of 11  Patient currently on IV steroids for COVID-19 treatment, which predispose the patient to hyperglycemia  Patient switched to insulin basal-bolus plus sliding-scale correction on admission, with her preadmission metformin held  Discussion about uncontrolled DM type 2 and insulin requirement was conducted with patient  Patient consents for continuation of insulin regimen on d/c  Blood sugar continues to be outside of target  - Rx for insulin and supplies provided to  and to be faxed to [place]; [place] does not process Rx overnight   - Given patient's elevated hemoglobin A1c and concurrent use of steroids patient will be discharged with insulin regimen, in addition to restarting her metformin 500 mg p o  B i d   - Provide insulin-dependent diabetic education  - Continue basal bolus regimen and hospital; currently increased regimen to glargine 40 units HS + lispro 26 units t i d  A c  + sliding scale correction  - Monitor POC glucose a c  and HS    Elevated glucose  Assessment & Plan  Reference assessment and plan for type 2 diabetes mellitus  Hypokalemia  Assessment & Plan  Likely secondary to diarrhea, which has since resolved  Potassium today of 3 9     - Resolved;  Continue k-dur 40 mEq bid for supplementation   - Monitor BMP  Vaginal candidiasis  Assessment & Plan  Provide Diflucan  VTE Pharmacologic Prophylaxis:   Pharmacologic: Enoxaparin (Lovenox)  Mechanical VTE Prophylaxis in Place: Yes    Patient Centered Rounds: I have performed bedside rounds with nursing staff today  Discussions with Specialists or Other Care Team Provider:  NONE  Education and Discussions with Family / Patient:  NONE  Time Spent for Care: 45 minutes    More than 50% of total time spent on counseling and coordination of care as described above     Current Length of Stay: 6 day(s)    Current Patient Status: Inpatient   Certification Statement: The patient will continue to require additional inpatient hospital stay due to Need to obtain insulin prior to discharge  Receives medications from the  [facility], however they were unable provide insulin and insulin supplies for another 2 weeks  Patient needs to obtain a 2-week supply through our services, however [facility] currently closed  Discharge Plan / Estimated Discharge Date:  As above /estimated discharge date 2021  Code Status: Level 1 - Full Code      Subjective:   Patient still reports some shortness of breath and desaturation with ambulation, but believes that her respiratory status is improving  Denies any fevers, chills, chest pain, palpitations, headaches, dizziness, abdominal pains or diarrhea  Objective:     Vitals:   Temp (24hrs), Av 8 °F (36 6 °C), Min:97 8 °F (36 6 °C), Max:97 8 °F (36 6 °C)    Temp:  [97 8 °F (36 6 °C)] 97 8 °F (36 6 °C)  HR:  [47-71] 47  Resp:  [16-18] 18  BP: (112-121)/(78-81) 121/81  SpO2:  [91 %-93 %] 91 %  Body mass index is 38 25 kg/m²  Input and Output Summary (last 24 hours):     No intake or output data in the 24 hours ending 21 1408    Physical Exam:     Physical Exam  Vitals signs reviewed  Constitutional:       Appearance: Normal appearance  She is obese  Eyes:      General:         Right eye: No discharge  Left eye: No discharge  Conjunctiva/sclera: Conjunctivae normal    Neck:      Musculoskeletal: Neck supple  Cardiovascular:      Rate and Rhythm: Normal rate and regular rhythm  Heart sounds: S1 normal and S2 normal    Pulmonary:      Effort: No tachypnea, accessory muscle usage or respiratory distress  Abdominal:      Palpations: Abdomen is soft  Musculoskeletal:         General: No swelling or tenderness  Skin:     General: Skin is dry  Findings: No erythema     Neurological:      Mental Status: She is alert and oriented to person, place, and time  Psychiatric:         Mood and Affect: Mood normal          Behavior: Behavior normal          Additional Data:     Labs:    Results from last 7 days   Lab Units 01/16/21  0627  01/14/21  0703   WBC Thousand/uL 6 77   < > 4 62   HEMOGLOBIN g/dL 13 6   < > 13 2   HEMATOCRIT % 41 8   < > 39 7   PLATELETS Thousands/uL 287   < > 230   NEUTROS PCT %  --   --  44   LYMPHS PCT %  --   --  48*   LYMPHO PCT % 26   < >  --    MONOS PCT %  --   --  7   MONO PCT % 7   < >  --    EOS PCT % 0   < > 0    < > = values in this interval not displayed  Results from last 7 days   Lab Units 01/16/21  0627 01/15/21  0454   POTASSIUM mmol/L 3 9 4 3   CHLORIDE mmol/L 103 105   CO2 mmol/L 28 27   BUN mg/dL 14 15   CREATININE mg/dL 0 76 0 64   CALCIUM mg/dL 9 3 8 8   ALK PHOS U/L  --  75   ALT U/L  --  40   AST U/L  --  41           * I Have Reviewed All Lab Data Listed Above  * Additional Pertinent Lab Tests Reviewed: All Labs Within Last 24 Hours Reviewed    Imaging:    Imaging Reports Reviewed Today Include:  None  Imaging Personally Reviewed by Myself Includes:  None        Recent Cultures (last 7 days):           Last 24 Hours Medication List:   Current Facility-Administered Medications   Medication Dose Route Frequency Provider Last Rate    acetaminophen  975 mg Oral Q6H PRN Majorie Cheadle, PA-C      albuterol  2 puff Inhalation Q4H PRN Sury Camejo MD      calcium carbonate  1,000 mg Oral Daily PRN Majorie Cheadle, PA-C      cholecalciferol  2,000 Units Oral Daily Majorie Cheadle, PA-C      dexamethasone  6 mg Oral Daily Jerome Neil MD      dextromethorphan-guaiFENesin  10 mL Oral Q4H PRN Majorie Cheadle, PA-C      diphenhydrAMINE  25 mg Oral HS PRN Irma Rodriguez PA-C      enoxaparin  40 mg Subcutaneous Daily Marty Salazar PA-C      famotidine  20 mg Oral BID Majorie Cheadle, PA-C      fluconazole  200 mg Oral Daily Lakshmi Bernal MD     [facility] guaiFENesin  600 mg Oral Q12H [facility], [place]      insulin glargine  40 Units Subcutaneous HS Noé WELCH MD      insulin lispro  1-5 Units Subcutaneous TID AC Kunal Feliz MD      insulin lispro  1-5 Units Subcutaneous HS Kunal Feliz MD      insulin lispro  26 Units Subcutaneous TID With Meals Tamera Courtney MD      multivitamin-minerals  1 tablet Oral Daily Lucyanemely López PA-C      ondansetron  4 mg Intravenous Q6H PRN Remigio López PA-C      polyvinyl alcohol  1 drop Both Eyes Q3H PRN Armandon MARIAM López      potassium chloride  40 mEq Oral BID Tamera Courtney MD          Today, Patient Was Seen By: Jannie Lynn MD    ** Please Note: Dragon 360 Dictation voice to text software may have been used in the creation of this document   **

## 2021-01-17 NOTE — ASSESSMENT & PLAN NOTE
Lab Results   Component Value Date    HGBA1C 11 0 (H) 01/10/2021       Recent Labs     01/16/21  1105 01/16/21  1731 01/16/21  2118 01/17/21  0603   POCGLU 336* 453* 319* 235*       Blood Sugar Average: Last 72 hrs:  (P) 402 [id number]     Patient has history of type 2 diabetes mellitus most recent A1c of 11  Patient currently on IV steroids for COVID-19 treatment, which predispose the patient to hyperglycemia  Patient switched to insulin basal-bolus plus sliding-scale correction on admission, with her preadmission metformin held  Discussion about uncontrolled DM type 2 and insulin requirement was conducted with patient  Patient consents for continuation of insulin regimen on d/c  Blood sugar continues to be outside of target        - Rx for insulin and supplies provided to  and to be faxed to [place]; [place] does not process Rx overnight   - Given patient's elevated hemoglobin A1c and concurrent use of steroids patient will be discharged with insulin regimen, in addition to restarting her metformin 500 mg p o  B i d   - Provide insulin-dependent diabetic education  - Continue basal bolus regimen and hospital; currently increased regimen to glargine 40 units HS + lispro 26 units t i d  A c  + sliding scale correction  - Monitor POC glucose a c  and HS

## 2021-01-17 NOTE — ASSESSMENT & PLAN NOTE
Likely secondary to diarrhea, which has since resolved  Potassium today of 3 9     - Resolved;  Continue k-dur 40 mEq bid for supplementation   - Monitor BMP

## 2021-01-18 VITALS
SYSTOLIC BLOOD PRESSURE: 141 MMHG | HEIGHT: 66 IN | DIASTOLIC BLOOD PRESSURE: 73 MMHG | HEART RATE: 52 BPM | TEMPERATURE: 97.9 F | OXYGEN SATURATION: 92 % | BODY MASS INDEX: 38.09 KG/M2 | WEIGHT: 237 LBS | RESPIRATION RATE: 18 BRPM

## 2021-01-18 LAB
GLUCOSE SERPL-MCNC: 271 MG/DL (ref 65–140)
GLUCOSE SERPL-MCNC: 293 MG/DL (ref 65–140)

## 2021-01-18 PROCEDURE — 99239 HOSP IP/OBS DSCHRG MGMT >30: CPT | Performed by: STUDENT IN AN ORGANIZED HEALTH CARE EDUCATION/TRAINING PROGRAM

## 2021-01-18 PROCEDURE — 82948 REAGENT STRIP/BLOOD GLUCOSE: CPT

## 2021-01-18 RX ORDER — INSULIN GLARGINE 100 [IU]/ML
40 INJECTION, SOLUTION SUBCUTANEOUS
Qty: 2 PEN | Refills: 0 | Status: SHIPPED | OUTPATIENT
Start: 2021-01-18 | End: 2021-01-18 | Stop reason: SDUPTHER

## 2021-01-18 RX ORDER — INSULIN GLARGINE 100 [IU]/ML
40 INJECTION, SOLUTION SUBCUTANEOUS
Qty: 2 PEN | Refills: 0 | Status: SHIPPED | OUTPATIENT
Start: 2021-01-18 | End: 2022-01-03

## 2021-01-18 RX ORDER — DEXAMETHASONE 6 MG/1
6 TABLET ORAL DAILY
Qty: 3 TABLET | Refills: 0 | Status: SHIPPED | OUTPATIENT
Start: 2021-01-19 | End: 2022-06-09 | Stop reason: ALTCHOICE

## 2021-01-18 RX ORDER — PEN NEEDLE, DIABETIC 30 GX3/16"
NEEDLE, DISPOSABLE MISCELLANEOUS 4 TIMES DAILY
Qty: 56 EACH | Refills: 0 | Status: SHIPPED | OUTPATIENT
Start: 2021-01-18 | End: 2021-01-18 | Stop reason: SDUPTHER

## 2021-01-18 RX ORDER — PEN NEEDLE, DIABETIC 30 GX3/16"
NEEDLE, DISPOSABLE MISCELLANEOUS 4 TIMES DAILY
Qty: 56 EACH | Refills: 0 | Status: SHIPPED | OUTPATIENT
Start: 2021-01-18 | End: 2022-01-03

## 2021-01-18 RX ORDER — INSULIN GLARGINE 100 [IU]/ML
45 INJECTION, SOLUTION SUBCUTANEOUS
Status: DISCONTINUED | OUTPATIENT
Start: 2021-01-18 | End: 2021-01-18 | Stop reason: HOSPADM

## 2021-01-18 RX ORDER — INSULIN ASPART 100 [IU]/ML
30 INJECTION, SOLUTION INTRAVENOUS; SUBCUTANEOUS
Qty: 5 PEN | Refills: 0 | Status: SHIPPED | OUTPATIENT
Start: 2021-01-18 | End: 2022-01-03

## 2021-01-18 RX ORDER — DEXAMETHASONE 6 MG/1
6 TABLET ORAL DAILY
Qty: 3 TABLET | Refills: 0 | Status: SHIPPED | OUTPATIENT
Start: 2021-01-19 | End: 2021-01-18

## 2021-01-18 RX ORDER — INSULIN ASPART 100 [IU]/ML
30 INJECTION, SOLUTION INTRAVENOUS; SUBCUTANEOUS
Qty: 5 PEN | Refills: 0 | Status: SHIPPED | OUTPATIENT
Start: 2021-01-18 | End: 2021-01-18 | Stop reason: SDUPTHER

## 2021-01-18 RX ORDER — GLUCOSAMINE HCL/CHONDROITIN SU 500-400 MG
CAPSULE ORAL 4 TIMES DAILY
Qty: 120 EACH | Refills: 0 | Status: SHIPPED | OUTPATIENT
Start: 2021-01-18

## 2021-01-18 RX ADMIN — Medication 2000 UNITS: at 09:10

## 2021-01-18 RX ADMIN — INSULIN LISPRO 2 UNITS: 100 INJECTION, SOLUTION INTRAVENOUS; SUBCUTANEOUS at 12:13

## 2021-01-18 RX ADMIN — INSULIN LISPRO 2 UNITS: 100 INJECTION, SOLUTION INTRAVENOUS; SUBCUTANEOUS at 09:11

## 2021-01-18 RX ADMIN — FAMOTIDINE 20 MG: 20 TABLET ORAL at 09:10

## 2021-01-18 RX ADMIN — ENOXAPARIN SODIUM 40 MG: 40 INJECTION SUBCUTANEOUS at 09:10

## 2021-01-18 RX ADMIN — POTASSIUM CHLORIDE 40 MEQ: 1500 TABLET, EXTENDED RELEASE ORAL at 09:10

## 2021-01-18 RX ADMIN — FLUCONAZOLE 200 MG: 100 TABLET ORAL at 09:10

## 2021-01-18 RX ADMIN — Medication 1 TABLET: at 09:10

## 2021-01-18 RX ADMIN — GUAIFENESIN 600 MG: 600 TABLET ORAL at 09:10

## 2021-01-18 RX ADMIN — DEXAMETHASONE 6 MG: 2 TABLET ORAL at 09:10

## 2021-01-18 NOTE — DISCHARGE INSTRUCTIONS
Hypoglycemia in a Person with Diabetes   WHAT YOU NEED TO KNOW:   Hypoglycemia is a serious condition that happens when your blood glucose (sugar) level drops too low  The blood sugar level is usually too high in a person with diabetes, but the level can also drop too low  It is important to follow your diabetes management plan to keep your blood sugar level steady  DISCHARGE INSTRUCTIONS:   You or someone close to you needs to call the local emergency number (911 in the [address] [facility]) if:   · You have a seizure or pass out  · Your blood sugar is less than 50 mg/dL and does not respond to treatment  · You feel you are going to pass out  · You have trouble thinking clearly  Call your diabetes care team if:   · You have had symptoms of low blood sugar several times  · You have questions about the amount of insulin or diabetes medicine you are taking  · You have questions or concerns about your condition or care  Medicines:   · Insulin or diabetes medicine  help to keep your blood sugar under control  · Glucagon  may be needed if you have severe hypoglycemia  · Take your medicine as directed  Contact your healthcare provider if you think your medicine is not helping or if you have side effects  Tell him or her if you are allergic to any medicine  Keep a list of the medicines, vitamins, and herbs you take  Include the amounts, and when and why you take them  Bring the list or the pill bottles to follow-up visits  Carry your medicine list with you in case of an emergency  Manage hypoglycemia:   · Check your blood sugar level right away if you have symptoms of hypoglycemia  Hypoglycemia is usually 70 mg/dL or below  Ask your diabetes care team what blood sugar level is too low for you  · If your blood sugar level is too low, eat or drink 15 grams of fast-acting carbohydrate    Examples of this amount of fast-acting carbohydrate are 4 ounces (½ cup) of fruit juice or 4 ounces of regular soda  Other examples are 2 tablespoons of raisins or 1 tube of glucose gel  Check your blood sugar level 15 minutes later  Sit still as you wait  If the level is still low (less than 100 mg/dL), have another 15 grams of carbohydrate  When the level returns to 100 mg/dL, eat a meal if it is time  If your meal time is more than 1 hour away, eat a snack  The snack should contain carbohydrates, such as the following:     ? 3/4 cup of cereal    ? 1 cup of skim or low fat milk    ? 6 soda crackers    ? 1/2 of a turkey sandwich    ? 15 fat-free chips  This will help prevent another drop in blood sugar  Always carefully follow your diabetes care team's instructions on how to treat low blood sugar levels  · Always carry a source of fast-acting carbohydrate  If you have symptoms of hypoglycemia and you do not have a blood glucose meter, have a source of fast-acting carbohydrate anyway  Avoid carbohydrate foods that are high in fat  The fat content may make the carbohydrate take longer to increase your blood sugar level  Ask your diabetes care team if you should carry a glucagon kit  Glucagon is a medicine that is injected when you develop severe hypoglycemia and become unconscious  Check the expiration date every month and replace it before it expires  · Teach others how to help you if you have symptoms of hypoglycemia  Tell them about the symptoms of hypoglycemia  Ask them to give you a source of fast-acting carbohydrate if you cannot get it yourself  Ask them to give you a glucagon injection if you have signs of hypoglycemia and you become unconscious or have a seizure  Ask them to call the local emergency number (911 in the [facility] [facility])   This is an emergency  Tell them never to try to make you swallow anything if you faint or have a seizure  · Wear medical alert jewelry  or carry a card that says you have diabetes  Ask where to get these items  Prevent hypoglycemia:   · Take diabetes medicine as directed    Take your medicine at the right time and in the right amount  Do not  double the amount of medicine you take unless instructed by your diabetes care team      · Eat regular meals and snacks  Talk to your dietitian or diabetes care team about a meal plan that is right for you  Do not skip meals  · Check your blood sugar level as directed  Ask your diabetes care team what your blood sugar levels should be before and after you eat  Ask when and how often to check your blood sugar level  You may need to check at least 3 times each day  Record your blood sugar level results and take the record with you when you see your care team  Changes may need to be made to your medicine, food, or exercise schedules using the record  · Check your blood sugar level before you exercise  Physical activity, such as exercise can decrease your blood sugar level  If your blood sugar level is less than 100 mg/dL, have a carbohydrate snack  Examples are 4 to 6 crackers, ½ banana, 8 ounces (1 cup) of nonfat or 1% milk, or 4 ounces (½ cup) of juice  If you will be active for more than 1 hour, you may need to check your blood sugar level every 30 minutes  Your diabetes care team may also recommend that you check your blood sugar level after your activity  · Know the risks if you choose to drink alcohol  Alcohol can cause your blood sugar levels to be low if you use insulin  Alcohol can cause high blood sugar levels and weight gain if you drink too much  Women 21 years or older and men 72 years or older should limit alcohol to 1 drink a day  Men aged 24 to 59 years should limit alcohol to 2 drinks a day  A drink of alcohol is 12 ounces of beer, 5 ounces of wine, or 1½ ounces of liquor  Follow up with your diabetes care team as directed:  Write down your questions so you remember to ask them during your visits     © Copyright [facility] [address] Information is for End User's use only and may not be sold, redistributed or otherwise used for commercial purposes  All illustrations and images included in CareNotes® are the copyrighted property of A D A M , Inc  or Brody Win[employer]  The above information is an  only  It is not intended as medical advice for individual conditions or treatments  Talk to your doctor, nurse or pharmacist before following any medical regimen to see if it is safe and effective for you  [address]    Your healthcare provider and/or public health staff have evaluated you and have determined that you do not need to remain in the hospital at this time  At this time you can be isolated at home where you will be monitored by staff from your local or state health department  You should carefully follow the prevention and isolation steps below until a healthcare provider or local or state health department says that you can return to your normal activities  Stay home except to get medical care    People who are mildly ill with COVID-19 are able to isolate at home during their illness  You should restrict activities outside your home, except for getting medical care  Do not go to work, school, or public areas  Avoid using public transportation, ride-sharing, or taxis  Separate yourself from other people and animals in your home    People: As much as possible, you should stay in a specific room and away from other people in your home  Also, you should use a separate bathroom, if available  Animals: You should restrict contact with pets and other animals while you are sick with COVID-19, just like you would around other people  Although there have not been reports of pets or other animals becoming sick with COVID-19, it is still recommended that people sick with COVID-19 limit contact with animals until more information is known about the virus  When possible, have another member of your household care for your animals while you are sick   If you are sick with COVID-19, avoid contact with your pet, including petting, snuggling, being kissed or licked, and sharing food  If you must care for your pet or be around animals while you are sick, wash your hands before and after you interact with pets and wear a facemask  See COVID-19 and Animals for more information  Call ahead before visiting your doctor    If you have a medical appointment, call the healthcare provider and tell them that you have or may have COVID-19  This will help the healthcare providers office take steps to keep other people from getting infected or exposed  Wear a facemask    You should wear a facemask when you are around other people (e g , sharing a room or vehicle) or pets and before you enter a healthcare providers office  If you are not able to wear a facemask (for example, because it causes trouble breathing), then people who live with you should not stay in the same room with you, or they should wear a facemask if they enter your room  Cover your coughs and sneezes    Cover your mouth and nose with a tissue when you cough or sneeze  Throw used tissues in a lined trash can  Immediately wash your hands with soap and water for at least 20 seconds or, if soap and water are not available, clean your hands with an alcohol-based hand  that contains at least 60% alcohol  Clean your hands often    Wash your hands often with soap and water for at least 20 seconds, especially after blowing your nose, coughing, or sneezing; going to the bathroom; and before eating or preparing food  If soap and water are not readily available, use an alcohol-based hand  with at least 60% alcohol, covering all surfaces of your hands and rubbing them together until they feel dry  Soap and water are the best option if hands are visibly dirty  Avoid touching your eyes, nose, and mouth with unwashed hands      Avoid sharing personal household items    You should not share dishes, drinking glasses, cups, eating utensils, towels, or bedding with other people or pets in your home  After using these items, they should be washed thoroughly with soap and water  Clean all high-touch surfaces everyday    High touch surfaces include counters, tabletops, doorknobs, bathroom fixtures, toilets, phones, keyboards, tablets, and bedside tables  Also, clean any surfaces that may have blood, stool, or body fluids on them  Use a household cleaning spray or wipe, according to the label instructions  Labels contain instructions for safe and effective use of the cleaning product including precautions you should take when applying the product, such as wearing gloves and making sure you have good ventilation during use of the product  Monitor your symptoms    Seek prompt medical attention if your illness is worsening (e g , difficulty breathing)  Before seeking care, call your healthcare provider and tell them that you have, or are being evaluated for, COVID-19  Put on a facemask before you enter the facility  These steps will help the healthcare providers office to keep other people in the office or waiting room from getting infected or exposed  Ask your healthcare provider to call the local or [facility] health department  Persons who are placed under active monitoring or facilitated self-monitoring should follow instructions provided by their local health department or occupational health professionals, as appropriate  If you have a medical emergency and need to call 911, notify the dispatch personnel that you have, or are being evaluated for COVID-19  If possible, put on a facemask before emergency medical services arrive      Discontinuing home isolation    Patients with confirmed COVID-19 should remain under home isolation precautions until the following conditions are met:   - They have had no fever for at least 24 hours (that is one full day of no fever without the use medicine that reduces fevers)  AND  - other symptoms have improved (for example, when their cough or shortness of breath have improved)  AND  - If had mild or moderate illness, at least 10 days have passed since their symptoms first appeared or if severe illness (needed oxygen) or immunosuppressed, at least 20 days have passed since symptoms first appeared  Patients with confirmed COVID-19 should also notify close contacts (including their workplace) and ask that they self-quarantine  Currently, close contact is defined as being within 6 feet for 15 minutes or more from the period 24 hours starting 48 hours before symptom onset to the time at which the patient went into isolation  Close contacts of patients diagnosed with COVID-19 should be instructed by the patient to self-quarantine for 14 days from the last time of their last contact with the patient       Source: [employer]

## 2021-01-18 NOTE — CASE MANAGEMENT
O2 has already been delivered to home and Dr Juan Garcia has already put in the referral for [facility]  The [address] cannot guarantee delivery of insulin for 10 days  CM contacted [address] and they inform  that if Humalog is changed to Novulin, the price will only be $29 00  The needles are $25 00 and Basaglar is $99 00  This OOP expense for a 2 wk supply is agreeable to pt  Pt to be discharged today with  transporting home    He will bring portable tank to the hospital

## 2021-01-18 NOTE — DISCHARGE SUMMARY
Discharge- Aurora Brandon 1965, 60 y o  female MRN: [id number]    Unit/Bed#: -01 Encounter: [id number]    Primary Care Provider: No primary care provider on file  Date and time admitted to hospital: 1/10/2021 12:22 AM        * Pneumonia due to COVID-19 virus  Assessment & Plan  COVID 19 pneumonia improving; saturation at goal on 2L/min of supplemental oxygen  - Case management working on provision of affordable insulin for patient   - Son will bring home oxygen at time of discharge  - Provide albuterol prn and robitussin prn for cough and wheezing   - Continue to monitor vitals, O2 saturations  -  D/c on tapering dose of prednisone   - Continue VTE prophylaxis with enoxaparin   - Continue 2000 U of vitamin D, multivitamin and famotidine  Acute hypoxemic respiratory failure due to COVID-19 [facility])  Assessment & Plan  See assessment and plan for 'pneumonia due to COVID-19 virus'    Type 2 diabetes mellitus [facility])  Assessment & Plan  Lab Results   Component Value Date    HGBA1C 11 0 (H) 01/10/2021       Recent Labs     01/17/21  1121 01/17/21  1610 01/17/21  2051 01/18/21  0518   POCGLU 298* 400* 444* 271*       Blood Sugar Average: Last 72 hrs:  (P) 542 [id number]     Patient has history of type 2 diabetes mellitus most recent A1c of 11  Patient currently on IV steroids for COVID-19 treatment, which predispose the patient to hyperglycemia  Patient switched to insulin basal-bolus plus sliding-scale correction on admission, with her preadmission metformin held  Discussion about uncontrolled DM type 2 and insulin requirement was conducted with patient  Patient consents for continuation of insulin regimen on d/c  Blood sugar continues to be outside of target, however, better controlled   Will perhaps get better control as steroids are tapered and metformin restarted on d/c       - Provide insulin-dependent diabetic education, in addition to diabetes supplies   - Will d/c on basal-bolus regimen of glargine 40 units HS + lispro 30 units t i d  A c  + sliding scale correction  Restart metformin 1000 mg bid on d/c   - Advise patient to monitor POC glucose a c  and HS  Elevated glucose  Assessment & Plan  Reference assessment and plan for type 2 diabetes mellitus  Hypokalemia  Assessment & Plan  Likely secondary to diarrhea, which has since resolved  Potassium today of 3 9     - Resolved;  Continue k-dur 40 mEq bid for supplementation   - Monitor BMP  Vaginal candidiasis  Assessment & Plan  Provide Diflucan  PCP: No primary care provider on file  Admission Date: 1/10/2021  Discharge Date: 01/18/21    Disposition:     Home    Reason for Admission:  Productive cough, chest congestion shortness of breath  Consultations During Hospital Stay:  · None  Procedures Performed:     · None  Primary diagnosis:    Acute respiratory distress  Secondary diagnosis:   COVID-19 pneumonia  Significant Findings / Test Results:     · None  Incidental Findings:   · None  Test Results Pending at Discharge (will require follow up): · None  Outpatient Tests Requested:  · None  Complications:  None  Hospital Course:     Bo Gordon is a 60 y o  female patient who originally presented to the hospital on 1/10/2021 due to complaints of productive cough and chest congestion for 2 days prior to presentation  She additionally reported some shortness of  Patient has a history of asthma and initially assumed that it was an asthma exacerbation Patient has associated symptoms phlegm production decreased appetite  Patient medical history significant for mild asthma as well as diabetes mellitus type 2 on metformin; she was noted to have O2 saturations of 90% on room air after breathing treatment  Patient was tested for COVID-19 infection which returned positive result  Patient underwent a chest x-ray which showed a hazy infiltrate at the left lower lobe    Patient however was afebrile and no leukocytosis lab assessment  Patient was initially started IV antibiotics with Rocephin discontinue when procalcitonin returned within normal limits  Patient was admitted  formanaged with mild COVID-19 treatment protocol  During hospitalization patient underwent a course of steroids and remdesivir, with supplemental oxygen provided between 1-2 L minute, and saturations ranged from the 80s low 90s  Patient was noted to desaturate with ambulation indicates that she become short of breath walking toilet  Additionally, patient was noted to have uncontrolled diabetes mellitus, with POC glucose often greater than 200, and as high as 400+, and hemoglobin A1c was measured at 11  Patient was subsequently started on basal bolus insulin regimen which was continuously titrated to correct hyperglycemia  Attempting to obtain glycemic control for Ms Shani Clark proved challenging as basal bolus regimen had to be increased daily  Patient's respiratory status gradually improved with therapy, and patient met criteria for discharge with 2 L of oxygen via nasal cannula on home O2 evaluation  Prescriptions for insulin, insulin supplies and home oxygen were sent to the [address] [place] [address] to be authorized for distribution, however significant delays were reported by the [address] [place] [address] in delivery of these necessities, with insulin from these services unable to be provided any sooner than 2 weeks  Ms Shani Clark was provided with prescriptions from in-hose pharmacy, and in addition to insulin, she was provided with a 2-day course of decadron to complete her steroid course  Ms Shani Clark  was provided with diabetic education prior to discharge  She was also advised to obtain a CXR in 6 weeks post discharge to evaluate for resolution of COVID infiltrates, and to follow-up with her PCP within 3 days of discharge  Condition at Discharge: Stable and Improving       Discharge Day Visit / Exam:     Subjective:  Patient interviewed the bedside on the day of discharge with necessary airborne precautions taken  States that she is feeling much better, but mild dyspnea on exertion still persists  Denies any fever, chills  Chest pain, palpitations, abdominal pain, diarrhea or generalized weakness  Vitals: Blood Pressure: 141/73 (01/18/21 0521)  Pulse: (!) 52 (01/18/21 0521)  Temperature: 97 9 °F (36 6 °C) (01/18/21 0521)  Temp Source: Oral (01/18/21 0521)  Respirations: 18 (01/18/21 0521)  Height: 5' 6" (167 6 cm) (01/10/21 1811)  Weight - Scale: 108 kg (237 lb) (01/10/21 1811)  SpO2: 92 % (01/18/21 0521)  Exam:   Physical Exam  Vitals signs reviewed  Constitutional:       Appearance: Normal appearance  She is obese  HENT:      Head: Normocephalic and atraumatic  Nose:      Comments: Nasal cannula in place delivering 1 5 L/min of oxygen  Mouth/Throat:      Mouth: Mucous membranes are moist    Eyes:      General:         Right eye: No discharge  Left eye: No discharge  Conjunctiva/sclera: Conjunctivae normal    Neck:      Musculoskeletal: Neck supple  Cardiovascular:      Rate and Rhythm: Normal rate and regular rhythm  Heart sounds: S1 normal and S2 normal    Pulmonary:      Effort: Pulmonary effort is normal  No tachypnea, accessory muscle usage or respiratory distress  Breath sounds: No wheezing, rhonchi or rales  Abdominal:      Palpations: Abdomen is soft  Musculoskeletal:         General: No swelling or tenderness  Skin:     General: Skin is warm and dry  Neurological:      General: No focal deficit present  Mental Status: She is alert and oriented to person, place, and time  Psychiatric:         Mood and Affect: Mood normal          Behavior: Behavior normal        Discussion with Family:  None  Discharge instructions/Information to patient and family:   See after visit summary for information provided to patient and family        Provisions for Follow-Up Care:  See after visit summary for information related to follow-up care and any pertinent home health orders  Planned Readmission:  None  Discharge Medications:  See after visit summary for reconciled discharge medications provided to patient and family        ** Please Note: This note has been constructed using a voice recognition system **

## 2021-01-18 NOTE — ASSESSMENT & PLAN NOTE
Lab Results   Component Value Date    HGBA1C 11 0 (H) 01/10/2021       Recent Labs     01/17/21  1121 01/17/21  1610 01/17/21 2051 01/18/21  0518   POCGLU 298* 400* 444* 271*       Blood Sugar Average: Last 72 hrs:  (P) 801 [id number]     Patient has history of type 2 diabetes mellitus most recent A1c of 11  Patient currently on IV steroids for COVID-19 treatment, which predispose the patient to hyperglycemia  Patient switched to insulin basal-bolus plus sliding-scale correction on admission, with her preadmission metformin held  Discussion about uncontrolled DM type 2 and insulin requirement was conducted with patient  Patient consents for continuation of insulin regimen on d/c  Blood sugar continues to be outside of target, however, better controlled  Will perhaps get better control as steroids are tapered and metformin restarted on d/c       - Provide insulin-dependent diabetic education, in addition to diabetes supplies   - Will d/c on basal-bolus regimen of glargine 40 units HS + lispro 30 units t i d  A c  + sliding scale correction  Restart metformin 1000 mg bid on d/c   - Advise patient to monitor POC glucose a c  and HS

## 2021-01-18 NOTE — DISCHARGE INSTR - AVS FIRST PAGE
Recommended close follow-up with primary care provider in 3-5 days of discharge  Recommended to have repeat chest x-ray in 6 weeks to monitor resolution  Recommended to keep close log of fingerstick glucose monitoring and follow up with her care provider for insulin adjustment  Of note:  Currently your going home on high dose of insulin because of being on Decadron which does elevated blood sugar  I strongly encouraged to decrease Basaglar and NovoLog insulin does when done with Decadron tx in 3 days

## 2021-01-18 NOTE — ASSESSMENT & PLAN NOTE
COVID 19 pneumonia improving; saturation at goal on 2L/min of supplemental oxygen  - Case management working on provision of affordable insulin for patient   - Son will bring home oxygen at time of discharge  - Provide albuterol prn and robitussin prn for cough and wheezing   - Continue to monitor vitals, O2 saturations  -  D/c on tapering dose of prednisone   - Continue VTE prophylaxis with enoxaparin   - Continue 2000 U of vitamin D, multivitamin and famotidine

## 2021-02-23 ENCOUNTER — TRANSCRIBE ORDERS (OUTPATIENT)
Dept: ADMINISTRATIVE | Facility: HOSPITAL | Age: 56
End: 2021-02-23

## 2021-02-23 DIAGNOSIS — U07.1 RESPIRATORY TRACT INFECTION DUE TO COVID-19 VIRUS: Primary | ICD-10-CM

## 2021-02-23 DIAGNOSIS — J84.114 ACUTE INTERSTITIAL PNEUMONITIS (HCC): Primary | ICD-10-CM

## 2021-02-23 DIAGNOSIS — J98.8 RESPIRATORY TRACT INFECTION DUE TO COVID-19 VIRUS: Primary | ICD-10-CM

## 2021-02-26 ENCOUNTER — HOSPITAL ENCOUNTER (OUTPATIENT)
Dept: CT IMAGING | Facility: HOSPITAL | Age: 56
Discharge: HOME | End: 2021-02-26
Payer: COMMERCIAL

## 2021-02-26 DIAGNOSIS — J84.114 ACUTE INTERSTITIAL PNEUMONITIS (HCC): ICD-10-CM

## 2021-02-26 PROCEDURE — 71250 CT THORAX DX C-: CPT

## 2021-02-26 PROCEDURE — G1004 CDSM NDSC: HCPCS

## 2021-04-09 ENCOUNTER — HOSPITAL ENCOUNTER (EMERGENCY)
Facility: HOSPITAL | Age: 56
Discharge: HOME | End: 2021-04-09
Attending: EMERGENCY MEDICINE
Payer: COMMERCIAL

## 2021-04-09 ENCOUNTER — APPOINTMENT (EMERGENCY)
Dept: RADIOLOGY | Facility: HOSPITAL | Age: 56
End: 2021-04-09
Payer: COMMERCIAL

## 2021-04-09 VITALS
OXYGEN SATURATION: 100 % | TEMPERATURE: 99.1 F | DIASTOLIC BLOOD PRESSURE: 59 MMHG | HEART RATE: 69 BPM | RESPIRATION RATE: 17 BRPM | SYSTOLIC BLOOD PRESSURE: 112 MMHG

## 2021-04-09 DIAGNOSIS — R06.02 SOB (SHORTNESS OF BREATH): Primary | ICD-10-CM

## 2021-04-09 LAB
ALBUMIN SERPL BCP-MCNC: 3.4 G/DL (ref 3.5–5)
ALP SERPL-CCNC: 74 U/L (ref 46–116)
ALT SERPL W P-5'-P-CCNC: 18 U/L (ref 12–78)
ANION GAP SERPL CALCULATED.3IONS-SCNC: 10 MMOL/L (ref 4–13)
AST SERPL W P-5'-P-CCNC: 13 U/L (ref 5–45)
ATRIAL RATE: 69 BPM
BASOPHILS # BLD AUTO: 0.03 THOUSANDS/ΜL (ref 0–0.1)
BASOPHILS NFR BLD AUTO: 0 % (ref 0–1)
BILIRUB SERPL-MCNC: 0.33 MG/DL (ref 0.2–1)
BUN SERPL-MCNC: 13 MG/DL (ref 5–25)
CALCIUM ALBUM COR SERPL-MCNC: 9.6 MG/DL (ref 8.3–10.1)
CALCIUM SERPL-MCNC: 9.1 MG/DL (ref 8.3–10.1)
CHLORIDE SERPL-SCNC: 103 MMOL/L (ref 100–108)
CO2 SERPL-SCNC: 28 MMOL/L (ref 21–32)
CREAT SERPL-MCNC: 0.84 MG/DL (ref 0.6–1.3)
D DIMER PPP FEU-MCNC: <0.27 UG/ML FEU
EOSINOPHIL # BLD AUTO: 0.24 THOUSAND/ΜL (ref 0–0.61)
EOSINOPHIL NFR BLD AUTO: 3 % (ref 0–6)
ERYTHROCYTE [DISTWIDTH] IN BLOOD BY AUTOMATED COUNT: 14.4 % (ref 11.6–15.1)
GFR SERPL CREATININE-BSD FRML MDRD: 89 ML/MIN/1.73SQ M
GLUCOSE SERPL-MCNC: 167 MG/DL (ref 65–140)
HCT VFR BLD AUTO: 41.7 % (ref 34.8–46.1)
HGB BLD-MCNC: 13.5 G/DL (ref 11.5–15.4)
IMM GRANULOCYTES # BLD AUTO: 0.03 THOUSAND/UL (ref 0–0.2)
IMM GRANULOCYTES NFR BLD AUTO: 0 % (ref 0–2)
LYMPHOCYTES # BLD AUTO: 2.4 THOUSANDS/ΜL (ref 0.6–4.47)
LYMPHOCYTES NFR BLD AUTO: 30 % (ref 14–44)
MCH RBC QN AUTO: 30.3 PG (ref 26.8–34.3)
MCHC RBC AUTO-ENTMCNC: 32.4 G/DL (ref 31.4–37.4)
MCV RBC AUTO: 94 FL (ref 82–98)
MONOCYTES # BLD AUTO: 0.51 THOUSAND/ΜL (ref 0.17–1.22)
MONOCYTES NFR BLD AUTO: 6 % (ref 4–12)
NEUTROPHILS # BLD AUTO: 4.9 THOUSANDS/ΜL (ref 1.85–7.62)
NEUTS SEG NFR BLD AUTO: 61 % (ref 43–75)
NRBC BLD AUTO-RTO: 0 /100 WBCS
P AXIS: 66 DEGREES
PLATELET # BLD AUTO: 330 THOUSANDS/UL (ref 149–390)
PMV BLD AUTO: 9.4 FL (ref 8.9–12.7)
POTASSIUM SERPL-SCNC: 3.5 MMOL/L (ref 3.5–5.3)
PR INTERVAL: 156 MS
PROT SERPL-MCNC: 7.5 G/DL (ref 6.4–8.2)
QRS AXIS: 70 DEGREES
QRSD INTERVAL: 90 MS
QT INTERVAL: 400 MS
QTC INTERVAL: 428 MS
RBC # BLD AUTO: 4.45 MILLION/UL (ref 3.81–5.12)
SODIUM SERPL-SCNC: 141 MMOL/L (ref 136–145)
T WAVE AXIS: 74 DEGREES
TROPONIN I SERPL-MCNC: <0.02 NG/ML
VENTRICULAR RATE: 69 BPM
WBC # BLD AUTO: 8.11 THOUSAND/UL (ref 4.31–10.16)

## 2021-04-09 PROCEDURE — 99285 EMERGENCY DEPT VISIT HI MDM: CPT | Performed by: EMERGENCY MEDICINE

## 2021-04-09 PROCEDURE — 71045 X-RAY EXAM CHEST 1 VIEW: CPT

## 2021-04-09 PROCEDURE — 84484 ASSAY OF TROPONIN QUANT: CPT | Performed by: EMERGENCY MEDICINE

## 2021-04-09 PROCEDURE — 99285 EMERGENCY DEPT VISIT HI MDM: CPT

## 2021-04-09 PROCEDURE — 36415 COLL VENOUS BLD VENIPUNCTURE: CPT | Performed by: EMERGENCY MEDICINE

## 2021-04-09 PROCEDURE — 93010 ELECTROCARDIOGRAM REPORT: CPT | Performed by: INTERNAL MEDICINE

## 2021-04-09 PROCEDURE — 80053 COMPREHEN METABOLIC PANEL: CPT | Performed by: EMERGENCY MEDICINE

## 2021-04-09 PROCEDURE — 85025 COMPLETE CBC W/AUTO DIFF WBC: CPT | Performed by: EMERGENCY MEDICINE

## 2021-04-09 PROCEDURE — 93005 ELECTROCARDIOGRAM TRACING: CPT

## 2021-04-09 PROCEDURE — 85379 FIBRIN DEGRADATION QUANT: CPT | Performed by: EMERGENCY MEDICINE

## 2021-04-09 RX ORDER — MEDROXYPROGESTERONE ACETATE 10 MG/1
1 TABLET ORAL DAILY
COMMUNITY
Start: 2021-01-14 | End: 2022-06-09 | Stop reason: ALTCHOICE

## 2021-04-09 RX ORDER — HYDROXYZINE HYDROCHLORIDE 25 MG/1
2 TABLET, FILM COATED ORAL
COMMUNITY
Start: 2021-02-02

## 2021-04-09 RX ORDER — ESCITALOPRAM OXALATE 5 MG/1
1 TABLET ORAL DAILY
COMMUNITY
Start: 2020-12-30

## 2021-04-09 RX ORDER — BUSPIRONE HYDROCHLORIDE 10 MG/1
1 TABLET ORAL 3 TIMES DAILY
COMMUNITY
Start: 2020-12-30

## 2021-04-09 NOTE — ED PROVIDER NOTES
Pt Name: Arvin Sebastian  MRN: [id number]  [place] 1965  Age/Sex: 60 y o  female  Date of evaluation: 4/9/2021  PCP: No primary care provider on file  CHIEF COMPLAINT    Chief Complaint   Patient presents with    Shortness of Breath     pt presents for c/o her oxygen level dropped to 86% RA, Pt wears 2L of O2 at home  HPI    60 y o  female presenting with shortness of breath as well as concern for hypoxemia  Patient states that she had COVID several months ago, is currently oxygen dependent, wearing 2 L of oxygen at home as needed although not all the time  She states that she recently went to the grocery store but forgot her oxygen tank  She was able to complete her shopping but felt somewhat out of breath  She arrived home and restart her oxygen states that her pulse ox was getting readings in the mid 80s with 86% being the lowest   She also noted that her heart rate kimani up over 100 while going up a flight of stairs  She conferred with her primary care doctor who sent her to the ER with concern for possible blood clot  She denies fever, trauma, other symptoms  Patient feels at her baseline right now  HPI      Past Medical and Surgical History    Past Medical History:   Diagnosis Date    Asthma        Past Surgical History:   Procedure Laterality Date    HERNIA REPAIR         Family History   Family history unknown: Yes       Social History     Tobacco Use    Smoking status: Former Smoker    Smokeless tobacco: Never Used   Substance Use Topics    Alcohol use: Yes     Frequency: 2-4 times a month     Binge frequency: Never    Drug use: Never           Allergies    Allergies   Allergen Reactions    Sulfa Antibiotics Rash       Home Medications    Prior to Admission medications    Medication Sig Start Date End Date Taking?  Authorizing Provider   Alcohol Swabs 70 % PADS Use 4 (four) times a day 1/18/21   Emili Snyder MD   dexamethasone (DECADRON) 6 mg tablet Take 1 tablet (6 mg total) by mouth daily 1/19/21   Fabricio oMra MD   insulin aspart (NovoLOG FlexPen) 100 UNIT/ML injection pen Inject 30 Units under the skin 3 (three) times a day with meals for 14 days 1/18/21 2/1/21  Fabricio Mora MD   insulin glargine (Basaglar KwikPen) 100 units/mL injection pen Inject 40 Units under the skin daily at bedtime for 14 days 1/18/21 2/1/21  Fabricio Mora MD   Insulin Pen Needle (Pen Needles) 32G X 4 MM MISC Use 4 (four) times a day for 14 days 1/18/21 2/1/21  Fabricio Mora MD   losartan (COZAAR) 100 MG tablet Take 100 mg by mouth daily before dinner    Historical Provider, MD   metFORMIN (GLUCOPHAGE) 1000 MG tablet Take 1,000 mg by mouth 2 (two) times a day with meals    Historical Provider, MD   triamcinolone (KENALOG) 0 1 % cream Apply topically 2 (two) times a day for 7 days 8/7/20 8/14/20  Christ Mcgraw MD           Review of Systems    Review of Systems   Constitutional: Negative for activity change, chills and fever  HENT: Negative for drooling and facial swelling  Eyes: Negative for pain, discharge and visual disturbance  Respiratory: Positive for shortness of breath  Negative for apnea, cough, chest tightness and wheezing  Cardiovascular: Negative for chest pain and leg swelling  Gastrointestinal: Negative for abdominal pain, constipation, diarrhea, nausea and vomiting  Genitourinary: Negative for difficulty urinating, dysuria and urgency  Musculoskeletal: Negative for arthralgias, back pain and gait problem  Skin: Negative for color change and rash  Neurological: Positive for light-headedness  Negative for dizziness, speech difficulty, weakness and headaches  Psychiatric/Behavioral: Negative for agitation, behavioral problems and confusion  All other systems reviewed and negative      Physical Exam      ED Triage Vitals [04/09/21 1533]   Temperature Pulse Respirations Blood Pressure SpO2   99 1 °F (37 3 °C) 75 22 129/62 95 %      Temp src Heart Rate Source Patient Position - Orthostatic VS BP Location FiO2 (%)   -- Monitor Sitting Left arm --      Pain Score       --               Physical Exam  Vitals signs and nursing note reviewed  Constitutional:       General: She is not in acute distress  Appearance: Normal appearance  She is well-developed  She is not ill-appearing  HENT:      Head: Normocephalic and atraumatic  Eyes:      Conjunctiva/sclera: Conjunctivae normal       Pupils: Pupils are equal, round, and reactive to light  Neck:      Musculoskeletal: Normal range of motion and neck supple  Cardiovascular:      Rate and Rhythm: Normal rate and regular rhythm  Heart sounds: Normal heart sounds  Pulmonary:      Effort: Pulmonary effort is normal  No respiratory distress  Breath sounds: Normal breath sounds  No wheezing or rales  Comments: Mild tachypnea but no respiratory distress at time of initial exam   Abdominal:      General: There is no distension  Palpations: Abdomen is soft  Tenderness: There is no abdominal tenderness  There is no guarding or rebound  Musculoskeletal: Normal range of motion  General: No deformity  Right lower leg: No edema  Left lower leg: No edema  Comments: No swelling, calf tenderness, or other abnormalities of the calves  Skin:     General: Skin is warm and dry  Findings: No erythema or rash  Neurological:      Mental Status: She is alert and oriented to person, place, and time  Psychiatric:         Behavior: Behavior normal          Thought Content:  Thought content normal          Judgment: Judgment normal               Diagnostic Results  EKG Interpretation    Rate:  69  BPM  Rhythm:  Normal Sinus Rhythm   Axis:  Normal   Intervals: Normal, no blocks, QTc  428 ms  Q waves:  No pathologic Q waves   T waves:  Slight flattening in lateral leads  ST segments:  No significant elevations or depressions     Impression:  Normal sinus rhythm nonspecific ST-T changes but without evidence of acute ischemia or significant arrhythmia      EKG for comparison:  EKG dated 10 January 2021 similar in character although T-wave inversions noted inferior and lateral leads at that time  EKG interpreted by me         Labs:    Results Reviewed     Procedure Component Value Units Date/Time    Troponin I [[id number]]  (Normal) Collected: 04/09/21 1622    Lab Status: Final result Specimen: Blood from Arm, Right Updated: 04/09/21 1705     Troponin I <0 02 ng/mL     D-dimer, quantitative [[id number]]  (Normal) Collected: 04/09/21 1622    Lab Status: Final result Specimen: Blood from Arm, Right Updated: 04/09/21 1702     D-Dimer, Quant <0 27 ug/ml FEU     Comprehensive metabolic panel [[id number]]  (Abnormal) Collected: 04/09/21 1622    Lab Status: Final result Specimen: Blood from Arm, Right Updated: 04/09/21 1700     Sodium 141 mmol/L      Potassium 3 5 mmol/L      Chloride 103 mmol/L      CO2 28 mmol/L      ANION GAP 10 mmol/L      BUN 13 mg/dL      Creatinine 0 84 mg/dL      Glucose 167 mg/dL      Calcium 9 1 mg/dL      Corrected Calcium 9 6 mg/dL      AST 13 U/L      ALT 18 U/L      Alkaline Phosphatase 74 U/L      Total Protein 7 5 g/dL      Albumin 3 4 g/dL      Total Bilirubin 0 33 mg/dL      eGFR 89 ml/min/1 73sq m     Narrative:      Nic guidelines for Chronic Kidney Disease (CKD):     Stage 1 with normal or high GFR (GFR > 90 mL/min/1 73 square meters)    Stage 2 Mild CKD (GFR = 60-89 mL/min/1 73 square meters)    Stage 3A Moderate CKD (GFR = 45-59 mL/min/1 73 square meters)    Stage 3B Moderate CKD (GFR = 30-44 mL/min/1 73 square meters)    Stage 4 Severe CKD (GFR = 15-29 mL/min/1 73 square meters)    Stage 5 End Stage CKD (GFR <15 mL/min/1 73 square meters)  Note: GFR calculation is accurate only with a steady state creatinine    CBC and differential [[id number]] Collected: 04/09/21 1622    Lab Status: Final result Specimen: Blood from Arm, Right Updated: 04/09/21 1647     WBC 8 11 Thousand/uL      RBC 4 45 Million/uL      Hemoglobin 13 5 g/dL      Hematocrit 41 7 %      MCV 94 fL      MCH 30 3 pg      MCHC 32 4 g/dL      RDW 14 4 %      MPV 9 4 fL      Platelets 619 Thousands/uL      nRBC 0 /100 WBCs      Neutrophils Relative 61 %      Immat GRANS % 0 %      Lymphocytes Relative 30 %      Monocytes Relative 6 %      Eosinophils Relative 3 %      Basophils Relative 0 %      Neutrophils Absolute 4 90 Thousands/µL      Immature Grans Absolute 0 03 Thousand/uL      Lymphocytes Absolute 2 40 Thousands/µL      Monocytes Absolute 0 51 Thousand/µL      Eosinophils Absolute 0 24 Thousand/µL      Basophils Absolute 0 03 Thousands/µL           All labs reviewed and utilized in the medical decision making process    Radiology:    XR chest 1 view portable   Final Result      No acute cardiopulmonary disease  Workstation performed: [id number]             All radiology studies independently viewed by me and interpreted by the radiologist     Procedure    Procedures        ED Course of Care and Re-Assessments      Lab workup sent concern for possible cardiac cause verses potential pulmonary embolism  Patient low risk by Beau Foot for PE felt appropriate for screening with a D-dimer which returned negative as above  Labs otherwise reassuring  Medications - No data to display        FINAL IMPRESSION    Final diagnoses:   SOB (shortness of breath)         DISPOSITION/PLAN    Presentation with shortness of breath and transient tachycardia as above  Vital signs reassuring, examination likewise reassuring  No evidence of DVT at this time  Etiology of episode of hypoxemia and mild tachycardia unclear at this time, possibly secondary to bronchospasm with history of asthma or mucous plugging    PE felt to be relatively unlikely based on transient nature of symptoms as well as negative D-dimer and low risk by Beau Foot score emergency department  Likewise, low suspicion for ACS, bacterial pneumonia, or other acute life threat  Patient counseled regarding findings and differential diagnosis, discharged strict return precautions, follow up primary care doctor  Time reflects when diagnosis was documented in both MDM as applicable and the Disposition within this note     Time User Action Codes Description Comment    4/9/2021  5:32 PM Nisachele Tita Soriano [R06 02] SOB (shortness of breath)       ED Disposition     ED Disposition Condition Date/Time Comment    Discharge Stable Fri Apr 9, 2021  5:32 PM Gela Early discharge to home/self care              Follow-up Information     Follow up With Specialties Details Why [address] [facility] Emergency Department Emergency Medicine Go to  If symptoms worsen [address] Emergency Department, [address] [place] [address], [facility]    Your primary care doctor  Call in 3 days Discuss this visit and schedule close outpatient follow-up              PATIENT REFERRED TO:    [address] [facility] Emergency Department  [address] [facility]  [phone]  Go to   If symptoms worsen    Your primary care doctor    Call in 3 days  Discuss this visit and schedule close outpatient follow-up      DISCHARGE MEDICATIONS:    Discharge Medication List as of 4/9/2021  5:32 PM      CONTINUE these medications which have NOT CHANGED    Details   Alcohol Swabs 70 % PADS Use 4 (four) times a day, Starting Mon 1/18/2021, Normal      busPIRone (BUSPAR) 10 mg tablet Take 1 tablet by mouth 3 (three) times a day, Starting Wed 12/30/2020, Historical Med      escitalopram (LEXAPRO) 5 mg tablet Take 1 tablet by mouth daily, Starting Wed 12/30/2020, Historical Med      hydrOXYzine HCL (ATARAX) 25 mg tablet Take 2 tablets by mouth daily at bedtime, Starting Tue 2/2/2021, Historical Med      insulin glargine (LANTUS SOLOSTAR) 100 units/mL injection pen Inject 21 Units under the skin daily at bedtime, Starting Tue 2/23/2021, Historical Med      losartan (COZAAR) 100 MG tablet Take 100 mg by mouth daily before dinner, Historical Med      medroxyPROGESTERone (PROVERA) 10 mg tablet Take 1 tablet by mouth daily, Starting Thu 1/14/2021, Historical Med      metFORMIN (GLUCOPHAGE) 1000 MG tablet Take 1,000 mg by mouth 2 (two) times a day with meals, Historical Med      Mometasone Furoate 200 MCG/ACT AERO Inhale 1 Inhaler daily, Starting Wed 3/24/2021, Historical Med      dexamethasone (DECADRON) 6 mg tablet Take 1 tablet (6 mg total) by mouth daily, Starting Tue 1/19/2021, Normal      insulin aspart (NovoLOG FlexPen) 100 UNIT/ML injection pen Inject 30 Units under the skin 3 (three) times a day with meals for 14 days, Starting Mon 1/18/2021, Until Mon 2/1/2021, Normal      Insulin Pen Needle (Pen Needles) 32G X 4 MM MISC Use 4 (four) times a day for 14 days, Starting Mon 1/18/2021, Until Mon 2/1/2021, Normal      triamcinolone (KENALOG) 0 1 % cream Apply topically 2 (two) times a day for 7 days, Starting Fri 8/7/2020, Until Fri 8/14/2020, Print             No discharge procedures on file           MD Andrew Mcfarlane MD  04/10/21 8714

## 2021-11-11 ENCOUNTER — APPOINTMENT (OUTPATIENT)
Dept: RADIOLOGY | Facility: CLINIC | Age: 56
End: 2021-11-11
Payer: COMMERCIAL

## 2021-11-11 ENCOUNTER — OFFICE VISIT (OUTPATIENT)
Dept: OBGYN CLINIC | Facility: CLINIC | Age: 56
End: 2021-11-11
Payer: COMMERCIAL

## 2021-11-11 VITALS
BODY MASS INDEX: 40.18 KG/M2 | HEART RATE: 72 BPM | SYSTOLIC BLOOD PRESSURE: 137 MMHG | RESPIRATION RATE: 18 BRPM | WEIGHT: 250 LBS | DIASTOLIC BLOOD PRESSURE: 88 MMHG | HEIGHT: 66 IN

## 2021-11-11 DIAGNOSIS — M25.562 LEFT KNEE PAIN, UNSPECIFIED CHRONICITY: ICD-10-CM

## 2021-11-11 DIAGNOSIS — M25.561 RIGHT KNEE PAIN, UNSPECIFIED CHRONICITY: ICD-10-CM

## 2021-11-11 DIAGNOSIS — M17.0 BILATERAL PRIMARY OSTEOARTHRITIS OF KNEE: ICD-10-CM

## 2021-11-11 DIAGNOSIS — M25.562 LEFT KNEE PAIN, UNSPECIFIED CHRONICITY: Primary | ICD-10-CM

## 2021-11-11 PROCEDURE — 99203 OFFICE O/P NEW LOW 30 MIN: CPT | Performed by: ORTHOPAEDIC SURGERY

## 2021-11-11 PROCEDURE — 73564 X-RAY EXAM KNEE 4 OR MORE: CPT

## 2021-11-11 RX ORDER — LIDOCAINE 50 MG/G
PATCH TOPICAL
COMMUNITY
Start: 2021-09-24

## 2021-11-11 RX ORDER — NAPROXEN SODIUM 550 MG/1
TABLET ORAL
COMMUNITY
Start: 2021-06-30 | End: 2021-12-23

## 2021-11-30 ENCOUNTER — EVALUATION (OUTPATIENT)
Dept: PHYSICAL THERAPY | Facility: CLINIC | Age: 56
End: 2021-11-30
Payer: COMMERCIAL

## 2021-11-30 DIAGNOSIS — M17.0 BILATERAL PRIMARY OSTEOARTHRITIS OF KNEE: Primary | ICD-10-CM

## 2021-11-30 PROCEDURE — 97110 THERAPEUTIC EXERCISES: CPT

## 2021-11-30 PROCEDURE — 97161 PT EVAL LOW COMPLEX 20 MIN: CPT

## 2021-12-07 ENCOUNTER — APPOINTMENT (OUTPATIENT)
Dept: PHYSICAL THERAPY | Facility: CLINIC | Age: 56
End: 2021-12-07
Payer: COMMERCIAL

## 2021-12-07 NOTE — PROGRESS NOTES
Daily Note     Today's date: 2021  Patient name: Shannon Drake  : 1965  MRN: [id number]  Referring provider: Emily Morin PA-C  Dx:   Encounter Diagnosis     ICD-10-CM    1  Bilateral primary osteoarthritis of knee  M17 0                   Subjective: ***      Objective: See treatment diary below      Assessment: Tolerated treatment {Tolerated treatment :2209996500}   Patient {assessment:}      Plan: {PLAN:[id number]}     Precautions: type 2 diabetes       DATES:             Manuals             PROM             Patellar mobility                                        Neuro Re-Ed                                                                                                        Ther Ex             Pt edu Pt pres, anatomy, ex tech, HEP, POC            bike             glute set 5x 3s b/l            Heel slide 5x b/l            clamshells 5x b/l            Quad set 5x 3s b/l            SLR             Standing hip abd/ext                                                    Ther Activity             Side stepping             Step ups             Lateral step ups             Mini squats              STS                                                    Gait Training             SPC use adjustment of SPC, proper ambulation with SPC                         Modalities                          HEP: [id number]

## 2021-12-08 ENCOUNTER — OFFICE VISIT (OUTPATIENT)
Dept: PHYSICAL THERAPY | Facility: CLINIC | Age: 56
End: 2021-12-08
Payer: COMMERCIAL

## 2021-12-08 DIAGNOSIS — M17.0 BILATERAL PRIMARY OSTEOARTHRITIS OF KNEE: Primary | ICD-10-CM

## 2021-12-08 PROCEDURE — 97140 MANUAL THERAPY 1/> REGIONS: CPT

## 2021-12-09 ENCOUNTER — PROCEDURE VISIT (OUTPATIENT)
Dept: OBGYN CLINIC | Facility: CLINIC | Age: 56
End: 2021-12-09
Payer: COMMERCIAL

## 2021-12-09 ENCOUNTER — APPOINTMENT (OUTPATIENT)
Dept: RADIOLOGY | Facility: CLINIC | Age: 56
End: 2021-12-09
Payer: COMMERCIAL

## 2021-12-09 ENCOUNTER — APPOINTMENT (OUTPATIENT)
Dept: PHYSICAL THERAPY | Facility: CLINIC | Age: 56
End: 2021-12-09
Payer: COMMERCIAL

## 2021-12-09 VITALS
SYSTOLIC BLOOD PRESSURE: 137 MMHG | DIASTOLIC BLOOD PRESSURE: 83 MMHG | HEART RATE: 90 BPM | WEIGHT: 250 LBS | RESPIRATION RATE: 18 BRPM | BODY MASS INDEX: 40.18 KG/M2 | HEIGHT: 66 IN

## 2021-12-09 DIAGNOSIS — M25.551 PAIN IN RIGHT HIP: Primary | ICD-10-CM

## 2021-12-09 DIAGNOSIS — M25.551 PAIN IN RIGHT HIP: ICD-10-CM

## 2021-12-09 DIAGNOSIS — M17.0 BILATERAL PRIMARY OSTEOARTHRITIS OF KNEE: ICD-10-CM

## 2021-12-09 PROCEDURE — 73502 X-RAY EXAM HIP UNI 2-3 VIEWS: CPT

## 2021-12-09 PROCEDURE — 20610 DRAIN/INJ JOINT/BURSA W/O US: CPT | Performed by: PHYSICIAN ASSISTANT

## 2021-12-09 PROCEDURE — 99213 OFFICE O/P EST LOW 20 MIN: CPT | Performed by: PHYSICIAN ASSISTANT

## 2021-12-09 RX ORDER — HYALURONATE SODIUM 10 MG/ML
20 SYRINGE (ML) INTRAARTICULAR
Status: COMPLETED | OUTPATIENT
Start: 2021-12-09 | End: 2021-12-09

## 2021-12-09 RX ADMIN — Medication 20 MG: at 10:23

## 2021-12-14 ENCOUNTER — TELEPHONE (OUTPATIENT)
Dept: OBGYN CLINIC | Facility: MEDICAL CENTER | Age: 56
End: 2021-12-14

## 2021-12-14 ENCOUNTER — OFFICE VISIT (OUTPATIENT)
Dept: PHYSICAL THERAPY | Facility: CLINIC | Age: 56
End: 2021-12-14
Payer: COMMERCIAL

## 2021-12-14 DIAGNOSIS — M17.0 BILATERAL PRIMARY OSTEOARTHRITIS OF KNEE: Primary | ICD-10-CM

## 2021-12-14 PROCEDURE — 97110 THERAPEUTIC EXERCISES: CPT

## 2021-12-14 PROCEDURE — 97140 MANUAL THERAPY 1/> REGIONS: CPT

## 2021-12-16 ENCOUNTER — PROCEDURE VISIT (OUTPATIENT)
Dept: OBGYN CLINIC | Facility: CLINIC | Age: 56
End: 2021-12-16
Payer: COMMERCIAL

## 2021-12-16 ENCOUNTER — APPOINTMENT (OUTPATIENT)
Dept: PHYSICAL THERAPY | Facility: CLINIC | Age: 56
End: 2021-12-16
Payer: COMMERCIAL

## 2021-12-16 VITALS
HEIGHT: 66 IN | DIASTOLIC BLOOD PRESSURE: 89 MMHG | HEART RATE: 78 BPM | BODY MASS INDEX: 39.7 KG/M2 | SYSTOLIC BLOOD PRESSURE: 145 MMHG | WEIGHT: 247 LBS

## 2021-12-16 DIAGNOSIS — M17.0 BILATERAL PRIMARY OSTEOARTHRITIS OF KNEE: Primary | ICD-10-CM

## 2021-12-16 PROCEDURE — 20610 DRAIN/INJ JOINT/BURSA W/O US: CPT | Performed by: PHYSICIAN ASSISTANT

## 2021-12-16 RX ADMIN — Medication 20 MG: at 17:47

## 2021-12-17 RX ORDER — HYALURONATE SODIUM 10 MG/ML
20 SYRINGE (ML) INTRAARTICULAR
Status: COMPLETED | OUTPATIENT
Start: 2021-12-16 | End: 2021-12-16

## 2021-12-22 ENCOUNTER — TELEPHONE (OUTPATIENT)
Dept: PHYSICAL THERAPY | Facility: CLINIC | Age: 56
End: 2021-12-22

## 2021-12-23 ENCOUNTER — PROCEDURE VISIT (OUTPATIENT)
Dept: OBGYN CLINIC | Facility: CLINIC | Age: 56
End: 2021-12-23
Payer: COMMERCIAL

## 2021-12-23 ENCOUNTER — APPOINTMENT (OUTPATIENT)
Dept: RADIOLOGY | Facility: CLINIC | Age: 56
End: 2021-12-23

## 2021-12-23 VITALS
HEART RATE: 77 BPM | SYSTOLIC BLOOD PRESSURE: 163 MMHG | HEIGHT: 66 IN | WEIGHT: 245 LBS | BODY MASS INDEX: 39.37 KG/M2 | RESPIRATION RATE: 18 BRPM | DIASTOLIC BLOOD PRESSURE: 91 MMHG

## 2021-12-23 DIAGNOSIS — M17.0 BILATERAL PRIMARY OSTEOARTHRITIS OF KNEE: ICD-10-CM

## 2021-12-23 DIAGNOSIS — M25.562 ACUTE PAIN OF LEFT KNEE: Primary | ICD-10-CM

## 2021-12-23 DIAGNOSIS — M79.671 PAIN IN RIGHT FOOT: ICD-10-CM
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
## 2021-12-23 PROCEDURE — 20610 DRAIN/INJ JOINT/BURSA W/O US: CPT | Performed by: ORTHOPAEDIC SURGERY

## 2021-12-23 PROCEDURE — 73562 X-RAY EXAM OF KNEE 3: CPT

## 2021-12-23 PROCEDURE — 73630 X-RAY EXAM OF FOOT: CPT

## 2021-12-23 RX ORDER — HYALURONATE SODIUM 10 MG/ML
20 SYRINGE (ML) INTRAARTICULAR
Status: COMPLETED | OUTPATIENT
Start: 2021-12-23 | End: 2021-12-23

## 2021-12-23 RX ORDER — IBUPROFEN 600 MG/1
600 TABLET ORAL EVERY 8 HOURS PRN
Qty: 30 TABLET | Refills: 0 | Status: SHIPPED | OUTPATIENT
Start: 2021-12-23

## 2021-12-23 RX ADMIN — Medication 20 MG: at 14:23

## 2022-01-03 ENCOUNTER — PROCEDURE VISIT (OUTPATIENT)
Dept: OBGYN CLINIC | Facility: CLINIC | Age: 57
End: 2022-01-03
Payer: COMMERCIAL

## 2022-01-03 VITALS
SYSTOLIC BLOOD PRESSURE: 139 MMHG | WEIGHT: 245 LBS | HEIGHT: 66 IN | DIASTOLIC BLOOD PRESSURE: 74 MMHG | HEART RATE: 63 BPM | BODY MASS INDEX: 39.37 KG/M2 | RESPIRATION RATE: 18 BRPM

## 2022-01-03 DIAGNOSIS — M17.12 PRIMARY OSTEOARTHRITIS OF LEFT KNEE: Primary | ICD-10-CM

## 2022-01-03 PROCEDURE — 20610 DRAIN/INJ JOINT/BURSA W/O US: CPT | Performed by: PHYSICIAN ASSISTANT

## 2022-01-03 RX ORDER — HYALURONATE SODIUM 10 MG/ML
20 SYRINGE (ML) INTRAARTICULAR
Status: COMPLETED | OUTPATIENT
Start: 2022-01-03 | End: 2022-01-03

## 2022-01-03 RX ADMIN — Medication 20 MG: at 09:51

## 2022-01-03 NOTE — PROGRESS NOTES
Overall, patient reports approximately 50% improvement of knee pain s/p Euflexxa injection therapy  After discussing the options for treatment, the patient elected to proceed forward with Euflexxa injection to reduce pain  Risks of injection, including but not limited to, post-injection pain, swelling, pseudo septic reaction, skin rash, itching, and infection were discussed in detail  The patient understood, had no further questions and elected to proceed forward  After sterile preparation, the third injection was injected into the Left knee  At last appointment, patient present s/p mechanical fall onto Left knee and injection was not provided secondary to acute trauma  The patient tolerated the procedure well no complications were noted  The patient was instructed ice and elevate the extremity, limit strenuous activity for the next 2-3 days, and to contact us if there were any questions or concerns prior to their follow-up appointment  I will see the patient back in 6-8 weeks for reevaluation of bilateral knees  She will also schedule appointment after MRI of Right hip resulted       Large joint arthrocentesis: L knee  Universal Protocol:  Consent given by: patient  Patient identity confirmed: verbally with patient    Supporting Documentation  Indications: pain   Procedure Details  Location: knee - L knee  Needle size: 22 G  Medications administered: 20 mg Sodium Hyaluronate 20 MG/2ML    Patient tolerance: patient tolerated the procedure well with no immediate complications  Dressing:  Sterile dressing applied

## 2022-01-10 ENCOUNTER — HOSPITAL ENCOUNTER (OUTPATIENT)
Dept: MRI IMAGING | Facility: HOSPITAL | Age: 57
Discharge: HOME | End: 2022-01-10
Payer: COMMERCIAL

## 2022-01-10 DIAGNOSIS — M17.0 BILATERAL PRIMARY OSTEOARTHRITIS OF KNEE: ICD-10-CM

## 2022-01-10 PROCEDURE — 73721 MRI JNT OF LWR EXTRE W/O DYE: CPT

## 2022-01-11 ENCOUNTER — TELEPHONE (OUTPATIENT)
Dept: OBGYN CLINIC | Facility: HOSPITAL | Age: 57
End: 2022-01-11

## 2022-01-11 NOTE — TELEPHONE ENCOUNTER
Patient is stating she does not drive well in this weather and asking if she can have a phone visit instead of coming to office for mri review  Please advise patient   [phone]

## 2022-01-20 ENCOUNTER — EVALUATION (OUTPATIENT)
Dept: PHYSICAL THERAPY | Facility: CLINIC | Age: 57
End: 2022-01-20
Payer: COMMERCIAL

## 2022-01-20 DIAGNOSIS — M25.561 CHRONIC PAIN OF RIGHT KNEE: ICD-10-CM

## 2022-01-20 DIAGNOSIS — G89.29 CHRONIC PAIN OF RIGHT KNEE: ICD-10-CM

## 2022-01-20 DIAGNOSIS — M25.562 CHRONIC PAIN OF LEFT KNEE: Primary | ICD-10-CM

## 2022-01-20 DIAGNOSIS — G89.29 CHRONIC PAIN OF LEFT KNEE: Primary | ICD-10-CM

## 2022-01-20 PROCEDURE — 97140 MANUAL THERAPY 1/> REGIONS: CPT | Performed by: PHYSICAL MEDICINE & REHABILITATION

## 2022-01-20 NOTE — PROGRESS NOTES
PT Re-Evaluation     Today's date: 2022  Patient name: Ana Mehta  : 1965  MRN: [id number]  Referring provider: José Miguel Kimble PA-C  Dx:   No diagnosis found  Assessment  Assessment details: Patient returns to therapy following hiatus for unrelated issue, continues with bilateral knee symptoms including increased pain, decreased ROM, decreased strength, decreased patellar mobility, impaired gait, and impaired STS mechanics which limits her function when performing self care, ADLs, and ambulatory tasks  Pt will benefit from the skilled therapy of a physical therapist in order to address the above listed impairments, achieve established goals, and restore PLOF  Impairments: abnormal gait, abnormal or restricted ROM, abnormal movement, activity intolerance, impaired balance, impaired physical strength, lacks appropriate home exercise program, pain with function and poor body mechanics    Goals  STG: to be achieved by 4 weeks  Pt will demonstrate independence with provided HEP  -progressing  Pt will demonstrate decreased pain to no greater than 2/10  - not met  Pt will improve bilateral knee flex AROM to at least 115 deg  - not met  Pt will improve bilateral hip abd strength to at least 4/5  -progressing    LTG: to be achieved by discharge  Pt will improve FOTO score to at least 48  Pt will be able to perform at least 5x STS with no greater than min UE support  Pt will demonstrate improved ambulation quality to report no difficulties with walking for at least 15 min  Pt will improve mobility to report no difficulties with stair negotiation and transfers including STS and bed mobility         Plan  Patient would benefit from: PT eval and skilled physical therapy  Planned modality interventions: cryotherapy and thermotherapy: hydrocollator packs  Planned therapy interventions: joint mobilization, manual therapy, Mcdonald taping, balance, flexibility, functional ROM exercises, gait training, home exercise program, therapeutic exercise, therapeutic activities, strengthening, stretching, patient education and neuromuscular re-education  Frequency: 2x week  Duration in visits: 12  Duration in weeks: 6        Subjective Evaluation    History of Present Illness  Mechanism of injury: Patient returns to therapy after hiatus secondary to suspected R hip fx which has since been ruled out  Patient notes slight improvement in knee pain following injections, however continues with increased pain with WBing activity  Patient denies edema, N/T  Since beginning therapy patient notes she is able to stand a bit longer  Patient notes continued intermittent SPC or rollator  Mechanism of onset: Pt reporting a history of meniscal tear in the R knee in 1999 in which she was doing a [employer] carry and she was limping right after  Pt went to a doctor in [place] and pt had imaging which indicated tear  She had a CSI  Pt reporting ongoing knee pain and in the last 5 years her knee pain has progressed  Pt reporting that she went the ortho about 2 weeks ago  Pt received x rays of her knees indicated OA  Pt is scheduling gel injections for Dec 9 and beyond  Pt was referred to OPPT thereafter  Pt noting she has hip pain as well  She notes issues with her L4/5 where she received an injection 2 weeks ago      Pt denies: Numbness/tingling, bowel and bladder changes, saddle anesthesia, gait changes, history of cancer, unexpected weight changes, night sweats, fever/sweating/chills, dizziness, vision/hearing changes    Pain location: R/L knee anterior, R>L  Quality: achy, constant   Timing including onset:    - change since onset: worse    - how activity affects: worse  Aggs/difficulties: standing, walking, any activity, scared about walking in airport and going out of house   Eases/previous treatment: nothing eases, stretching has helped in the past    Work/duties: n/a   Current activities/exercise: n/a  Goals: improve walking, be able to clean her house  PMH: mitral valve prolapse   Imaging: xray   Surgeries: hernia soon to be 3 years ago - pt stating her posture changes    Pt denies allergies to latex or use of blood thinners    Pain  Current pain ratin  At worst pain rating: 10          Objective     Observations     Additional Observation Details  POSTURE: b/l knee valgus, R>L    Active Range of Motion   Left Knee   Flexion: 106 degrees   Extension: -3 degrees     Right Knee   Flexion: 100 degrees   Extension: -11 degrees     Additional Active Range of Motion Details  Supine L 0-100  R 0-85    Passive Range of Motion   Left Knee   Flexion: 109 degrees with pain    Right Knee   Flexion: 105 degrees with pain    Additional Passive Range of Motion Details  Mild HS restriction b/l    Mobility   Patellar Mobility:   Left Knee   Hypomobile: left medial, left lateral, left superior and left inferior    Right Knee   Hypomobile: medial, lateral, superior and inferior     Strength/Myotome Testing     Left Knee   Flexion: 4  Extension: 4    Right Knee   Flexion: 4  Extension: 4    Additional Strength Details  R hip abd: 3+/5  L hip abd: 4-/5    R hip ext: 4/5  L hip ext: 4-/5    General Comments:      Knee Comments  GAIT: ambulates with SPC, antalgic, R trendelenburg    STS: 2 UE to rise and lower, limited quad usage as demonstrating uncontrolled knee extension, trunk extension to rise- pattern continues at RE    Unable to perform functional squat, knees straight with hip hinge to reach floor                 Precautions: type 2 diabetes , concurrent LBP and R hip pain/restrictions          DATES:           Manuals             PROM  ES, flex/ext b/l TB: L flex/ext in supine, R seated gentle knee flex,ext          Patellar mobility   ES, sup/inf b/l TB: med/lat, sup/inf b/l                                     Neuro Re-Ed                                                                                                        Ther Ex Pt edu Pt pres, anatomy, ex tech, HEP, POC  educated on benefits/parameters of CP          bike             glute set 5x 3s b/l 20x 5s 5s x20          Heel slide 5x b/l 4x5 b/l 2x10 L only          clamshells 5x b/l 20x b/l           Quad set 5x 3s b/l 20x 5s 20x 5s b/l           SLR  2x10 b/l 2x10 L only H         Standing hip abd/ext                                                    Ther Activity             Side stepping             Step ups             Lateral step ups             Mini squats              STS                                                    Gait Training             SPC use adjustment of SPC, proper ambulation with SPC                         Modalities                          HEP: [id number]

## 2022-01-24 ENCOUNTER — APPOINTMENT (OUTPATIENT)
Dept: PHYSICAL THERAPY | Facility: CLINIC | Age: 57
End: 2022-01-24
Payer: COMMERCIAL

## 2022-01-27 ENCOUNTER — OFFICE VISIT (OUTPATIENT)
Dept: PHYSICAL THERAPY | Facility: CLINIC | Age: 57
End: 2022-01-27
Payer: COMMERCIAL

## 2022-01-27 DIAGNOSIS — G89.29 CHRONIC PAIN OF RIGHT KNEE: ICD-10-CM

## 2022-01-27 DIAGNOSIS — M25.561 CHRONIC PAIN OF RIGHT KNEE: ICD-10-CM

## 2022-01-27 DIAGNOSIS — M25.562 CHRONIC PAIN OF LEFT KNEE: Primary | ICD-10-CM

## 2022-01-27 DIAGNOSIS — G89.29 CHRONIC PAIN OF LEFT KNEE: Primary | ICD-10-CM

## 2022-01-27 PROCEDURE — 97140 MANUAL THERAPY 1/> REGIONS: CPT | Performed by: PHYSICAL MEDICINE & REHABILITATION

## 2022-01-27 PROCEDURE — 97112 NEUROMUSCULAR REEDUCATION: CPT | Performed by: PHYSICAL MEDICINE & REHABILITATION

## 2022-01-27 NOTE — PROGRESS NOTES
Daily Note     Today's date: 2022  Patient name: Gela Early  : 1965  MRN: [id number]  Referring provider: Vinay Woods PA-C  Dx:   Encounter Diagnosis     ICD-10-CM    1  Chronic pain of left knee  M25 562     G89 29    2  Chronic pain of right knee  M25 561     G89 29                   Subjective: Patient notes she was in [place] since last visit and had trouble and increased pain when walking  Was unable to walk more than a block  Ibuprofen has not been helpful for pain  Patient notes increased bilateral knee pain with R knee buckling becoming more frequent  Objective: See treatment diary below    Assessment: Tolerated treatment fair  Patient did well with gentle stretching and TE  Limited tolerance to R knee PROM secondary to R hip pain  MHP to conclude in sidelying  Patient demonstrated fatigue post treatment, exhibited good technique with therapeutic exercises and would benefit from continued PT  Plan: Continue per plan of care        Precautions: type 2 diabetes , concurrent LBP and R hip pain/restrictions      DATES:          Manuals             PROM  ES, flex/ext b/l TB: L flex/ext in supine, R seated gentle knee flex,ext LH B knees, gastroc stretch, LAD         Patellar mobility   ES, sup/inf b/l TB: med/lat, sup/inf b/l                                     Neuro Re-Ed                                                                                                        Ther Ex             Pt edu Pt pres, anatomy, ex tech, HEP, POC  educated on benefits/parameters of CP          bike             glute set 5x 3s b/l 20x 5s 5s x20 Hip add lindy 2'         Heel slide 5x b/l 4x5 b/l 2x10 L only          clamshells 5x b/l 20x b/l           Quad set 5x 3s b/l 20x 5s 20x 5s b/l           SLR  2x10 b/l 2x10 L only          Standing hip abd/ext             HR/TR    seated 2'             Lumbar roll outs 2'             Gastroc st w/ strap 5x ea         Ther Activity Side stepping             Step ups             Lateral step ups             Mini squats              STS                                                    Gait Training             SPC use adjustment of SPC, proper ambulation with SPC                         Modalities                 MHP lower back and B knees, 10' post         HEP: [id number]

## 2022-01-31 ENCOUNTER — APPOINTMENT (OUTPATIENT)
Dept: PHYSICAL THERAPY | Facility: CLINIC | Age: 57
End: 2022-01-31
Payer: COMMERCIAL

## 2022-02-03 ENCOUNTER — OFFICE VISIT (OUTPATIENT)
Dept: PHYSICAL THERAPY | Facility: CLINIC | Age: 57
End: 2022-02-03
Payer: COMMERCIAL

## 2022-02-03 DIAGNOSIS — M25.562 CHRONIC PAIN OF LEFT KNEE: Primary | ICD-10-CM

## 2022-02-03 DIAGNOSIS — G89.29 CHRONIC PAIN OF RIGHT KNEE: ICD-10-CM

## 2022-02-03 DIAGNOSIS — M25.561 CHRONIC PAIN OF RIGHT KNEE: ICD-10-CM

## 2022-02-03 DIAGNOSIS — G89.29 CHRONIC PAIN OF LEFT KNEE: Primary | ICD-10-CM

## 2022-02-03 PROCEDURE — 97110 THERAPEUTIC EXERCISES: CPT

## 2022-02-03 PROCEDURE — 97140 MANUAL THERAPY 1/> REGIONS: CPT

## 2022-02-03 NOTE — PROGRESS NOTES
Daily Note     Today's date: 2/3/2022  Patient name: Siria Rios  : 1965  MRN: [id number]  Referring provider: Mark Li PA-C  Dx:   Encounter Diagnosis     ICD-10-CM    1  Chronic pain of left knee  M25 562     G89 29    2  Chronic pain of right knee  M25 561     G89 29                   Subjective: Patient reports both knees are really bad today and rates B/L knee pain as 9/10 at beginning of session  Objective: See treatment diary below      Assessment: Tolerated treatment fair  Patient's exercise tolerance limited by pre-existing LBP  Moderate fatigue noted post session  Patient would benefit from continued PT to increase B/L knee strength and mobility for improved function in ADLs  Plan: Continue per plan of care        Precautions: type 2 diabetes , concurrent LBP and R hip pain/restrictions      DATES: 11/30 12/8 12/14 1/27 2/3        Manuals             PROM  ES, flex/ext b/l TB: L flex/ext in supine, R seated gentle knee flex,ext LH B knees, gastroc stretch, LAD B knees, gastroc stretch, LAD AS        Patellar mobility   ES, sup/inf b/l TB: med/lat, sup/inf b/l   med/lat, sup/inf b/l  AS                                  Neuro Re-Ed                                                                                                        Ther Ex             Pt edu Pt pres, anatomy, ex tech, HEP, POC  educated on benefits/parameters of CP          bike             glute set 5x 3s b/l 20x 5s 5s x20 Hip add lindy 2' Hip add lindy 2'        Heel slide 5x b/l 4x5 b/l 2x10 L only  2x10 L only        clamshells 5x b/l 20x b/l           Quad set 5x 3s b/l 20x 5s 20x 5s b/l   20x 5s b/l         SLR  2x10 b/l 2x10 L only          Standing hip abd/ext             HR/TR    seated 2'             Lumbar roll outs 2'             Gastroc st w/ strap 5x ea Gastroc st w/ strap 30"x3 ea        Ther Activity             Side stepping             Step ups             Lateral step ups             Mini squats              STS                                                    Gait Training             SPC use adjustment of SPC, proper ambulation with SPC                         Modalities                 MHP lower back and B knees, 10' post B/L knee MHP 10' post        HEP: [id number]

## 2022-02-08 ENCOUNTER — APPOINTMENT (OUTPATIENT)
Dept: PHYSICAL THERAPY | Facility: CLINIC | Age: 57
End: 2022-02-08
Payer: COMMERCIAL

## 2022-02-10 ENCOUNTER — OFFICE VISIT (OUTPATIENT)
Dept: PHYSICAL THERAPY | Facility: CLINIC | Age: 57
End: 2022-02-10
Payer: COMMERCIAL

## 2022-02-10 DIAGNOSIS — M25.562 CHRONIC PAIN OF LEFT KNEE: Primary | ICD-10-CM

## 2022-02-10 DIAGNOSIS — M25.561 CHRONIC PAIN OF RIGHT KNEE: ICD-10-CM

## 2022-02-10 DIAGNOSIS — G89.29 CHRONIC PAIN OF LEFT KNEE: Primary | ICD-10-CM

## 2022-02-10 DIAGNOSIS — G89.29 CHRONIC PAIN OF RIGHT KNEE: ICD-10-CM

## 2022-02-10 PROCEDURE — 97140 MANUAL THERAPY 1/> REGIONS: CPT

## 2022-02-10 PROCEDURE — 97110 THERAPEUTIC EXERCISES: CPT

## 2022-02-10 NOTE — PROGRESS NOTES
Daily Note     Today's date: 2/10/2022  Patient name: Kemar Mao  : 1965  MRN: [id number]  Referring provider: Negar Diaz PA-C  Dx:   Encounter Diagnosis     ICD-10-CM    1  Chronic pain of left knee  M25 562     G89 29    2  Chronic pain of right knee  M25 561     G89 29                   Subjective: patient reports b/l knees are still painful but less than last visit R>L  She continues to be limited by LBP and R hip  Objective: See treatment diary below      Assessment: Patient was tolerable of exercises this visit and denied any increases in pain  Added SAQ to strengthen the quads and was able to perform 3x10 with no increases in pain  Intermittent breaks to sit up secondary to onset of LBP but was then able to resume exercises after a couple minutes  Able to perform heel slides on R side this visit  Progress as able  Plan: Continue with current POC to address pt deficits        Precautions: type 2 diabetes , concurrent LBP and R hip pain/restrictions      DATES: 11/30 12/8 12/14 1/27 2/3 2/10       Manuals             PROM  ES, flex/ext b/l TB: L flex/ext in supine, R seated gentle knee flex,ext LH B knees, gastroc stretch, LAD B knees, gastroc stretch, LAD AS B knees, gastroc stretch       Patellar mobility   ES, sup/inf b/l TB: med/lat, sup/inf b/l   med/lat, sup/inf b/l  AS Med/lat, sup/inf b/l TB PROM only                                  Neuro Re-Ed                                                                                                        Ther Ex    1/27  2/10       Pt edu Pt pres, anatomy, ex tech, HEP, POC  educated on benefits/parameters of CP          bike             glute set 5x 3s b/l 20x 5s 5s x20 Hip add lindy 2' Hip add lindy 2' Hip add lindy 2'       Heel slide 5x b/l 4x5 b/l 2x10 L only  2x10 L only 5"x10 b/l        clamshells 5x b/l 20x b/l           Quad set 5x 3s b/l 20x 5s 20x 5s b/l   20x 5s b/l  10"x10 b/l        SLR  2x10 b/l 2x10 L only SAQ       3x10 b/l        Standing hip abd/ext             HR/TR    seated 2'             Lumbar roll outs 2'             Gastroc st w/ strap 5x ea Gastroc st w/ strap 30"x3 ea gastroc st w/strap 30"x3 ea        Ther Activity             Side stepping             Step ups             Lateral step ups             Mini squats              STS                                                    Gait Training             SPC use adjustment of SPC, proper ambulation with SPC                         Modalities                 MHP lower back and B knees, 10' post B/L knee MHP 10' post B/l knee MHP 10' post       HEP: [id number]

## 2022-02-14 ENCOUNTER — APPOINTMENT (OUTPATIENT)
Dept: PHYSICAL THERAPY | Facility: CLINIC | Age: 57
End: 2022-02-14
Payer: COMMERCIAL

## 2022-02-15 ENCOUNTER — TELEPHONE (OUTPATIENT)
Dept: OBGYN CLINIC | Facility: HOSPITAL | Age: 57
End: 2022-02-15

## 2022-02-15 NOTE — TELEPHONE ENCOUNTER
[place] [facility] [place] for [place]   Not proper code, Ph [phone]

## 2022-02-17 ENCOUNTER — OFFICE VISIT (OUTPATIENT)
Dept: PHYSICAL THERAPY | Facility: CLINIC | Age: 57
End: 2022-02-17
Payer: COMMERCIAL

## 2022-02-17 DIAGNOSIS — G89.29 CHRONIC PAIN OF LEFT KNEE: Primary | ICD-10-CM

## 2022-02-17 DIAGNOSIS — M25.562 CHRONIC PAIN OF LEFT KNEE: Primary | ICD-10-CM

## 2022-02-17 DIAGNOSIS — G89.29 CHRONIC PAIN OF RIGHT KNEE: ICD-10-CM

## 2022-02-17 DIAGNOSIS — M25.561 CHRONIC PAIN OF RIGHT KNEE: ICD-10-CM

## 2022-02-17 PROCEDURE — 97140 MANUAL THERAPY 1/> REGIONS: CPT

## 2022-02-17 PROCEDURE — 97110 THERAPEUTIC EXERCISES: CPT

## 2022-02-17 PROCEDURE — 97112 NEUROMUSCULAR REEDUCATION: CPT

## 2022-02-17 NOTE — PROGRESS NOTES
Daily Note     Today's date: 2022  Patient name: Indira Shay  : 1965  MRN: [id number]  Referring provider: Christos Glover PA-C  Dx:   Encounter Diagnosis     ICD-10-CM    1  Chronic pain of left knee  M25 562     G89 29    2  Chronic pain of right knee  M25 561     G89 29                   Subjective: Patient reports being very fatigued this date  Notes getting her shingles vaccine earlier this morning which she contributes to her overall fatigue level  Objective: See treatment diary below      Assessment: Tolerated treatment well  Continued to progress patient plan of care this date  Able to tolerate some moderate higher level exercises focusing on improving patient motor control  Able to ride the bike for 5 mins without discomfort  Continue to increase time as patient activity tolerance improved  Patient subjectively stated that she felt less "stiff" at the end of the session  Motivated patient to pursue going to the gym to continue to improve activity tolerance and functional mobility  Patient would benefit from continued PT  Plan: Continue per plan of care    Re-eval next visit     Precautions: type 2 diabetes , concurrent LBP and R hip pain/restrictions      DATES: 11/30 12/8 12/14 1/27 2/3 2/10 2/17      Manuals             PROM  ES, flex/ext b/l TB: L flex/ext in supine, R seated gentle knee flex,ext LH B knees, gastroc stretch, LAD B knees, gastroc stretch, LAD AS B knees, gastroc stretch       Patellar mobility   ES, sup/inf b/l TB: med/lat, sup/inf b/l   med/lat, sup/inf b/l  AS Med/lat, sup/inf b/l TB PROM only  Med/lat, sup/inf b/l EB Gr III-IV                                Neuro Re-Ed             Mini Squats w hip hinge       3x10      Hip abduction isometric       2x10 belt around legs                                                                       Ther Ex    1/27  2/10       Pt edu Pt pres, anatomy, ex tech, HEP, POC  educated on benefits/parameters of CP bike       5'       glute set 5x 3s b/l 20x 5s 5s x20 Hip add lindy 2' Hip add lindy 2' Hip add lindy 2'       Heel slide 5x b/l 4x5 b/l 2x10 L only  2x10 L only 5"x10 b/l        clamshells 5x b/l 20x b/l           Quad set 5x 3s b/l 20x 5s 20x 5s b/l   20x 5s b/l  10"x10 b/l        SLR  2x10 b/l 2x10 L only          SAQ       3x10 b/l        Standing hip abd/ext             HR/TR    seated 2'             Lumbar roll outs 2'         Seated Adduction       2x10 ball squeeze                   LAQ       2x12 ea leg      Seated Marches       2x12 ea leg           Gastroc st w/ strap 5x ea Gastroc st w/ strap 30"x3 ea gastroc st w/strap 30"x3 ea        Ther Activity             Side stepping             Step ups             Lateral step ups             Mini squats              STS                                                    Gait Training             SPC use adjustment of SPC, proper ambulation with SPC                         Modalities                 MHP lower back and B knees, 10' post B/L knee MHP 10' post B/l knee MHP 10' post       HEP: [id number]

## 2022-02-23 NOTE — TELEPHONE ENCOUNTER
2nd call back about ICD10 coding  Gareth Tillman at [facility], needing code beside fall ans slip on ice for billing  Not proper code used (W00  9XXA), she is asking if a new code can be used for this visit? Also, same visit an [address] [place] [address] was needed for the visit with GONZÁLEZ Cortes? Can someone help her with this as well?     Ariana June   [phone]

## 2022-03-10 ENCOUNTER — APPOINTMENT (OUTPATIENT)
Dept: PHYSICAL THERAPY | Facility: CLINIC | Age: 57
End: 2022-03-10
Payer: COMMERCIAL

## 2022-03-17 ENCOUNTER — EVALUATION (OUTPATIENT)
Dept: PHYSICAL THERAPY | Facility: CLINIC | Age: 57
End: 2022-03-17
Payer: COMMERCIAL

## 2022-03-17 ENCOUNTER — HOSPITAL ENCOUNTER (OUTPATIENT)
Dept: MAMMOGRAPHY | Facility: CLINIC | Age: 57
Discharge: HOME | End: 2022-03-17
Payer: COMMERCIAL

## 2022-03-17 VITALS — WEIGHT: 245 LBS | BODY MASS INDEX: 39.37 KG/M2 | HEIGHT: 66 IN

## 2022-03-17 DIAGNOSIS — G89.29 CHRONIC PAIN OF RIGHT KNEE: ICD-10-CM

## 2022-03-17 DIAGNOSIS — Z12.31 OTHER SCREENING MAMMOGRAM: ICD-10-CM

## 2022-03-17 DIAGNOSIS — M25.561 CHRONIC PAIN OF RIGHT KNEE: ICD-10-CM

## 2022-03-17 DIAGNOSIS — M25.562 CHRONIC PAIN OF LEFT KNEE: Primary | ICD-10-CM

## 2022-03-17 DIAGNOSIS — G89.29 CHRONIC PAIN OF LEFT KNEE: Primary | ICD-10-CM

## 2022-03-17 PROCEDURE — 77067 SCR MAMMO BI INCL CAD: CPT

## 2022-03-17 PROCEDURE — 77063 BREAST TOMOSYNTHESIS BI: CPT

## 2022-03-17 PROCEDURE — 97110 THERAPEUTIC EXERCISES: CPT

## 2022-03-17 PROCEDURE — 97140 MANUAL THERAPY 1/> REGIONS: CPT

## 2022-03-17 NOTE — PROGRESS NOTES
PT Re-Evaluation     Today's date: 3/17/2022  Patient name: Pato Wallace  : 1965  MRN: [id number]  Referring provider: Soni Melendez PA-C  Dx:   Encounter Diagnosis     ICD-10-CM    1  Chronic pain of left knee  M25 562     G89 29    2  Chronic pain of right knee  M25 561     G89 29                   Assessment  Assessment details: Patient returns to therapy following hiatus for one month  Patient has made minor progress during this time related to inability to consistently perform HEP and home life  Patient continues to have increased pain, decreased ROM, decreased strength, decreased patellar mobility, and impaired gait  Patient has made functional gains in hip strength and knee ROM which have led to minimal improvements in activity tolerance  Patient has been able to ambulate without use of SPC more frequently since the start of PT  Pt will benefit from the skilled therapy of a physical therapist in order to address the above listed impairments, achieve established goals, and restore PLOF  Continue POC  Impairments: abnormal gait, abnormal or restricted ROM, abnormal movement, activity intolerance, impaired balance, impaired physical strength, lacks appropriate home exercise program, pain with function and poor body mechanics    Goals  STG: to be achieved by 4 weeks  Pt will demonstrate independence with provided HEP  -progressing  Pt will demonstrate decreased pain to no greater than 2/10  - not met  Pt will improve bilateral knee flex AROM to at least 115 deg  - met  Pt will improve bilateral hip abd strength to at least 4/5  -met  Pt will be able to ambulate without use of SPC for 15 minutes  LTG: to be achieved by discharge  Pt will improve FOTO score to at least 48 - not met   Pt will be able to perform at least 5x STS with no greater than min UE support  - met   Pt will demonstrate improved ambulation quality to report no difficulties with walking for at least 15 min  - progressing  Pt will improve mobility to report no difficulties with stair negotiation and transfers including STS and bed mobility  Plan  Patient would benefit from: PT eval and skilled physical therapy  Planned modality interventions: cryotherapy and thermotherapy: hydrocollator packs  Planned therapy interventions: joint mobilization, manual therapy, Mcdonald taping, balance, flexibility, functional ROM exercises, gait training, home exercise program, therapeutic exercise, therapeutic activities, strengthening, stretching, patient education and neuromuscular re-education  Frequency: 2x week  Duration in visits: 12  Duration in weeks: 4        Subjective Evaluation    History of Present Illness  Mechanism of injury: Patient returns to therapy after hiatus secondary to suspected R hip fx which has since been ruled out  Patient notes slight improvement in knee pain following injections, however continues with increased pain with WBing activity  Patient denies edema, N/T  Since beginning therapy patient notes she is able to stand a bit longer  Patient notes continued intermittent SPC or rollator  Mechanism of onset: Pt reporting a history of meniscal tear in the R knee in 1999 in which she was doing a firemans carry and she was limping right after  Pt went to a doctor in [place] and pt had imaging which indicated tear  She had a CSI  Pt reporting ongoing knee pain and in the last 5 years her knee pain has progressed  Pt reporting that she went the ortho about 2 weeks ago  Pt received x rays of her knees indicated OA  Pt is scheduling gel injections for Dec 9 and beyond  Pt was referred to OPPT thereafter  Pt noting she has hip pain as well  She notes issues with her L4/5 where she received an injection 2 weeks ago      Pt denies: Numbness/tingling, bowel and bladder changes, saddle anesthesia, gait changes, history of cancer, unexpected weight changes, night sweats, fever/sweating/chills, dizziness, vision/hearing changes    Pain location: R/L knee anterior, R>L  Quality: achy, constant   Timing including onset:    - change since onset: worse    - how activity affects: worse  Aggs/difficulties: standing, walking, any activity, scared about walking in airport and going out of house   Eases/previous treatment: nothing eases, stretching has helped in the past    Work/duties: n/a   Current activities/exercise: n/a  Goals: improve walking, be able to clean her house  PMH: mitral valve prolapse   Imaging: xray   Surgeries: hernia soon to be 3 years ago - pt stating her posture changes    Pt denies allergies to latex or use of blood thinners  Pain  Current pain ratin  At worst pain rating: 10      Dory reports a perceived improvement of 30%  Functional status measure is now 31  Patient notes pain has not had large improvements and that she has pain still  Patient reports that she has been able to do more activity since her initial evaluation  She says that she has been doing more walking with less pain and more mobility  She does note that she continues to have difficulty and pain in the knees as well as her hips and low back  Patient would like to continue with PT so that she can improve her overall level of function  Overall, patient has made steady progress toward goals and would benefit from additional PT at this time         Objective     Observations     Additional Observation Details  POSTURE: b/l knee valgus, R>L    Active Range of Motion   Left Knee   Flexion: 125 degrees   Extension: -5 degrees     Right Knee   Flexion: 120 degrees   Extension: -5 degrees     Additional Active Range of Motion Details  Supine L 0-100  R 0-85    Passive Range of Motion   Left Knee   Flexion: 130 degrees with pain    Right Knee   Flexion: 125 degrees with pain    Additional Passive Range of Motion Details  Mild HS restriction b/l    Mobility   Patellar Mobility:   Left Knee   Hypomobile: left medial, left lateral, left superior and left inferior    Right Knee   Hypomobile: medial, lateral, superior and inferior     Strength/Myotome Testing     Left Knee   Flexion: 4  Extension: 4    Right Knee   Flexion: 4  Extension: 4    Additional Strength Details  R hip abd: 3+/5- r hip pain   L hip abd: 4/5    R hip ext: 4/5  L hip ext: 4/5    General Comments:      Knee Comments  GAIT: ambulates with SPC, antalgic, R trendelenburg- not using SPC this date (3/17/22)    STS: 5x no UE support; used momentum to stand     Able to perform mini squat this date                 Precautions: type 2 diabetes , concurrent LBP and R hip pain/restrictions    DATES: 11/30 12/8 12/14 1/27 2/3 2/10 2/17 3/17     Manuals             PROM  ES, flex/ext b/l TB: L flex/ext in supine, R seated gentle knee flex,ext LH B knees, gastroc stretch, LAD B knees, gastroc stretch, LAD AS B knees, gastroc stretch       Patellar mobility   ES, sup/inf b/l TB: med/lat, sup/inf b/l   med/lat, sup/inf b/l  AS Med/lat, sup/inf b/l TB PROM only  Med/lat, sup/inf b/l EB Gr III-IV      Re-eval         EB + patient education, prognsis, plan moving foward                  Neuro Re-Ed             Mini Squats w hip hinge       3x10      Hip abduction isometric       2x10 belt around legs                                                                       Ther Ex    1/27  2/10       Pt edu Pt pres, anatomy, ex tech, HEP, POC  educated on benefits/parameters of CP          bike       5'  5'     glute set 5x 3s b/l 20x 5s 5s x20 Hip add lindy 2' Hip add lindy 2' Hip add lindy 2'       Heel slide 5x b/l 4x5 b/l 2x10 L only  2x10 L only 5"x10 b/l        clamshells 5x b/l 20x b/l           Quad set 5x 3s b/l 20x 5s 20x 5s b/l   20x 5s b/l  10"x10 b/l        SLR  2x10 b/l 2x10 L only          SAQ       3x10 b/l        Standing hip abd/ext             HR/TR    seated 2'             Lumbar roll outs 2'         Seated Adduction       2x10 ball squeeze 30x ball squeeze     Hip 3 way         2x10 ea direction      LAQ       2x12 ea leg      Hip abduction isometric         30x blue belt      Seated Marches       2x12 ea leg  30x belt around knees          Gastroc st w/ strap 5x ea Gastroc st w/ strap 30"x3 ea gastroc st w/strap 30"x3 ea        Ther Activity             Side stepping             Step ups             Lateral step ups             Mini squats              STS                                                    Gait Training             SPC use adjustment of SPC, proper ambulation with SPC                         Modalities                 MHP lower back and B knees, 10' post B/L knee MHP 10' post B/l knee MHP 10' post       HEP: [id number]

## 2022-03-24 ENCOUNTER — APPOINTMENT (OUTPATIENT)
Dept: PHYSICAL THERAPY | Facility: CLINIC | Age: 57
End: 2022-03-24
Payer: COMMERCIAL

## 2022-03-25 ENCOUNTER — OFFICE VISIT (OUTPATIENT)
Dept: PHYSICAL THERAPY | Facility: CLINIC | Age: 57
End: 2022-03-25
Payer: COMMERCIAL

## 2022-03-25 DIAGNOSIS — G89.29 CHRONIC PAIN OF RIGHT KNEE: ICD-10-CM

## 2022-03-25 DIAGNOSIS — G89.29 CHRONIC PAIN OF LEFT KNEE: Primary | ICD-10-CM

## 2022-03-25 DIAGNOSIS — M25.562 CHRONIC PAIN OF LEFT KNEE: Primary | ICD-10-CM

## 2022-03-25 DIAGNOSIS — M25.561 CHRONIC PAIN OF RIGHT KNEE: ICD-10-CM

## 2022-03-25 PROCEDURE — 97110 THERAPEUTIC EXERCISES: CPT

## 2022-03-25 PROCEDURE — 97140 MANUAL THERAPY 1/> REGIONS: CPT

## 2022-03-25 PROCEDURE — 97530 THERAPEUTIC ACTIVITIES: CPT

## 2022-03-25 NOTE — TELEPHONE ENCOUNTER
Rayray Green is calling back stating that she has not heard back from anyone about the correct ICD10 code and the auth for [place] as stated in the previous note   Rayray Green would like a call back [phone]

## 2022-03-25 NOTE — PROGRESS NOTES
Daily Note     Today's date: 3/25/2022  Patient name: Zulma Gutierrez  : 1965  MRN: [id number]  Referring provider: Amy Rascon PA-C  Dx:   Encounter Diagnosis     ICD-10-CM    1  Chronic pain of left knee  M25 562     G89 29    2  Chronic pain of right knee  M25 561     G89 29                   Subjective: Patient reports increased soreness in both of her legs  She notes that she has been active and on her feet quite a bit the past couple of days  Objective: See treatment diary below      Assessment: Tolerated treatment well  Patient shows decreased hip and knee mobility this date  Noted decreases in patellar mobility b/l but improved with manuals  Continued to progress patient as tolerated through strengthening, mobility, and endurance exercises this date  Decreased eccentric control Noted fatigue this date especially during standing exercises  Rest breaks given as needed  Progress as tolerated  Patient would benefit from continued PT  Plan: Continue per plan of care        Precautions: type 2 diabetes , concurrent LBP and R hip pain/restrictions    DATES: 11/30 12/8 12/14 1/27 2/3 2/10 2/17 3/17 3/25    Manuals             PROM  ES, flex/ext b/l TB: L flex/ext in supine, R seated gentle knee flex,ext LH B knees, gastroc stretch, LAD B knees, gastroc stretch, LAD AS B knees, gastroc stretch   B/L knees, hips     Patellar mobility   ES, sup/inf b/l TB: med/lat, sup/inf b/l   med/lat, sup/inf b/l  AS Med/lat, sup/inf b/l TB PROM only  Med/lat, sup/inf b/l EB Gr III-IV  Med, lat, sup, inf EB Gr III-IV    Re-eval         EB + patient education, prognsis, plan moving foward                  Neuro Re-Ed             Mini Squats w hip hinge       3x10      Hip abduction isometric       2x10 belt around legs                                *                                       Ther Ex    1/27  2/10       Pt edu Pt pres, anatomy, ex tech, HEP, POC  educated on benefits/parameters of CP bike       5'  5'     glute set 5x 3s b/l 20x 5s 5s x20 Hip add lindy 2' Hip add lindy 2' Hip add lindy 2'       Heel slide 5x b/l 4x5 b/l 2x10 L only  2x10 L only 5"x10 b/l        clamshells 5x b/l 20x b/l           Quad set 5x 3s b/l 20x 5s 20x 5s b/l   20x 5s b/l  10"x10 b/l        SLR  2x10 b/l 2x10 L only      2x10 b/l 2#     SAQ       3x10 b/l        Standing hip abd/ext             HR/TR    seated 2'             Lumbar roll outs 2'         Seated Adduction       2x10 ball squeeze 30x ball squeeze     Hip 3 way         2x10 ea direction  2x10 ext and abd     LAQ       2x12 ea leg      Hip abduction isometric         30x blue belt      Supine Hamstring Stretch         10x10" b/l     Seated Marches       2x12 ea leg  30x belt around knees          Gastroc st w/ strap 5x ea Gastroc st w/ strap 30"x3 ea gastroc st w/strap 30"x3 ea        Ther Activity             Side stepping             Step ups         10x R and L 6"    Lateral step ups             Mini squats              STS             Step Downs          5x L and R 4" 5x L and R 6"                              Gait Training             SPC use adjustment of SPC, proper ambulation with SPC                         Modalities                 MHP lower back and B knees, 10' post B/L knee MHP 10' post B/l knee MHP 10' post       HEP: [id number]

## 2022-04-06 ENCOUNTER — APPOINTMENT (EMERGENCY)
Dept: CT IMAGING | Facility: HOSPITAL | Age: 57
End: 2022-04-06
Payer: COMMERCIAL

## 2022-04-06 ENCOUNTER — HOSPITAL ENCOUNTER (OUTPATIENT)
Facility: HOSPITAL | Age: 57
Setting detail: OBSERVATION
Discharge: HOME | End: 2022-04-08
Attending: EMERGENCY MEDICINE | Admitting: STUDENT IN AN ORGANIZED HEALTH CARE EDUCATION/TRAINING PROGRAM
Payer: COMMERCIAL

## 2022-04-06 DIAGNOSIS — R10.9 ABDOMINAL PAIN: Primary | ICD-10-CM

## 2022-04-06 DIAGNOSIS — R11.10 VOMITING: ICD-10-CM

## 2022-04-06 DIAGNOSIS — E11.9 TYPE 2 DIABETES MELLITUS WITHOUT COMPLICATION, UNSPECIFIED WHETHER LONG TERM INSULIN USE (HCC): ICD-10-CM

## 2022-04-06 PROBLEM — I10 HYPERTENSION: Status: ACTIVE | Noted: 2022-04-06

## 2022-04-06 LAB
2HR DELTA HS TROPONIN: 1 NG/L
4HR DELTA HS TROPONIN: 0 NG/L
ALBUMIN SERPL BCP-MCNC: 3.8 G/DL (ref 3.5–5)
ALP SERPL-CCNC: 118 U/L (ref 46–116)
ALT SERPL W P-5'-P-CCNC: 34 U/L (ref 12–78)
ANION GAP SERPL CALCULATED.3IONS-SCNC: 12 MMOL/L (ref 4–13)
APTT PPP: 28 SECONDS (ref 23–37)
AST SERPL W P-5'-P-CCNC: 26 U/L (ref 5–45)
ATRIAL RATE: 88 BPM
BASOPHILS # BLD AUTO: 0.02 THOUSANDS/ΜL (ref 0–0.1)
BASOPHILS NFR BLD AUTO: 0 % (ref 0–1)
BILIRUB DIRECT SERPL-MCNC: 0.15 MG/DL (ref 0–0.2)
BILIRUB SERPL-MCNC: 0.54 MG/DL (ref 0.2–1)
BUN SERPL-MCNC: 17 MG/DL (ref 5–25)
CALCIUM SERPL-MCNC: 9.8 MG/DL (ref 8.3–10.1)
CARDIAC TROPONIN I PNL SERPL HS: 4 NG/L
CARDIAC TROPONIN I PNL SERPL HS: 4 NG/L
CARDIAC TROPONIN I PNL SERPL HS: 5 NG/L
CHLORIDE SERPL-SCNC: 101 MMOL/L (ref 100–108)
CO2 SERPL-SCNC: 26 MMOL/L (ref 21–32)
CREAT SERPL-MCNC: 0.77 MG/DL (ref 0.6–1.3)
EOSINOPHIL # BLD AUTO: 0.03 THOUSAND/ΜL (ref 0–0.61)
EOSINOPHIL NFR BLD AUTO: 0 % (ref 0–6)
ERYTHROCYTE [DISTWIDTH] IN BLOOD BY AUTOMATED COUNT: 13.9 % (ref 11.6–15.1)
EST. AVERAGE GLUCOSE BLD GHB EST-MCNC: 160 MG/DL
GFR SERPL CREATININE-BSD FRML MDRD: 85 ML/MIN/1.73SQ M
GLUCOSE SERPL-MCNC: 142 MG/DL (ref 65–140)
GLUCOSE SERPL-MCNC: 206 MG/DL (ref 65–140)
HBA1C MFR BLD: 7.2 %
HCT VFR BLD AUTO: 45.5 % (ref 34.8–46.1)
HGB BLD-MCNC: 15.3 G/DL (ref 11.5–15.4)
IMM GRANULOCYTES # BLD AUTO: 0.05 THOUSAND/UL (ref 0–0.2)
IMM GRANULOCYTES NFR BLD AUTO: 1 % (ref 0–2)
INR PPP: 1 (ref 0.84–1.19)
LACTATE SERPL-SCNC: 1.9 MMOL/L (ref 0.5–2)
LIPASE SERPL-CCNC: 59 U/L (ref 73–393)
LYMPHOCYTES # BLD AUTO: 1.22 THOUSANDS/ΜL (ref 0.6–4.47)
LYMPHOCYTES NFR BLD AUTO: 12 % (ref 14–44)
MCH RBC QN AUTO: 31.7 PG (ref 26.8–34.3)
MCHC RBC AUTO-ENTMCNC: 33.6 G/DL (ref 31.4–37.4)
MCV RBC AUTO: 94 FL (ref 82–98)
MONOCYTES # BLD AUTO: 0.5 THOUSAND/ΜL (ref 0.17–1.22)
MONOCYTES NFR BLD AUTO: 5 % (ref 4–12)
NEUTROPHILS # BLD AUTO: 8.2 THOUSANDS/ΜL (ref 1.85–7.62)
NEUTS SEG NFR BLD AUTO: 82 % (ref 43–75)
NRBC BLD AUTO-RTO: 0 /100 WBCS
P AXIS: 70 DEGREES
PLATELET # BLD AUTO: 250 THOUSANDS/UL (ref 149–390)
PLATELET # BLD AUTO: 287 THOUSANDS/UL (ref 149–390)
PMV BLD AUTO: 9.6 FL (ref 8.9–12.7)
PMV BLD AUTO: 9.7 FL (ref 8.9–12.7)
POTASSIUM SERPL-SCNC: 3.6 MMOL/L (ref 3.5–5.3)
PR INTERVAL: 160 MS
PROT SERPL-MCNC: 8.3 G/DL (ref 6.4–8.2)
PROTHROMBIN TIME: 12.8 SECONDS (ref 11.6–14.5)
QRS AXIS: 72 DEGREES
QRSD INTERVAL: 88 MS
QT INTERVAL: 350 MS
QTC INTERVAL: 423 MS
RBC # BLD AUTO: 4.82 MILLION/UL (ref 3.81–5.12)
SODIUM SERPL-SCNC: 139 MMOL/L (ref 136–145)
T WAVE AXIS: 65 DEGREES
VENTRICULAR RATE: 88 BPM
WBC # BLD AUTO: 10.02 THOUSAND/UL (ref 4.31–10.16)

## 2022-04-06 PROCEDURE — 85025 COMPLETE CBC W/AUTO DIFF WBC: CPT | Performed by: EMERGENCY MEDICINE

## 2022-04-06 PROCEDURE — 85610 PROTHROMBIN TIME: CPT | Performed by: EMERGENCY MEDICINE

## 2022-04-06 PROCEDURE — 80076 HEPATIC FUNCTION PANEL: CPT | Performed by: EMERGENCY MEDICINE

## 2022-04-06 PROCEDURE — 96375 TX/PRO/DX INJ NEW DRUG ADDON: CPT

## 2022-04-06 PROCEDURE — 84484 ASSAY OF TROPONIN QUANT: CPT | Performed by: EMERGENCY MEDICINE

## 2022-04-06 PROCEDURE — 93010 ELECTROCARDIOGRAM REPORT: CPT | Performed by: INTERNAL MEDICINE

## 2022-04-06 PROCEDURE — 74176 CT ABD & PELVIS W/O CONTRAST: CPT

## 2022-04-06 PROCEDURE — 96374 THER/PROPH/DIAG INJ IV PUSH: CPT

## 2022-04-06 PROCEDURE — 99285 EMERGENCY DEPT VISIT HI MDM: CPT

## 2022-04-06 PROCEDURE — 99245 OFF/OP CONSLTJ NEW/EST HI 55: CPT | Performed by: INTERNAL MEDICINE

## 2022-04-06 PROCEDURE — 80048 BASIC METABOLIC PNL TOTAL CA: CPT | Performed by: EMERGENCY MEDICINE

## 2022-04-06 PROCEDURE — 82948 REAGENT STRIP/BLOOD GLUCOSE: CPT

## 2022-04-06 PROCEDURE — 85049 AUTOMATED PLATELET COUNT: CPT | Performed by: PHYSICIAN ASSISTANT

## 2022-04-06 PROCEDURE — G1004 CDSM NDSC: HCPCS

## 2022-04-06 PROCEDURE — 99219 PR INITIAL OBSERVATION CARE/DAY 50 MINUTES: CPT | Performed by: STUDENT IN AN ORGANIZED HEALTH CARE EDUCATION/TRAINING PROGRAM

## 2022-04-06 PROCEDURE — 93005 ELECTROCARDIOGRAM TRACING: CPT

## 2022-04-06 PROCEDURE — 83036 HEMOGLOBIN GLYCOSYLATED A1C: CPT | Performed by: PHYSICIAN ASSISTANT

## 2022-04-06 PROCEDURE — 83605 ASSAY OF LACTIC ACID: CPT | Performed by: EMERGENCY MEDICINE

## 2022-04-06 PROCEDURE — 85730 THROMBOPLASTIN TIME PARTIAL: CPT | Performed by: EMERGENCY MEDICINE

## 2022-04-06 PROCEDURE — 83690 ASSAY OF LIPASE: CPT | Performed by: EMERGENCY MEDICINE

## 2022-04-06 PROCEDURE — 36415 COLL VENOUS BLD VENIPUNCTURE: CPT | Performed by: EMERGENCY MEDICINE

## 2022-04-06 PROCEDURE — 99285 EMERGENCY DEPT VISIT HI MDM: CPT | Performed by: EMERGENCY MEDICINE

## 2022-04-06 RX ORDER — ONDANSETRON 2 MG/ML
4 INJECTION INTRAMUSCULAR; INTRAVENOUS EVERY 6 HOURS PRN
Status: DISCONTINUED | OUTPATIENT
Start: 2022-04-06 | End: 2022-04-08 | Stop reason: HOSPADM

## 2022-04-06 RX ORDER — ACETAMINOPHEN 325 MG/1
650 TABLET ORAL EVERY 4 HOURS PRN
Status: DISCONTINUED | OUTPATIENT
Start: 2022-04-06 | End: 2022-04-08 | Stop reason: HOSPADM

## 2022-04-06 RX ORDER — SODIUM CHLORIDE, SODIUM LACTATE, POTASSIUM CHLORIDE, CALCIUM CHLORIDE 600; 310; 30; 20 MG/100ML; MG/100ML; MG/100ML; MG/100ML
125 INJECTION, SOLUTION INTRAVENOUS CONTINUOUS
Status: DISCONTINUED | OUTPATIENT
Start: 2022-04-06 | End: 2022-04-08 | Stop reason: HOSPADM

## 2022-04-06 RX ORDER — LABETALOL 20 MG/4 ML (5 MG/ML) INTRAVENOUS SYRINGE
10 EVERY 4 HOURS PRN
Status: DISCONTINUED | OUTPATIENT
Start: 2022-04-06 | End: 2022-04-08 | Stop reason: HOSPADM

## 2022-04-06 RX ORDER — LANOLIN ALCOHOL/MO/W.PET/CERES
3 CREAM (GRAM) TOPICAL
Status: DISCONTINUED | OUTPATIENT
Start: 2022-04-06 | End: 2022-04-08 | Stop reason: HOSPADM

## 2022-04-06 RX ORDER — TRAMADOL HYDROCHLORIDE 50 MG/1
50 TABLET ORAL 2 TIMES DAILY PRN
COMMUNITY

## 2022-04-06 RX ORDER — KETOROLAC TROMETHAMINE 30 MG/ML
15 INJECTION, SOLUTION INTRAMUSCULAR; INTRAVENOUS ONCE
Status: COMPLETED | OUTPATIENT
Start: 2022-04-06 | End: 2022-04-06

## 2022-04-06 RX ORDER — MORPHINE SULFATE 10 MG/ML
8 INJECTION, SOLUTION INTRAMUSCULAR; INTRAVENOUS ONCE
Status: COMPLETED | OUTPATIENT
Start: 2022-04-06 | End: 2022-04-06

## 2022-04-06 RX ORDER — ONDANSETRON 2 MG/ML
4 INJECTION INTRAMUSCULAR; INTRAVENOUS ONCE
Status: COMPLETED | OUTPATIENT
Start: 2022-04-06 | End: 2022-04-06

## 2022-04-06 RX ORDER — ACETAMINOPHEN 325 MG/1
650 TABLET ORAL ONCE
Status: COMPLETED | OUTPATIENT
Start: 2022-04-06 | End: 2022-04-06

## 2022-04-06 RX ORDER — HEPARIN SODIUM 5000 [USP'U]/ML
5000 INJECTION, SOLUTION INTRAVENOUS; SUBCUTANEOUS EVERY 8 HOURS SCHEDULED
Status: DISCONTINUED | OUTPATIENT
Start: 2022-04-06 | End: 2022-04-08 | Stop reason: HOSPADM

## 2022-04-06 RX ORDER — OXYCODONE HYDROCHLORIDE 5 MG/1
5 TABLET ORAL ONCE
Status: COMPLETED | OUTPATIENT
Start: 2022-04-06 | End: 2022-04-06

## 2022-04-06 RX ADMIN — HEPARIN SODIUM 5000 UNITS: 5000 INJECTION INTRAVENOUS; SUBCUTANEOUS at 21:00

## 2022-04-06 RX ADMIN — MORPHINE SULFATE 8 MG: 10 INJECTION, SOLUTION INTRAMUSCULAR; INTRAVENOUS at 12:46

## 2022-04-06 RX ADMIN — OXYCODONE HYDROCHLORIDE 5 MG: 5 TABLET ORAL at 23:35

## 2022-04-06 RX ADMIN — ONDANSETRON 4 MG: 2 INJECTION INTRAMUSCULAR; INTRAVENOUS at 12:46

## 2022-04-06 RX ADMIN — KETOROLAC TROMETHAMINE 15 MG: 30 INJECTION, SOLUTION INTRAMUSCULAR at 21:35

## 2022-04-06 RX ADMIN — HEPARIN SODIUM 5000 UNITS: 5000 INJECTION INTRAVENOUS; SUBCUTANEOUS at 16:26

## 2022-04-06 RX ADMIN — SODIUM CHLORIDE, SODIUM LACTATE, POTASSIUM CHLORIDE, AND CALCIUM CHLORIDE 125 ML/HR: .6; .31; .03; .02 INJECTION, SOLUTION INTRAVENOUS at 16:25

## 2022-04-06 RX ADMIN — ACETAMINOPHEN 650 MG: 325 TABLET ORAL at 20:53

## 2022-04-06 RX ADMIN — Medication 3 MG: at 23:35

## 2022-04-06 RX ADMIN — ACETAMINOPHEN 650 MG: 325 TABLET ORAL at 14:54

## 2022-04-06 NOTE — ASSESSMENT & PLAN NOTE
Lab Results   Component Value Date    HGBA1C 11 0 (H) 01/10/2021       No results for input(s): POCGLU in the last 72 hours      Blood Sugar Average: Last 72 hrs:   history of poorly-controlled type 2 diabetes  Reports not on insulin at home  Given a1c of 11 would likely benefit with lantus qhs  Will provid SSI q6h for now given npo  Will repeat a1c  Hold home metformin

## 2022-04-06 NOTE — ASSESSMENT & PLAN NOTE
Presented with abdominal pain with  concern for small-bowel obstruction     Management per primary team surgery  IV fluids  Currently NPO  Follow-up plan per primary team

## 2022-04-06 NOTE — ED PROVIDER NOTES
History  Chief Complaint   Patient presents with    Abdominal Pain     per ems, patient c/o lower right abdominal pain since this morning at 0100  Tender to touch  Sudden onset epigastric pain and vomiting since 0100 this morning  Moderate to severe, constant, nonradiating, still present  Worse with palpation  No alleviating factors  No h/o similar sxs in past  PSH significant for hysterectomy and hernia repair  No h/o gastritis or gastric ulcer  No gi bleeding  No hematemesis  No fever or chills  pmh - asthma, DM  Prior to Admission Medications   Prescriptions Last Dose Informant Patient Reported? Taking? Alcohol Swabs 70 % PADS Not Taking at Unknown time Self No No   Sig: Use 4 (four) times a day   Patient not taking: Reported on 4/6/2022    Insulin Pen Needle (Pen Needles) 32G X 4 MM MISC  Self No No   Sig: Use 4 (four) times a day for 14 days   Patient not taking: Reported on 1/3/2022    busPIRone (BUSPAR) 10 mg tablet 4/5/2022 at Unknown time Self Yes Yes   Sig: Take 1 tablet by mouth 3 (three) times a day   dexamethasone (DECADRON) 6 mg tablet Not Taking at Unknown time Self No No   Sig: Take 1 tablet (6 mg total) by mouth daily   Patient not taking: Reported on 4/6/2022    escitalopram (LEXAPRO) 5 mg tablet 4/5/2022 at Unknown time Self Yes Yes   Sig: Take 1 tablet by mouth daily   hydrOXYzine HCL (ATARAX) 25 mg tablet 4/5/2022 at Unknown time Self Yes Yes   Sig: Take 2 tablets by mouth daily at bedtime   ibuprofen (MOTRIN) 600 mg tablet Not Taking at Unknown time Self No No   Sig: Take 1 tablet (600 mg total) by mouth every 8 (eight) hours as needed for mild pain Take 3 times daily for 3-4 days, then take as needed  Please take with food     Patient not taking: Reported on 4/6/2022    insulin aspart (NovoLOG FlexPen) 100 UNIT/ML injection pen  Self No No   Sig: Inject 30 Units under the skin 3 (three) times a day with meals for 14 days   Patient not taking: Reported on 1/3/2022    insulin glargine (Basaglar KwikPen) 100 units/mL injection pen  Self No No   Sig: Inject 40 Units under the skin daily at bedtime for 14 days   Patient not taking: Reported on 1/3/2022    insulin glargine (LANTUS SOLOSTAR) 100 units/mL injection pen Not Taking at Unknown time Self Yes No   Sig: Inject 21 Units under the skin daily at bedtime   Patient not taking: Reported on 4/6/2022    lidocaine (LIDODERM) 5 % 4/5/2022 at Unknown time Self Yes Yes   Sig: APPLY 1 PATCH TOPICALLY DAILY FOR PAIN (REMOVE PATCH AFTER 12 HOURS; 12 HOURS ON AND 12 HOURS OFF)   losartan (COZAAR) 100 MG tablet 4/5/2022 at Unknown time Self Yes Yes   Sig: Take 100 mg by mouth daily before dinner   medroxyPROGESTERone (PROVERA) 10 mg tablet Not Taking at Unknown time Self Yes No   Sig: Take 1 tablet by mouth daily   Patient not taking: Reported on 4/6/2022    metFORMIN (GLUCOPHAGE) 1000 MG tablet 4/5/2022 at Unknown time Self Yes Yes   Sig: Take 1,000 mg by mouth 2 (two) times a day with meals   traMADol (ULTRAM) 50 mg tablet   Yes Yes   Sig: Take 50 mg by mouth 2 (two) times a day as needed for moderate pain   triamcinolone (KENALOG) 0 1 % cream  Self No No   Sig: Apply topically 2 (two) times a day for 7 days      Facility-Administered Medications: None       Past Medical History:   Diagnosis Date    Asthma     Diabetes mellitus ([facility] [address] )        Past Surgical History:   Procedure Laterality Date    HERNIA REPAIR      HYSTERECTOMY  05/2021       Family History   Problem Relation Age of Onset    Diabetes Mother     Diabetes Father     No Known Problems Maternal Grandmother     No Known Problems Maternal Grandfather     No Known Problems Paternal Grandmother     No Known Problems Paternal Grandfather     No Known Problems Son     No Known Problems Son     No Known Problems Maternal Aunt     No Known Problems Maternal Aunt     No Known Problems Maternal Aunt      I have reviewed and agree with the history as documented      E-Cigarette/Vaping  E-Cigarette Use Never User      E-Cigarette/Vaping Substances    Nicotine No     THC No     CBD No     Flavoring No     Other No     Unknown No      Social History     Tobacco Use    Smoking status: Former Smoker    Smokeless tobacco: Never Used   Vaping Use    Vaping Use: Never used   Substance Use Topics    Alcohol use: Yes    Drug use: Never       Review of Systems   Gastrointestinal: Positive for abdominal pain, nausea and vomiting  All other systems reviewed and are negative  Physical Exam  Physical Exam  Vitals and nursing note reviewed  Constitutional:       General: She is not in acute distress  Appearance: She is well-developed  She is ill-appearing  She is not toxic-appearing or diaphoretic  HENT:      Head: Normocephalic and atraumatic  Eyes:      Conjunctiva/sclera: Conjunctivae normal       Pupils: Pupils are equal, round, and reactive to light  Neck:      Vascular: No JVD  Cardiovascular:      Rate and Rhythm: Normal rate and regular rhythm  Heart sounds: Normal heart sounds  Pulmonary:      Effort: Pulmonary effort is normal       Breath sounds: Normal breath sounds  No stridor  Abdominal:      General: There is no distension  Palpations: Abdomen is soft  Tenderness: There is abdominal tenderness  There is guarding  There is no rebound  Comments: Focal tenderness and guarding in the epigastrium  Musculoskeletal:         General: No tenderness or deformity  Normal range of motion  Cervical back: Normal range of motion and neck supple  Skin:     General: Skin is warm and dry  Capillary Refill: Capillary refill takes less than 2 seconds  Coloration: Skin is not jaundiced or pale  Findings: No bruising, erythema, lesion or rash  Neurological:      General: No focal deficit present  Mental Status: She is alert and oriented to person, place, and time  Cranial Nerves: No cranial nerve deficit        Sensory: No sensory deficit  Motor: No weakness or abnormal muscle tone        Coordination: Coordination normal          Vital Signs  ED Triage Vitals   Temperature Pulse Respirations Blood Pressure SpO2   04/06/22 1226 04/06/22 1226 04/06/22 1226 04/06/22 1230 04/06/22 1226   98 1 °F (36 7 °C) 85 22 143/84 99 %      Temp Source Heart Rate Source Patient Position - Orthostatic VS BP Location FiO2 (%)   04/06/22 1226 04/06/22 1226 04/06/22 1630 04/06/22 1630 --   Oral Monitor Lying Right arm       Pain Score       04/06/22 1226       10 - Worst Possible Pain           Vitals:    04/06/22 1630 04/06/22 1757 04/06/22 1758 04/06/22 1758   BP: 127/56  134/76 134/76   Pulse: 99 92 92 92   Patient Position - Orthostatic VS: Lying            Visual Acuity      ED Medications  Medications   ondansetron (ZOFRAN) injection 4 mg (has no administration in time range)   lactated ringers infusion (125 mL/hr Intravenous New Bag 4/6/22 1625)   acetaminophen (TYLENOL) tablet 650 mg (has no administration in time range)   heparin (porcine) subcutaneous injection 5,000 Units (5,000 Units Subcutaneous Given 4/6/22 1626)   insulin lispro (HumaLOG) 100 units/mL subcutaneous injection 1-6 Units (1 Units Subcutaneous Not Given 4/6/22 1821)   Labetalol HCl (NORMODYNE) injection 10 mg (has no administration in time range)   morphine (PF) 10 mg/mL injection 8 mg (8 mg Intravenous Given 4/6/22 1246)   ondansetron (ZOFRAN) injection 4 mg (4 mg Intravenous Given 4/6/22 1246)   acetaminophen (TYLENOL) tablet 650 mg (650 mg Oral Given 4/6/22 1454)       Diagnostic Studies  Results Reviewed     Procedure Component Value Units Date/Time    HS Troponin I 4hr [[id number]]  (Normal) Collected: 04/06/22 1627    Lab Status: Final result Specimen: Blood from Arm, Right Updated: 04/06/22 1719     hs TnI 4hr 4 ng/L      Delta 4hr hsTnI 0 ng/L     Hemoglobin A1C [[id number]]     Lab Status: No result Specimen: Blood     Platelet count [[id number]]  (Normal) Collected: 04/06/22 1627    Lab Status: Final result Specimen: Blood from Arm, Right Updated: 04/06/22 1655     Platelets 802 Thousands/uL      MPV 9 7 fL     Hemoglobin A1C [[id number]] Collected: 04/06/22 1627    Lab Status: In process Specimen: Blood from Arm, Right Updated: 04/06/22 1650    HS Troponin I 2hr [[id number]]  (Normal) Collected: 04/06/22 1455    Lab Status: Final result Specimen: Blood from Arm, Right Updated: 04/06/22 1528     hs TnI 2hr 5 ng/L      Delta 2hr hsTnI 1 ng/L     HS Troponin 0hr (reflex protocol) [[id number]]  (Normal) Collected: 04/06/22 1233    Lab Status: Final result Specimen: Blood from Arm, Left Updated: 04/06/22 1354     hs TnI 0hr 4 ng/L     Lactic acid [[id number]]  (Normal) Collected: 04/06/22 1239    Lab Status: Final result Specimen: Blood from Arm, Right Updated: 04/06/22 1307     LACTIC ACID 1 9 mmol/L     Narrative:      Result may be elevated if tourniquet was used during collection      Cecilio Montes De Oca [[id number]]  (Normal) Collected: 04/06/22 1239    Lab Status: Final result Specimen: Blood from Arm, Right Updated: 04/06/22 1257     Protime 12 8 seconds      INR 1 00    APTT [[id number]]  (Normal) Collected: 04/06/22 1239    Lab Status: Final result Specimen: Blood from Arm, Right Updated: 04/06/22 1257     PTT 28 seconds     Basic metabolic panel [[id number]]  (Abnormal) Collected: 04/06/22 1233    Lab Status: Final result Specimen: Blood from Arm, Left Updated: 04/06/22 1255     Sodium 139 mmol/L      Potassium 3 6 mmol/L      Chloride 101 mmol/L      CO2 26 mmol/L      ANION GAP 12 mmol/L      BUN 17 mg/dL      Creatinine 0 77 mg/dL      Glucose 206 mg/dL      Calcium 9 8 mg/dL      eGFR 85 ml/min/1 73sq m     Narrative:      [place] guidelines for Chronic Kidney Disease (CKD):     Stage 1 with normal or high GFR (GFR > 90 mL/min/1 73 square meters)    Stage 2 Mild CKD (GFR = 60-89 mL/min/1 73 square meters)    Stage 3A Moderate CKD (GFR = 45-59 mL/min/1 73 square meters)    Stage 3B Moderate CKD (GFR = 30-44 mL/min/1 73 square meters)    Stage 4 Severe CKD (GFR = 15-29 mL/min/1 73 square meters)    Stage 5 End Stage CKD (GFR <15 mL/min/1 73 square meters)  Note: GFR calculation is accurate only with a steady state creatinine    Hepatic function panel [[id number]]  (Abnormal) Collected: 04/06/22 1233    Lab Status: Final result Specimen: Blood from Arm, Left Updated: 04/06/22 1255     Total Bilirubin 0 54 mg/dL      Bilirubin, Direct 0 15 mg/dL      Alkaline Phosphatase 118 U/L      AST 26 U/L      ALT 34 U/L      Total Protein 8 3 g/dL      Albumin 3 8 g/dL     Lipase [[id number]]  (Abnormal) Collected: 04/06/22 1233    Lab Status: Final result Specimen: Blood from Arm, Left Updated: 04/06/22 1255     Lipase 59 u/L     CBC and differential [[id number]]  (Abnormal) Collected: 04/06/22 1233    Lab Status: Final result Specimen: Blood from Arm, Left Updated: 04/06/22 1240     WBC 10 02 Thousand/uL      RBC 4 82 Million/uL      Hemoglobin 15 3 g/dL      Hematocrit 45 5 %      MCV 94 fL      MCH 31 7 pg      MCHC 33 6 g/dL      RDW 13 9 %      MPV 9 6 fL      Platelets 188 Thousands/uL      nRBC 0 /100 WBCs      Neutrophils Relative 82 %      Immat GRANS % 1 %      Lymphocytes Relative 12 %      Monocytes Relative 5 %      Eosinophils Relative 0 %      Basophils Relative 0 %      Neutrophils Absolute 8 20 Thousands/µL      Immature Grans Absolute 0 05 Thousand/uL      Lymphocytes Absolute 1 22 Thousands/µL      Monocytes Absolute 0 50 Thousand/µL      Eosinophils Absolute 0 03 Thousand/µL      Basophils Absolute 0 02 Thousands/µL                  CT abdomen pelvis wo contrast   Final Result by Gali Singleton MD (04/06 1415)      1  Dilated loops of small bowel with completely collapsed terminal ileum   While discrete transition point is not definitely visualized there are matted small bowel loops in the right upper quadrant as well as along the anterior abdominal wall in the    region of the prior umbilical hernia repair  This may represent a site of obstruction  Consider follow-up small bowel follow-through or CT with oral contrast to better identify a transition point  2   Mildly hyperdense free fluid in the pelvis, likely related to the bowel process in this presumably postmenopausal patient  The study was marked in [facility] for immediate notification  Workstation performed: [id number]                    Procedures  Procedures         ED Course                               SBIRT 20yo+      Most Recent Value   SBIRT (22 yo +)    In order to provide better care to our patients, we are screening all of our patients for alcohol and drug use  Would it be okay to ask you these screening questions? Yes Filed at: 04/06/2022 1234   Initial Alcohol Screen: US AUDIT-C     1  How often do you have a drink containing alcohol? 0 Filed at: 04/06/2022 1234   2  How many drinks containing alcohol do you have on a typical day you are drinking? 0 Filed at: 04/06/2022 1234   3a  Male UNDER 65: How often do you have five or more drinks on one occasion? 0 Filed at: 04/06/2022 1234   3b  FEMALE Any Age, or MALE 65+: How often do you have 4 or more drinks on one occassion? 0 Filed at: 04/06/2022 1234   Audit-C Score 0 Filed at: 04/06/2022 1234   RODGER: How many times in the past year have you    Used an illegal drug or used a prescription medication for non-medical reasons?  Never Filed at: 04/06/2022 1234                    MDM    Disposition  Final diagnoses:   Abdominal pain   Vomiting     Time reflects when diagnosis was documented in both MDM as applicable and the Disposition within this note     Time User Action Codes Description Comment    4/6/2022  3:11 PM Kelsey Pereyra Add [R10 9] Abdominal pain     4/6/2022  3:11 PM Luis Felipe Michaud Add [R11 10] Vomiting     4/6/2022  3:35 PM Norma Mathews Add [E11 9] Type 2 diabetes mellitus without complication, unspecified whether long term insulin use [facility])       ED Disposition     ED Disposition Condition Date/Time Comment    Admit Stable Wed Apr 6, 2022  3:11 PM Case was discussed with Dr Kvng Tobin and the patient's admission status was agreed to be Admission Status: observation status to the service of Dr Kvng Tobin   Follow-up Information    None         Current Discharge Medication List      CONTINUE these medications which have NOT CHANGED    Details   busPIRone (BUSPAR) 10 mg tablet Take 1 tablet by mouth 3 (three) times a day      escitalopram (LEXAPRO) 5 mg tablet Take 1 tablet by mouth daily      hydrOXYzine HCL (ATARAX) 25 mg tablet Take 2 tablets by mouth daily at bedtime      lidocaine (LIDODERM) 5 % APPLY 1 PATCH TOPICALLY DAILY FOR PAIN (REMOVE PATCH AFTER 12 HOURS; 12 HOURS ON AND 12 HOURS OFF)      losartan (COZAAR) 100 MG tablet Take 100 mg by mouth daily before dinner      metFORMIN (GLUCOPHAGE) 1000 MG tablet Take 1,000 mg by mouth 2 (two) times a day with meals      traMADol (ULTRAM) 50 mg tablet Take 50 mg by mouth 2 (two) times a day as needed for moderate pain      Alcohol Swabs 70 % PADS Use 4 (four) times a day  Qty: 120 each, Refills: 0    Associated Diagnoses: Type 2 diabetes mellitus (HCC)      dexamethasone (DECADRON) 6 mg tablet Take 1 tablet (6 mg total) by mouth daily  Qty: 3 tablet, Refills: 0    Associated Diagnoses: COVID-19      ibuprofen (MOTRIN) 600 mg tablet Take 1 tablet (600 mg total) by mouth every 8 (eight) hours as needed for mild pain Take 3 times daily for 3-4 days, then take as needed  Please take with food    Qty: 30 tablet, Refills: 0    Associated Diagnoses: Bilateral primary osteoarthritis of knee      insulin aspart (NovoLOG FlexPen) 100 UNIT/ML injection pen Inject 30 Units under the skin 3 (three) times a day with meals for 14 days  Qty: 5 pen, Refills: 0    Associated Diagnoses: Type 2 diabetes mellitus (HCC)      insulin glargine (LANTUS SOLOSTAR) 100 units/mL injection pen Inject 21 Units under the skin daily at bedtime      Insulin Pen Needle (Pen Needles) 32G X 4 MM MISC Use 4 (four) times a day for 14 days  Qty: 56 each, Refills: 0    Associated Diagnoses: Type 2 diabetes mellitus (HCC)      medroxyPROGESTERone (PROVERA) 10 mg tablet Take 1 tablet by mouth daily      triamcinolone (KENALOG) 0 1 % cream Apply topically 2 (two) times a day for 7 days  Qty: 30 g, Refills: 0    Associated Diagnoses: Dermatitis             No discharge procedures on file      PDMP Review     None          ED Provider  Electronically Signed by           Sandro Donohue MD  04/06/22 0533

## 2022-04-06 NOTE — H&P
GENERAL SURGERY HISTORY AND PHYSICAL    Jyotsna Stevens 60 y o  female MRN: [id number]  Unit/Bed#: ED 10 Encounter: [id number]      Assessment/Plan   61y F with Abdominal pain and CT concerning for potential SBO  Diabetes Mellitus Type 2  HTN  Asthma    She presents with midline abdominal pain that started overnight with distension, nausea and vomiting  BM at home but not since and has not passed flatus today  On exam abdomen is distended and mild periumbilical tenderness  CT scan shows dilated loops of small bowel was completely decompressed terminal ileum, but without definitive transition point  Noted are matted loops of bowel in the right upper quadrant as well as adjacent to the anterior abdominal wall at the site of umbilical hernia repair  She displays no electrolyte imbalance on BMP anion gap is 12  No leukocytosis  She does report eating 2 turkey sandwiches last night  She has had a open hysterectomy with Pfannenstiel incision, and a ventral hernia repair with mesh  Symptoms may be secondary to small bowel obstruction vs enteritis    Plan  Recommend admission for observation  [facility] consult for medical management  Diet NPO with IV fluids  Analgesia and antiemetics, p r n  Serial labs and abdominal exams  Encourage ambulation  DVT prophylaxis  ______________________________________________________________________  Chief Complaint: I had this pain that would no going away and I was throwing up    HPI: Jyotsna Stevens is a 60y o  year old female with PMHx of Type 2 Diabetes Mellitus, HTN, and asthma who presents with midline abdominal pain, distension, nausea and vomiting that started overnight  She states she had a bowel movement last night that was very foul smelling  She ate to turkey sandwiches late last night when she got home, and awoke in the morning with cramping sharp abdominal pain and distension  She states she is passing a lot of gas and did have a bowel movement home    She notes that it was also foul smelling  Her pain did not improve along with the nausea vomiting prompting her to present to the emergency department  She states the pain was improved with medication but it is starting to wear off and return  She has not had any further nausea or vomiting since she has been in the ER  She denies any further bowel function and has not passed any flatus  She is still feeling distended but it is improved  She denies any chest pain, palpitations, shortness of breath or cough  She denies any lightheadedness or dizziness  She has had history of ventral umbilical hernia repair with mesh placement, and open hysterectomy with Pfannenstiel incision  Review of Systems   Constitutional: Negative for activity change, appetite change, chills, fatigue and fever  HENT: Negative for congestion, postnasal drip and sore throat  Eyes: Negative  Respiratory: Negative for cough, chest tightness, shortness of breath and wheezing  Cardiovascular: Negative for chest pain, palpitations and leg swelling  Gastrointestinal: Positive for abdominal distention, abdominal pain, nausea and vomiting  Negative for blood in stool  Endocrine: Negative  Genitourinary: Negative for difficulty urinating, dysuria and frequency  Musculoskeletal: Negative  Skin: Negative  Allergic/Immunologic: Negative  Neurological: Negative  Hematological: Negative  Psychiatric/Behavioral: Negative  All other systems reviewed and are negative        Meds/Allergies   Allergies   Allergen Reactions    Sulfa Antibiotics Rash     current meds:   Current Facility-Administered Medications   Medication Dose Route Frequency    acetaminophen (TYLENOL) tablet 650 mg  650 mg Oral Q4H PRN    heparin (porcine) subcutaneous injection 5,000 Units  5,000 Units Subcutaneous Q8H [facility]    lactated ringers infusion  125 mL/hr Intravenous Continuous    ondansetron (ZOFRAN) injection 4 mg  4 mg Intravenous Q6H PRN       Historical Information   Past Medical History:   Diagnosis Date    Asthma     Diabetes mellitus ([address] )      Past Surgical History:   Procedure Laterality Date    HERNIA REPAIR      HYSTERECTOMY  05/2021     Social History   Social History     Substance and Sexual Activity   Alcohol Use Yes     Social History     Substance and Sexual Activity   Drug Use Never     Social History     Tobacco Use   Smoking Status Former Smoker   Smokeless Tobacco Never Used       Family History:  Negative/unremarkable except as detailed in HPI  Objective   Vitals: /84   Pulse 85   Temp 98 1 °F (36 7 °C) (Oral)   Resp 22   SpO2 99% ,There is no height or weight on file to calculate BMI  No intake or output data in the 24 hours ending 04/06/22 1604  Invasive Devices  Report    Peripheral Intravenous Line            Peripheral IV 04/09/21 Right Antecubital 361 days    Peripheral IV 04/06/22 Right Antecubital <1 day                Lab Results:   CBC with diff:   Lab Results   Component Value Date    WBC 10 02 04/06/2022    HGB 15 3 04/06/2022    HCT 45 5 04/06/2022    MCV 94 04/06/2022     04/06/2022    MCH 31 7 04/06/2022    MCHC 33 6 04/06/2022    RDW 13 9 04/06/2022    MPV 9 6 04/06/2022    NRBC 0 04/06/2022   , BMP/CMP:   Lab Results   Component Value Date    SODIUM 139 04/06/2022    K 3 6 04/06/2022     04/06/2022    CO2 26 04/06/2022    BUN 17 04/06/2022    CREATININE 0 77 04/06/2022    CALCIUM 9 8 04/06/2022    AST 26 04/06/2022    ALT 34 04/06/2022    ALKPHOS 118 (H) 04/06/2022    EGFR 85 04/06/2022   , Coags:   Lab Results   Component Value Date    PTT 28 04/06/2022    INR 1 00 04/06/2022   , Urinalysis: No results found for: COLORU, CLARITYU, SPECGRAV, PHUR, LEUKOCYTESUR, NITRITE, PROTEINUA, GLUCOSEU, KETONESU, BILIRUBINUR, BLOODU    Physical Exam  Vitals: /84   Pulse 85   Temp 98 1 °F (36 7 °C) (Oral)   Resp 22   SpO2 99% ,There is no height or weight on file to calculate BMI    General appearance: AAO x3, no distress, appears stated age, cooperative  HEENT: PERRL, sclera clear, anicterus, oral mucosa is pink  Neck: No carotid bruits, trachea is midline    Back: no tenderness,deformity,   Lungs:clear throughout  Heart[de-identified] RRR, S1, S2 normal, no murmur  Abdomen:  Hypoactive bowel sounds, abdomen distended, mild periumbilical tenderness to palpation, no rebound or guarding  Extremities:  Well perfuse, pulses palpable, no edema  Skin:  Warm, dry, no rash  Neurologic: CN II-XII grossly intact, no tremor, affect appropriate    Imaging Studies: CT abdomen pelvis wo contrast    Result Date: 4/6/2022  Impression: 1  Dilated loops of small bowel with completely collapsed terminal ileum  While discrete transition point is not definitely visualized there are matted small bowel loops in the right upper quadrant as well as along the anterior abdominal wall in the region of the prior umbilical hernia repair  This may represent a site of obstruction  Consider follow-up small bowel follow-through or CT with oral contrast to better identify a transition point  2   Mildly hyperdense free fluid in the pelvis, likely related to the bowel process in this presumably postmenopausal patient  The study was marked in [facility] for immediate notification  Workstation performed: [id number]     EKG, Pathology, and Other Studies: I have personally reviewed pertinent reports      VTE Prophylaxis: Sequential compression device (Venodyne)  and Heparin     Code Status: Level 1 - Full Code    Samantha Baez PA-C  4/6/2022

## 2022-04-06 NOTE — ASSESSMENT & PLAN NOTE
Lab Results   Component Value Date    HGBA1C 11 0 (H) 01/10/2021       No results for input(s): POCGLU in the last 72 hours      Blood Sugar Average: Last 72 hrs:   history of poorly-controlled type 2 diabetes  Will continue home Lantus at half dose  Hold home mealtime insulin  Sliding scale insulin for now  Hold home metformin

## 2022-04-06 NOTE — CONSULTS
[address] 1965, 60 y o  female MRN: [id number]  Unit/Bed#: ED 10 Encounter: [id number]  Primary Care Provider: Lucero Norman MD   Date and time admitted to hospital: 4/6/2022 12:25 PM    Inpatient consult to Internal Medicine  Consult performed by: Alphonso Sanchez MD  Consult ordered by: Kush Milton PA-C          * Abdominal pain  Assessment & Plan  Presented with abdominal pain with  concern for small-bowel obstruction     Management per primary team surgery  IV fluids  Currently NPO  Follow-up plan per primary team    Hypertension  Assessment & Plan  Hold home BP meds given possible small-bowel obstruction  Labetalol p r n  For now    Type 2 diabetes mellitus [facility])  Assessment & Plan  Lab Results   Component Value Date    HGBA1C 11 0 (H) 01/10/2021       No results for input(s): POCGLU in the last 72 hours  Blood Sugar Average: Last 72 hrs:   history of poorly-controlled type 2 diabetes  Reports not on insulin at home  Given a1c of 11 would likely benefit with lantus qhs  Will provid SSI q6h for now given npo  Will repeat a1c  Hold home metformin      VTE Prophylaxis: Heparin  / sequential compression device     Recommendations for Discharge:  · na    Counseling / Coordination of Care Time: 1 hour  Greater than 50% of total time spent on patient counseling and coordination of care  Collaboration of Care: Were Recommendations Directly Discussed with Primary Treatment Team? - Yes     History of Present Illness:    Derek Lu is a 60 y o  female who is originally admitted to the general surgery service due to possible small-bowel obstruction We are consulted for medical management of hypertension, diabetes, asthma  Patient initially presented with epigastric pain with associated nausea and vomiting  She denies any other acute complaints      Review of Systems:    Review of Systems   Gastrointestinal: Positive for abdominal pain, nausea and vomiting  Past Medical and Surgical History:     Past Medical History:   Diagnosis Date    Asthma     Diabetes mellitus ([address] )        Past Surgical History:   Procedure Laterality Date    HERNIA REPAIR      HYSTERECTOMY  05/2021       Meds/Allergies:    PTA meds:   Prior to Admission Medications   Prescriptions Last Dose Informant Patient Reported? Taking? Alcohol Swabs 70 % PADS  Self No No   Sig: Use 4 (four) times a day   Insulin Pen Needle (Pen Needles) 32G X 4 MM MISC  Self No No   Sig: Use 4 (four) times a day for 14 days   Patient not taking: Reported on 1/3/2022    busPIRone (BUSPAR) 10 mg tablet  Self Yes No   Sig: Take 1 tablet by mouth 3 (three) times a day   dexamethasone (DECADRON) 6 mg tablet  Self No No   Sig: Take 1 tablet (6 mg total) by mouth daily   escitalopram (LEXAPRO) 5 mg tablet  Self Yes No   Sig: Take 1 tablet by mouth daily   hydrOXYzine HCL (ATARAX) 25 mg tablet  Self Yes No   Sig: Take 2 tablets by mouth daily at bedtime   ibuprofen (MOTRIN) 600 mg tablet  Self No No   Sig: Take 1 tablet (600 mg total) by mouth every 8 (eight) hours as needed for mild pain Take 3 times daily for 3-4 days, then take as needed  Please take with food     insulin aspart (NovoLOG FlexPen) 100 UNIT/ML injection pen  Self No No   Sig: Inject 30 Units under the skin 3 (three) times a day with meals for 14 days   Patient not taking: Reported on 1/3/2022    insulin glargine (Basaglar KwikPen) 100 units/mL injection pen  Self No No   Sig: Inject 40 Units under the skin daily at bedtime for 14 days   Patient not taking: Reported on 1/3/2022    insulin glargine (LANTUS SOLOSTAR) 100 units/mL injection pen  Self Yes No   Sig: Inject 21 Units under the skin daily at bedtime   lidocaine (LIDODERM) 5 %  Self Yes No   Sig: APPLY 1 PATCH TOPICALLY DAILY FOR PAIN (REMOVE PATCH AFTER 12 HOURS; 12 HOURS ON AND 12 HOURS OFF)   losartan (COZAAR) 100 MG tablet  Self Yes No   Sig: Take 100 mg by mouth daily before dinner   medroxyPROGESTERone (PROVERA) 10 mg tablet  Self Yes No   Sig: Take 1 tablet by mouth daily   metFORMIN (GLUCOPHAGE) 1000 MG tablet  Self Yes No   Sig: Take 1,000 mg by mouth 2 (two) times a day with meals   triamcinolone (KENALOG) 0 1 % cream  Self No No   Sig: Apply topically 2 (two) times a day for 7 days      Facility-Administered Medications: None       Allergies: Allergies   Allergen Reactions    Sulfa Antibiotics Rash       Social History:     Marital Status: Single    Substance Use History:   Social History     Substance and Sexual Activity   Alcohol Use Yes     Social History     Tobacco Use   Smoking Status Former Smoker   Smokeless Tobacco Never Used     Social History     Substance and Sexual Activity   Drug Use Never       Family History:    Family History   Problem Relation Age of Onset    Diabetes Mother     Diabetes Father     No Known Problems Maternal Grandmother     No Known Problems Maternal Grandfather     No Known Problems Paternal Grandmother     No Known Problems Paternal Grandfather     No Known Problems Son     No Known Problems Son     No Known Problems Maternal Aunt     No Known Problems Maternal Aunt     No Known Problems Maternal Aunt        Physical Exam:     Vitals:   Blood Pressure: 127/56 (04/06/22 1630)  Pulse: 99 (04/06/22 1630)  Temperature: 98 1 °F (36 7 °C) (04/06/22 1226)  Temp Source: Oral (04/06/22 1226)  Respirations: 20 (04/06/22 1630)  SpO2: 96 % (04/06/22 1630)    Physical Exam  Constitutional:       General: She is not in acute distress  Appearance: She is well-developed  She is not diaphoretic  HENT:      Head: Normocephalic and atraumatic  Nose: Nose normal       Mouth/Throat:      Pharynx: No oropharyngeal exudate  Eyes:      General: No scleral icterus  Right eye: No discharge  Left eye: No discharge  Conjunctiva/sclera: Conjunctivae normal    Neck:      Thyroid: No thyromegaly  Vascular: No JVD  Cardiovascular:      Rate and Rhythm: Normal rate and regular rhythm  Heart sounds: Normal heart sounds  No murmur heard  No friction rub  No gallop  Pulmonary:      Effort: Pulmonary effort is normal  No respiratory distress  Breath sounds: Normal breath sounds  No wheezing or rales  Chest:      Chest wall: No tenderness  Abdominal:      General: Bowel sounds are normal  There is distension  Palpations: Abdomen is soft  Tenderness: There is abdominal tenderness  There is no guarding or rebound  Musculoskeletal:         General: No tenderness or deformity  Normal range of motion  Cervical back: Normal range of motion and neck supple  Skin:     General: Skin is warm and dry  Findings: No erythema or rash  Neurological:      Mental Status: She is alert and oriented to person, place, and time  Cranial Nerves: No cranial nerve deficit  Sensory: No sensory deficit  Motor: No abnormal muscle tone  Coordination: Coordination normal            Additional Data:     Lab Results: I have personally reviewed pertinent films in PACS    Results from last 7 days   Lab Units 04/06/22  1627 04/06/22  1233 04/06/22  1233   WBC Thousand/uL  --   --  10 02   HEMOGLOBIN g/dL  --   --  15 3   HEMATOCRIT %  --   --  45 5   PLATELETS Thousands/uL 250   < > 287   NEUTROS PCT %  --   --  82*   LYMPHS PCT %  --   --  12*   MONOS PCT %  --   --  5   EOS PCT %  --   --  0    < > = values in this interval not displayed       Results from last 7 days   Lab Units 04/06/22  1233   SODIUM mmol/L 139   POTASSIUM mmol/L 3 6   CHLORIDE mmol/L 101   CO2 mmol/L 26   BUN mg/dL 17   CREATININE mg/dL 0 77   ANION GAP mmol/L 12   CALCIUM mg/dL 9 8   ALBUMIN g/dL 3 8   TOTAL BILIRUBIN mg/dL 0 54   ALK PHOS U/L 118*   ALT U/L 34   AST U/L 26   GLUCOSE RANDOM mg/dL 206*     Results from last 7 days   Lab Units 04/06/22  1239   INR  1 00         Lab Results   Component Value Date/Time    HGBA1C 11 0 (H) 01/10/2021 08:03 AM         Results from last 7 days   Lab Units 04/06/22  1239   LACTIC ACID mmol/L 1 9       Imaging: I have personally reviewed pertinent reports  CT abdomen pelvis wo contrast   Final Result by Toña Farooq MD (04/06 6148)      1  Dilated loops of small bowel with completely collapsed terminal ileum  While discrete transition point is not definitely visualized there are matted small bowel loops in the right upper quadrant as well as along the anterior abdominal wall in the    region of the prior umbilical hernia repair  This may represent a site of obstruction  Consider follow-up small bowel follow-through or CT with oral contrast to better identify a transition point  2   Mildly hyperdense free fluid in the pelvis, likely related to the bowel process in this presumably postmenopausal patient  The study was marked in [facility] for immediate notification  Workstation performed: [id number]             EKG, Pathology, and Other Studies Reviewed on Admission:   · EKG: reviewed    ** Please Note: This note has been constructed using a voice recognition system   **

## 2022-04-07 ENCOUNTER — TELEPHONE (OUTPATIENT)
Dept: OBGYN CLINIC | Facility: HOSPITAL | Age: 57
End: 2022-04-07

## 2022-04-07 LAB
ANION GAP SERPL CALCULATED.3IONS-SCNC: 8 MMOL/L (ref 4–13)
BUN SERPL-MCNC: 16 MG/DL (ref 5–25)
CALCIUM SERPL-MCNC: 8.1 MG/DL (ref 8.3–10.1)
CHLORIDE SERPL-SCNC: 106 MMOL/L (ref 100–108)
CO2 SERPL-SCNC: 26 MMOL/L (ref 21–32)
CREAT SERPL-MCNC: 0.7 MG/DL (ref 0.6–1.3)
ERYTHROCYTE [DISTWIDTH] IN BLOOD BY AUTOMATED COUNT: 14 % (ref 11.6–15.1)
GFR SERPL CREATININE-BSD FRML MDRD: 95 ML/MIN/1.73SQ M
GLUCOSE P FAST SERPL-MCNC: 143 MG/DL (ref 65–99)
GLUCOSE SERPL-MCNC: 117 MG/DL (ref 65–140)
GLUCOSE SERPL-MCNC: 118 MG/DL (ref 65–140)
GLUCOSE SERPL-MCNC: 131 MG/DL (ref 65–140)
GLUCOSE SERPL-MCNC: 132 MG/DL (ref 65–140)
GLUCOSE SERPL-MCNC: 133 MG/DL (ref 65–140)
GLUCOSE SERPL-MCNC: 143 MG/DL (ref 65–140)
GLUCOSE SERPL-MCNC: 171 MG/DL (ref 65–140)
HCT VFR BLD AUTO: 36.6 % (ref 34.8–46.1)
HGB BLD-MCNC: 12.1 G/DL (ref 11.5–15.4)
MCH RBC QN AUTO: 31.7 PG (ref 26.8–34.3)
MCHC RBC AUTO-ENTMCNC: 33.1 G/DL (ref 31.4–37.4)
MCV RBC AUTO: 96 FL (ref 82–98)
PLATELET # BLD AUTO: 216 THOUSANDS/UL (ref 149–390)
PMV BLD AUTO: 9.4 FL (ref 8.9–12.7)
POTASSIUM SERPL-SCNC: 3.1 MMOL/L (ref 3.5–5.3)
RBC # BLD AUTO: 3.82 MILLION/UL (ref 3.81–5.12)
SODIUM SERPL-SCNC: 140 MMOL/L (ref 136–145)
WBC # BLD AUTO: 6.47 THOUSAND/UL (ref 4.31–10.16)

## 2022-04-07 PROCEDURE — 82948 REAGENT STRIP/BLOOD GLUCOSE: CPT

## 2022-04-07 PROCEDURE — 80048 BASIC METABOLIC PNL TOTAL CA: CPT | Performed by: PHYSICIAN ASSISTANT

## 2022-04-07 PROCEDURE — 99225 PR SBSQ OBSERVATION CARE/DAY 25 MINUTES: CPT | Performed by: INTERNAL MEDICINE

## 2022-04-07 PROCEDURE — 99225 PR SBSQ OBSERVATION CARE/DAY 25 MINUTES: CPT | Performed by: STUDENT IN AN ORGANIZED HEALTH CARE EDUCATION/TRAINING PROGRAM

## 2022-04-07 PROCEDURE — 85027 COMPLETE CBC AUTOMATED: CPT | Performed by: PHYSICIAN ASSISTANT

## 2022-04-07 RX ORDER — OXYCODONE HYDROCHLORIDE 5 MG/1
5 TABLET ORAL EVERY 6 HOURS PRN
Status: DISCONTINUED | OUTPATIENT
Start: 2022-04-07 | End: 2022-04-08 | Stop reason: HOSPADM

## 2022-04-07 RX ORDER — POTASSIUM CHLORIDE 20 MEQ/1
40 TABLET, EXTENDED RELEASE ORAL ONCE
Status: COMPLETED | OUTPATIENT
Start: 2022-04-07 | End: 2022-04-07

## 2022-04-07 RX ORDER — OXYCODONE HYDROCHLORIDE 5 MG/1
5 TABLET ORAL ONCE
Status: COMPLETED | OUTPATIENT
Start: 2022-04-07 | End: 2022-04-07

## 2022-04-07 RX ADMIN — SODIUM CHLORIDE, SODIUM LACTATE, POTASSIUM CHLORIDE, AND CALCIUM CHLORIDE 125 ML/HR: .6; .31; .03; .02 INJECTION, SOLUTION INTRAVENOUS at 01:31

## 2022-04-07 RX ADMIN — HEPARIN SODIUM 5000 UNITS: 5000 INJECTION INTRAVENOUS; SUBCUTANEOUS at 05:44

## 2022-04-07 RX ADMIN — ACETAMINOPHEN 650 MG: 325 TABLET ORAL at 06:14

## 2022-04-07 RX ADMIN — OXYCODONE HYDROCHLORIDE 5 MG: 5 TABLET ORAL at 10:07

## 2022-04-07 RX ADMIN — Medication 3 MG: at 22:36

## 2022-04-07 RX ADMIN — OXYCODONE HYDROCHLORIDE 5 MG: 5 TABLET ORAL at 22:36

## 2022-04-07 RX ADMIN — POTASSIUM CHLORIDE 40 MEQ: 1500 TABLET, EXTENDED RELEASE ORAL at 10:07

## 2022-04-07 RX ADMIN — SODIUM CHLORIDE, SODIUM LACTATE, POTASSIUM CHLORIDE, AND CALCIUM CHLORIDE 125 ML/HR: .6; .31; .03; .02 INJECTION, SOLUTION INTRAVENOUS at 18:09

## 2022-04-07 RX ADMIN — ACETAMINOPHEN 650 MG: 325 TABLET ORAL at 16:13

## 2022-04-07 RX ADMIN — OXYCODONE HYDROCHLORIDE 5 MG: 5 TABLET ORAL at 06:14

## 2022-04-07 RX ADMIN — INSULIN LISPRO 1 UNITS: 100 INJECTION, SOLUTION INTRAVENOUS; SUBCUTANEOUS at 11:45

## 2022-04-07 RX ADMIN — SODIUM CHLORIDE, SODIUM LACTATE, POTASSIUM CHLORIDE, AND CALCIUM CHLORIDE 125 ML/HR: .6; .31; .03; .02 INJECTION, SOLUTION INTRAVENOUS at 10:12

## 2022-04-07 RX ADMIN — OXYCODONE HYDROCHLORIDE 5 MG: 5 TABLET ORAL at 16:12

## 2022-04-07 NOTE — PLAN OF CARE
Problem: Potential for Falls  Goal: Patient will remain free of falls  Description: INTERVENTIONS:  - Educate patient/family on patient safety including physical limitations  - Instruct patient to call for assistance with activity   - Consult OT/PT to assist with strengthening/mobility   - Keep Call bell within reach  - Keep bed low and locked with side rails adjusted as appropriate  - Keep care items and personal belongings within reach  - Initiate and maintain comfort rounds  - Make Fall Risk Sign visible to staff  - Offer Toileting every 2 Hours, in advance of need  - Initiate/Maintain alarm  - Obtain necessary fall risk management equipment  - Apply yellow socks and bracelet for high fall risk patients  - Consider moving patient to room near nurses station  Outcome: Progressing     Problem: PAIN - ADULT  Goal: Verbalizes/displays adequate comfort level or baseline comfort level  Description: Interventions:  - Encourage patient to monitor pain and request assistance  - Assess pain using appropriate pain scale  - Administer analgesics based on type and severity of pain and evaluate response  - Implement non-pharmacological measures as appropriate and evaluate response  - Consider cultural and social influences on pain and pain management  - Notify physician/advanced practitioner if interventions unsuccessful or patient reports new pain  Outcome: Progressing     Problem: INFECTION - ADULT  Goal: Absence or prevention of progression during hospitalization  Description: INTERVENTIONS:  - Assess and monitor for signs and symptoms of infection  - Monitor lab/diagnostic results  - Monitor all insertion sites, i e  indwelling lines, tubes, and drains  - Monitor endotracheal if appropriate and nasal secretions for changes in amount and color  - [place] appropriate cooling/warming therapies per order  - Administer medications as ordered  - Instruct and encourage patient and family to use good hand hygiene technique  - Identify and instruct in appropriate isolation precautions for identified infection/condition  Outcome: Progressing  Goal: Absence of fever/infection during neutropenic period  Description: INTERVENTIONS:  - Monitor WBC  Outcome: Progressing     Problem: SAFETY ADULT  Goal: Patient will remain free of falls  Description: INTERVENTIONS:  - Educate patient/family on patient safety including physical limitations  - Instruct patient to call for assistance with activity   - Consult OT/PT to assist with strengthening/mobility   - Keep Call bell within reach  - Keep bed low and locked with side rails adjusted as appropriate  - Keep care items and personal belongings within reach  - Initiate and maintain comfort rounds  - Make Fall Risk Sign visible to staff  - Offer Toileting every 2 Hours, in advance of need  - Initiate/Maintain alarm  - Obtain necessary fall risk management equipment  - Apply yellow socks and bracelet for high fall risk patients  - Consider moving patient to room near nurses station  Outcome: Progressing  Goal: Maintain or return to baseline ADL function  Description: INTERVENTIONS:  -  Assess patient's ability to carry out ADLs; assess patient's baseline for ADL function and identify physical deficits which impact ability to perform ADLs (bathing, care of mouth/teeth, toileting, grooming, dressing, etc )  - Assess/evaluate cause of self-care deficits   - Assess range of motion  - Assess patient's mobility; develop plan if impaired  - Assess patient's need for assistive devices and provide as appropriate  - Encourage maximum independence but intervene and supervise when necessary  - Involve family in performance of ADLs  - Assess for home care needs following discharge   - Consider OT consult to assist with ADL evaluation and planning for discharge  - Provide patient education as appropriate  Outcome: Progressing  Goal: Maintains/Returns to pre admission functional level  Description: INTERVENTIONS:  - Perform BMAT or MOVE assessment daily    - Set and communicate daily mobility goal to care team and patient/family/caregiver  - Collaborate with rehabilitation services on mobility goals if consulted  - Perform Range of Motion 2 times a day  - Reposition patient every 2 hours  - Dangle patient   - Stand patient   - Ambulate patient   - Out of bed to chair   - Out of bed for meals   - Out of bed for toileting  - Record patient progress and toleration of activity level   Outcome: Progressing     Problem: DISCHARGE PLANNING  Goal: Discharge to home or other facility with appropriate resources  Description: INTERVENTIONS:  - Identify barriers to discharge w/patient and caregiver  - Arrange for needed discharge resources and transportation as appropriate  - Identify discharge learning needs (meds, wound care, etc )  - Arrange for interpretive services to assist at discharge as needed  - Refer to Case Management Department for coordinating discharge planning if the patient needs post-hospital services based on physician/advanced practitioner order or complex needs related to functional status, cognitive ability, or social support system  Outcome: Progressing     Problem: Knowledge Deficit  Goal: Patient/family/caregiver demonstrates understanding of disease process, treatment plan, medications, and discharge instructions  Description: Complete learning assessment and assess knowledge base    Interventions:  - Provide teaching at level of understanding  - Provide teaching via preferred learning methods  Outcome: Progressing

## 2022-04-07 NOTE — NURSING NOTE
Patient is still in severe abdominal pain, rating it a 9/10  Now stating the previous pain medication given made her angry and she cannot sleep now  On-call SLIM made aware  One time dose of 5 mg oxycodone and PRN melatonin ordered and given  Will continue to monitor

## 2022-04-07 NOTE — CASE MANAGEMENT
Case Management Assessment & Discharge Planning Note    Patient name Darryn Bentley  Location [address] [phone] MRN [id number]  : 1965 Date 2022       Current Admission Date: 2022  Current Admission Diagnosis:Abdominal pain   Patient Active Problem List    Diagnosis Date Noted    Abdominal pain 2022    Hypertension 2022    Vaginal candidiasis 01/15/2021    Type 2 diabetes mellitus ([facility] [address] ) 2021    Acute hypoxemic respiratory failure due to COVID-19 [facility]) 2021    Pneumonia due to COVID-19 virus 01/10/2021    Hypokalemia 01/10/2021    Elevated glucose 01/10/2021      LOS (days): 0  Geometric Mean LOS (GMLOS) (days):   Days to GMLOS:     OBJECTIVE:              Current admission status: Observation       Preferred Pharmacy:   [facility] [address] [address]  Phone: [phone] Fax: [phone]    Primary Care Provider: Dejon Montalvo MD    Primary Insurance: [employer]  Secondary Insurance:     ASSESSMENT:  [address] [facility],  [facility]   Primary Phone: [phone] (Mobile)               Advance Directives  Does patient have a [address] [place] [address] Avenue?: No  Was patient offered paperwork?: Yes (not interested)  Does patient currently have a Health Care decision maker?: Yes, please see Health Care Proxy section  Does patient have Advance Directives?: No  Was patient offered paperwork?: Yes         Readmission Root Cause  30 Day Readmission: No    Patient Information  Admitted from[de-identified] Home  Mental Status: Alert  During Assessment patient was accompanied by: Not accompanied during assessment  Assessment information provided by[de-identified] Patient  Primary Caregiver: Self  Support Systems: [address] [place] [address] of Residence: [place] [address] do you live in?: [address] [place] entry access options   Select all that apply : No steps to enter home  Type of Current Residence: [place]  In the last 12 months, was there a time when you were not able to pay the mortgage or rent on time?: No  In the last 12 months, how many places have you lived?: 1  In the last 12 months, was there a time when you did not have a steady place to sleep or slept in a shelter (including now)?: No  Homeless/housing insecurity resource given?: N/A  Living Arrangements: Lives w/ Son  Is patient a ?: Yes  Is patient active with [facility])?: Yes  Is patient service connected?: No    Activities of Daily Living Prior to Admission  Functional Status: Independent  Completes ADLs independently?: Yes  Ambulates independently?: Yes  Does patient use assisted devices?: No  Does patient currently own DME?: Yes  What DME does the patient currently own?: Home Oxygen concentrator,Portable Oxygen tanks (from [place])  Does patient have a history of Outpatient Therapy (PT/OT)?: Yes  Does the patient have a history of Short-Term Rehab?: No  Does patient have a history of HHC?: No  Does patient currently have [facility]?: No         Patient Information Continued  Income Source: Unemployed  Does patient have prescription coverage?: Yes  Within the past 12 months, you worried that your food would run out before you got the money to buy more : Never true  Within the past 12 months, the food you bought just didnt last and you didnt have money to get more : Never true  Food insecurity resource given?: N/A  Does patient receive dialysis treatments?: No  Does patient have a history of substance abuse?: No  Does patient have a history of Mental Health Diagnosis?: Yes  Is patient receiving treatment for mental health?: Yes (anxiety)  Has patient received inpatient treatment related to mental health in the last 2 years?: No         Means of Transportation  Means of Transport to Appts[de-identified] Drives Self  In the past 12 months, has lack of transportation kept you from medical appointments or from getting medications?: No  In the past 12 months, has lack of transportation kept you from meetings, work, or from getting things needed for daily living?: No  Was application for public transport provided?: N/A        DISCHARGE DETAILS:    Discharge planning discussed with[de-identified] patient  Freedom of Choice: Yes  Comments - Freedom of Choice: pt will communicate with her family herself  CM contacted family/caregiver?: No- see comments  Were Treatment Team discharge recommendations reviewed with patient/caregiver?: Yes  Did patient/caregiver verbalize understanding of patient care needs?: Yes  Were patient/caregiver advised of the risks associated with not following Treatment Team discharge recommendations?: Yes         [address]         Is the patient interested in [facility] at discharge?: No    DME Referral Provided  Referral made for DME?: No              Treatment Team Recommendation: Home  Discharge Destination Plan[de-identified] Home

## 2022-04-07 NOTE — TELEPHONE ENCOUNTER
Dr Garret Russell    [phone]      Patient is currently;y admitted to the hospital  She states that she is still having pain in both of her legs  She is requesting to speak to you directly

## 2022-04-07 NOTE — NURSING NOTE
Patient complaining of severe abdominal pain, rating it a 9/10  On-call SLIM made aware  One time dose of 5 mg oxycodone ordered and given  Will continue to monitor

## 2022-04-07 NOTE — PLAN OF CARE
Problem: PAIN - ADULT  Goal: Verbalizes/displays adequate comfort level or baseline comfort level  Description: Interventions:  - Encourage patient to monitor pain and request assistance  - Assess pain using appropriate pain scale  - Administer analgesics based on type and severity of pain and evaluate response  - Implement non-pharmacological measures as appropriate and evaluate response  - Consider cultural and social influences on pain and pain management  - Notify physician/advanced practitioner if interventions unsuccessful or patient reports new pain  Outcome: Progressing     Problem: INFECTION - ADULT  Goal: Absence or prevention of progression during hospitalization  Description: INTERVENTIONS:  - Assess and monitor for signs and symptoms of infection  - Monitor lab/diagnostic results  - Monitor all insertion sites, i e  indwelling lines, tubes, and drains  - Monitor endotracheal if appropriate and nasal secretions for changes in amount and color  - [place] appropriate cooling/warming therapies per order  - Administer medications as ordered  - Instruct and encourage patient and family to use good hand hygiene technique  - Identify and instruct in appropriate isolation precautions for identified infection/condition  Outcome: Progressing  Goal: Absence of fever/infection during neutropenic period  Description: INTERVENTIONS:  - Monitor WBC    Outcome: Progressing

## 2022-04-07 NOTE — NURSING NOTE
Patient is complaining of severe abdominal in her epigastric/upper abdominal region  Rating it a 8/10  PRN tylenol given with no relief  On-Call SLIM made aware  One time dose of 15 mg of IV Toradol ordered and given  Will continue to monitor

## 2022-04-07 NOTE — PROGRESS NOTES
Progress Note - General Surgery   Derek Lu 60 y o  female MRN: [id number]  Unit/Bed#: -01 Encounter: [id number]    Assessment:  Derek Lu is a 60 y o  female with abdominal pain and CT concerning for SBO vs enteritis, admitted for observation  Afebrile, vital signs stable  Labs within normal limits  Abdominal pain improved from yesterday  Denies nausea or vomiting  Admits to 2 liquid bowel movements last night and passing flatus this morning  Plan:   Advance to clear liquid diet this morning   Monitor abdominal exam   Continue IV fluids until adequate oral intake   P r n  pain medication and antiemetics   OOB/ambulate   IS   dvt ppx: heparin    Subjective/Objective    Subjective:  Patient states she is feeling better this morning and wants to eat  Rates abdominal pain 7/10 but says it is improved from yesterday  No nausea or vomiting  States she had 2 liquid bowel movements last night and passing flatus this morning  Objective:     Blood pressure 132/69, pulse 82, temperature 98 °F (36 7 °C), resp  rate 17, height 5' 6" (1 676 m), weight 118 kg (259 lb 4 2 oz), SpO2 93 %, not currently breastfeeding  ,Body mass index is 41 85 kg/m²  Intake/Output Summary (Last 24 hours) at 4/7/2022 0905  Last data filed at 4/7/2022 0813  Gross per 24 hour   Intake 980 ml   Output 500 ml   Net 480 ml       Invasive Devices  Report    Peripheral Intravenous Line            Peripheral IV 04/09/21 Right Antecubital 362 days    Peripheral IV 04/06/22 Right Antecubital <1 day                Physical Exam:   GEN: NAD  HEENT: NCAT, MMM  CV: RRR, no m/r/g  Lung: Normal effort, CTA B/L, no w/r/r  Ab: Soft, mildly distended  Mildly TTP in LUQ and epigastric regions  Extrem: No CCE   Neuro: A+Ox3     Lab, Imaging and other studies:I have personally reviewed pertinent lab results       VTE Pharmacologic Prophylaxis: Heparin  VTE Mechanical Prophylaxis: sequential compression device    Recent Results (from the past 36 hour(s))   ECG 12 lead    Collection Time: 04/06/22 12:28 PM   Result Value Ref Range    Ventricular Rate 88 BPM    Atrial Rate 88 BPM    MT Interval 160 ms    QRSD Interval 88 ms    QT Interval 350 ms    QTC Interval 423 ms    P Axis 70 degrees    QRS Axis 72 degrees    T Wave Axis 65 degrees   CBC and differential    Collection Time: 04/06/22 12:33 PM   Result Value Ref Range    WBC 10 02 4 31 - 10 16 Thousand/uL    RBC 4 82 3 81 - 5 12 Million/uL    Hemoglobin 15 3 11 5 - 15 4 g/dL    Hematocrit 45 5 34 8 - 46 1 %    MCV 94 82 - 98 fL    MCH 31 7 26 8 - 34 3 pg    MCHC 33 6 31 4 - 37 4 g/dL    RDW 13 9 11 6 - 15 1 %    MPV 9 6 8 9 - 12 7 fL    Platelets 422 780 - 177 Thousands/uL    nRBC 0 /100 WBCs    Neutrophils Relative 82 (H) 43 - 75 %    Immat GRANS % 1 0 - 2 %    Lymphocytes Relative 12 (L) 14 - 44 %    Monocytes Relative 5 4 - 12 %    Eosinophils Relative 0 0 - 6 %    Basophils Relative 0 0 - 1 %    Neutrophils Absolute 8 20 (H) 1 85 - 7 62 Thousands/µL    Immature Grans Absolute 0 05 0 00 - 0 20 Thousand/uL    Lymphocytes Absolute 1 22 0 60 - 4 47 Thousands/µL    Monocytes Absolute 0 50 0 17 - 1 22 Thousand/µL    Eosinophils Absolute 0 03 0 00 - 0 61 Thousand/µL    Basophils Absolute 0 02 0 00 - 0 10 Thousands/µL   Basic metabolic panel    Collection Time: 04/06/22 12:33 PM   Result Value Ref Range    Sodium 139 136 - 145 mmol/L    Potassium 3 6 3 5 - 5 3 mmol/L    Chloride 101 100 - 108 mmol/L    CO2 26 21 - 32 mmol/L    ANION GAP 12 4 - 13 mmol/L    BUN 17 5 - 25 mg/dL    Creatinine 0 77 0 60 - 1 30 mg/dL    Glucose 206 (H) 65 - 140 mg/dL    Calcium 9 8 8 3 - 10 1 mg/dL    eGFR 85 ml/min/1 73sq m   Hepatic function panel    Collection Time: 04/06/22 12:33 PM   Result Value Ref Range    Total Bilirubin 0 54 0 20 - 1 00 mg/dL    Bilirubin, Direct 0 15 0 00 - 0 20 mg/dL    Alkaline Phosphatase 118 (H) 46 - 116 U/L    AST 26 5 - 45 U/L    ALT 34 12 - 78 U/L    Total Protein 8 3 (H) 6 4 - 8 2 g/dL    Albumin 3 8 3 5 - 5 0 g/dL   Lipase    Collection Time: 04/06/22 12:33 PM   Result Value Ref Range    Lipase 59 (L) 73 - 393 u/L   HS Troponin 0hr (reflex protocol)    Collection Time: 04/06/22 12:33 PM   Result Value Ref Range    hs TnI 0hr 4 "Refer to ACS Flowchart"- see link ng/L   Lactic acid    Collection Time: 04/06/22 12:39 PM   Result Value Ref Range    LACTIC ACID 1 9 0 5 - 2 0 mmol/L   Protime-INR    Collection Time: 04/06/22 12:39 PM   Result Value Ref Range    Protime 12 8 11 6 - 14 5 seconds    INR 1 00 0 84 - 1 19   APTT    Collection Time: 04/06/22 12:39 PM   Result Value Ref Range    PTT 28 23 - 37 seconds   HS Troponin I 2hr    Collection Time: 04/06/22  2:55 PM   Result Value Ref Range    hs TnI 2hr 5 "Refer to ACS Flowchart"- see link ng/L    Delta 2hr hsTnI 1 <20 ng/L   HS Troponin I 4hr    Collection Time: 04/06/22  4:27 PM   Result Value Ref Range    hs TnI 4hr 4 "Refer to ACS Flowchart"- see link ng/L    Delta 4hr hsTnI 0 <20 ng/L   Hemoglobin A1C    Collection Time: 04/06/22  4:27 PM   Result Value Ref Range    Hemoglobin A1C 7 2 (H) Normal 3 8-5 6%; PreDiabetic 5 7-6 4%;  Diabetic >=6 5%; Glycemic control for adults with diabetes <7 0% %     mg/dl   Platelet count    Collection Time: 04/06/22  4:27 PM   Result Value Ref Range    Platelets 989 022 - 536 Thousands/uL    MPV 9 7 8 9 - 12 7 fL   Fingerstick Glucose (POCT)    Collection Time: 04/06/22  6:12 PM   Result Value Ref Range    POC Glucose 142 (H) 65 - 140 mg/dl   Fingerstick Glucose (POCT)    Collection Time: 04/06/22 11:37 PM   Result Value Ref Range    POC Glucose 131 65 - 140 mg/dl   CBC (With Platelets)    Collection Time: 04/07/22  5:47 AM   Result Value Ref Range    WBC 6 47 4 31 - 10 16 Thousand/uL    RBC 3 82 3 81 - 5 12 Million/uL    Hemoglobin 12 1 11 5 - 15 4 g/dL    Hematocrit 36 6 34 8 - 46 1 %    MCV 96 82 - 98 fL    MCH 31 7 26 8 - 34 3 pg    MCHC 33 1 31 4 - 37 4 g/dL    RDW 14 0 11 6 - 15 1 % Platelets 220 049 - 960 Thousands/uL    MPV 9 4 8 9 - 12 7 fL   Basic metabolic panel    Collection Time: 04/07/22  5:47 AM   Result Value Ref Range    Sodium 140 136 - 145 mmol/L    Potassium 3 1 (L) 3 5 - 5 3 mmol/L    Chloride 106 100 - 108 mmol/L    CO2 26 21 - 32 mmol/L    ANION GAP 8 4 - 13 mmol/L    BUN 16 5 - 25 mg/dL    Creatinine 0 70 0 60 - 1 30 mg/dL    Glucose 143 (H) 65 - 140 mg/dL    Glucose, Fasting 143 (H) 65 - 99 mg/dL    Calcium 8 1 (L) 8 3 - 10 1 mg/dL    eGFR 95 ml/min/1 73sq m   Fingerstick Glucose (POCT)    Collection Time: 04/07/22  5:47 AM   Result Value Ref Range    POC Glucose 132 65 - 140 mg/dl

## 2022-04-07 NOTE — PROGRESS NOTES
[address] [place] [address]  Progress Note - Rachael Nolen 1965, 60 y o  female MRN: [id number]  Unit/Bed#: -01 Encounter: [id number]  Primary Care Provider: Cristina Tafoya MD   Date and time admitted to hospital: 2022 12:25 PM    * Abdominal pain  Assessment & Plan  Presented with abdominal pain with  concern for small-bowel obstruction     Management per primary team surgery  IV fluids  Currently NPO  Follow-up plan per primary team    Hypertension  Assessment & Plan  Hold home BP meds given possible small-bowel obstruction  Labetalol p r n  For now    Type 2 diabetes mellitus [facility])  Assessment & Plan  Lab Results   Component Value Date    HGBA1C 11 0 (H) 01/10/2021       No results for input(s): POCGLU in the last 72 hours  Blood Sugar Average: Last 72 hrs:   history of poorly-controlled type 2 diabetes  Reports not on insulin at home  Given a1c of 11 would likely benefit with lantus qhs  Will provid SSI q6h for now given npo  Will repeat a1c  Hold home metformin      VTE Pharmacologic Prophylaxis:   Pharmacologic: Heparin  Mechanical VTE Prophylaxis in Place: Yes    Patient Centered Rounds: I have performed bedside rounds with nursing staff today  Discussions with Specialists or Other Care Team Provider: cm, nursing    Education and Discussions with Family / Patient: pt    Time Spent for Care: 30 minutes  More than 50% of total time spent on counseling and coordination of care as described above      Current Length of Stay: 0 day(s)    Current Patient Status: Observation   Certification Statement: The patient will continue to require additional inpatient hospital stay due to see above    Discharge Plan: per primary team    Code Status: Level 1 - Full Code      Subjective:   No acute complaints    Objective:     Vitals:   Temp (24hrs), Av 3 °F (36 8 °C), Min:98 °F (36 7 °C), Max:98 6 °F (37 °C)    Temp:  [98 °F (36 7 °C)-98 6 °F (37 °C)] 98 °F (36 7 °C)  HR:  [82-99] 82  Resp:  [17-22] 17  BP: (127-143)/(56-84) 132/69  SpO2:  [93 %-99 %] 93 %  Body mass index is 41 85 kg/m²  Input and Output Summary (last 24 hours): Intake/Output Summary (Last 24 hours) at 4/7/2022 0806  Last data filed at 4/7/2022 0616  Gross per 24 hour   Intake 980 ml   Output 500 ml   Net 480 ml       Physical Exam:     Physical Exam  Constitutional:       General: She is not in acute distress  Appearance: She is well-developed  She is not diaphoretic  HENT:      Head: Normocephalic and atraumatic  Nose: Nose normal       Mouth/Throat:      Pharynx: No oropharyngeal exudate  Eyes:      General: No scleral icterus  Right eye: No discharge  Left eye: No discharge  Conjunctiva/sclera: Conjunctivae normal    Neck:      Thyroid: No thyromegaly  Vascular: No JVD  Cardiovascular:      Rate and Rhythm: Normal rate and regular rhythm  Heart sounds: Normal heart sounds  No murmur heard  No friction rub  No gallop  Pulmonary:      Effort: Pulmonary effort is normal  No respiratory distress  Breath sounds: Normal breath sounds  No wheezing or rales  Chest:      Chest wall: No tenderness  Abdominal:      General: Bowel sounds are normal  There is no distension  Palpations: Abdomen is soft  Tenderness: There is no abdominal tenderness  There is no guarding or rebound  Musculoskeletal:         General: No tenderness or deformity  Normal range of motion  Cervical back: Normal range of motion and neck supple  Skin:     General: Skin is warm and dry  Findings: No erythema or rash  Neurological:      Mental Status: She is alert  Mental status is at baseline  Cranial Nerves: No cranial nerve deficit  Sensory: No sensory deficit  Motor: No abnormal muscle tone        Coordination: Coordination normal            Additional Data:     Labs:    Results from last 7 days   Lab Units 04/07/22  0547 04/06/22  1627 04/06/22  1233 WBC Thousand/uL 6 47   < > 10 02   HEMOGLOBIN g/dL 12 1   < > 15 3   HEMATOCRIT % 36 6   < > 45 5   PLATELETS Thousands/uL 216   < > 287   NEUTROS PCT %  --   --  82*   LYMPHS PCT %  --   --  12*   MONOS PCT %  --   --  5   EOS PCT %  --   --  0    < > = values in this interval not displayed  Results from last 7 days   Lab Units 04/07/22  0547 04/06/22  1233 04/06/22  1233   SODIUM mmol/L 140   < > 139   POTASSIUM mmol/L 3 1*   < > 3 6   CHLORIDE mmol/L 106   < > 101   CO2 mmol/L 26   < > 26   BUN mg/dL 16   < > 17   CREATININE mg/dL 0 70   < > 0 77   ANION GAP mmol/L 8   < > 12   CALCIUM mg/dL 8 1*   < > 9 8   ALBUMIN g/dL  --   --  3 8   TOTAL BILIRUBIN mg/dL  --   --  0 54   ALK PHOS U/L  --   --  118*   ALT U/L  --   --  34   AST U/L  --   --  26   GLUCOSE RANDOM mg/dL 143*   < > 206*    < > = values in this interval not displayed  Results from last 7 days   Lab Units 04/06/22  1239   INR  1 00     Results from last 7 days   Lab Units 04/07/22  0547 04/06/22  2337 04/06/22  1812   POC GLUCOSE mg/dl 132 131 142*     Results from last 7 days   Lab Units 04/06/22  1627   HEMOGLOBIN A1C % 7 2*     Results from last 7 days   Lab Units 04/06/22  1239   LACTIC ACID mmol/L 1 9           * I Have Reviewed All Lab Data Listed Above  * Additional Pertinent Lab Tests Reviewed:  All Labs Within Last 24 Hours Reviewed    Imaging:    Imaging Reports Reviewed Today Include: na  Imaging Personally Reviewed by Myself Includes:  na    Recent Cultures (last 7 days):           Last 24 Hours Medication List:   Current Facility-Administered Medications   Medication Dose Route Frequency Provider Last Rate    acetaminophen  650 mg Oral Q4H PRN Humera Mathews PA-C      heparin (porcine)  5,000 Units Subcutaneous Q8H [address] Norma Mathews PA-C      insulin lispro  1-6 Units Subcutaneous Q6H [address] Kodi Michelle MD      Labetalol HCl  10 mg Intravenous Q4H PRN Kodi Michelle MD      lactated ringers  125 mL/hr Intravenous Continuous Irene Bonnie Mathews PA-C 125 mL/hr (04/07/22 0131)    melatonin  3 mg Oral HS PRN Boris Smith PA-C      ondansetron  4 mg Intravenous Q6H PRN Vaishnavi Barraza PA-C          Today, Patient Was Seen By: Marina Callahan MD    ** Please Note: Dictation voice to text software may have been used in the creation of this document   **

## 2022-04-07 NOTE — TELEPHONE ENCOUNTER
Left detailed msg with patient to speak to her attending Dr to put Ortho Consult in for B/L leg pain

## 2022-04-08 ENCOUNTER — APPOINTMENT (OUTPATIENT)
Dept: ULTRASOUND IMAGING | Facility: CLINIC | Age: 57
End: 2022-04-08
Payer: COMMERCIAL

## 2022-04-08 VITALS
HEART RATE: 72 BPM | TEMPERATURE: 97.7 F | OXYGEN SATURATION: 95 % | SYSTOLIC BLOOD PRESSURE: 140 MMHG | HEIGHT: 66 IN | WEIGHT: 259.26 LBS | DIASTOLIC BLOOD PRESSURE: 80 MMHG | RESPIRATION RATE: 17 BRPM | BODY MASS INDEX: 41.67 KG/M2

## 2022-04-08 LAB
ANION GAP SERPL CALCULATED.3IONS-SCNC: 7 MMOL/L (ref 4–13)
BASOPHILS # BLD AUTO: 0.02 THOUSANDS/ΜL (ref 0–0.1)
BASOPHILS NFR BLD AUTO: 0 % (ref 0–1)
BUN SERPL-MCNC: 7 MG/DL (ref 5–25)
CALCIUM SERPL-MCNC: 8.1 MG/DL (ref 8.3–10.1)
CHLORIDE SERPL-SCNC: 107 MMOL/L (ref 100–108)
CO2 SERPL-SCNC: 27 MMOL/L (ref 21–32)
CREAT SERPL-MCNC: 0.63 MG/DL (ref 0.6–1.3)
EOSINOPHIL # BLD AUTO: 0.15 THOUSAND/ΜL (ref 0–0.61)
EOSINOPHIL NFR BLD AUTO: 3 % (ref 0–6)
ERYTHROCYTE [DISTWIDTH] IN BLOOD BY AUTOMATED COUNT: 13.7 % (ref 11.6–15.1)
GFR SERPL CREATININE-BSD FRML MDRD: 99 ML/MIN/1.73SQ M
GLUCOSE P FAST SERPL-MCNC: 129 MG/DL (ref 65–99)
GLUCOSE SERPL-MCNC: 129 MG/DL (ref 65–140)
GLUCOSE SERPL-MCNC: 133 MG/DL (ref 65–140)
GLUCOSE SERPL-MCNC: 134 MG/DL (ref 65–140)
GLUCOSE SERPL-MCNC: 135 MG/DL (ref 65–140)
HCT VFR BLD AUTO: 35.7 % (ref 34.8–46.1)
HGB BLD-MCNC: 11.7 G/DL (ref 11.5–15.4)
IMM GRANULOCYTES # BLD AUTO: 0.05 THOUSAND/UL (ref 0–0.2)
IMM GRANULOCYTES NFR BLD AUTO: 1 % (ref 0–2)
LYMPHOCYTES # BLD AUTO: 2.03 THOUSANDS/ΜL (ref 0.6–4.47)
LYMPHOCYTES NFR BLD AUTO: 39 % (ref 14–44)
MCH RBC QN AUTO: 31.6 PG (ref 26.8–34.3)
MCHC RBC AUTO-ENTMCNC: 32.8 G/DL (ref 31.4–37.4)
MCV RBC AUTO: 97 FL (ref 82–98)
MONOCYTES # BLD AUTO: 0.39 THOUSAND/ΜL (ref 0.17–1.22)
MONOCYTES NFR BLD AUTO: 7 % (ref 4–12)
NEUTROPHILS # BLD AUTO: 2.6 THOUSANDS/ΜL (ref 1.85–7.62)
NEUTS SEG NFR BLD AUTO: 50 % (ref 43–75)
NRBC BLD AUTO-RTO: 0 /100 WBCS
PLATELET # BLD AUTO: 225 THOUSANDS/UL (ref 149–390)
PMV BLD AUTO: 9.9 FL (ref 8.9–12.7)
POTASSIUM SERPL-SCNC: 3.6 MMOL/L (ref 3.5–5.3)
RBC # BLD AUTO: 3.7 MILLION/UL (ref 3.81–5.12)
SODIUM SERPL-SCNC: 141 MMOL/L (ref 136–145)
WBC # BLD AUTO: 5.24 THOUSAND/UL (ref 4.31–10.16)

## 2022-04-08 PROCEDURE — NC001 PR NO CHARGE: Performed by: PHYSICIAN ASSISTANT

## 2022-04-08 PROCEDURE — 80048 BASIC METABOLIC PNL TOTAL CA: CPT

## 2022-04-08 PROCEDURE — 85025 COMPLETE CBC W/AUTO DIFF WBC: CPT

## 2022-04-08 PROCEDURE — 82948 REAGENT STRIP/BLOOD GLUCOSE: CPT

## 2022-04-08 PROCEDURE — 99217 PR OBSERVATION CARE DISCHARGE MANAGEMENT: CPT | Performed by: PHYSICIAN ASSISTANT

## 2022-04-08 RX ADMIN — SODIUM CHLORIDE, SODIUM LACTATE, POTASSIUM CHLORIDE, AND CALCIUM CHLORIDE 125 ML/HR: .6; .31; .03; .02 INJECTION, SOLUTION INTRAVENOUS at 02:22

## 2022-04-08 RX ADMIN — OXYCODONE HYDROCHLORIDE 5 MG: 5 TABLET ORAL at 07:20

## 2022-04-08 RX ADMIN — ACETAMINOPHEN 650 MG: 325 TABLET ORAL at 02:22

## 2022-04-08 NOTE — CASE MANAGEMENT
Case Management Progress Note    Patient name Pankaj Perez  Location /-13 MRN [id number]  : 1965 Date 2022       LOS (days): 0  Geometric Mean LOS (GMLOS) (days):   Days to GMLOS:        OBJECTIVE:        Current admission status: Observation  Preferred Pharmacy:   [facility] [facility] [address]  Phone: [phone] Fax: [phone]    Primary Care Provider: Aletha Yi MD    Primary Insurance: [employer]  Secondary Insurance:     PROGRESS NOTE:    Per rounding with SLIM, surgery is primary, and they are advancing diet as tolerated  No CM needs identified at this time  CM department will continue to follow patient through discharge

## 2022-04-08 NOTE — PLAN OF CARE
Problem: Potential for Falls  Goal: Patient will remain free of falls  Description: INTERVENTIONS:  - Educate patient/family on patient safety including physical limitations  - Instruct patient to call for assistance with activity   - Consult OT/PT to assist with strengthening/mobility   - Keep Call bell within reach  - Keep bed low and locked with side rails adjusted as appropriate  - Keep care items and personal belongings within reach  - Initiate and maintain comfort rounds  - Make Fall Risk Sign visible to staff  - Apply yellow socks and bracelet for high fall risk patients  - Consider moving patient to room near nurses station  Outcome: Progressing     Problem: PAIN - ADULT  Goal: Verbalizes/displays adequate comfort level or baseline comfort level  Description: Interventions:  - Encourage patient to monitor pain and request assistance  - Assess pain using appropriate pain scale  - Administer analgesics based on type and severity of pain and evaluate response  - Implement non-pharmacological measures as appropriate and evaluate response  - Consider cultural and social influences on pain and pain management  - Notify physician/advanced practitioner if interventions unsuccessful or patient reports new pain  Outcome: Progressing     Problem: INFECTION - ADULT  Goal: Absence or prevention of progression during hospitalization  Description: INTERVENTIONS:  - Assess and monitor for signs and symptoms of infection  - Monitor lab/diagnostic results  - Monitor all insertion sites, i e  indwelling lines, tubes, and drains  - Monitor endotracheal if appropriate and nasal secretions for changes in amount and color  - [place] appropriate cooling/warming therapies per order  - Administer medications as ordered  - Instruct and encourage patient and family to use good hand hygiene technique  - Identify and instruct in appropriate isolation precautions for identified infection/condition  Outcome: Progressing  Goal: Absence of fever/infection during neutropenic period  Description: INTERVENTIONS:  - Monitor WBC  Outcome: Progressing     Problem: SAFETY ADULT  Goal: Patient will remain free of falls  Description: INTERVENTIONS:  - Educate patient/family on patient safety including physical limitations  - Instruct patient to call for assistance with activity   - Consult OT/PT to assist with strengthening/mobility   - Keep Call bell within reach  - Keep bed low and locked with side rails adjusted as appropriate  - Keep care items and personal belongings within reach  - Initiate and maintain comfort rounds  - Make Fall Risk Sign visible to staff  - Apply yellow socks and bracelet for high fall risk patients  - Consider moving patient to room near nurses station  Outcome: Progressing  Goal: Maintain or return to baseline ADL function  Description: INTERVENTIONS:  -  Assess patient's ability to carry out ADLs; assess patient's baseline for ADL function and identify physical deficits which impact ability to perform ADLs (bathing, care of mouth/teeth, toileting, grooming, dressing, etc )  - Assess/evaluate cause of self-care deficits   - Assess range of motion  - Assess patient's mobility; develop plan if impaired  - Assess patient's need for assistive devices and provide as appropriate  - Encourage maximum independence but intervene and supervise when necessary  - Involve family in performance of ADLs  - Assess for home care needs following discharge   - Consider OT consult to assist with ADL evaluation and planning for discharge  - Provide patient education as appropriate  Outcome: Progressing  Goal: Maintains/Returns to pre admission functional level  Description: INTERVENTIONS:  - Perform BMAT or MOVE assessment daily    - Set and communicate daily mobility goal to care team and patient/family/caregiver     - Collaborate with rehabilitation services on mobility goals if consulted  - Record patient progress and toleration of activity level Outcome: Progressing     Problem: DISCHARGE PLANNING  Goal: Discharge to home or other facility with appropriate resources  Description: INTERVENTIONS:  - Identify barriers to discharge w/patient and caregiver  - Arrange for needed discharge resources and transportation as appropriate  - Identify discharge learning needs (meds, wound care, etc )  - Arrange for interpretive services to assist at discharge as needed  - Refer to Case Management Department for coordinating discharge planning if the patient needs post-hospital services based on physician/advanced practitioner order or complex needs related to functional status, cognitive ability, or social support system  Outcome: Progressing     Problem: Knowledge Deficit  Goal: Patient/family/caregiver demonstrates understanding of disease process, treatment plan, medications, and discharge instructions  Description: Complete learning assessment and assess knowledge base    Interventions:  - Provide teaching at level of understanding  - Provide teaching via preferred learning methods  Outcome: Progressing

## 2022-04-08 NOTE — PROGRESS NOTES
Progress Note - General Surgery   Pasha Lord 60 y o  female MRN: [id number]  Unit/Bed#: -Srinivas Encounter: [id number]    Assessment/Plan  Pasha Lord is a 60 y o  female     Small bowel obstruction vs enteritis  AVSS, WBC 5 24  +flatus/BM, tolerating clear liquids, no further abdominal pain    Advance diet as tolerated to surgical soft  If patient able to tolerate solid diet without any reoccurrence of abdominal pain, nausea, or vomiting, plan for discharge home   Heplock IV, patient able to tolerate adequate clears intake   Serial abdominal exams  Trend labs, monitor vitals  Encourage ambulation/OOB  Pain control/antiemetics prn  DVT prophylaxis     Subjective/Objective    Subjective: No acute events overnight, wants to eat solid food for breakfast this AM "before she drop kicks someone"    Objective:     Blood pressure 140/80, pulse 72, temperature 97 7 °F (36 5 °C), resp  rate 17, height 5' 6" (1 676 m), weight 118 kg (259 lb 4 2 oz), SpO2 95 %, not currently breastfeeding  ,Body mass index is 41 85 kg/m²        Intake/Output Summary (Last 24 hours) at 4/8/2022 1016  Last data filed at 4/8/2022 0551  Gross per 24 hour   Intake 2500 ml   Output 2400 ml   Net 100 ml       Invasive Devices  Report    Peripheral Intravenous Line            Peripheral IV 04/09/21 Right Antecubital 363 days    Peripheral IV 04/06/22 Right Antecubital 1 day                Physical Exam: /80   Pulse 72   Temp 97 7 °F (36 5 °C)   Resp 17   Ht 5' 6" (1 676 m)   Wt 118 kg (259 lb 4 2 oz)   SpO2 95%   BMI 41 85 kg/m²   General appearance: alert and oriented, in no acute distress  Lungs: clear to auscultation bilaterally  Heart: regular rate and rhythm, S1, S2 normal, no murmur, click, rub or gallop  Abdomen: soft, non-tender; bowel sounds normal; no masses,  no organomegaly  Extremities: extremities normal, warm and well-perfused; no cyanosis, clubbing, or edema    Lab, Imaging and other studies:I have personally reviewed pertinent lab results       VTE Pharmacologic Prophylaxis: Heparin  VTE Mechanical Prophylaxis: sequential compression device    Recent Results (from the past 36 hour(s))   Fingerstick Glucose (POCT)    Collection Time: 04/06/22 11:37 PM   Result Value Ref Range    POC Glucose 131 65 - 140 mg/dl   CBC (With Platelets)    Collection Time: 04/07/22  5:47 AM   Result Value Ref Range    WBC 6 47 4 31 - 10 16 Thousand/uL    RBC 3 82 3 81 - 5 12 Million/uL    Hemoglobin 12 1 11 5 - 15 4 g/dL    Hematocrit 36 6 34 8 - 46 1 %    MCV 96 82 - 98 fL    MCH 31 7 26 8 - 34 3 pg    MCHC 33 1 31 4 - 37 4 g/dL    RDW 14 0 11 6 - 15 1 %    Platelets 666 834 - 433 Thousands/uL    MPV 9 4 8 9 - 12 7 fL   Basic metabolic panel    Collection Time: 04/07/22  5:47 AM   Result Value Ref Range    Sodium 140 136 - 145 mmol/L    Potassium 3 1 (L) 3 5 - 5 3 mmol/L    Chloride 106 100 - 108 mmol/L    CO2 26 21 - 32 mmol/L    ANION GAP 8 4 - 13 mmol/L    BUN 16 5 - 25 mg/dL    Creatinine 0 70 0 60 - 1 30 mg/dL    Glucose 143 (H) 65 - 140 mg/dL    Glucose, Fasting 143 (H) 65 - 99 mg/dL    Calcium 8 1 (L) 8 3 - 10 1 mg/dL    eGFR 95 ml/min/1 73sq m   Fingerstick Glucose (POCT)    Collection Time: 04/07/22  5:47 AM   Result Value Ref Range    POC Glucose 132 65 - 140 mg/dl   Fingerstick Glucose (POCT)    Collection Time: 04/07/22 11:12 AM   Result Value Ref Range    POC Glucose 171 (H) 65 - 140 mg/dl   Fingerstick Glucose (POCT)    Collection Time: 04/07/22  3:58 PM   Result Value Ref Range    POC Glucose 133 65 - 140 mg/dl   Fingerstick Glucose (POCT)    Collection Time: 04/07/22  9:07 PM   Result Value Ref Range    POC Glucose 117 65 - 140 mg/dl   Fingerstick Glucose (POCT)    Collection Time: 04/07/22 11:29 PM   Result Value Ref Range    POC Glucose 118 65 - 140 mg/dl   CBC and differential    Collection Time: 04/08/22  4:39 AM   Result Value Ref Range    WBC 5 24 4 31 - 10 16 Thousand/uL    RBC 3 70 (L) 3 81 - 5 12 Million/uL Hemoglobin 11 7 11 5 - 15 4 g/dL    Hematocrit 35 7 34 8 - 46 1 %    MCV 97 82 - 98 fL    MCH 31 6 26 8 - 34 3 pg    MCHC 32 8 31 4 - 37 4 g/dL    RDW 13 7 11 6 - 15 1 %    MPV 9 9 8 9 - 12 7 fL    Platelets 365 746 - 533 Thousands/uL    nRBC 0 /100 WBCs    Neutrophils Relative 50 43 - 75 %    Immat GRANS % 1 0 - 2 %    Lymphocytes Relative 39 14 - 44 %    Monocytes Relative 7 4 - 12 %    Eosinophils Relative 3 0 - 6 %    Basophils Relative 0 0 - 1 %    Neutrophils Absolute 2 60 1 85 - 7 62 Thousands/µL    Immature Grans Absolute 0 05 0 00 - 0 20 Thousand/uL    Lymphocytes Absolute 2 03 0 60 - 4 47 Thousands/µL    Monocytes Absolute 0 39 0 17 - 1 22 Thousand/µL    Eosinophils Absolute 0 15 0 00 - 0 61 Thousand/µL    Basophils Absolute 0 02 0 00 - 0 10 Thousands/µL   Basic metabolic panel    Collection Time: 04/08/22  4:39 AM   Result Value Ref Range    Sodium 141 136 - 145 mmol/L    Potassium 3 6 3 5 - 5 3 mmol/L    Chloride 107 100 - 108 mmol/L    CO2 27 21 - 32 mmol/L    ANION GAP 7 4 - 13 mmol/L    BUN 7 5 - 25 mg/dL    Creatinine 0 63 0 60 - 1 30 mg/dL    Glucose 129 65 - 140 mg/dL    Glucose, Fasting 129 (H) 65 - 99 mg/dL    Calcium 8 1 (L) 8 3 - 10 1 mg/dL    eGFR 99 ml/min/1 73sq m   Fingerstick Glucose (POCT)    Collection Time: 04/08/22  5:20 AM   Result Value Ref Range    POC Glucose 134 65 - 140 mg/dl   Fingerstick Glucose (POCT)    Collection Time: 04/08/22  7:36 AM   Result Value Ref Range    POC Glucose 133 65 - 140 mg/dl

## 2022-04-08 NOTE — PLAN OF CARE
Problem: PAIN - ADULT  Goal: Verbalizes/displays adequate comfort level or baseline comfort level  Description: Interventions:  - Encourage patient to monitor pain and request assistance  - Assess pain using appropriate pain scale  - Administer analgesics based on type and severity of pain and evaluate response  - Implement non-pharmacological measures as appropriate and evaluate response  - Consider cultural and social influences on pain and pain management  - Notify physician/advanced practitioner if interventions unsuccessful or patient reports new pain  Outcome: Progressing     Problem: INFECTION - ADULT  Goal: Absence or prevention of progression during hospitalization  Description: INTERVENTIONS:  - Assess and monitor for signs and symptoms of infection  - Monitor lab/diagnostic results  - Monitor all insertion sites, i e  indwelling lines, tubes, and drains  - Monitor endotracheal if appropriate and nasal secretions for changes in amount and color  - [place] appropriate cooling/warming therapies per order  - Administer medications as ordered  - Instruct and encourage patient and family to use good hand hygiene technique  - Identify and instruct in appropriate isolation precautions for identified infection/condition  Outcome: Progressing  Goal: Absence of fever/infection during neutropenic period  Description: INTERVENTIONS:  - Monitor WBC    Outcome: Progressing

## 2022-04-08 NOTE — TELEPHONE ENCOUNTER
Called patient and she is currently being discharged from the hospital  Can we make her an appointment to follow up with Dr Alistair Livingston for bilateral knees ?

## 2022-04-12 NOTE — DISCHARGE SUMMARY
Discharge Date: Discharge Summary - Crystal Crowder 60 y o  female MRN: [id number]    Unit/Bed#: -01 Encounter: [id number]    Admission Date: 4/6/2022   Discharge Date: 4/8/22    Admitting Diagnosis:   Vomiting [R11 10]  Abdominal pain [R10 9]  Type 2 diabetes mellitus without complication, unspecified whether long term insulin use ([facility] ) [E11 9]    Principle/ Secondary Diagnosis:  Past Medical History:   Diagnosis Date    Asthma     Diabetes mellitus ([facility] )      Past Surgical History:   Procedure Laterality Date    HERNIA REPAIR      HYSTERECTOMY  05/2021       Discharge Diagnoses:   Principal Problem:    Abdominal pain  Active Problems:    Type 2 diabetes mellitus ([facility] )    Hypertension      Procedures Performed:  No orders of the defined types were placed in this encounter  Procedure name not found  Consultants:     Internal Medicine    HPI: As per Amanda Zhou PA-C: "Crystal Crowder is a 60y o  year old female with PMHx of Type 2 Diabetes Mellitus, HTN, and asthma who presents with midline abdominal pain, distension, nausea and vomiting that started overnight  She states she had a bowel movement last night that was very foul smelling  She ate to turkey sandwiches late last night when she got home, and awoke in the morning with cramping sharp abdominal pain and distension  She states she is passing a lot of gas and did have a bowel movement home  She notes that it was also foul smelling  Her pain did not improve along with the nausea vomiting prompting her to present to the emergency department  She states the pain was improved with medication but it is starting to wear off and return  She has not had any further nausea or vomiting since she has been in the ER  She denies any further bowel function and has not passed any flatus  She is still feeling distended but it is improved  She denies any chest pain, palpitations, shortness of breath or cough    She denies any lightheadedness or dizziness  She has had history of ventral umbilical hernia repair with mesh placement, and open hysterectomy with Pfannenstiel incision "    Summary of Hospital Course: The patient was admitted to the hospital and started on conservative treatment for enteritis vs SBO  Patient noted rapid improvement of symptoms with bowel rest and IVF resuscitation and on HOD2 the patient noted return of bowel function with significant improvement of abdominal pain and diet was advanced to clears and continued to be advanced as tolerated  On HOD3 the patient was noted to be doing well, tolerating solid diet and exhibiting regular bowel function  She denied any further nausea, vomiting, or abdominal pain, and she was deemed appropriate for discharge home  All questions and concerns were answered prior to discharge  All discharge instructions were reviewed with the patient  Significant Findings, Care, Treatment and Services Provided: See Above  CT abdomen pelvis wo contrast    Result Date: 4/6/2022  Impression: 1  Dilated loops of small bowel with completely collapsed terminal ileum  While discrete transition point is not definitely visualized there are matted small bowel loops in the right upper quadrant as well as along the anterior abdominal wall in the region of the prior umbilical hernia repair  This may represent a site of obstruction  Consider follow-up small bowel follow-through or CT with oral contrast to better identify a transition point  2   Mildly hyperdense free fluid in the pelvis, likely related to the bowel process in this presumably postmenopausal patient  The study was marked in [facility] for immediate notification   Workstation performed: [id number]       Complications: None    Discharge Diagnosis: Small bowel obstruction    Medical Problems             Resolved Problems  Date Reviewed: 4/7/2022    None              Principal Problem:    Abdominal pain  Active Problems:    Type 2 diabetes mellitus ([facility] ) Hypertension      Condition at Discharge: good         Discharge instructions/Information to patient and family:   See after visit summary for information provided to patient and family  Provisions for Follow-Up Care:  See after visit summary for information related to follow-up care and any pertinent home health orders  PCP: Will Villanueva MD    Disposition: See After Visit Summary for discharge disposition information  Planned Readmission: No    Discharge Statement   I spent 30 minutes discharging the patient  This time was spent on the day of discharge  I had direct contact with the patient on the day of discharge  Additional documentation is required if more than 30 minutes were spent on discharge  Discharge Medications:  See after visit summary for reconciled discharge medications provided to patient and family

## 2022-04-14 ENCOUNTER — OFFICE VISIT (OUTPATIENT)
Dept: OBGYN CLINIC | Facility: CLINIC | Age: 57
End: 2022-04-14
Payer: COMMERCIAL

## 2022-04-14 VITALS
DIASTOLIC BLOOD PRESSURE: 96 MMHG | HEART RATE: 79 BPM | SYSTOLIC BLOOD PRESSURE: 169 MMHG | BODY MASS INDEX: 41.66 KG/M2 | WEIGHT: 259.2 LBS | HEIGHT: 66 IN

## 2022-04-14 DIAGNOSIS — M17.0 BILATERAL PRIMARY OSTEOARTHRITIS OF KNEE: Primary | ICD-10-CM

## 2022-04-14 DIAGNOSIS — M16.11 PRIMARY OSTEOARTHRITIS OF ONE HIP, RIGHT: ICD-10-CM

## 2022-04-14 DIAGNOSIS — G89.29 CHRONIC PAIN OF LEFT KNEE: ICD-10-CM

## 2022-04-14 DIAGNOSIS — M25.561 CHRONIC PAIN OF RIGHT KNEE: ICD-10-CM

## 2022-04-14 DIAGNOSIS — M25.562 CHRONIC PAIN OF LEFT KNEE: ICD-10-CM

## 2022-04-14 DIAGNOSIS — G89.29 CHRONIC PAIN OF RIGHT KNEE: ICD-10-CM

## 2022-04-14 PROCEDURE — 20610 DRAIN/INJ JOINT/BURSA W/O US: CPT | Performed by: PHYSICIAN ASSISTANT

## 2022-04-14 PROCEDURE — 99214 OFFICE O/P EST MOD 30 MIN: CPT | Performed by: PHYSICIAN ASSISTANT

## 2022-04-14 RX ORDER — HYDROCHLOROTHIAZIDE 12.5 MG/1
1 TABLET ORAL DAILY
COMMUNITY
Start: 2022-02-17

## 2022-04-14 RX ADMIN — BUPIVACAINE HYDROCHLORIDE 2 ML: 2.5 INJECTION, SOLUTION INFILTRATION; PERINEURAL at 15:14

## 2022-04-14 RX ADMIN — LIDOCAINE HYDROCHLORIDE 2 ML: 10 INJECTION, SOLUTION INFILTRATION; PERINEURAL at 15:14

## 2022-04-14 RX ADMIN — METHYLPREDNISOLONE ACETATE 2 ML: 40 INJECTION, SUSPENSION INTRA-ARTICULAR; INTRALESIONAL; INTRAMUSCULAR; SOFT TISSUE at 15:14

## 2022-04-14 NOTE — PROGRESS NOTES
Patient Name:  Derek Lu  MRN:  [id number]    [address] [place] [address]     1  Bilateral primary osteoarthritis of knee  -     Large joint arthrocentesis: R knee  -     Large joint arthrocentesis: L knee    2  Primary osteoarthritis of one hip, right  -     FL spine and pain procedure; Future; Expected date: 04/14/2022    3  Chronic pain of right knee    4  Chronic pain of left knee      60 y o  female with bilateral knee osteoarthritis and associated pain s/p viscosupplementation injections on 01/03/2022  In regards to radicular pain, advised this is likely etiology of nerve irritation from lumbar spine  Can consider additional imaging at follow up appointment if indicated  In depth conversation had with patient regarding continued nonoperative treatment of bilateral knee osteoarthritis including corticosteroid injections, repeat visco injections every 6 months as needed  Discussed PRP injections with patient and usually administered for early osteoarthritis; for patient to receive them at this time, results could be unpredictable  Briefly discussed surgical intervention by means of total knee arthroplasty  At this time, patient would like to move forward with bilateral knee corticosteroid injections  Risks and benefits of corticosteroid injection were discussed in detail  Risks including:  Post injection pain, elevation in blood sugar, skin discoloration, fatty atrophy, and infection were discussed in detail  The patient understood and elected to proceed forward  After sterile preparation the Right and Left knees were injected with 2 cc of 1% lidocaine, 2 cc of 0 25% bupivacaine, and 2 cc of Depo-Medrol  The patient tolerated the procedure and no immediate complications were noted  The patient was instructed to ice and elevate the injection site, limit strenuous activity for the next 24-48 hours, and contact us if there were any questions or concerns prior to their follow-up appointment    They were also instructed to contact their primary care physician to discuss elevated blood sugar and to check blood glucose 3 times daily for the next 1-2 weeks for evaluation  In regards to Right hip osteoarthritis, placed referral for intraarticular hip injection  Advised patient she may have pain relief from injection, but if efficacy decreases quickly, can place referral with Dr Kenroy Moreno for further evaluation of Right hip  History of the Present Illness   Darryn Bentley is a 60 y o  female with bilateral knee osteoarthritis s/p viscosupplementation injections on 01/03/2022  Today, patient reports she was recently hospitalized secondary to SBO; overall improved from admission date  In regards to bilateral knees, admits to moderate pain in knees with ambulation and a pins and needles sensation radiating down bilateral legs  She is considering cortisone injections  Review of Systems     Review of Systems   Constitutional: Negative for chills and fever  HENT: Negative for ear pain and sore throat  Eyes: Negative for pain and visual disturbance  Respiratory: Negative for cough and shortness of breath  Cardiovascular: Negative for chest pain and palpitations  Gastrointestinal: Negative for abdominal pain and vomiting  Genitourinary: Negative for dysuria and hematuria  Musculoskeletal: Negative for arthralgias and back pain  Skin: Negative for color change and rash  Neurological: Negative for seizures and syncope  All other systems reviewed and are negative  Physical Exam     /96   Pulse 79   Ht 5' 6" (1 676 m)   Wt 118 kg (259 lb 3 2 oz)   BMI 41 84 kg/m²     Left Knee  Range of motion from 5 to 105 with patellofemoral crepitus  There is trace effusion  There is tenderness over the posterolateral joint line  The patient is ableperform a straight leg raise      Varus stress testing reveals no pain or laxity at 0 and 30 degrees   Valgus stress testing reveals no pain or laxity at 0 and 30 degrees  The patient is neurovascular intact distally  Right Knee  Range of motion from 5 to 115 with patellofemoral crepitus  Obvious valgus alignment   There is trace effusion  There is minima tenderness over the medial and lateral joint lines  The patient is ableperform a straight leg raise  Varus stress testing reveals no pain or laxity at 0 and 30 degrees   Valgus stress testing reveals no pain or laxity at 0 and 30 degrees  The patient is neurovascular intact distally  Data Review     I have personally reviewed pertinent films in PACS, and my interpretation follows  No new images    Social History     Tobacco Use    Smoking status: Former Smoker    Smokeless tobacco: Never Used   Vaping Use    Vaping Use: Never used   Substance Use Topics    Alcohol use:  Yes    Drug use: Never           Large joint arthrocentesis: R knee  Universal Protocol:  Consent given by: patient  Patient identity confirmed: verbally with patient    Procedure Details  Location: knee - R knee  Needle size: 22 G  Approach: lateral  Medications administered: 2 mL bupivacaine 0 25 %; 2 mL lidocaine 1 %; 2 mL methylPREDNISolone acetate 40 mg/mL    Patient tolerance: patient tolerated the procedure well with no immediate complications  Dressing:  Sterile dressing applied    Large joint arthrocentesis: L knee  Universal Protocol:  Consent given by: patient  Patient identity confirmed: verbally with patient    Procedure Details  Location: knee - L knee  Needle size: 22 G  Approach: lateral  Medications administered: 2 mL bupivacaine 0 25 %; 2 mL lidocaine 1 %; 2 mL methylPREDNISolone acetate 40 mg/mL    Patient tolerance: patient tolerated the procedure well with no immediate complications  Dressing:  Sterile dressing applied            Candice Ramirez DO

## 2022-04-15 RX ORDER — BUPIVACAINE HYDROCHLORIDE 2.5 MG/ML
2 INJECTION, SOLUTION INFILTRATION; PERINEURAL
Status: COMPLETED | OUTPATIENT
Start: 2022-04-14 | End: 2022-04-14

## 2022-04-15 RX ORDER — METHYLPREDNISOLONE ACETATE 40 MG/ML
2 INJECTION, SUSPENSION INTRA-ARTICULAR; INTRALESIONAL; INTRAMUSCULAR; SOFT TISSUE
Status: COMPLETED | OUTPATIENT
Start: 2022-04-14 | End: 2022-04-14

## 2022-04-15 RX ORDER — LIDOCAINE HYDROCHLORIDE 10 MG/ML
2 INJECTION, SOLUTION INFILTRATION; PERINEURAL
Status: COMPLETED | OUTPATIENT
Start: 2022-04-14 | End: 2022-04-14

## 2022-04-25 ENCOUNTER — HOSPITAL ENCOUNTER (OUTPATIENT)
Dept: MAMMOGRAPHY | Facility: CLINIC | Age: 57
Discharge: HOME | End: 2022-04-25
Payer: COMMERCIAL

## 2022-04-25 ENCOUNTER — HOSPITAL ENCOUNTER (OUTPATIENT)
Dept: ULTRASOUND IMAGING | Facility: CLINIC | Age: 57
Discharge: HOME | End: 2022-04-25
Payer: COMMERCIAL

## 2022-04-25 VITALS — WEIGHT: 259 LBS | BODY MASS INDEX: 41.62 KG/M2 | HEIGHT: 66 IN

## 2022-04-25 DIAGNOSIS — R92.8 ABNORMAL SCREENING MAMMOGRAM: ICD-10-CM

## 2022-04-25 PROCEDURE — G0279 TOMOSYNTHESIS, MAMMO: HCPCS

## 2022-04-25 PROCEDURE — 77065 DX MAMMO INCL CAD UNI: CPT

## 2022-04-25 PROCEDURE — 76642 ULTRASOUND BREAST LIMITED: CPT

## 2022-04-25 NOTE — PROGRESS NOTES
Met with patient and Dr Joshua Serna regarding recommendation for;    ___X__ RIGHT ______LEFT      _____Ultrasound guided  ___X___Stereotactic breast biopsy  Dr Kranthi Curry explained the need for [facility] imaging and the biopsy to follow if there is enhancement on the imaging, all questions answered for the patient  Explained the location of this imaging at our [facility] location  __X___Verbalized understanding        Blood thinners:  No: __X___ Yes: ______ What:            All questions related to the [facility] imaging completed, pt with hx of HTN and DM, patient just had lab work completed on 4/8/22, pt has had IV contrast previously with no reaction       Biopsy teaching sheet given:  Yes: ___X___ No: ________    Pt given contact information and adv to call with any questions/needs    Patient given address and directions to the [facility] in [facility]

## 2022-05-06 ENCOUNTER — HOSPITAL ENCOUNTER (OUTPATIENT)
Dept: MAMMOGRAPHY | Facility: CLINIC | Age: 57
Discharge: HOME | End: 2022-05-06

## 2022-05-27 ENCOUNTER — HOSPITAL ENCOUNTER (OUTPATIENT)
Dept: MAMMOGRAPHY | Facility: CLINIC | Age: 57
Discharge: HOME | End: 2022-05-27
Admitting: RADIOLOGY
Payer: COMMERCIAL

## 2022-05-27 ENCOUNTER — HOSPITAL ENCOUNTER (OUTPATIENT)
Dept: MAMMOGRAPHY | Facility: CLINIC | Age: 57
Discharge: HOME | End: 2022-05-27

## 2022-05-27 VITALS
SYSTOLIC BLOOD PRESSURE: 164 MMHG | WEIGHT: 256 LBS | DIASTOLIC BLOOD PRESSURE: 89 MMHG | HEART RATE: 77 BPM | HEIGHT: 66 IN | BODY MASS INDEX: 41.14 KG/M2

## 2022-05-27 DIAGNOSIS — R92.8 ABNORMAL MAMMOGRAM: ICD-10-CM

## 2022-05-27 DIAGNOSIS — R92.8 ABNORMAL MAMMOGRAM OF RIGHT BREAST: ICD-10-CM

## 2022-05-27 PROCEDURE — 88342 IMHCHEM/IMCYTCHM 1ST ANTB: CPT | Performed by: PATHOLOGY

## 2022-05-27 PROCEDURE — 19081 BX BREAST 1ST LESION STRTCTC: CPT

## 2022-05-27 PROCEDURE — 88341 IMHCHEM/IMCYTCHM EA ADD ANTB: CPT | Performed by: PATHOLOGY

## 2022-05-27 PROCEDURE — 88305 TISSUE EXAM BY PATHOLOGIST: CPT | Performed by: PATHOLOGY

## 2022-05-27 PROCEDURE — A4648 IMPLANTABLE TISSUE MARKER: HCPCS

## 2022-05-27 RX ORDER — BUPIVACAINE HYDROCHLORIDE AND EPINEPHRINE 2.5; 5 MG/ML; UG/ML
10 INJECTION, SOLUTION EPIDURAL; INFILTRATION; INTRACAUDAL; PERINEURAL ONCE
Status: COMPLETED | OUTPATIENT
Start: 2022-05-27 | End: 2022-05-27

## 2022-05-27 RX ORDER — LIDOCAINE HYDROCHLORIDE 10 MG/ML
5 INJECTION, SOLUTION EPIDURAL; INFILTRATION; INTRACAUDAL; PERINEURAL ONCE
Status: COMPLETED | OUTPATIENT
Start: 2022-05-27 | End: 2022-05-27

## 2022-05-27 RX ADMIN — BUPIVACAINE HYDROCHLORIDE AND EPINEPHRINE BITARTRATE 10 ML: 2.5; .005 INJECTION, SOLUTION EPIDURAL; INFILTRATION; INTRACAUDAL; PERINEURAL at 10:51

## 2022-05-27 RX ADMIN — LIDOCAINE HYDROCHLORIDE 5 ML: 10 INJECTION, SOLUTION EPIDURAL; INFILTRATION; INTRACAUDAL; PERINEURAL at 10:51

## 2022-05-27 NOTE — PROGRESS NOTES
Procedure type:    _____ultrasound guided __x___stereotactic    Breast:    _____Left __x___Right    Location: 4 oclock    Needle: 8g Revolve    # of passes: 4 cores all submitted     Clip: Mammomarvasquez Johnson    Performed by: Dr Mohsen Mosley    Pressure held for 5 minutes by: Darvin Ballesteros    Sterrenee Strips:    __x___yes _____no    Band aid:    __x___yes_____no    Tape and guaze:    _____yes __x___no    Tolerated procedure:    ___x__yes _____no

## 2022-05-27 NOTE — DISCHARGE INSTR - OTHER ORDERS
POST LARGE CORE BREAST BIOPSY PATIENT INFORMATION      Place an ice pack inside your bra over the top of the dressing every hour for 20 minutes (20 minutes on, 60 minutes off)  Do this until bedtime  Do not shower or bathe until the following morning  After bathing, you may remove the bandaid  You may bathe your breast carefully with the steri-strips in place  Be careful not to loosen them  The steri-strips will fall off in 3-5 days  You may have mild discomfort, and you may have some bruising where the needle entered the skin  This should clear within 5-7 days  If you need medicine for discomfort, take acetaminophen products such as Tylenol  You may also take Advil or Motrin products  DO NOT use Aspirin for the first 24 hours  Do not participate in strenuous activities such as-tennis, aerobics, weight lifting, etc  for 24 hours  Refrain from swimming/soaking for 72 hours  Wearing a bra for sleeping may be more comfortable for the first 24-48 hours after your biopsy  Watch for continued bleeding, pain or fever over 101  If any of these symptoms occur, please contact our breast nurse navigator at the location where your biopsy was performed  During normal business hours (7:30am - 4:00pm) please call the nurse navigator at the site     where your procedure was performed:       [address] [facility] [address]: [phone] or [phone] [address]: [phone] or [phone]     [address]: [address] [facility]: [address] [facility] [address]: [phone]        After 4pm - please call your physician or go to the nearest Emergency Department location  The final results of your biopsy are usually available within one week

## 2022-05-31 NOTE — PROGRESS NOTES
Post procedure call completed    Bleeding: _____yes __X___no    Pain: _____yes ___X___no    Redness/Swelling: ______yes ___X___no    Band aid removed: _____yes ___X__no  Instructed patient to remove the band aid      Steri-Strips intact: ___X___yes _____no (discussed with patient to remove steri strips on 6/1/22 if they have not come off on their own)    Pt with no questions at this time, adv will call when results available, adv to call with any questions or concerns, has name/# for contact

## 2022-06-01 ENCOUNTER — TELEPHONE (OUTPATIENT)
Dept: MAMMOGRAPHY | Facility: CLINIC | Age: 57
End: 2022-06-01

## 2022-06-09 ENCOUNTER — TELEPHONE (OUTPATIENT)
Dept: OBGYN CLINIC | Facility: CLINIC | Age: 57
End: 2022-06-09

## 2022-06-09 ENCOUNTER — OFFICE VISIT (OUTPATIENT)
Dept: OBGYN CLINIC | Facility: CLINIC | Age: 57
End: 2022-06-09
Payer: COMMERCIAL

## 2022-06-09 VITALS
HEIGHT: 66 IN | SYSTOLIC BLOOD PRESSURE: 152 MMHG | HEART RATE: 68 BPM | DIASTOLIC BLOOD PRESSURE: 95 MMHG | BODY MASS INDEX: 41.95 KG/M2 | WEIGHT: 261 LBS | RESPIRATION RATE: 18 BRPM

## 2022-06-09 DIAGNOSIS — G89.29 CHRONIC PAIN OF RIGHT KNEE: ICD-10-CM

## 2022-06-09 DIAGNOSIS — M17.0 BILATERAL PRIMARY OSTEOARTHRITIS OF KNEE: Primary | ICD-10-CM

## 2022-06-09 DIAGNOSIS — M25.561 CHRONIC PAIN OF RIGHT KNEE: ICD-10-CM

## 2022-06-09 DIAGNOSIS — M25.562 CHRONIC PAIN OF LEFT KNEE: ICD-10-CM

## 2022-06-09 DIAGNOSIS — G89.29 CHRONIC PAIN OF LEFT KNEE: ICD-10-CM

## 2022-06-09 PROCEDURE — 99213 OFFICE O/P EST LOW 20 MIN: CPT | Performed by: ORTHOPAEDIC SURGERY

## 2022-06-09 NOTE — TELEPHONE ENCOUNTER
I spoke with the patient and let her know that she will need a new referral for today's visit since her last referral  on 5/10/22  She will call the [place] and get a new referral faxed to the office

## 2022-06-09 NOTE — PROGRESS NOTES
Patient Name:  Manuel Lindquist  MRN:  [id number]    [address] [place] [address]     1  Bilateral primary osteoarthritis of knee    2  Chronic pain of left knee    3  Chronic pain of right knee      60 y o  female with bilateral knee osteoarthritis  At this time, will hold off on corticosteroid injections as she is only about 2 months s/p most recent injections  Advised patient we can consider Toradol injections today, but suggested to hold off until appropriate time for cortisone administration as this has provided her with more pain relief  Continue with OTC oral analgesics and ice application, gentle ROM exercises  She will follow up in office in 4-6 weeks for bilateral knee corticosteroid injections  History of the Present Illness   Manuel Lindquist is a 60 y o  female with bilateral knee osteoarthritis  Today, patient notes she had about 1 5 months of pain relief from most recent corticosteroid injection  She states she is having a hard time with weight loss secondary to social concerns  She wants to get additional corticosteroid injections  Review of Systems     Review of Systems   Constitutional: Negative for activity change, appetite change, chills, fatigue and unexpected weight change  HENT: Negative for congestion, drooling, ear discharge, mouth sores, postnasal drip, sinus pressure, tinnitus and voice change  Eyes: Negative for photophobia, pain, discharge and visual disturbance  Respiratory: Negative for apnea, choking, shortness of breath and wheezing  Cardiovascular: Negative for chest pain, palpitations and leg swelling  Gastrointestinal: Negative for abdominal distention, anal bleeding, constipation, rectal pain and vomiting  Endocrine: Negative for cold intolerance and polyphagia  Genitourinary: Negative for difficulty urinating, dysuria, enuresis, flank pain and urgency  Musculoskeletal: Negative for arthralgias, back pain, gait problem, joint swelling and neck pain     Skin: Negative for pallor and rash  Allergic/Immunologic: Negative for environmental allergies and food allergies  Neurological: Negative for dizziness, seizures, weakness, light-headedness, numbness and headaches  Psychiatric/Behavioral: Negative for agitation, decreased concentration, dysphoric mood, hallucinations, self-injury, sleep disturbance and suicidal ideas  The patient is not nervous/anxious  Physical Exam     /95   Pulse 68   Resp 18   Ht 5' 6" (1 676 m)   Wt 118 kg (261 lb)   BMI 42 13 kg/m²     Right Knee  Range of motion from 5 to 95  There is crepitus with range of motion  There is no effusion  There is tenderness over the medial joint line  There is 4+/5 quadriceps strength and preserved tone  The patient is ableperform a straight leg raise  positive  patellar grind test   Anterior drawer tests is negative  negative Lachman Test    Posterior drawer test is   negative   Varus stress testing reveals no pain or laxity at 0 and 30 degrees   Valgus stress testing reveals no pain or laxity at 0 and 30 degrees  The patient is neurovascular intact distally  Left Knee  Range of motion from 5 to 95 with pain  There is crepitus with range of motion  There is no effusion  There is tenderness over the medial joint line  There is 5/5 quadriceps strength and preserved tone  The patient is ableperform a straight leg raise  positive  patellar grind test   Anterior drawer tests is negative  negative Lachman Test    Posterior drawer test is   negative   Varus stress testing reveals no pain or laxity at 0 and 30 degrees   Valgus stress testing reveals no pain or laxity at 0 and 30 degrees  The patient is neurovascular intact distally  Data Review     I have personally reviewed pertinent films in PACS, and my interpretation follows  No new images   Previous images demonstrate bilateral knee moderate medial and lateral osteoarthritis, mild to moderate patellofemoral osteoarthritis  Social History     Tobacco Use    Smoking status: Former Smoker    Smokeless tobacco: Never Used   Vaping Use    Vaping Use: Never used   Substance Use Topics    Alcohol use:  Yes    Drug use: Never           Procedures  None

## 2022-09-14 ENCOUNTER — OFFICE VISIT (OUTPATIENT)
Dept: OBGYN CLINIC | Facility: CLINIC | Age: 57
End: 2022-09-14
Payer: COMMERCIAL

## 2022-09-14 VITALS
WEIGHT: 260 LBS | HEIGHT: 66 IN | DIASTOLIC BLOOD PRESSURE: 97 MMHG | BODY MASS INDEX: 41.78 KG/M2 | SYSTOLIC BLOOD PRESSURE: 163 MMHG | HEART RATE: 67 BPM

## 2022-09-14 DIAGNOSIS — G89.29 CHRONIC PAIN OF RIGHT KNEE: ICD-10-CM

## 2022-09-14 DIAGNOSIS — M17.11 PRIMARY OSTEOARTHRITIS OF RIGHT KNEE: ICD-10-CM

## 2022-09-14 DIAGNOSIS — M17.12 PRIMARY OSTEOARTHRITIS OF LEFT KNEE: Primary | ICD-10-CM

## 2022-09-14 DIAGNOSIS — G89.29 CHRONIC PAIN OF LEFT KNEE: ICD-10-CM

## 2022-09-14 DIAGNOSIS — M25.561 CHRONIC PAIN OF RIGHT KNEE: ICD-10-CM

## 2022-09-14 DIAGNOSIS — M25.562 CHRONIC PAIN OF LEFT KNEE: ICD-10-CM

## 2022-09-14 PROCEDURE — 20610 DRAIN/INJ JOINT/BURSA W/O US: CPT | Performed by: ORTHOPAEDIC SURGERY

## 2022-09-14 PROCEDURE — 99213 OFFICE O/P EST LOW 20 MIN: CPT | Performed by: ORTHOPAEDIC SURGERY

## 2022-09-14 RX ORDER — BUPIVACAINE HYDROCHLORIDE 2.5 MG/ML
2 INJECTION, SOLUTION INFILTRATION; PERINEURAL
Status: COMPLETED | OUTPATIENT
Start: 2022-09-14 | End: 2022-09-14

## 2022-09-14 RX ORDER — METHYLPREDNISOLONE ACETATE 40 MG/ML
2 INJECTION, SUSPENSION INTRA-ARTICULAR; INTRALESIONAL; INTRAMUSCULAR; SOFT TISSUE
Status: COMPLETED | OUTPATIENT
Start: 2022-09-14 | End: 2022-09-14

## 2022-09-14 RX ORDER — LIDOCAINE HYDROCHLORIDE 10 MG/ML
2 INJECTION, SOLUTION INFILTRATION; PERINEURAL
Status: COMPLETED | OUTPATIENT
Start: 2022-09-14 | End: 2022-09-14

## 2022-09-14 RX ADMIN — METHYLPREDNISOLONE ACETATE 2 ML: 40 INJECTION, SUSPENSION INTRA-ARTICULAR; INTRALESIONAL; INTRAMUSCULAR; SOFT TISSUE at 13:41

## 2022-09-14 RX ADMIN — BUPIVACAINE HYDROCHLORIDE 2 ML: 2.5 INJECTION, SOLUTION INFILTRATION; PERINEURAL at 13:41

## 2022-09-14 RX ADMIN — LIDOCAINE HYDROCHLORIDE 2 ML: 10 INJECTION, SOLUTION INFILTRATION; PERINEURAL at 13:41

## 2022-09-14 NOTE — PROGRESS NOTES
Patient Name:  Lawana Harada  MRN:  [id number]    [address] [place] [address]     1  Primary osteoarthritis of left knee  -     Large joint arthrocentesis: L knee    2  Chronic pain of left knee  -     Large joint arthrocentesis: L knee    3  Primary osteoarthritis of right knee  -     Large joint arthrocentesis: R knee    4  Chronic pain of right knee  -     Large joint arthrocentesis: R knee        59 y o  female with Bilateral knee osteoarthritis  X-rays once again reviewed and discussed in the office today  It was discussed the patient is ready for a right total knee arthroplasty  The patients A1C needs to be below 7 and her BMI needs to be below 40  Her current A1C numbers are from 4/6/22 and it was a 7 2  Her current BMi is 41 97 and the patient will need to lose 12 lbs for surgical intervention  Her next A1C will be drawn in a few weeks  The patient was advised to watch her sugar and carb intake to help lose some of the weight  The patient was offered a corticosteroid injection today  Risks and benefits of corticosteroid injection were discussed in detail  Risks including:  Post injection pain, elevation in blood sugar, skin discoloration, fatty atrophy, and infection were discussed in detail  The patient understood and elected to proceed forward  After sterile preparation the bilateral knees was injected with 2 cc of 1% lidocaine, 2 cc of 0 25% bupivacaine, and 2 cc of Depo-Medrol  The patient tolerated the procedure and no immediate complications were noted  The patient was instructed to ice and elevate the injection site, limit strenuous activity for the next 24-48 hours, and contact us if there were any questions or concerns prior to their follow-up appointment  They were also instructed to contact their primary care physician to discuss elevated blood sugar    I will see the patient back in 12 weeks for re-evaluation, new x-rays, weight check, hemoglobin A1C check and possible right total knee arthroplasty discussion  History of the Present Illness   Jaren Ray is a 59 y o  female with Bilateral knee osteoarthritis  Patient was last seen in the office on 6/9/22 where she was advised to continue OTC oral analgesics, gentle ROM and ice  She reports the right is typically worse but the left has been bad recently  The patient is ready to move forward with a right total knee arthroplasty  The previous injections lasted for 2 5 months  The patient informed us that her mother passed away 3 months ago so she is ready to focus on herself  Review of Systems     Review of Systems   Constitutional: Negative for appetite change and unexpected weight change  HENT: Negative for congestion and trouble swallowing  Eyes: Negative for visual disturbance  Respiratory: Negative for cough and shortness of breath  Cardiovascular: Negative for chest pain and palpitations  Gastrointestinal: Negative for nausea and vomiting  Endocrine: Negative for cold intolerance and heat intolerance  Musculoskeletal: Negative for joint swelling and myalgias  Skin: Negative for rash  Neurological: Negative for numbness  Physical Exam     /97   Pulse 67   Ht 5' 6" (1 676 m)   Wt 118 kg (260 lb)   BMI 41 97 kg/m²     Left  Knee  Range of motion from 0 to 100  There is no effusion  There is tenderness over the medial joint line  4+ quad   The patient is able to perform a straight leg raise  Positive apprehension  Varus stress testing reveals no at 0 and 30 degrees   Valgus stress testing reveals no at 0 and 30 degrees  The patient is neurovascular intact distally  Right Knee  Range of motion from 3  to 110  There is trace  effusion  There is tenderness over the medial joint line  4+ quad   The patient is able to perform a straight leg raise      Varus stress testing reveals no at 0 and 30 degrees   Valgus stress testing reveals no at 0 and 30 degrees  The patient is neurovascular intact distally  Data Review     I have personally reviewed pertinent films in PACS, and my interpretation follows  Previous images demonstrate bilateral knee moderate medial and lateral osteoarthritis, mild to moderate patellofemoral osteoarthritis  Social History     Tobacco Use    Smoking status: Former Smoker    Smokeless tobacco: Never Used   Vaping Use    Vaping Use: Never used   Substance Use Topics    Alcohol use: Yes    Drug use: Never           Large joint arthrocentesis: L knee  Universal Protocol:  Consent: Verbal consent obtained  Risks and benefits: risks, benefits and alternatives were discussed  Consent given by: patient  Time out: Immediately prior to procedure a "time out" was called to verify the correct patient, procedure, equipment, support staff and site/side marked as required  Timeout called at: 9/14/2022 1:34 PM   Patient understanding: patient states understanding of the procedure being performed  Site marked: the operative site was marked  Patient identity confirmed: verbally with patient    Supporting Documentation  Indications: pain   Procedure Details  Location: knee - L knee  Needle size: 22 G  Ultrasound guidance: no  Approach: anterolateral  Medications administered: 2 mL lidocaine 1 %; 2 mL methylPREDNISolone acetate 40 mg/mL; 2 mL bupivacaine 0 25 %    Patient tolerance: patient tolerated the procedure well with no immediate complications  Dressing:  Sterile dressing applied    Large joint arthrocentesis: R knee  Universal Protocol:  Consent: Verbal consent obtained  Risks and benefits: risks, benefits and alternatives were discussed  Consent given by: patient  Time out: Immediately prior to procedure a "time out" was called to verify the correct patient, procedure, equipment, support staff and site/side marked as required    Timeout called at: 9/14/2022 1:34 PM   Patient understanding: patient states understanding of the procedure being performed  Site marked: the operative site was marked  Patient identity confirmed: verbally with patient    Supporting Documentation  Indications: pain   Procedure Details  Location: knee - R knee  Needle size: 22 G  Ultrasound guidance: no  Approach: anterolateral  Medications administered: 2 mL lidocaine 1 %; 2 mL methylPREDNISolone acetate 40 mg/mL; 2 mL bupivacaine 0 25 %    Patient tolerance: patient tolerated the procedure well with no immediate complications  Dressing:  Sterile dressing applied            Liseth Lyons   Scribe Attestation    I,:  Liseth Lyons am acting as a scribe while in the presence of the attending physician :       I,:  Jerrod Mcdonald DO personally performed the services described in this documentation    as scribed in my presence :

## 2022-10-12 PROBLEM — U07.1 PNEUMONIA DUE TO COVID-19 VIRUS: Status: RESOLVED | Noted: 2021-01-10 | Resolved: 2022-10-12

## 2022-10-12 PROBLEM — J12.82 PNEUMONIA DUE TO COVID-19 VIRUS: Status: RESOLVED | Noted: 2021-01-10 | Resolved: 2022-10-12

## 2023-01-14 ENCOUNTER — APPOINTMENT (EMERGENCY)
Dept: RADIOLOGY | Facility: HOSPITAL | Age: 58
End: 2023-01-14

## 2023-01-14 ENCOUNTER — HOSPITAL ENCOUNTER (EMERGENCY)
Facility: HOSPITAL | Age: 58
Discharge: HOME | End: 2023-01-14
Attending: EMERGENCY MEDICINE

## 2023-01-14 VITALS
DIASTOLIC BLOOD PRESSURE: 109 MMHG | TEMPERATURE: 99.1 F | SYSTOLIC BLOOD PRESSURE: 217 MMHG | BODY MASS INDEX: 41.17 KG/M2 | WEIGHT: 256.2 LBS | HEART RATE: 71 BPM | OXYGEN SATURATION: 98 % | HEIGHT: 66 IN | RESPIRATION RATE: 17 BRPM

## 2023-01-14 DIAGNOSIS — S69.91XA INJURY OF RIGHT HAND, INITIAL ENCOUNTER: Primary | ICD-10-CM

## 2023-01-14 RX ORDER — IBUPROFEN 600 MG/1
600 TABLET ORAL ONCE
Status: COMPLETED | OUTPATIENT
Start: 2023-01-14 | End: 2023-01-14

## 2023-01-14 RX ADMIN — IBUPROFEN 600 MG: 600 TABLET, FILM COATED ORAL at 15:37

## 2023-01-14 NOTE — ED PROVIDER NOTES
History  Chief Complaint   Patient presents with   • Wrist Injury     Patient co right wrist/hand injury that happened 5 days ago  63yo right hand dominant female presenting for evaluation of right hand pain  Patient fell 5 days ago onto her right hand and has been having persistent pain and swelling since that time  Her swelling is no better so she decided to be evaluated  Pain is present near the 4th and 5th metacarpal  Pain is moderate in intensity  No paresthesias  She also sustained ecchymosis to her left elbow but she denies any pain or decreased ROM in the left elbow  History provided by:  Patient   used: No    Hand Pain  Associated symptoms: no fever        Prior to Admission Medications   Prescriptions Last Dose Informant Patient Reported? Taking? Alcohol Swabs 70 % PADS   No No   Sig: Use 4 (four) times a day   Insulin Pen Needle (Pen Needles) 32G X 4 MM MISC   No No   Sig: Use 4 (four) times a day for 14 days   Patient not taking: Reported on 1/3/2022    busPIRone (BUSPAR) 10 mg tablet   Yes No   Sig: Take 1 tablet by mouth 3 (three) times a day   escitalopram (LEXAPRO) 5 mg tablet   Yes No   Sig: Take 1 tablet by mouth daily   hydrOXYzine HCL (ATARAX) 25 mg tablet   Yes No   Sig: Take 2 tablets by mouth daily at bedtime   hydrochlorothiazide (HYDRODIURIL) 12 5 mg tablet   Yes No   Sig: Take 1 tablet by mouth daily   ibuprofen (MOTRIN) 600 mg tablet   No No   Sig: Take 1 tablet (600 mg total) by mouth every 8 (eight) hours as needed for mild pain Take 3 times daily for 3-4 days, then take as needed  Please take with food     insulin aspart (NovoLOG FlexPen) 100 UNIT/ML injection pen   No No   Sig: Inject 30 Units under the skin 3 (three) times a day with meals for 14 days   Patient not taking: Reported on 1/3/2022    insulin glargine (LANTUS SOLOSTAR) 100 units/mL injection pen   Yes No   Sig: Inject 21 Units under the skin daily at bedtime   lidocaine (LIDODERM) 5 %   Yes No   Sig: APPLY 1 PATCH TOPICALLY DAILY FOR PAIN (REMOVE PATCH AFTER 12 HOURS; 12 HOURS ON AND 12 HOURS OFF)   losartan (COZAAR) 100 MG tablet   Yes No   Sig: Take 100 mg by mouth daily before dinner   metFORMIN (GLUCOPHAGE) 1000 MG tablet   Yes No   Sig: Take 1,000 mg by mouth 2 (two) times a day with meals   traMADol (ULTRAM) 50 mg tablet   Yes No   Sig: Take 50 mg by mouth 2 (two) times a day as needed for moderate pain   triamcinolone (KENALOG) 0 1 % cream   No No   Sig: Apply topically 2 (two) times a day for 7 days      Facility-Administered Medications: None       Past Medical History:   Diagnosis Date   • Asthma    • Diabetes mellitus (HCC)        Past Surgical History:   Procedure Laterality Date   • HERNIA REPAIR     • HYSTERECTOMY  05/2021   • MAMMO STEREOTACTIC BREAST BIOPSY RIGHT (ALL INC) Right 5/27/2022       Family History   Problem Relation Age of Onset   • Diabetes Mother    • Diabetes Father    • No Known Problems Maternal Grandmother    • No Known Problems Maternal Grandfather    • No Known Problems Paternal Grandmother    • No Known Problems Paternal Grandfather    • No Known Problems Son    • No Known Problems Son    • No Known Problems Maternal Aunt    • No Known Problems Maternal Aunt    • No Known Problems Maternal Aunt      I have reviewed and agree with the history as documented  E-Cigarette/Vaping   • E-Cigarette Use Never User      E-Cigarette/Vaping Substances   • Nicotine No    • THC No    • CBD No    • Flavoring No    • Other No    • Unknown No      Social History     Tobacco Use   • Smoking status: Former   • Smokeless tobacco: Never   Vaping Use   • Vaping Use: Never used   Substance Use Topics   • Alcohol use: Yes   • Drug use: Never       Review of Systems   Constitutional: Negative for chills and fever  Eyes: Negative for discharge and redness  Musculoskeletal: Positive for arthralgias  Negative for neck stiffness  Skin: Negative for color change and wound  Neurological: Negative for weakness and numbness  Psychiatric/Behavioral: Negative for confusion  The patient is not nervous/anxious  All other systems reviewed and are negative  Physical Exam  Physical Exam  Vitals and nursing note reviewed  Constitutional:       General: She is not in acute distress  Appearance: Normal appearance  She is not toxic-appearing  HENT:      Head: Normocephalic and atraumatic  Right Ear: External ear normal       Left Ear: External ear normal    Eyes:      General: No scleral icterus  Right eye: No discharge  Left eye: No discharge  Conjunctiva/sclera: Conjunctivae normal    Cardiovascular:      Rate and Rhythm: Normal rate  Pulmonary:      Effort: Pulmonary effort is normal  No respiratory distress  Breath sounds: No stridor  Musculoskeletal:         General: No deformity  Normal range of motion  Cervical back: Normal range of motion  Comments: Right hand: Mild swelling noted to dorsum of hand  No wounds or deformity  +Tenderness at the distal 4th and 5th metacarpals  No snuffbox tenderness  ROM of DIP, PIP, and MCP joints intact  2+ radial pulse and sensation intact  Skin:     General: Skin is warm and dry  Neurological:      General: No focal deficit present  Mental Status: She is alert  Mental status is at baseline     Psychiatric:         Mood and Affect: Mood normal          Behavior: Behavior normal          Vital Signs  ED Triage Vitals [01/14/23 1415]   Temperature Pulse Respirations Blood Pressure SpO2   99 1 °F (37 3 °C) 71 17 (!) 217/109 98 %      Temp Source Heart Rate Source Patient Position - Orthostatic VS BP Location FiO2 (%)   Oral Monitor Sitting Left arm --      Pain Score       8           Vitals:    01/14/23 1415   BP: (!) 217/109   Pulse: 71   Patient Position - Orthostatic VS: Sitting         Visual Acuity      ED Medications  Medications   ibuprofen (MOTRIN) tablet 600 mg (600 mg Oral Given 1/14/23 1537)       Diagnostic Studies  Results Reviewed     None                 XR hand 3+ views RIGHT   ED Interpretation by Boo Chisholm PA-C (01/14 1511)   ? 5th metacarpal fx                 Procedures  Splint application    Date/Time: 1/14/2023 6:50 PM  Performed by: Boo Chisholm PA-C  Authorized by: Boo Chisholm PA-C   Universal Protocol:  Procedure performed by: (ED tech)  Consent: Verbal consent obtained  Risks and benefits: risks, benefits and alternatives were discussed  Consent given by: patient  Patient understanding: patient states understanding of the procedure being performed  Radiology Images displayed and confirmed  If images not available, report reviewed: imaging studies available  Patient identity confirmed: verbally with patient      Pre-procedure details:     Sensation:  Normal  Procedure details:     Laterality:  Right    Location:  Hand    Hand:  R hand    Splint type:  Ulnar gutter    Supplies:  Cotton padding and Ortho-Glass  Post-procedure details:     Pain:  Unchanged    Sensation:  Normal    Patient tolerance of procedure: Tolerated well, no immediate complications             ED Course                       Medical Decision Making  57yoF presenting for R hand pain x 5 days after a fall  Mild swelling noted on exam without deformity  RUE is neurovascularly intact  X-rays obtained which show a questionable 5th metacarpal fracture per my interpretation  Patient placed in an ulnar gutter splint by ED tech  Neurovascular status unchanged after splint placement  Advised f/u with orthopedics  Patient hypertensive in triage at 217/109  Repeat blood pressure improved with SBP in the 180s  Patient has a history of HTN and is currently on losartan  Advised f/u with PCP for recheck       Injury of right hand, initial encounter: acute illness or injury  Amount and/or Complexity of Data Reviewed  Radiology: ordered and independent interpretation performed  Risk  Prescription drug management  Disposition  Final diagnoses:   Injury of right hand, initial encounter     Time reflects when diagnosis was documented in both MDM as applicable and the Disposition within this note     Time User Action Codes Description Comment    1/14/2023  3:25  [facility], [address] Injury of right hand, initial encounter       ED Disposition     ED Disposition   Discharge    Condition   Stable    Date/Time   Sat Jan 14, 2023  3:25 PM    Comment   Swati Montoya discharge to home/self care                 Follow-up Information     Follow up With Specialties Details Why Contact Info Additional [address] [facility] Orthopedic Surgery Schedule an appointment as soon as possible for a visit   [address] [facility] [address] [phone]  600 [facility], [address] [address] [place], [place], [address] [facility] Emergency Department Emergency Medicine  If symptoms worsen 34 [facility] Emergency Department, [address] [place], [place], [address]          Discharge Medication List as of 1/14/2023  3:30 PM      CONTINUE these medications which have NOT CHANGED    Details   Alcohol Swabs 70 % PADS Use 4 (four) times a day, Starting Mon 1/18/2021, Normal      busPIRone (BUSPAR) 10 mg tablet Take 1 tablet by mouth 3 (three) times a day, Starting Wed 12/30/2020, Historical Med      escitalopram (LEXAPRO) 5 mg tablet Take 1 tablet by mouth daily, Starting Wed 12/30/2020, Historical Med      hydrochlorothiazide (HYDRODIURIL) 12 5 mg tablet Take 1 tablet by mouth daily, Starting Thu 2/17/2022, Historical Med      hydrOXYzine HCL (ATARAX) 25 mg tablet Take 2 tablets by mouth daily at bedtime, Starting Tue 2/2/2021, Historical Med      ibuprofen (MOTRIN) 600 mg tablet Take 1 tablet (600 mg total) by mouth every 8 (eight) hours as needed for mild pain Take 3 times daily for 3-4 days, then take as needed  Please take with food , Starting Thu 12/23/2021, Normal      insulin aspart (NovoLOG FlexPen) 100 UNIT/ML injection pen Inject 30 Units under the skin 3 (three) times a day with meals for 14 days, Starting Mon 1/18/2021, Until Mon 1/3/2022, Normal      insulin glargine (LANTUS SOLOSTAR) 100 units/mL injection pen Inject 21 Units under the skin daily at bedtime, Starting Tue 2/23/2021, Historical Med      Insulin Pen Needle (Pen Needles) 32G X 4 MM MISC Use 4 (four) times a day for 14 days, Starting Mon 1/18/2021, Until Mon 1/3/2022, Normal      lidocaine (LIDODERM) 5 % APPLY 1 PATCH TOPICALLY DAILY FOR PAIN (REMOVE PATCH AFTER 12 HOURS; 12 HOURS ON AND 12 HOURS OFF), Historical Med      losartan (COZAAR) 100 MG tablet Take 100 mg by mouth daily before dinner, Historical Med      metFORMIN (GLUCOPHAGE) 1000 MG tablet Take 1,000 mg by mouth 2 (two) times a day with meals, Historical Med      traMADol (ULTRAM) 50 mg tablet Take 50 mg by mouth 2 (two) times a day as needed for moderate pain, Historical Med      triamcinolone (KENALOG) 0 1 % cream Apply topically 2 (two) times a day for 7 days, Starting Fri 8/7/2020, Until Thu 12/9/2021, Print             No discharge procedures on file      PDMP Review     None          ED Provider  Electronically Signed by           Ofelia Owusu PA-C  01/14/23 5288

## 2023-01-14 NOTE — DISCHARGE INSTRUCTIONS
Take Tylenol and ibuprofen as needed for pain  Keep splint in place until seen by orthopedics  Please call tomorrow to schedule a follow-up appointment with orthopedics  You should also follow-up with your family doctor for a repeat blood pressure check

## 2023-01-17 ENCOUNTER — OFFICE VISIT (OUTPATIENT)
Dept: OBGYN CLINIC | Facility: CLINIC | Age: 58
End: 2023-01-17

## 2023-01-17 VITALS
BODY MASS INDEX: 41.78 KG/M2 | DIASTOLIC BLOOD PRESSURE: 102 MMHG | SYSTOLIC BLOOD PRESSURE: 178 MMHG | WEIGHT: 260 LBS | HEART RATE: 68 BPM | HEIGHT: 66 IN

## 2023-01-17 DIAGNOSIS — S66.911A STRAIN OF RIGHT HAND, INITIAL ENCOUNTER: ICD-10-CM

## 2023-01-17 DIAGNOSIS — S60.221A CONTUSION OF RIGHT HAND, INITIAL ENCOUNTER: Primary | ICD-10-CM

## 2023-01-17 DIAGNOSIS — T14.8XXA CONTUSION OF BONE: ICD-10-CM

## 2023-01-17 NOTE — PROGRESS NOTES
Assessment/Plan:  Assessment/Plan   Diagnoses and all orders for this visit:    Contusion of right hand, initial encounter  -     Ambulatory Referral to PT/OT Hand Therapy; Future  -     Ambulatory Referral to PT/OT Hand Therapy; Future    Contusion of bone  -     Ambulatory Referral to PT/OT Hand Therapy; Future  -     Ambulatory Referral to PT/OT Hand Therapy; Future    Strain of right hand, initial encounter  -     Ambulatory Referral to PT/OT Hand Therapy; Future  -     Ambulatory Referral to PT/OT Hand Therapy; Future        47-year-old right-hand-dominant female [address] with onset of right hand pain and swelling from fall injury 01/09/2023  Discussed with patient physical exam, radiographs, impression, and plan  X-rays right hand are unremarkable for acute osseous abnormality  Physical exam right hand noted for generalized swelling of the palm  She has tenderness at the distal aspect of the 4th and 5th metacarpals  She has limited motion at the 4th and 5th MCP joints  She has intact sensation in digits of the hand  She has normal radial pulse bilaterally  Clinical impression is that she is symptomatic from contusion and strain of the hand  I discussed regimen of supplements, topical anti-inflammatory, protection, and formal therapy  She is to start taking tumeric at least 1000 mg daily and tart cherry at least 1000 mg daily  She is to apply topical diclofenac gel 3 times a day for the next 10 days  I will refer for custom-made ulnar gutter splint  She is to start hand therapy as soon as possible and do home exercises as directed  She was advised symptoms may take another 3-4 weeks to significantly improve  She will follow up as needed  Subjective:   Patient ID: Daksha Sidhu is a 59 y o  female    Chief Complaint   Patient presents with   • Right Hand - Pain       47-year-old right-hand-dominant female [address] presents for evaluation of onset of right hand pain and swelling from fall injury 01/09/2023  She tripped and fell forward onto her outstretched hand  She had pain described as sudden in onset, generalized to the hand but worse at the 4th and 5th metacarpals, achy and throbbing, worse with movement of the fingers, associated with limited range of motion, associated with swelling  Symptoms persisted and she presented to the emergency room  X-ray evaluation was unremarkable  She was placed in ulnar gutter splint and advised to follow up with orthopedic care  She has been taking ibuprofen intermittently for pain  Hand Pain  This is a new problem  The current episode started 1 to 4 weeks ago  The problem occurs daily  The problem has been unchanged  Associated symptoms include arthralgias, joint swelling and weakness  Pertinent negatives include no abdominal pain, chest pain, chills, fever, numbness, rash or sore throat  Exacerbated by:   Gripping  She has tried rest, NSAIDs and immobilization for the symptoms  The treatment provided mild relief  The following portions of the patient's history were reviewed and updated as appropriate: She  has a past medical history of Asthma and Diabetes mellitus ([facility] [address] )  She  has a past surgical history that includes Hernia repair; Hysterectomy (05/2021); and Mammo stereotactic breast biopsy right (all inc) (Right, 5/27/2022)  Her family history includes Diabetes in her father and mother; No Known Problems in her maternal aunt, maternal aunt, maternal aunt, maternal grandfather, maternal grandmother, paternal grandfather, paternal grandmother, son, and son  She  reports that she has quit smoking  She has never used smokeless tobacco  She reports current alcohol use  She reports that she does not use drugs  She is allergic to sulfa antibiotics       Review of Systems   Constitutional: Negative for chills and fever  HENT: Negative for sore throat  Eyes: Negative for visual disturbance     Respiratory: Negative for shortness of breath  Cardiovascular: Negative for chest pain  Gastrointestinal: Negative for abdominal pain  Genitourinary: Negative for flank pain  Musculoskeletal: Positive for arthralgias and joint swelling  Skin: Negative for rash and wound  Neurological: Positive for weakness  Negative for numbness  Hematological: Does not bruise/bleed easily  Psychiatric/Behavioral: Negative for self-injury  Objective:  Vitals:    01/17/23 1347 01/17/23 1354   BP: (!) 183/103 (!) 178/102   Pulse: 79 68   Weight: 118 kg (260 lb)    Height: 5' 6" (1 676 m)      Right Hand Exam     Muscle Strength   Wrist extension: 5/5   Wrist flexion: 5/5   : 4/5     Other   Sensation: normal  Pulse: present    Comments:  Generalized swelling of the palm  She has tenderness at the distal aspect of the 4th and 5th metacarpals  She has limited motion at the 4th and 5th MCP joints          Strength/Myotome Testing     Right Wrist/Hand   Wrist extension: 5  Wrist flexion: 5      Physical Exam  Vitals and nursing note reviewed  Constitutional:       General: She is not in acute distress  Appearance: She is well-developed  She is not ill-appearing or diaphoretic  HENT:      Head: Normocephalic  Right Ear: External ear normal       Left Ear: External ear normal    Eyes:      Conjunctiva/sclera: Conjunctivae normal    Neck:      Trachea: No tracheal deviation  Cardiovascular:      Rate and Rhythm: Normal rate  Pulmonary:      Effort: Pulmonary effort is normal  No respiratory distress  Abdominal:      General: There is no distension  Musculoskeletal:         General: Swelling and tenderness present  No deformity or signs of injury  Skin:     General: Skin is warm and dry  Coloration: Skin is not jaundiced or pale  Neurological:      Mental Status: She is alert and oriented to person, place, and time     Psychiatric:         Mood and Affect: Mood normal          Behavior: Behavior normal          Thought Content: Thought content normal          Judgment: Judgment normal          I have personally reviewed pertinent films in PACS and my interpretation is  no acute osseous abnormality of right hand

## 2023-01-18 ENCOUNTER — HOSPITAL ENCOUNTER (EMERGENCY)
Facility: HOSPITAL | Age: 58
Discharge: HOME | End: 2023-01-18
Attending: EMERGENCY MEDICINE

## 2023-01-18 ENCOUNTER — APPOINTMENT (EMERGENCY)
Dept: CT IMAGING | Facility: HOSPITAL | Age: 58
End: 2023-01-18

## 2023-01-18 VITALS
SYSTOLIC BLOOD PRESSURE: 164 MMHG | DIASTOLIC BLOOD PRESSURE: 74 MMHG | OXYGEN SATURATION: 96 % | TEMPERATURE: 97.6 F | HEART RATE: 70 BPM | RESPIRATION RATE: 16 BRPM

## 2023-01-18 DIAGNOSIS — I10 HYPERTENSION, UNSPECIFIED TYPE: Primary | ICD-10-CM

## 2023-01-18 DIAGNOSIS — R20.2 PARESTHESIAS: ICD-10-CM

## 2023-01-18 DIAGNOSIS — I10 PRIMARY HYPERTENSION: ICD-10-CM

## 2023-01-18 LAB
ANION GAP SERPL CALCULATED.3IONS-SCNC: 9 MMOL/L (ref 4–13)
ATRIAL RATE: 61 BPM
BASOPHILS # BLD AUTO: 0.03 THOUSANDS/ÂΜL (ref 0–0.1)
BASOPHILS NFR BLD AUTO: 1 % (ref 0–1)
BUN SERPL-MCNC: 13 MG/DL (ref 5–25)
CALCIUM SERPL-MCNC: 9.2 MG/DL (ref 8.3–10.1)
CARDIAC TROPONIN I PNL SERPL HS: 6 NG/L (ref 8–18)
CHLORIDE SERPL-SCNC: 103 MMOL/L (ref 96–108)
CO2 SERPL-SCNC: 26 MMOL/L (ref 21–32)
CREAT SERPL-MCNC: 0.68 MG/DL (ref 0.6–1.3)
EOSINOPHIL # BLD AUTO: 0.12 THOUSAND/ÂΜL (ref 0–0.61)
EOSINOPHIL NFR BLD AUTO: 2 % (ref 0–6)
ERYTHROCYTE [DISTWIDTH] IN BLOOD BY AUTOMATED COUNT: 13.8 % (ref 11.6–15.1)
GFR SERPL CREATININE-BSD FRML MDRD: 96 ML/MIN/1.73SQ M
GLUCOSE SERPL-MCNC: 152 MG/DL (ref 65–140)
HCT VFR BLD AUTO: 42.4 % (ref 34.8–46.1)
HGB BLD-MCNC: 14.2 G/DL (ref 11.5–15.4)
IMM GRANULOCYTES # BLD AUTO: 0.06 THOUSAND/UL (ref 0–0.2)
IMM GRANULOCYTES NFR BLD AUTO: 1 % (ref 0–2)
LYMPHOCYTES # BLD AUTO: 2.23 THOUSANDS/ÂΜL (ref 0.6–4.47)
LYMPHOCYTES NFR BLD AUTO: 34 % (ref 14–44)
MCH RBC QN AUTO: 31.7 PG (ref 26.8–34.3)
MCHC RBC AUTO-ENTMCNC: 33.5 G/DL (ref 31.4–37.4)
MCV RBC AUTO: 95 FL (ref 82–98)
MONOCYTES # BLD AUTO: 0.43 THOUSAND/ÂΜL (ref 0.17–1.22)
MONOCYTES NFR BLD AUTO: 7 % (ref 4–12)
NEUTROPHILS # BLD AUTO: 3.77 THOUSANDS/ÂΜL (ref 1.85–7.62)
NEUTS SEG NFR BLD AUTO: 55 % (ref 43–75)
NRBC BLD AUTO-RTO: 0 /100 WBCS
P AXIS: 30 DEGREES
PLATELET # BLD AUTO: 298 THOUSANDS/UL (ref 149–390)
PMV BLD AUTO: 9.6 FL (ref 8.9–12.7)
POTASSIUM SERPL-SCNC: 3.8 MMOL/L (ref 3.5–5.3)
PR INTERVAL: 176 MS
QRS AXIS: 66 DEGREES
QRSD INTERVAL: 84 MS
QT INTERVAL: 436 MS
QTC INTERVAL: 438 MS
RBC # BLD AUTO: 4.48 MILLION/UL (ref 3.81–5.12)
SODIUM SERPL-SCNC: 138 MMOL/L (ref 135–147)
T WAVE AXIS: 51 DEGREES
VENTRICULAR RATE: 61 BPM
WBC # BLD AUTO: 6.64 THOUSAND/UL (ref 4.31–10.16)

## 2023-01-18 RX ORDER — ACETAMINOPHEN 325 MG/1
650 TABLET ORAL ONCE
Status: COMPLETED | OUTPATIENT
Start: 2023-01-18 | End: 2023-01-18

## 2023-01-18 RX ORDER — HYDRALAZINE HYDROCHLORIDE 25 MG/1
25 TABLET, FILM COATED ORAL 3 TIMES DAILY
Qty: 60 TABLET | Refills: 0 | Status: SHIPPED | OUTPATIENT
Start: 2023-01-18

## 2023-01-18 RX ORDER — HYDRALAZINE HYDROCHLORIDE 25 MG/1
25 TABLET, FILM COATED ORAL ONCE
Status: COMPLETED | OUTPATIENT
Start: 2023-01-18 | End: 2023-01-18

## 2023-01-18 RX ADMIN — ACETAMINOPHEN 650 MG: 325 TABLET, FILM COATED ORAL at 15:06

## 2023-01-18 RX ADMIN — HYDRALAZINE HYDROCHLORIDE 25 MG: 25 TABLET ORAL at 15:01

## 2023-01-18 NOTE — DISCHARGE INSTRUCTIONS
A  personal message from Dr Madison Nice,  Thank you so much for allowing me to care for you today  I pride myself in the care and attention I give all my patients  I hope you were a witness to this tonight  If for any reason your condition does not improve, worsens, or you have a question that was not answered during your visit you can feel free to text me on my personal phone   # [phone]  I will answer to your message and continue your care past your emergency room visit

## 2023-01-18 NOTE — ED PROVIDER NOTES
History  Chief Complaint   Patient presents with   • Hypertension     Reported htn x days, minimal "maybe" left sided facial numbness      This patient presents to the emergency department due to elevated blood pressures at home despite using losartan daily  She also has some left facial slight numbness starting from the cheek area down to her mandible area  This is been constant for about a week  She also has some twitching of the facial muscles that come and go intermittently  No trouble with concentration no dizziness no weakness of the arms or legs  No trouble with concentration no headaches no chest pain no trouble breathing  The main issue to come to the emergency department for with the elevated blood pressure despite medication  History provided by:  Patient   used: No    Hypertension  Associated symptoms: no abdominal pain, no chest pain, no dizziness, no ear pain, no fever, no headaches, no hematuria, no palpitations, no shortness of breath, not vomiting and no weakness        Prior to Admission Medications   Prescriptions Last Dose Informant Patient Reported? Taking? Alcohol Swabs 70 % PADS   No No   Sig: Use 4 (four) times a day   Insulin Pen Needle (Pen Needles) 32G X 4 MM MISC   No No   Sig: Use 4 (four) times a day for 14 days   Patient not taking: Reported on 1/3/2022    busPIRone (BUSPAR) 10 mg tablet   Yes No   Sig: Take 1 tablet by mouth 3 (three) times a day   escitalopram (LEXAPRO) 5 mg tablet   Yes No   Sig: Take 1 tablet by mouth daily   hydrOXYzine HCL (ATARAX) 25 mg tablet   Yes No   Sig: Take 2 tablets by mouth daily at bedtime   hydrochlorothiazide (HYDRODIURIL) 12 5 mg tablet   Yes No   Sig: Take 1 tablet by mouth daily   ibuprofen (MOTRIN) 600 mg tablet   No No   Sig: Take 1 tablet (600 mg total) by mouth every 8 (eight) hours as needed for mild pain Take 3 times daily for 3-4 days, then take as needed  Please take with food     insulin aspart (NovoLOG FlexPen) 100 UNIT/ML injection pen   No No   Sig: Inject 30 Units under the skin 3 (three) times a day with meals for 14 days   Patient not taking: Reported on 1/3/2022    insulin glargine (LANTUS SOLOSTAR) 100 units/mL injection pen   Yes No   Sig: Inject 21 Units under the skin daily at bedtime   lidocaine (LIDODERM) 5 %   Yes No   Sig: APPLY 1 PATCH TOPICALLY DAILY FOR PAIN (REMOVE PATCH AFTER 12 HOURS; 12 HOURS ON AND 12 HOURS OFF)   losartan (COZAAR) 100 MG tablet   Yes No   Sig: Take 100 mg by mouth daily before dinner   metFORMIN (GLUCOPHAGE) 1000 MG tablet   Yes No   Sig: Take 1,000 mg by mouth 2 (two) times a day with meals   traMADol (ULTRAM) 50 mg tablet   Yes No   Sig: Take 50 mg by mouth 2 (two) times a day as needed for moderate pain   Patient not taking: Reported on 1/17/2023   triamcinolone (KENALOG) 0 1 % cream   No No   Sig: Apply topically 2 (two) times a day for 7 days      Facility-Administered Medications: None       Past Medical History:   Diagnosis Date   • Asthma    • Diabetes mellitus ([facility] [address] )    • Hypertension        Past Surgical History:   Procedure Laterality Date   • HERNIA REPAIR     • HYSTERECTOMY  05/2021   • MAMMO STEREOTACTIC BREAST BIOPSY RIGHT (ALL INC) Right 5/27/2022       Family History   Problem Relation Age of Onset   • Diabetes Mother    • Diabetes Father    • No Known Problems Maternal Grandmother    • No Known Problems Maternal Grandfather    • No Known Problems Paternal Grandmother    • No Known Problems Paternal Grandfather    • No Known Problems Son    • No Known Problems Son    • No Known Problems Maternal Aunt    • No Known Problems Maternal Aunt    • No Known Problems Maternal Aunt      I have reviewed and agree with the history as documented      E-Cigarette/Vaping   • E-Cigarette Use Never User      E-Cigarette/Vaping Substances   • Nicotine No    • THC No    • CBD No    • Flavoring No    • Other No    • Unknown No      Social History     Tobacco Use   • Smoking status: Former   • Smokeless tobacco: Never   Vaping Use   • Vaping Use: Never used   Substance Use Topics   • Alcohol use: Yes   • Drug use: Never       Review of Systems   Constitutional: Negative for chills and fever  HENT: Negative for ear pain and sore throat  Eyes: Negative for pain and visual disturbance  Respiratory: Negative for cough and shortness of breath  Cardiovascular: Negative for chest pain and palpitations  Gastrointestinal: Negative for abdominal pain and vomiting  Genitourinary: Negative for dysuria and hematuria  Musculoskeletal: Negative for arthralgias and back pain  Skin: Negative for color change and rash  Neurological: Positive for numbness  Negative for dizziness, tremors, seizures, syncope, facial asymmetry, speech difficulty, weakness and headaches  All other systems reviewed and are negative  Physical Exam  Physical Exam  Vitals and nursing note reviewed  Constitutional:       General: She is not in acute distress  Appearance: Normal appearance  She is well-developed  She is obese  HENT:      Head: Normocephalic and atraumatic  Right Ear: External ear normal       Left Ear: External ear normal       Nose: Nose normal       Mouth/Throat:      Mouth: Mucous membranes are moist    Eyes:      Extraocular Movements: Extraocular movements intact  Conjunctiva/sclera: Conjunctivae normal       Pupils: Pupils are equal, round, and reactive to light  Cardiovascular:      Rate and Rhythm: Normal rate and regular rhythm  Pulses: Normal pulses  Heart sounds: Normal heart sounds  No murmur heard  Pulmonary:      Effort: Pulmonary effort is normal  No respiratory distress  Breath sounds: Normal breath sounds  Abdominal:      General: Abdomen is flat  Palpations: Abdomen is soft  Tenderness: There is no abdominal tenderness  Musculoskeletal:         General: No swelling  Cervical back: Neck supple     Skin:     General: Skin is warm and dry  Capillary Refill: Capillary refill takes less than 2 seconds  Neurological:      General: No focal deficit present  Mental Status: She is alert and oriented to person, place, and time  Cranial Nerves: No cranial nerve deficit  Sensory: Sensory deficit present  Motor: No weakness  Coordination: Coordination normal       Gait: Gait normal    Psychiatric:         Mood and Affect: Mood normal          Thought Content:  Thought content normal          Judgment: Judgment normal          Vital Signs  ED Triage Vitals   Temperature Pulse Respirations Blood Pressure SpO2   01/18/23 1441 01/18/23 1441 01/18/23 1441 01/18/23 1441 01/18/23 1441   (!) 97 °F (36 1 °C) 85 18 (!) 211/89 92 %      Temp Source Heart Rate Source Patient Position - Orthostatic VS BP Location FiO2 (%)   01/18/23 1441 01/18/23 1441 01/18/23 1441 01/18/23 1441 --   Temporal Monitor Sitting Left arm       Pain Score       01/18/23 1506       4           Vitals:    01/18/23 1441 01/18/23 1500 01/18/23 1530   BP: (!) 211/89 159/71 161/69   Pulse: 85 63 69   Patient Position - Orthostatic VS: Sitting           Visual Acuity  Visual Acuity    Flowsheet Row Most Recent Value   L Pupil Size (mm) 2   R Pupil Size (mm) 2          ED Medications  Medications   hydrALAZINE (APRESOLINE) tablet 25 mg (25 mg Oral Given 1/18/23 1501)   acetaminophen (TYLENOL) tablet 650 mg (650 mg Oral Given 1/18/23 1506)       Diagnostic Studies  Results Reviewed     Procedure Component Value Units Date/Time    High Sensitivity Troponin I Random [[id number]]  (Abnormal) Collected: 01/18/23 1459    Lab Status: Final result Specimen: Blood from Arm, Right Updated: 01/18/23 1528     HS TnI random 6 ng/L     Basic metabolic panel [[id number]]  (Abnormal) Collected: 01/18/23 1459    Lab Status: Final result Specimen: Blood from Arm, Right Updated: 01/18/23 1514     Sodium 138 mmol/L      Potassium 3 8 mmol/L      Chloride 103 mmol/L CO2 26 mmol/L      ANION GAP 9 mmol/L      BUN 13 mg/dL      Creatinine 0 68 mg/dL      Glucose 152 mg/dL      Calcium 9 2 mg/dL      eGFR 96 ml/min/1 73sq m     Narrative:      [place] guidelines for Chronic Kidney Disease (CKD):   •  Stage 1 with normal or high GFR (GFR > 90 mL/min/1 73 square meters)  •  Stage 2 Mild CKD (GFR = 60-89 mL/min/1 73 square meters)  •  Stage 3A Moderate CKD (GFR = 45-59 mL/min/1 73 square meters)  •  Stage 3B Moderate CKD (GFR = 30-44 mL/min/1 73 square meters)  •  Stage 4 Severe CKD (GFR = 15-29 mL/min/1 73 square meters)  •  Stage 5 End Stage CKD (GFR <15 mL/min/1 73 square meters)  Note: GFR calculation is accurate only with a steady state creatinine    CBC and differential [[id number]] Collected: 01/18/23 1459    Lab Status: Final result Specimen: Blood from Arm, Right Updated: 01/18/23 1504     WBC 6 64 Thousand/uL      RBC 4 48 Million/uL      Hemoglobin 14 2 g/dL      Hematocrit 42 4 %      MCV 95 fL      MCH 31 7 pg      MCHC 33 5 g/dL      RDW 13 8 %      MPV 9 6 fL      Platelets 507 Thousands/uL      nRBC 0 /100 WBCs      Neutrophils Relative 55 %      Immat GRANS % 1 %      Lymphocytes Relative 34 %      Monocytes Relative 7 %      Eosinophils Relative 2 %      Basophils Relative 1 %      Neutrophils Absolute 3 77 Thousands/µL      Immature Grans Absolute 0 06 Thousand/uL      Lymphocytes Absolute 2 23 Thousands/µL      Monocytes Absolute 0 43 Thousand/µL      Eosinophils Absolute 0 12 Thousand/µL      Basophils Absolute 0 03 Thousands/µL                  CT head without contrast   Final Result by Jovan Kirkland DO (01/18 1545)      No acute intracranial abnormality                    Workstation performed: [id number]                    Procedures  Procedures         ED Course  ED Course as of 01/18/23 1558   Wed Jan 18, 2023   1550 HS TnI random(!): 6   1550 Sodium: 138   1550 Potassium: 3 8   1550 BUN: 13   1550 Creatinine: 0 68   1550 WBC: 6 64   1550 Hemoglobin: 14 2   1550 HCT: 42 4                                             Medical Decision Making  EKG was performed at 1451  It shows normal sinus rhythm rate of 61, normal axis, no ST elevations or depressions  Amount and/or Complexity of Data Reviewed  Labs: ordered  Decision-making details documented in ED Course  Details: all normal except slight hyperglycemia  neg trop  Radiology: ordered and independent interpretation performed  Details: CT head negative   Discussion of management or test interpretation with external provider(s): Pt with facial paresthesias for 1 week  CT neg for stroke  No focal neuro deficit  BP high, improved with hydralazine, will Rx same to use PRN until follow up with PMD     Risk  OTC drugs  Prescription drug management  Risk Details: Consider TIA vs stroke,   Most likely symptomatic hypertension         Disposition  Final diagnoses:   Hypertension, unspecified type   Paresthesias   Primary hypertension     Time reflects when diagnosis was documented in both MDM as applicable and the Disposition within this note     Time User Action Codes Description Comment    1/18/2023  3:57 PM Michael Miller Add [I10] Hypertension, unspecified type     1/18/2023  3:57 PM Michael Miller [R20 2] Paresthesias     1/18/2023  3:58 PM Prudence Michael Ambrocio Add [I10] Primary hypertension       ED Disposition     ED Disposition   Discharge    Condition   Stable    Date/Time   Wed Jan 18, 2023  3:57 PM    Kisha Head 43 discharge to home/self care                 Follow-up Information     Follow up With Specialties Details Why Ryder Vigil MD Family Medicine In 3 days  Dept of [facility]  [address] [facility] [address]   [phone]            Patient's Medications   Discharge Prescriptions    HYDRALAZINE (APRESOLINE) 25 MG TABLET    Take 1 tablet (25 mg total) by mouth 3 (three) times a day If BP elevated despite baseline medication  Only uses IF needed       Start Date: 1/18/2023 End Date: --       Order Dose: 25 mg       Quantity: 60 tablet    Refills: 0       No discharge procedures on file      PDMP Review     None          ED Provider  Electronically Signed by           Yana Stephen MD  01/18/23 1559

## 2023-01-18 NOTE — ED NOTES
Patient transported to [address] [place] [address] , [facility]  01/18/23 7686

## 2023-02-10 ENCOUNTER — TELEPHONE (OUTPATIENT)
Dept: OBGYN CLINIC | Facility: CLINIC | Age: 58
End: 2023-02-10

## 2023-02-10 NOTE — TELEPHONE ENCOUNTER
Called the patient to advise her that she needed a new [facility] referral for her visit with Dr Jamil Fagan on 2/13/23  The patient said she was going to reschedule this appointment but will get a new referral faxed to the office prior to her appointment on 3/9/23

## 2023-02-15 NOTE — TELEPHONE ENCOUNTER
Yue Kirkland    Referral from [place] was received and is attached  Jessica Parikh is requesting confirmation of next appointment on March 9th but faxed [place] refrl # [id number] which appears to be for hand specialist not the knee  Might need to confirm with Jessica Parikh on refrl # and body part

## 2023-02-17 NOTE — TELEPHONE ENCOUNTER
I called the patient and advised her that the referral we received was for her wrist and not her knees  I also spoke with Low Izquierdo at the [facility] who said to have the patient call her provider at the [facility] to get a new referral for her knees    The patient will call to get the referral

## 2023-03-13 ENCOUNTER — OFFICE VISIT (OUTPATIENT)
Dept: OBGYN CLINIC | Facility: CLINIC | Age: 58
End: 2023-03-13

## 2023-03-13 VITALS
BODY MASS INDEX: 41.62 KG/M2 | WEIGHT: 259 LBS | HEART RATE: 72 BPM | SYSTOLIC BLOOD PRESSURE: 143 MMHG | DIASTOLIC BLOOD PRESSURE: 86 MMHG | HEIGHT: 66 IN

## 2023-03-13 DIAGNOSIS — G89.29 CHRONIC PAIN OF RIGHT KNEE: Primary | ICD-10-CM

## 2023-03-13 DIAGNOSIS — M25.562 CHRONIC PAIN OF LEFT KNEE: ICD-10-CM

## 2023-03-13 DIAGNOSIS — M17.0 BILATERAL PRIMARY OSTEOARTHRITIS OF KNEE: ICD-10-CM

## 2023-03-13 DIAGNOSIS — G89.29 CHRONIC PAIN OF LEFT KNEE: ICD-10-CM

## 2023-03-13 DIAGNOSIS — M25.561 CHRONIC PAIN OF RIGHT KNEE: Primary | ICD-10-CM

## 2023-03-13 RX ORDER — METHYLPREDNISOLONE ACETATE 40 MG/ML
2 INJECTION, SUSPENSION INTRA-ARTICULAR; INTRALESIONAL; INTRAMUSCULAR; SOFT TISSUE
Status: COMPLETED | OUTPATIENT
Start: 2023-03-13 | End: 2023-03-13

## 2023-03-13 RX ORDER — BUPIVACAINE HYDROCHLORIDE 2.5 MG/ML
2 INJECTION, SOLUTION INFILTRATION; PERINEURAL
Status: COMPLETED | OUTPATIENT
Start: 2023-03-13 | End: 2023-03-13

## 2023-03-13 RX ORDER — LIDOCAINE HYDROCHLORIDE 10 MG/ML
2 INJECTION, SOLUTION INFILTRATION; PERINEURAL
Status: COMPLETED | OUTPATIENT
Start: 2023-03-13 | End: 2023-03-13

## 2023-03-13 RX ADMIN — METHYLPREDNISOLONE ACETATE 2 ML: 40 INJECTION, SUSPENSION INTRA-ARTICULAR; INTRALESIONAL; INTRAMUSCULAR; SOFT TISSUE at 14:54

## 2023-03-13 RX ADMIN — LIDOCAINE HYDROCHLORIDE 2 ML: 10 INJECTION, SOLUTION INFILTRATION; PERINEURAL at 14:54

## 2023-03-13 RX ADMIN — BUPIVACAINE HYDROCHLORIDE 2 ML: 2.5 INJECTION, SOLUTION INFILTRATION; PERINEURAL at 14:54

## 2023-03-13 NOTE — PROGRESS NOTES
Patient Name:  Cristal Torres  MRN:  [id number]    [address] [place] [address]     1  Chronic pain of right knee    2  Bilateral primary osteoarthritis of knee  -     Large joint arthrocentesis: R knee  -     Large joint arthrocentesis: L knee    3  Chronic pain of left knee        59 y o  female with Bilateral knee osteoarthritis with worsening pain  Discussed continued nonoperative vs surgical management of knee osteoarthritis  At this time, patient would like to move forward with bilateral knee corticosteroid injections and continue weight management/diet in order to proceed with total knee arthroplasty in the future  Provided patient with bilateral knee corticosteroid injections and tolerated well  Educated patient on diet, water consumption and portion control  Patient verbalized understanding of the above and will follow up in office in 3 months for reevaluation  History of the Present Illness   Cristal Torres is a 59 y o  female with Bilateral knee osteoarthritis  Today, patient reports she is doing okay and would like to have injections today  Patient reports about 2-2 5 months of pain relief from injections  She admits Right knee is more painful than the Left  Review of Systems     Review of Systems   Constitutional: Negative for chills and fever  HENT: Negative for congestion  Respiratory: Negative for cough, chest tightness and shortness of breath  Cardiovascular: Negative for chest pain and palpitations  Gastrointestinal: Negative for abdominal pain  Endocrine: Negative for cold intolerance and heat intolerance  Neurological: Negative for syncope  Psychiatric/Behavioral: Negative for confusion  Physical Exam     /86   Pulse 72   Ht 5' 6" (1 676 m)   Wt 117 kg (259 lb)   BMI 41 80 kg/m²     Right Knee  Range of motion from 5 to 100 degrees  There is trace effusion      There is tenderness over the medial     The patient is able to perform a straight leg raise with 5/5 quad strength   The patient is neurovascular intact distally  Left Knee  Range of motion from 7 to 95 degrees  There is no effusion  There is tenderness over the medial and posteomedial     The patient is able to perform a straight leg raise 4+/5 quad strength  The patient is neurovascular intact distally  Data Review     I have personally reviewed pertinent films in PACS, and my interpretation follows  Previous images demonstrate bilateral knee moderate medial and lateral osteoarthritis, mild to moderate patellofemoral osteoarthritis  Social History     Tobacco Use   • Smoking status: Former   • Smokeless tobacco: Never   Vaping Use   • Vaping Use: Never used   Substance Use Topics   • Alcohol use:  Yes   • Drug use: Never           Large joint arthrocentesis: R knee  Universal Protocol:  Risks and benefits: risks, benefits and alternatives were discussed  Consent given by: patient  Patient identity confirmed: verbally with patient    Procedure Details  Location: knee - R knee  Needle size: 22 G  Approach: lateral  Medications administered: 2 mL bupivacaine 0 25 %; 2 mL lidocaine 1 %; 2 mL methylPREDNISolone acetate 40 mg/mL    Patient tolerance: patient tolerated the procedure well with no immediate complications  Dressing:  Sterile dressing applied    Large joint arthrocentesis: L knee  Universal Protocol:  Risks and benefits: risks, benefits and alternatives were discussed  Consent given by: patient  Patient identity confirmed: verbally with patient    Procedure Details  Location: knee - L knee  Needle size: 22 G  Approach: lateral  Medications administered: 2 mL bupivacaine 0 25 %; 2 mL lidocaine 1 %; 2 mL methylPREDNISolone acetate 40 mg/mL    Patient tolerance: patient tolerated the procedure well with no immediate complications  Dressing:  Sterile dressing applied            Agnes Jones DO

## 2023-03-23 ENCOUNTER — APPOINTMENT (EMERGENCY)
Dept: CT IMAGING | Facility: HOSPITAL | Age: 58
End: 2023-03-23

## 2023-03-23 ENCOUNTER — HOSPITAL ENCOUNTER (OUTPATIENT)
Facility: HOSPITAL | Age: 58
Setting detail: OBSERVATION
Discharge: HOME | End: 2023-03-24
Attending: EMERGENCY MEDICINE | Admitting: STUDENT IN AN ORGANIZED HEALTH CARE EDUCATION/TRAINING PROGRAM

## 2023-03-23 ENCOUNTER — APPOINTMENT (EMERGENCY)
Dept: RADIOLOGY | Facility: HOSPITAL | Age: 58
End: 2023-03-23

## 2023-03-23 DIAGNOSIS — K76.0 NONALCOHOLIC HEPATOSTEATOSIS: ICD-10-CM

## 2023-03-23 DIAGNOSIS — R11.2 NAUSEA AND VOMITING, UNSPECIFIED VOMITING TYPE: ICD-10-CM

## 2023-03-23 DIAGNOSIS — R10.13 EPIGASTRIC PAIN: Primary | ICD-10-CM

## 2023-03-23 DIAGNOSIS — R11.10 VOMITING: ICD-10-CM

## 2023-03-23 PROBLEM — F41.9 ANXIETY: Status: ACTIVE | Noted: 2023-03-23

## 2023-03-23 PROBLEM — R07.9 CHEST PAIN: Status: ACTIVE | Noted: 2023-03-23

## 2023-03-23 LAB
2HR DELTA HS TROPONIN: 4 NG/L
4HR DELTA HS TROPONIN: 5 NG/L
ALBUMIN SERPL BCP-MCNC: 4.2 G/DL (ref 3.5–5)
ALP SERPL-CCNC: 96 U/L (ref 34–104)
ALT SERPL W P-5'-P-CCNC: 17 U/L (ref 7–52)
ANION GAP SERPL CALCULATED.3IONS-SCNC: 12 MMOL/L (ref 4–13)
APTT PPP: 28 SECONDS (ref 23–37)
AST SERPL W P-5'-P-CCNC: 20 U/L (ref 13–39)
ATRIAL RATE: 105 BPM
BASOPHILS # BLD AUTO: 0.07 THOUSANDS/ÂΜL (ref 0–0.1)
BASOPHILS NFR BLD AUTO: 1 % (ref 0–1)
BILIRUB SERPL-MCNC: 0.8 MG/DL (ref 0.2–1)
BUN SERPL-MCNC: 29 MG/DL (ref 5–25)
CALCIUM SERPL-MCNC: 9.8 MG/DL (ref 8.4–10.2)
CARDIAC TROPONIN I PNL SERPL HS: 15 NG/L
CARDIAC TROPONIN I PNL SERPL HS: 19 NG/L
CARDIAC TROPONIN I PNL SERPL HS: 20 NG/L
CHLORIDE SERPL-SCNC: 102 MMOL/L (ref 96–108)
CO2 SERPL-SCNC: 21 MMOL/L (ref 21–32)
CREAT SERPL-MCNC: 0.95 MG/DL (ref 0.6–1.3)
EOSINOPHIL # BLD AUTO: 0.15 THOUSAND/ÂΜL (ref 0–0.61)
EOSINOPHIL NFR BLD AUTO: 1 % (ref 0–6)
ERYTHROCYTE [DISTWIDTH] IN BLOOD BY AUTOMATED COUNT: 13.6 % (ref 11.6–15.1)
FLUAV RNA RESP QL NAA+PROBE: NEGATIVE
FLUBV RNA RESP QL NAA+PROBE: NEGATIVE
GFR SERPL CREATININE-BSD FRML MDRD: 65 ML/MIN/1.73SQ M
GLUCOSE SERPL-MCNC: 133 MG/DL (ref 65–140)
GLUCOSE SERPL-MCNC: 135 MG/DL (ref 65–140)
HCT VFR BLD AUTO: 45.7 % (ref 34.8–46.1)
HGB BLD-MCNC: 15.4 G/DL (ref 11.5–15.4)
IMM GRANULOCYTES # BLD AUTO: 0.07 THOUSAND/UL (ref 0–0.2)
IMM GRANULOCYTES NFR BLD AUTO: 1 % (ref 0–2)
INR PPP: 1.08 (ref 0.84–1.19)
LIPASE SERPL-CCNC: 23 U/L (ref 11–82)
LYMPHOCYTES # BLD AUTO: 3.26 THOUSANDS/ÂΜL (ref 0.6–4.47)
LYMPHOCYTES NFR BLD AUTO: 29 % (ref 14–44)
MCH RBC QN AUTO: 31.4 PG (ref 26.8–34.3)
MCHC RBC AUTO-ENTMCNC: 33.7 G/DL (ref 31.4–37.4)
MCV RBC AUTO: 93 FL (ref 82–98)
MONOCYTES # BLD AUTO: 0.92 THOUSAND/ÂΜL (ref 0.17–1.22)
MONOCYTES NFR BLD AUTO: 8 % (ref 4–12)
NEUTROPHILS # BLD AUTO: 6.76 THOUSANDS/ÂΜL (ref 1.85–7.62)
NEUTS SEG NFR BLD AUTO: 60 % (ref 43–75)
NRBC BLD AUTO-RTO: 0 /100 WBCS
P AXIS: 67 DEGREES
PLATELET # BLD AUTO: 336 THOUSANDS/UL (ref 149–390)
PMV BLD AUTO: 9.4 FL (ref 8.9–12.7)
POTASSIUM SERPL-SCNC: 3.5 MMOL/L (ref 3.5–5.3)
PR INTERVAL: 150 MS
PROT SERPL-MCNC: 8 G/DL (ref 6.4–8.4)
PROTHROMBIN TIME: 13.8 SECONDS (ref 11.6–14.5)
QRS AXIS: 61 DEGREES
QRSD INTERVAL: 88 MS
QT INTERVAL: 348 MS
QTC INTERVAL: 459 MS
RBC # BLD AUTO: 4.91 MILLION/UL (ref 3.81–5.12)
RSV RNA RESP QL NAA+PROBE: NEGATIVE
SARS-COV-2 RNA RESP QL NAA+PROBE: NEGATIVE
SODIUM SERPL-SCNC: 135 MMOL/L (ref 135–147)
T WAVE AXIS: 53 DEGREES
VENTRICULAR RATE: 105 BPM
WBC # BLD AUTO: 11.23 THOUSAND/UL (ref 4.31–10.16)

## 2023-03-23 RX ORDER — LORAZEPAM 2 MG/ML
1 INJECTION INTRAMUSCULAR ONCE
Status: COMPLETED | OUTPATIENT
Start: 2023-03-23 | End: 2023-03-23

## 2023-03-23 RX ORDER — HYDROXYZINE HYDROCHLORIDE 25 MG/1
50 TABLET, FILM COATED ORAL
Status: DISCONTINUED | OUTPATIENT
Start: 2023-03-23 | End: 2023-03-24

## 2023-03-23 RX ORDER — FAMOTIDINE 20 MG/1
20 TABLET, FILM COATED ORAL 2 TIMES DAILY PRN
Status: DISCONTINUED | OUTPATIENT
Start: 2023-03-23 | End: 2023-03-24 | Stop reason: HOSPADM

## 2023-03-23 RX ORDER — FAMOTIDINE 10 MG/ML
20 INJECTION, SOLUTION INTRAVENOUS ONCE
Status: COMPLETED | OUTPATIENT
Start: 2023-03-23 | End: 2023-03-23

## 2023-03-23 RX ORDER — ASPIRIN 81 MG/1
324 TABLET, CHEWABLE ORAL ONCE
Status: COMPLETED | OUTPATIENT
Start: 2023-03-23 | End: 2023-03-23

## 2023-03-23 RX ORDER — INSULIN LISPRO 100 [IU]/ML
2-12 INJECTION, SOLUTION INTRAVENOUS; SUBCUTANEOUS
Status: DISCONTINUED | OUTPATIENT
Start: 2023-03-23 | End: 2023-03-24 | Stop reason: HOSPADM

## 2023-03-23 RX ORDER — LOSARTAN POTASSIUM 50 MG/1
100 TABLET ORAL
Status: DISCONTINUED | OUTPATIENT
Start: 2023-03-23 | End: 2023-03-24 | Stop reason: HOSPADM

## 2023-03-23 RX ORDER — ESCITALOPRAM OXALATE 10 MG/1
5 TABLET ORAL DAILY
Status: DISCONTINUED | OUTPATIENT
Start: 2023-03-24 | End: 2023-03-24 | Stop reason: HOSPADM

## 2023-03-23 RX ORDER — ACETAMINOPHEN 325 MG/1
650 TABLET ORAL EVERY 6 HOURS PRN
Status: DISCONTINUED | OUTPATIENT
Start: 2023-03-23 | End: 2023-03-24 | Stop reason: HOSPADM

## 2023-03-23 RX ORDER — ONDANSETRON 2 MG/ML
4 INJECTION INTRAMUSCULAR; INTRAVENOUS ONCE
Status: COMPLETED | OUTPATIENT
Start: 2023-03-23 | End: 2023-03-23

## 2023-03-23 RX ORDER — BUSPIRONE HYDROCHLORIDE 5 MG/1
10 TABLET ORAL 3 TIMES DAILY
Status: DISCONTINUED | OUTPATIENT
Start: 2023-03-23 | End: 2023-03-24 | Stop reason: HOSPADM

## 2023-03-23 RX ORDER — SODIUM CHLORIDE 9 MG/ML
75 INJECTION, SOLUTION INTRAVENOUS CONTINUOUS
Status: DISCONTINUED | OUTPATIENT
Start: 2023-03-23 | End: 2023-03-24 | Stop reason: HOSPADM

## 2023-03-23 RX ORDER — HYDROCHLOROTHIAZIDE 12.5 MG/1
12.5 TABLET ORAL DAILY
Status: DISCONTINUED | OUTPATIENT
Start: 2023-03-24 | End: 2023-03-24 | Stop reason: HOSPADM

## 2023-03-23 RX ORDER — ENOXAPARIN SODIUM 100 MG/ML
40 INJECTION SUBCUTANEOUS DAILY
Status: DISCONTINUED | OUTPATIENT
Start: 2023-03-24 | End: 2023-03-24 | Stop reason: HOSPADM

## 2023-03-23 RX ADMIN — ACETAMINOPHEN 650 MG: 325 TABLET, FILM COATED ORAL at 22:46

## 2023-03-23 RX ADMIN — HYDROXYZINE HYDROCHLORIDE 50 MG: 25 TABLET ORAL at 22:46

## 2023-03-23 RX ADMIN — LORAZEPAM 1 MG: 2 INJECTION INTRAMUSCULAR; INTRAVENOUS at 18:02

## 2023-03-23 RX ADMIN — SODIUM CHLORIDE 1000 ML: 0.9 INJECTION, SOLUTION INTRAVENOUS at 16:31

## 2023-03-23 RX ADMIN — ONDANSETRON 4 MG: 2 INJECTION INTRAMUSCULAR; INTRAVENOUS at 16:34

## 2023-03-23 RX ADMIN — IOHEXOL 100 ML: 350 INJECTION, SOLUTION INTRAVENOUS at 18:00

## 2023-03-23 RX ADMIN — BUSPIRONE HYDROCHLORIDE 10 MG: 5 TABLET ORAL at 22:46

## 2023-03-23 RX ADMIN — FAMOTIDINE 20 MG: 10 INJECTION, SOLUTION INTRAVENOUS at 18:02

## 2023-03-23 RX ADMIN — SODIUM CHLORIDE 75 ML/HR: 0.9 INJECTION, SOLUTION INTRAVENOUS at 22:52

## 2023-03-23 RX ADMIN — LORAZEPAM 1 MG: 2 INJECTION INTRAMUSCULAR; INTRAVENOUS at 16:35

## 2023-03-23 RX ADMIN — LOSARTAN POTASSIUM 100 MG: 50 TABLET, FILM COATED ORAL at 22:46

## 2023-03-23 RX ADMIN — ASPIRIN 324 MG: 81 TABLET, CHEWABLE ORAL at 19:32

## 2023-03-23 NOTE — ED PROVIDER NOTES
Pt Name: Antonina Bernal  MRN: [id number]  [place] 1965  Age/Sex: 59 y o  female  Date of evaluation: 3/23/2023  PCP: Nestor Figueroa MD    [address] [place] [address]    Chief Complaint   Patient presents with   • Shortness of Breath     Pt reports Stomach cramps, dizziness, SOB, and productive cough  Pt reports last weekend and Tuesday had vomiting, still nauseous  Pt denies chest pain          HPI    59 y o  female presenting with multiple symptoms  Patient states that over the past week she has had stomach cramps, lightheadedness, shortness of breath, and productive cough  Vomiting was on Tuesday, patient's had persistent nausea since then  Shortness of breath seems to be intermittent and associated with cough  Today, she states that she felt markedly worse, notes a cramping abdominal pain in the epigastric region that began suddenly  This pain was dull, moderate to severe in intensity, nonradiating, accompanied by shortness of breath as well as some sweating, worse with movement better at rest   She denies leg swelling or pain, fevers, trauma, prior episodes, other symptoms  Patient notes a history of hypertension as well as diabetes, states she has not checked her blood sugar recently        HPI      Past Medical and Surgical History    Past Medical History:   Diagnosis Date   • Asthma    • Diabetes mellitus ([facility] [address] )    • Hypertension        Past Surgical History:   Procedure Laterality Date   • HERNIA REPAIR     • HYSTERECTOMY  05/2021   • MAMMO STEREOTACTIC BREAST BIOPSY RIGHT (ALL INC) Right 5/27/2022       Family History   Problem Relation Age of Onset   • Diabetes Mother    • Diabetes Father    • No Known Problems Maternal Grandmother    • No Known Problems Maternal Grandfather    • No Known Problems Paternal Grandmother    • No Known Problems Paternal Grandfather    • No Known Problems Son    • No Known Problems Son    • No Known Problems Maternal Aunt    • No Known Problems Maternal Aunt    • No Known Problems Maternal Aunt        Social History     Tobacco Use   • Smoking status: Former   • Smokeless tobacco: Never   Vaping Use   • Vaping Use: Never used   Substance Use Topics   • Alcohol use: Yes   • Drug use: Never           Allergies    Allergies   Allergen Reactions   • Sulfa Antibiotics Rash       Home Medications    Prior to Admission medications    Medication Sig Start Date End Date Taking? Authorizing Provider   Alcohol Swabs 70 % PADS Use 4 (four) times a day 1/18/21   Francisco Gaona MD   busPIRone (BUSPAR) 10 mg tablet Take 1 tablet by mouth 3 (three) times a day 12/30/20   Historical Provider, MD   escitalopram (LEXAPRO) 5 mg tablet Take 1 tablet by mouth daily 12/30/20   Historical Provider, MD   hydrALAZINE (APRESOLINE) 25 mg tablet Take 1 tablet (25 mg total) by mouth 3 (three) times a day If BP elevated despite baseline medication  Only uses IF needed 1/18/23   Michael Marie MD   hydrochlorothiazide (HYDRODIURIL) 12 5 mg tablet Take 1 tablet by mouth daily 2/17/22   Historical Provider, MD   hydrOXYzine HCL (ATARAX) 25 mg tablet Take 2 tablets by mouth daily at bedtime 2/2/21   Historical Provider, MD   ibuprofen (MOTRIN) 600 mg tablet Take 1 tablet (600 mg total) by mouth every 8 (eight) hours as needed for mild pain Take 3 times daily for 3-4 days, then take as needed  Please take with food   12/23/21   Evelyn Hilliard PA-C   insulin aspart (NovoLOG FlexPen) 100 UNIT/ML injection pen Inject 30 Units under the skin 3 (three) times a day with meals for 14 days  Patient not taking: Reported on 1/3/2022 1/18/21 1/3/22  Francisco Gaona MD   insulin glargine (LANTUS SOLOSTAR) 100 units/mL injection pen Inject 21 Units under the skin daily at bedtime 2/23/21   Historical Provider, MD   Insulin Pen Needle (Pen Needles) 32G X 4 MM MISC Use 4 (four) times a day for 14 days  Patient not taking: Reported on 1/3/2022 1/18/21 1/3/22  Gulshan WELCH MD   lidocaine (LIDODERM) 5 % APPLY 1 PATCH TOPICALLY DAILY FOR PAIN (REMOVE PATCH AFTER 12 HOURS; 12 HOURS ON AND 12 HOURS OFF) 9/24/21   Historical Provider, MD   losartan (COZAAR) 100 MG tablet Take 100 mg by mouth daily before dinner    Historical Provider, MD   metFORMIN (GLUCOPHAGE) 1000 MG tablet Take 1,000 mg by mouth 2 (two) times a day with meals    Historical Provider, MD   traMADol (ULTRAM) 50 mg tablet Take 50 mg by mouth 2 (two) times a day as needed for moderate pain  Patient not taking: Reported on 1/17/2023    Historical Provider, MD   triamcinolone (KENALOG) 0 1 % cream Apply topically 2 (two) times a day for 7 days 8/7/20 12/9/21  Elida Cedillo MD           Review of Systems    Review of Systems   Constitutional: Negative for activity change, chills and fever  HENT: Negative for drooling and facial swelling  Eyes: Negative for pain, discharge and visual disturbance  Respiratory: Positive for cough and shortness of breath  Negative for apnea, chest tightness and wheezing  Cardiovascular: Negative for chest pain and leg swelling  Gastrointestinal: Positive for abdominal pain, nausea and vomiting  Negative for constipation and diarrhea  Genitourinary: Negative for difficulty urinating, dysuria and urgency  Musculoskeletal: Negative for arthralgias, back pain and gait problem  Skin: Negative for color change and rash  Neurological: Positive for light-headedness  Negative for dizziness, speech difficulty, weakness and headaches  Psychiatric/Behavioral: Negative for agitation, behavioral problems and confusion  All other systems reviewed and negative  Physical Exam      ED Triage Vitals [03/23/23 1552]   Temperature Pulse Respirations Blood Pressure SpO2   (!) 97 4 °F (36 3 °C) 100 18 116/81 96 %      Temp Source Heart Rate Source Patient Position - Orthostatic VS BP Location FiO2 (%)   Temporal Monitor Sitting Left arm --      Pain Score       --               Physical Exam  Vitals and nursing note reviewed  Constitutional:       General: She is not in acute distress  Appearance: She is well-developed  She is not ill-appearing, toxic-appearing or diaphoretic  HENT:      Head: Normocephalic and atraumatic  Right Ear: External ear normal       Left Ear: External ear normal       Nose: Nose normal  No congestion or rhinorrhea  Mouth/Throat:      Mouth: Mucous membranes are moist       Pharynx: Oropharynx is clear  Eyes:      Conjunctiva/sclera: Conjunctivae normal       Pupils: Pupils are equal, round, and reactive to light  Cardiovascular:      Rate and Rhythm: Normal rate and regular rhythm  Pulses: Normal pulses  Heart sounds: Normal heart sounds  No murmur heard  No friction rub  No gallop  Pulmonary:      Effort: Pulmonary effort is normal  No respiratory distress  Breath sounds: Normal breath sounds  No wheezing or rales  Abdominal:      General: There is no distension  Palpations: Abdomen is soft  Tenderness: There is abdominal tenderness  There is no guarding or rebound  Comments: Mild epigastric tenderness to palpation, no rebound or guarding, negative Velazquez sign  Musculoskeletal:         General: No deformity  Normal range of motion  Cervical back: Normal range of motion and neck supple  Right lower leg: No edema  Left lower leg: No edema  Skin:     General: Skin is warm and dry  Capillary Refill: Capillary refill takes less than 2 seconds  Findings: No erythema or rash  Neurological:      Mental Status: She is alert and oriented to person, place, and time  Cranial Nerves: No cranial nerve deficit  Motor: No weakness  Coordination: Coordination normal       Gait: Gait normal    Psychiatric:         Behavior: Behavior normal          Thought Content:  Thought content normal          Judgment: Judgment normal               Diagnostic Results  EKG Interpretation    Rate:  105  BPM  Rhythm: Sinus tachycardia  Axis:  Normal   Intervals: Normal, no blocks, QTc  459 ms  Q waves:  No pathologic Q waves   T waves: Biphasic in inferior and lateral leads  ST segments:  No significant elevations or depressions     Impression: Sinus tachycardia with biphasic T waves in inferior and lateral leads but without evidence of STEMI    EKG for comparison: EKG dated 18 January 2023 showed normal sinus rhythm, significant changes in T wave morphology in inferior and lateral leads    EKG interpreted by me  Labs:    Results Reviewed     Procedure Component Value Units Date/Time    Hemoglobin A1C [[id number]] Collected: 03/23/23 1630    Lab Status: In process Specimen: Blood from Arm, Right Updated: 03/23/23 2255    HS Troponin I 4hr [[id number]]  (Normal) Collected: 03/23/23 2041    Lab Status: Final result Specimen: Blood from Arm, Left Updated: 03/23/23 2113     hs TnI 4hr 20 ng/L      Delta 4hr hsTnI 5 ng/L     HS Troponin I 2hr [[id number]]  (Normal) Collected: 03/23/23 1840    Lab Status: Final result Specimen: Blood from Arm, Right Updated: 03/23/23 1909     hs TnI 2hr 19 ng/L      Delta 2hr hsTnI 4 ng/L     FLU/RSV/COVID - if FLU/RSV clinically relevant [[id number]]  (Normal) Collected: 03/23/23 1634    Lab Status: Final result Specimen: Nares from Nose Updated: 03/23/23 1735     SARS-CoV-2 Negative     INFLUENZA A PCR Negative     INFLUENZA B PCR Negative     RSV PCR Negative    Narrative:      FOR PEDIATRIC PATIENTS - copy/paste COVID Guidelines URL to browser: [email or web address]    SARS-CoV-2 assay is a Nucleic Acid Amplification assay intended for the  qualitative detection of nucleic acid from SARS-CoV-2 in nasopharyngeal  swabs  Results are for the presumptive identification of SARS-CoV-2 RNA  Positive results are indicative of infection with SARS-CoV-2, the virus  causing COVID-19, but do not rule out bacterial infection or co-infection  with other viruses  Laboratories within the [place] and its  territories are required to report all positive results to the appropriate  public health authorities  Negative results do not preclude SARS-CoV-2  infection and should not be used as the sole basis for treatment or other  patient management decisions  Negative results must be combined with  clinical observations, patient history, and epidemiological information  This test has not been FDA cleared or approved  This test has been authorized by FDA under an Emergency Use Authorization  (EUA)  This test is only authorized for the duration of time the  declaration that circumstances exist justifying the authorization of the  emergency use of an in vitro diagnostic tests for detection of SARS-CoV-2  virus and/or diagnosis of COVID-19 infection under section 564(b)(1) of  the Act, 21 U  S C  326ZIL-5(R)(1), unless the authorization is terminated  or revoked sooner  The test has been validated but independent review by FDA  and CLIA is pending  Test performed using [employer] Gene[employer]pert: This RT-PCR assay targets N2,  a region unique to SARS-CoV-2  A conserved region in the E-gene was chosen  for pan-Sarbecovirus detection which includes SARS-CoV-2  According to CMS-2020-01-R, this platform meets the definition of high-throughput technology      HS Troponin 0hr (reflex protocol) [[id number]]  (Normal) Collected: 03/23/23 1630    Lab Status: Final result Specimen: Blood from Arm, Right Updated: 03/23/23 1714     hs TnI 0hr 15 ng/L     Comprehensive metabolic panel [[id number]]  (Abnormal) Collected: 03/23/23 1630    Lab Status: Final result Specimen: Blood from Arm, Right Updated: 03/23/23 1701     Sodium 135 mmol/L      Potassium 3 5 mmol/L      Chloride 102 mmol/L      CO2 21 mmol/L      ANION GAP 12 mmol/L      BUN 29 mg/dL      Creatinine 0 95 mg/dL      Glucose 135 mg/dL      Calcium 9 8 mg/dL      AST 20 U/L      ALT 17 U/L      Alkaline Phosphatase 96 U/L      Total Protein 8 0 g/dL      Albumin 4 2 g/dL      Total Bilirubin 0 80 mg/dL      eGFR 65 ml/min/1 73sq m     Narrative:      National Kidney Disease Foundation guidelines for Chronic Kidney Disease (CKD):   •  Stage 1 with normal or high GFR (GFR > 90 mL/min/1 73 square meters)  •  Stage 2 Mild CKD (GFR = 60-89 mL/min/1 73 square meters)  •  Stage 3A Moderate CKD (GFR = 45-59 mL/min/1 73 square meters)  •  Stage 3B Moderate CKD (GFR = 30-44 mL/min/1 73 square meters)  •  Stage 4 Severe CKD (GFR = 15-29 mL/min/1 73 square meters)  •  Stage 5 End Stage CKD (GFR <15 mL/min/1 73 square meters)  Note: GFR calculation is accurate only with a steady state creatinine    Lipase [[id number]]  (Normal) Collected: 03/23/23 1630    Lab Status: Final result Specimen: Blood from Arm, Right Updated: 03/23/23 1701     Lipase 23 u/L     Protime-INR [[id number]]  (Normal) Collected: 03/23/23 1630    Lab Status: Final result Specimen: Blood from Arm, Right Updated: 03/23/23 1656     Protime 13 8 seconds      INR 1 08    APTT [[id number]]  (Normal) Collected: 03/23/23 1630    Lab Status: Final result Specimen: Blood from Arm, Right Updated: 03/23/23 1656     PTT 28 seconds     CBC and differential [[id number]]  (Abnormal) Collected: 03/23/23 1630    Lab Status: Final result Specimen: Blood from Arm, Right Updated: 03/23/23 1635     WBC 11 23 Thousand/uL      RBC 4 91 Million/uL      Hemoglobin 15 4 g/dL      Hematocrit 45 7 %      MCV 93 fL      MCH 31 4 pg      MCHC 33 7 g/dL      RDW 13 6 %      MPV 9 4 fL      Platelets 345 Thousands/uL      nRBC 0 /100 WBCs      Neutrophils Relative 60 %      Immat GRANS % 1 %      Lymphocytes Relative 29 %      Monocytes Relative 8 %      Eosinophils Relative 1 %      Basophils Relative 1 %      Neutrophils Absolute 6 76 Thousands/µL      Immature Grans Absolute 0 07 Thousand/uL      Lymphocytes Absolute 3 26 Thousands/µL      Monocytes Absolute 0 92 Thousand/µL      Eosinophils Absolute 0 15 Thousand/µL Basophils Absolute 0 07 Thousands/µL           All labs reviewed and utilized in the medical decision making process    Radiology:    CT abdomen pelvis with contrast   ED Interpretation   No acute inflammatory process identified      Hepatic steatosis  Final Result      No acute inflammatory process identified  Hepatic steatosis  Workstation performed: [id number]         XR chest 1 view portable   ED Interpretation   No acute cardiopulmonary disease          Final Result      No acute cardiopulmonary disease  Workstation performed: [id number]             All radiology studies independently viewed by me and interpreted by the radiologist     Procedure    Procedures        ED Course of Care and Re-Assessments      Initial presentation, a broad differential diagnosis  Plan formed for initial labs and cardiac work-up as well as symptomatic treatment with reevaluation afterwards  Patient given lorazepam for significant anxiety at her request   EKG concerning for changes from recent prior, initial troponin 15  Relatively low suspicion for acute surgical process the abdomen based on examination history and labs but patient requesting further evaluation and CT scan, after discussion of risks and benefits that was performed    Medications   busPIRone (BUSPAR) tablet 10 mg (10 mg Oral Given 3/23/23 2246)   escitalopram (LEXAPRO) tablet 5 mg (has no administration in time range)   hydrochlorothiazide (HYDRODIURIL) tablet 12 5 mg (has no administration in time range)   hydrOXYzine HCL (ATARAX) tablet 50 mg (50 mg Oral Given 3/23/23 2246)   losartan (COZAAR) tablet 100 mg (100 mg Oral Given 3/23/23 2246)   acetaminophen (TYLENOL) tablet 650 mg (650 mg Oral Given 3/23/23 2246)   sodium chloride 0 9 % infusion (75 mL/hr Intravenous New Bag 3/23/23 2252)   trimethobenzamide (TIGAN) IM injection 200 mg (has no administration in time range)   famotidine (PEPCID) tablet 20 mg (has no administration in time range)   enoxaparin (LOVENOX) subcutaneous injection 40 mg (has no administration in time range)   insulin lispro (HumaLOG) 100 units/mL subcutaneous injection 2-12 Units (2 Units Subcutaneous Not Given 3/23/23 2246)   ondansetron (ZOFRAN) injection 4 mg (4 mg Intravenous Given 3/23/23 1634)   LORazepam (ATIVAN) injection 1 mg (1 mg Intravenous Given 3/23/23 1635)   sodium chloride 0 9 % bolus 1,000 mL (1,000 mL Intravenous New Bag 3/23/23 1631)   LORazepam (ATIVAN) injection 1 mg (1 mg Intravenous Given 3/23/23 1802)   Famotidine (PF) (PEPCID) injection 20 mg (20 mg Intravenous Given 3/23/23 1802)   iohexol (OMNIPAQUE) 350 MG/ML injection (SINGLE-DOSE) 100 mL (100 mL Intravenous Given 3/23/23 1800)   aspirin chewable tablet 324 mg (324 mg Oral Given 3/23/23 1932)           FINAL IMPRESSION    Final diagnoses:   Epigastric pain   Vomiting         DISPOSITION/PLAN    Presentation as above with epigastric pain, shortness of breath, and vomiting  Vital signs and examination as above  Tachycardia resolved with resuscitation and treatment of anxiety, nausea much improved with Zofran, patient ate half a sandwich as well as some soup after receiving that medication, informed staff after she had eaten these things  No further emesis  Initial troponin of 15 and some concerning changes on EKG as well as history concerning for an acute coronary syndrome with potential atypical presentation  Low suspicion for aortic dissection, PE, bacterial pneumonia, pneumothorax, sepsis at this time  Plan form for likely admission to internal medicine for cardiac observation, pending results of CT scan and second troponin at time of signout to Dr Adela Gifford, position for same, anticipated admission to internal medicine for cardiac observation unless CT reveals acute surgical problem    Time reflects when diagnosis was documented in both MDM as applicable and the Disposition within this note     Time User Action Codes Description Comment    3/23/2023  8:53 PM Ariel Irwin Add [R10 13] Epigastric pain     3/23/2023  8:53 PM Ariel Irwin Add [R11 10] Vomiting       ED Disposition     ED Disposition   Admit    Condition   Stable    Date/Time   u Mar 23, 2023  8:53 PM    Comment   [facility] and the patient's admission status was agreed to be Admission Status: observation status to the service of Dr Aldair Rome   Follow-up Information    None           PATIENT REFERRED TO:    No follow-up provider specified  DISCHARGE MEDICATIONS:    Current Discharge Medication List      CONTINUE these medications which have NOT CHANGED    Details   busPIRone (BUSPAR) 10 mg tablet Take 1 tablet by mouth 3 (three) times a day      escitalopram (LEXAPRO) 5 mg tablet Take 1 tablet by mouth daily      hydrALAZINE (APRESOLINE) 25 mg tablet Take 1 tablet (25 mg total) by mouth 3 (three) times a day If BP elevated despite baseline medication  Only uses IF needed  Qty: 60 tablet, Refills: 0    Associated Diagnoses: Primary hypertension      hydrochlorothiazide (HYDRODIURIL) 12 5 mg tablet Take 1 tablet by mouth daily      hydrOXYzine HCL (ATARAX) 25 mg tablet Take 2 tablets by mouth daily at bedtime      losartan (COZAAR) 100 MG tablet Take 100 mg by mouth daily before dinner      metFORMIN (GLUCOPHAGE) 1000 MG tablet Take 1,000 mg by mouth 2 (two) times a day with meals      Alcohol Swabs 70 % PADS Use 4 (four) times a day  Qty: 120 each, Refills: 0    Associated Diagnoses: Type 2 diabetes mellitus (HCC)      ibuprofen (MOTRIN) 600 mg tablet Take 1 tablet (600 mg total) by mouth every 8 (eight) hours as needed for mild pain Take 3 times daily for 3-4 days, then take as needed  Please take with food    Qty: 30 tablet, Refills: 0    Associated Diagnoses: Bilateral primary osteoarthritis of knee      insulin aspart (NovoLOG FlexPen) 100 UNIT/ML injection pen Inject 30 Units under the skin 3 (three) times a day with meals for 14 days  Qty: 5 pen, Refills: 0    Associated Diagnoses: Type 2 diabetes mellitus (HCC)      insulin glargine (LANTUS SOLOSTAR) 100 units/mL injection pen Inject 21 Units under the skin daily at bedtime      Insulin Pen Needle (Pen Needles) 32G X 4 MM MISC Use 4 (four) times a day for 14 days  Qty: 56 each, Refills: 0    Associated Diagnoses: Type 2 diabetes mellitus (HCC)      lidocaine (LIDODERM) 5 % APPLY 1 PATCH TOPICALLY DAILY FOR PAIN (REMOVE PATCH AFTER 12 HOURS; 12 HOURS ON AND 12 HOURS OFF)      traMADol (ULTRAM) 50 mg tablet Take 50 mg by mouth 2 (two) times a day as needed for moderate pain      triamcinolone (KENALOG) 0 1 % cream Apply topically 2 (two) times a day for 7 days  Qty: 30 g, Refills: 0    Associated Diagnoses: Dermatitis             No discharge procedures on file  Evy Forde MD    Portions of the record may have been created with voice recognition software  Occasional wrong word or "sound alike" substitutions may have occurred due to the inherent limitations of voice recognition software    Please read the chart carefully and recognize, using context, where substitutions have occurred     Evy Forde MD  03/24/23 9009

## 2023-03-24 VITALS
DIASTOLIC BLOOD PRESSURE: 84 MMHG | WEIGHT: 246.91 LBS | HEIGHT: 66 IN | RESPIRATION RATE: 18 BRPM | HEART RATE: 69 BPM | SYSTOLIC BLOOD PRESSURE: 147 MMHG | TEMPERATURE: 97.9 F | BODY MASS INDEX: 39.68 KG/M2 | OXYGEN SATURATION: 93 %

## 2023-03-24 PROBLEM — R07.9 CHEST PAIN: Status: RESOLVED | Noted: 2023-03-23 | Resolved: 2023-03-24

## 2023-03-24 PROBLEM — R11.2 NAUSEA & VOMITING: Status: RESOLVED | Noted: 2023-03-23 | Resolved: 2023-03-24

## 2023-03-24 LAB
ALBUMIN SERPL BCP-MCNC: 3.9 G/DL (ref 3.5–5)
ALP SERPL-CCNC: 94 U/L (ref 34–104)
ALT SERPL W P-5'-P-CCNC: 17 U/L (ref 7–52)
ANION GAP SERPL CALCULATED.3IONS-SCNC: 9 MMOL/L (ref 4–13)
AST SERPL W P-5'-P-CCNC: 25 U/L (ref 13–39)
BASOPHILS # BLD AUTO: 0.04 THOUSANDS/ÂΜL (ref 0–0.1)
BASOPHILS NFR BLD AUTO: 1 % (ref 0–1)
BILIRUB SERPL-MCNC: 0.68 MG/DL (ref 0.2–1)
BUN SERPL-MCNC: 20 MG/DL (ref 5–25)
CALCIUM SERPL-MCNC: 8.6 MG/DL (ref 8.4–10.2)
CHLORIDE SERPL-SCNC: 105 MMOL/L (ref 96–108)
CO2 SERPL-SCNC: 22 MMOL/L (ref 21–32)
CREAT SERPL-MCNC: 0.67 MG/DL (ref 0.6–1.3)
EOSINOPHIL # BLD AUTO: 0.15 THOUSAND/ÂΜL (ref 0–0.61)
EOSINOPHIL NFR BLD AUTO: 2 % (ref 0–6)
ERYTHROCYTE [DISTWIDTH] IN BLOOD BY AUTOMATED COUNT: 13.5 % (ref 11.6–15.1)
EST. AVERAGE GLUCOSE BLD GHB EST-MCNC: 189 MG/DL
GFR SERPL CREATININE-BSD FRML MDRD: 96 ML/MIN/1.73SQ M
GLUCOSE P FAST SERPL-MCNC: 132 MG/DL (ref 65–99)
GLUCOSE SERPL-MCNC: 119 MG/DL (ref 65–140)
GLUCOSE SERPL-MCNC: 120 MG/DL (ref 65–140)
GLUCOSE SERPL-MCNC: 132 MG/DL (ref 65–140)
HBA1C MFR BLD: 8.2 %
HCT VFR BLD AUTO: 44.4 % (ref 34.8–46.1)
HGB BLD-MCNC: 14.6 G/DL (ref 11.5–15.4)
IMM GRANULOCYTES # BLD AUTO: 0.04 THOUSAND/UL (ref 0–0.2)
IMM GRANULOCYTES NFR BLD AUTO: 1 % (ref 0–2)
LYMPHOCYTES # BLD AUTO: 1.65 THOUSANDS/ÂΜL (ref 0.6–4.47)
LYMPHOCYTES NFR BLD AUTO: 25 % (ref 14–44)
MCH RBC QN AUTO: 30.5 PG (ref 26.8–34.3)
MCHC RBC AUTO-ENTMCNC: 32.9 G/DL (ref 31.4–37.4)
MCV RBC AUTO: 93 FL (ref 82–98)
MONOCYTES # BLD AUTO: 0.46 THOUSAND/ÂΜL (ref 0.17–1.22)
MONOCYTES NFR BLD AUTO: 7 % (ref 4–12)
NEUTROPHILS # BLD AUTO: 4.33 THOUSANDS/ÂΜL (ref 1.85–7.62)
NEUTS SEG NFR BLD AUTO: 64 % (ref 43–75)
NRBC BLD AUTO-RTO: 0 /100 WBCS
PLATELET # BLD AUTO: 280 THOUSANDS/UL (ref 149–390)
PMV BLD AUTO: 9.4 FL (ref 8.9–12.7)
POTASSIUM SERPL-SCNC: 3.9 MMOL/L (ref 3.5–5.3)
PROT SERPL-MCNC: 7.7 G/DL (ref 6.4–8.4)
RBC # BLD AUTO: 4.78 MILLION/UL (ref 3.81–5.12)
SODIUM SERPL-SCNC: 136 MMOL/L (ref 135–147)
WBC # BLD AUTO: 6.67 THOUSAND/UL (ref 4.31–10.16)

## 2023-03-24 RX ORDER — SCOLOPAMINE TRANSDERMAL SYSTEM 1 MG/1
1 PATCH, EXTENDED RELEASE TRANSDERMAL
Qty: 3 PATCH | Refills: 0 | Status: SHIPPED | OUTPATIENT
Start: 2023-03-24

## 2023-03-24 RX ORDER — BENZONATATE 100 MG/1
100 CAPSULE ORAL 3 TIMES DAILY PRN
Status: DISCONTINUED | OUTPATIENT
Start: 2023-03-24 | End: 2023-03-24 | Stop reason: HOSPADM

## 2023-03-24 RX ORDER — HYDROXYZINE HYDROCHLORIDE 25 MG/1
50 TABLET, FILM COATED ORAL DAILY
Status: DISCONTINUED | OUTPATIENT
Start: 2023-03-24 | End: 2023-03-24 | Stop reason: HOSPADM

## 2023-03-24 RX ADMIN — HYDROCHLOROTHIAZIDE 12.5 MG: 12.5 TABLET ORAL at 08:34

## 2023-03-24 RX ADMIN — ESCITALOPRAM OXALATE 5 MG: 10 TABLET ORAL at 08:34

## 2023-03-24 RX ADMIN — SODIUM CHLORIDE 75 ML/HR: 0.9 INJECTION, SOLUTION INTRAVENOUS at 09:02

## 2023-03-24 RX ADMIN — HYDROXYZINE HYDROCHLORIDE 50 MG: 25 TABLET ORAL at 12:09

## 2023-03-24 RX ADMIN — ENOXAPARIN SODIUM 40 MG: 40 INJECTION SUBCUTANEOUS at 08:34

## 2023-03-24 RX ADMIN — BENZONATATE 100 MG: 100 CAPSULE ORAL at 06:29

## 2023-03-24 RX ADMIN — BUSPIRONE HYDROCHLORIDE 10 MG: 5 TABLET ORAL at 08:34

## 2023-03-24 NOTE — ASSESSMENT & PLAN NOTE
Presenting with cramping midsternal chest pain in setting of persistent nausea and vomiting which worsens with cough  · No associated diaphoresis, dyspnea  · EKG shows T wave changes but on review in comparison with prior EKGs, appears chronic, will await final read  · Troponin: 15> 19> 20  · Received aspirin 324 mg in ED  · Low to no concern of ACS at this time  · Chest pain is likely secondary to vomiting which is causing esophageal irritation  · We will monitor on telemetry overnight to rule out arrhythmias or acute changes-if unremarkable, can discharge tomorrow

## 2023-03-24 NOTE — ASSESSMENT & PLAN NOTE
Patient presenting with nonbilious nonbloody emesis and nausea for 4 days  · Denies coffee-ground emesis, hematochezia, melena, diarrhea, constipation  · Denies other sick contacts or other household members being ill, denies travel  · Poor p o  intake secondary to nausea and vomiting  · Likely viral gastroenteritis  · Lipase normal  · CT abdomen pelvis shows no acute inflammatory process, chest x-ray unremarkable  · Received IV Zofran and 1 L normal saline bolus in ED, will continue infusion at 75 cc/h  · QTc slightly elevated at 459msec so we will avoid Zofran and Reglan and continue IM Tigan as needed for nausea vomiting  · Pepcid 20 mg twice daily as needed  · Cardiac and carb controlled diet  · Can likely discharge once patient is tolerating p o  intake

## 2023-03-24 NOTE — ASSESSMENT & PLAN NOTE
Patient presenting with nonbilious nonbloody emesis and nausea for 4 days  · Denies coffee-ground emesis, hematochezia, melena, diarrhea, constipation  · Denies other sick contacts or other household members being ill, denies travel  · Poor p o  intake secondary to nausea and vomiting  · Likely viral gastroenteritis  · Lipase normal  · CT abdomen pelvis shows no acute inflammatory process, chest x-ray unremarkable  · Received IV Zofran and 1 L normal saline bolus in ED, will continue infusion at 75 cc/h  · QTc slightly elevated at 459msec so we will avoid Zofran and Reglan and continue IM Tigan as needed for nausea vomiting  · Pepcid 20 mg twice daily as needed  · Ambulatory referral to GI as per patient's request

## 2023-03-24 NOTE — PLAN OF CARE
Problem: Potential for Falls  Goal: Patient will remain free of falls  Description: INTERVENTIONS:  - Educate patient/family on patient safety including physical limitations  - Instruct patient to call for assistance with activity   - Consult OT/PT to assist with strengthening/mobility   - Keep Call bell within reach  - Keep bed low and locked with side rails adjusted as appropriate  - Keep care items and personal belongings within reach  - Initiate and maintain comfort rounds  - Make Fall Risk Sign visible to staff  - Offer Toileting every 2 Hours, in advance of need  - Initiate/Maintain 2alarm  - Obtain necessary fall risk management equipment: 2  - Apply yellow socks and bracelet for high fall risk patients  - Consider moving patient to room near nurses station  Outcome: Progressing     Problem: MOBILITY - ADULT  Goal: Maintain or return to baseline ADL function  Description: INTERVENTIONS:  -  Assess patient's ability to carry out ADLs; assess patient's baseline for ADL function and identify physical deficits which impact ability to perform ADLs (bathing, care of mouth/teeth, toileting, grooming, dressing, etc )  - Assess/evaluate cause of self-care deficits   - Assess range of motion  - Assess patient's mobility; develop plan if impaired  - Assess patient's need for assistive devices and provide as appropriate  - Encourage maximum independence but intervene and supervise when necessary  - Involve family in performance of ADLs  - Assess for home care needs following discharge   - Consider OT consult to assist with ADL evaluation and planning for discharge  - Provide patient education as appropriate  Outcome: Progressing  Goal: Maintains/Returns to pre admission functional level  Description: INTERVENTIONS:  - Perform BMAT or MOVE assessment daily    - Set and communicate daily mobility goal to care team and patient/family/caregiver     - Collaborate with rehabilitation services on mobility goals if consulted  - Perform Range of Motion 2 times a day  - Reposition patient every 2 hours    - Dangle patient 2 times a day  - Stand patient 2 times a day  - Ambulate patient 2 times a day  - Out of bed to chair 2 times a day   - Out of bed for meals 2 times a day  - Out of bed for toileting  - Record patient progress and toleration of activity level   Outcome: Progressing

## 2023-03-24 NOTE — ASSESSMENT & PLAN NOTE
Lab Results   Component Value Date    HGBA1C 7 2 (H) 04/06/2022   Repeat hemoglobin A1c as last 1 was about a year ago  Appears well controlled so just continue insulin sliding scale and monitor blood sugar  Hold Home regimen: metformin 1000 mg twice daily, insulin glargine 21 unit nightly which pt does not take daily

## 2023-03-24 NOTE — PLAN OF CARE
Problem: Potential for Falls  Goal: Patient will remain free of falls  Description: INTERVENTIONS:  - Educate patient/family on patient safety including physical limitations  - Instruct patient to call for assistance with activity   - Consult OT/PT to assist with strengthening/mobility   - Keep Call bell within reach  - Keep bed low and locked with side rails adjusted as appropriate  - Keep care items and personal belongings within reach  - Initiate and maintain comfort rounds  - Make Fall Risk Sign visible to staff  - Offer Toileting every 2 Hours, in advance of need  Outcome: Adequate for Discharge     Problem: MOBILITY - ADULT  Goal: Maintain or return to baseline ADL function  Description: INTERVENTIONS:  -  Assess patient's ability to carry out ADLs; assess patient's baseline for ADL function and identify physical deficits which impact ability to perform ADLs (bathing, care of mouth/teeth, toileting, grooming, dressing, etc )  - Assess/evaluate cause of self-care deficits   - Assess range of motion  - Assess patient's mobility; develop plan if impaired  - Assess patient's need for assistive devices and provide as appropriate  - Encourage maximum independence but intervene and supervise when necessary  - Involve family in performance of ADLs  - Assess for home care needs following discharge   - Consider OT consult to assist with ADL evaluation and planning for discharge  - Provide patient education as appropriate  Outcome: Adequate for Discharge  Goal: Maintains/Returns to pre admission functional level  Description: INTERVENTIONS:  - Perform BMAT or MOVE assessment daily    - Set and communicate daily mobility goal to care team and patient/family/caregiver  - Collaborate with rehabilitation services on mobility goals if consulted  - Perform Range of Motion 2 times a day  - Reposition patient every 2 hours    - Dangle patient 2 times a day  - Stand patient 2 times a day  - Ambulate patient 2 times a day  - Out of bed to chair 2 times a day   - Out of bed for meals 3 times a day  - Out of bed for toileting  - Record patient progress and toleration of activity level   Outcome: Adequate for Discharge

## 2023-03-24 NOTE — H&P
[address] [place] [address]  H&P  Name: Yenni Chiu  MRN: [id number]  Unit/Bed#: ED 15 I Date of Admission: 3/23/2023   Date of Service: 3/23/2023 I Hospital Day: 0      Assessment/Plan   * Chest pain  Assessment & Plan  Presenting with cramping midsternal chest pain in setting of persistent nausea and vomiting which worsens with cough  · No associated diaphoresis, dyspnea  · EKG shows T wave changes but on review in comparison with prior EKGs, appears chronic, will await final read  · Troponin: 15> 19> 20  · Received aspirin 324 mg in ED  · Low to no concern of ACS at this time  · Chest pain is likely secondary to vomiting which is causing esophageal irritation  · We will monitor on telemetry overnight to rule out arrhythmias or acute changes-if unremarkable, can discharge tomorrow    Nausea & vomiting  Assessment & Plan  Patient presenting with nonbilious nonbloody emesis and nausea for 4 days  · Denies coffee-ground emesis, hematochezia, melena, diarrhea, constipation  · Denies other sick contacts or other household members being ill, denies travel  · Poor p o  intake secondary to nausea and vomiting  · Likely viral gastroenteritis  · Lipase normal  · CT abdomen pelvis shows no acute inflammatory process, chest x-ray unremarkable  · Received IV Zofran and 1 L normal saline bolus in ED, will continue infusion at 75 cc/h  · QTc slightly elevated at 459msec so we will avoid Zofran and Reglan and continue IM Tigan as needed for nausea vomiting  · Pepcid 20 mg twice daily as needed  · Cardiac and carb controlled diet  · Can likely discharge once patient is tolerating p o  intake    Anxiety  Assessment & Plan  Stable  Continue home Lexapro 5 mg daily, BuSpar 10 mg 3 times daily, Atarax 50 mg nightly    Hypertension  Assessment & Plan  Blood pressure 152/92  · Asymptomatic, no signs of endorgan damage  · Continue home hydrochlorothiazide 12 5 mg daily and losartan 100 mg daily  · Patient has as needed hydralazine at home which could be ordered if needed    Type 2 diabetes mellitus [facility])  Assessment & Plan  Lab Results   Component Value Date    HGBA1C 7 2 (H) 04/06/2022   Repeat hemoglobin A1c as last 1 was about a year ago  Appears well controlled so just continue insulin sliding scale and monitor blood sugar  Hold Home regimen: metformin 1000 mg twice daily, insulin glargine 21 unit nightly which pt does not take daily         VTE Pharmacologic Prophylaxis:   Moderate Risk (Score 3-4) - Pharmacological DVT Prophylaxis Ordered: enoxaparin (Lovenox)  Code Status: Level 1 - Full Code     Anticipated Length of Stay: Patient will be admitted on an observation basis with an anticipated length of stay of less than 2 midnights secondary to chest pain  Total Time Spent on Date of Encounter in care of patient: 55 minutes This time was spent on one or more of the following: performing physical exam; counseling and coordination of care; obtaining or reviewing history; documenting in the medical record; reviewing/ordering tests, medications or procedures; communicating with other healthcare professionals and discussing with patient's family/caregivers  Chief Complaint: nausea, vomiting, chest pain    History of Present Illness:  Isaiah Maria is a 59 y o  female with a PMH of hypertension and type 2 diabetes who presents with ongoping nausea and vomiting for 4 days, no others close contacts are ill, no travel  reports nonbilious nonbloody emesis, poor p o  intolerance, nausea  Denies hematochezia, melena, coffee-ground emesis, diarrhea  Reports midsternal chest pain, worse with cough, cramping in nature (also similar pain in stomach and back)-denies diaphoresis, dyspnea  Review of Systems:  Review of Systems   Constitutional: Positive for appetite change  Negative for chills and fever  HENT: Negative for ear pain and sore throat  Eyes: Negative for pain and visual disturbance     Respiratory: Positive for cough  Negative for chest tightness and shortness of breath  Cardiovascular: Positive for chest pain  Negative for palpitations  Gastrointestinal: Positive for nausea and vomiting  Negative for abdominal pain, constipation and diarrhea  Genitourinary: Negative for dysuria, hematuria and menstrual problem  Musculoskeletal: Negative for arthralgias and back pain  Skin: Negative for color change and rash  Neurological: Negative for seizures and syncope  Psychiatric/Behavioral: Negative for dysphoric mood and suicidal ideas  All other systems reviewed and are negative  Past Medical and Surgical History:   Past Medical History:   Diagnosis Date   • Asthma    • Diabetes mellitus ([facility] [address] )    • Hypertension        Past Surgical History:   Procedure Laterality Date   • HERNIA REPAIR     • HYSTERECTOMY  05/2021   • MAMMO STEREOTACTIC BREAST BIOPSY RIGHT (ALL INC) Right 5/27/2022       Meds/Allergies:  Prior to Admission medications    Medication Sig Start Date End Date Taking? Authorizing Provider   Alcohol Swabs 70 % PADS Use 4 (four) times a day 1/18/21   Kaleb Menjivar MD   busPIRone (BUSPAR) 10 mg tablet Take 1 tablet by mouth 3 (three) times a day 12/30/20   Historical Provider, MD   escitalopram (LEXAPRO) 5 mg tablet Take 1 tablet by mouth daily 12/30/20   Historical Provider, MD   hydrALAZINE (APRESOLINE) 25 mg tablet Take 1 tablet (25 mg total) by mouth 3 (three) times a day If BP elevated despite baseline medication  Only uses IF needed 1/18/23   Michael Martinez MD   hydrochlorothiazide (HYDRODIURIL) 12 5 mg tablet Take 1 tablet by mouth daily 2/17/22   Historical Provider, MD   hydrOXYzine HCL (ATARAX) 25 mg tablet Take 2 tablets by mouth daily at bedtime 2/2/21   Historical Provider, MD   ibuprofen (MOTRIN) 600 mg tablet Take 1 tablet (600 mg total) by mouth every 8 (eight) hours as needed for mild pain Take 3 times daily for 3-4 days, then take as needed  Please take with food   12/23/21 Arvin Aponte PA-C   insulin aspart (NovoLOG FlexPen) 100 UNIT/ML injection pen Inject 30 Units under the skin 3 (three) times a day with meals for 14 days  Patient not taking: Reported on 1/3/2022 1/18/21 1/3/22  Emili Snyder MD   insulin glargine (LANTUS SOLOSTAR) 100 units/mL injection pen Inject 21 Units under the skin daily at bedtime 2/23/21   Historical Provider, MD   Insulin Pen Needle (Pen Needles) 32G X 4 MM MISC Use 4 (four) times a day for 14 days  Patient not taking: Reported on 1/3/2022 1/18/21 1/3/22  Emili Snyder MD   lidocaine (LIDODERM) 5 % APPLY 1 PATCH TOPICALLY DAILY FOR PAIN (REMOVE PATCH AFTER 12 HOURS; 12 HOURS ON AND 12 HOURS OFF) 9/24/21   Historical Provider, MD   losartan (COZAAR) 100 MG tablet Take 100 mg by mouth daily before dinner    Historical Provider, MD   metFORMIN (GLUCOPHAGE) 1000 MG tablet Take 1,000 mg by mouth 2 (two) times a day with meals    Historical Provider, MD   traMADol (ULTRAM) 50 mg tablet Take 50 mg by mouth 2 (two) times a day as needed for moderate pain  Patient not taking: Reported on 1/17/2023    Historical Provider, MD   triamcinolone (KENALOG) 0 1 % cream Apply topically 2 (two) times a day for 7 days 8/7/20 12/9/21  Shima Sanchez MD     I have reviewed home medications with patient personally  Allergies:    Allergies   Allergen Reactions   • Sulfa Antibiotics Rash       Social History:  Marital Status: Single     Substance Use History:   Social History     Substance and Sexual Activity   Alcohol Use Yes     Social History     Tobacco Use   Smoking Status Former   Smokeless Tobacco Never     Social History     Substance and Sexual Activity   Drug Use Never       Family History:  Family History   Problem Relation Age of Onset   • Diabetes Mother    • Diabetes Father    • No Known Problems Maternal Grandmother    • No Known Problems Maternal Grandfather    • No Known Problems Paternal Grandmother    • No Known Problems Paternal Grandfather    • No Known Problems Son    • No Known Problems Son    • No Known Problems Maternal Aunt    • No Known Problems Maternal Aunt    • No Known Problems Maternal Aunt        Physical Exam:     Vitals:   Blood Pressure: 157/72 (03/23/23 1830)  Pulse: 80 (03/23/23 2045)  Temperature: (!) 97 4 °F (36 3 °C) (03/23/23 1552)  Temp Source: Temporal (03/23/23 1552)  Respirations: 16 (03/23/23 1830)  SpO2: 95 % (03/23/23 2045)    Physical Exam  Constitutional:       Appearance: Normal appearance  HENT:      Head: Normocephalic and atraumatic  Cardiovascular:      Rate and Rhythm: Normal rate and regular rhythm  Pulses: Normal pulses  Heart sounds: No murmur heard  Pulmonary:      Effort: Pulmonary effort is normal  No respiratory distress  Breath sounds: Normal breath sounds  No wheezing  Abdominal:      General: Bowel sounds are normal  There is no distension  Palpations: Abdomen is soft  Tenderness: There is abdominal tenderness (Diffuse)  There is no guarding or rebound  Musculoskeletal:      Right lower leg: No edema  Left lower leg: No edema  Skin:     General: Skin is warm and dry  Capillary Refill: Capillary refill takes less than 2 seconds  Neurological:      General: No focal deficit present  Mental Status: She is alert and oriented to person, place, and time     Psychiatric:         Mood and Affect: Mood normal          Behavior: Behavior normal          Additional Data:     Lab Results:  Results from last 7 days   Lab Units 03/23/23  1630   WBC Thousand/uL 11 23*   HEMOGLOBIN g/dL 15 4   HEMATOCRIT % 45 7   PLATELETS Thousands/uL 336   NEUTROS PCT % 60   LYMPHS PCT % 29   MONOS PCT % 8   EOS PCT % 1     Results from last 7 days   Lab Units 03/23/23  1630   SODIUM mmol/L 135   POTASSIUM mmol/L 3 5   CHLORIDE mmol/L 102   CO2 mmol/L 21   BUN mg/dL 29*   CREATININE mg/dL 0 95   ANION GAP mmol/L 12   CALCIUM mg/dL 9 8   ALBUMIN g/dL 4 2   TOTAL BILIRUBIN mg/dL 0 80   ALK PHOS U/L 96   ALT U/L 17   AST U/L 20   GLUCOSE RANDOM mg/dL 135     Results from last 7 days   Lab Units 03/23/23  1630   INR  1 08                   Lines/Drains:  Invasive Devices     Peripheral Intravenous Line  Duration           Peripheral IV 03/23/23 Right Antecubital <1 day                    Imaging: Reviewed radiology reports from this admission including: chest xray and abdominal/pelvic CT  CT abdomen pelvis with contrast   ED Interpretation by Agusto Garcia DO (03/23 2038)   No acute inflammatory process identified      Hepatic steatosis  Final Result by Frankie Henriquez DO (03/23 1936)      No acute inflammatory process identified  Hepatic steatosis  Workstation performed: [id number]         XR chest 1 view portable   ED Interpretation by Agusto Garcia DO (03/23 2038)   No acute cardiopulmonary disease          Final Result by Julian Byrd MD (03/23 1635)      No acute cardiopulmonary disease  Workstation performed: [id number]             EKG and Other Studies Reviewed on Admission:   · EKG: Sinus Tachycardia    Qtc 459msec    ** Please Note: This note has been constructed using a voice recognition system   **

## 2023-03-24 NOTE — DISCHARGE INSTR - AVS FIRST PAGE
Follow up with your primary care provider within one week after hospital discharge  Can take over the counter Pepcid one to two time daily as needed for indigestion  Make an appointment with gastroenterology in 1-2 weeks for your indigestion and fatty liver  Encourage to take your medications as prescribed  Come back to the hospital if symptoms recur or worsen

## 2023-03-24 NOTE — ASSESSMENT & PLAN NOTE
Lab Results   Component Value Date    HGBA1C 7 2 (H) 04/06/2022   Repeat hemoglobin A1c as last 1 was about a year ago  Appears well controlled so just continue insulin sliding scale and monitor blood sugar  Hold Home regimen: metformin 1000 mg twice daily, insulin glargine 21 unit nightly which pt does not take daily  Recommended to follow up with PCP    Pending A1C result

## 2023-03-24 NOTE — ASSESSMENT & PLAN NOTE
Presenting with cramping midsternal chest pain in setting of persistent nausea and vomiting which worsens with cough  · No associated diaphoresis, dyspnea  · EKG shows T wave changes but on review in comparison with prior EKGs, appears chronic, will await final read  · Troponin: 15> 19> 20  · Received aspirin 324 mg in ED  · ACS ruled out     · Chest pain is likely secondary to vomiting which is causing esophageal irritation  · Tele: no event

## 2023-03-24 NOTE — DISCHARGE SUMMARY
[facility]  Discharge- Raeann Carman 1965, 59 y o  female MRN: [id number]  Unit/Bed#: -01 Encounter: [id number]  Primary Care Provider: Karine Mesa MD   Date and time admitted to hospital: 3/23/2023  4:03 PM    * Chest pain-resolved as of 3/24/2023  Assessment & Plan  Presenting with cramping midsternal chest pain in setting of persistent nausea and vomiting which worsens with cough  · No associated diaphoresis, dyspnea  · EKG shows T wave changes but on review in comparison with prior EKGs, appears chronic, will await final read  · Troponin: 15> 19> 20  · Received aspirin 324 mg in ED  · ACS ruled out  · Chest pain is likely secondary to vomiting which is causing esophageal irritation  · Tele: no event    Nausea & vomiting-resolved as of 3/24/2023  Assessment & Plan  Patient presenting with nonbilious nonbloody emesis and nausea for 4 days  · Denies coffee-ground emesis, hematochezia, melena, diarrhea, constipation  · Denies other sick contacts or other household members being ill, denies travel  · Poor p o  intake secondary to nausea and vomiting  · Likely viral gastroenteritis  · Lipase normal  · CT abdomen pelvis shows no acute inflammatory process, chest x-ray unremarkable  · Received IV Zofran and 1 L normal saline bolus in ED, will continue infusion at 75 cc/h  · QTc slightly elevated at 459msec so we will avoid Zofran and Reglan and continue IM Tigan as needed for nausea vomiting  · Pepcid 20 mg twice daily as needed  · Ambulatory referral to GI as per patient's request    Anxiety  Assessment & Plan  Stable  Continue home Lexapro 5 mg daily, BuSpar 10 mg 3 times daily, Atarax 50 mg nightly    Hypertension  Assessment & Plan  Blood pressure 140s/80s  · Asymptomatic, no signs of endorgan damage  · Continue home hydrochlorothiazide 12 5 mg daily and losartan 100 mg daily  · Reports she is anxious being in the hospital   · Follow up with PCP in 1 week  · Patient has as needed hydralazine at home which could be ordered if needed    Type 2 diabetes mellitus [facility])  Assessment & Plan  Lab Results   Component Value Date    HGBA1C 7 2 (H) 04/06/2022   Repeat hemoglobin A1c as last 1 was about a year ago  Appears well controlled so just continue insulin sliding scale and monitor blood sugar  Hold Home regimen: metformin 1000 mg twice daily, insulin glargine 21 unit nightly which pt does not take daily  Recommended to follow up with PCP  Pending A1C result      Discharging Physician / Practitioner: Alejo Donohue MD  PCP: Anna Guerra MD  Admission Date:   Admission Orders (From admission, onward)     Ordered        03/23/23 2054  Place in Observation  Once                      Discharge Date: 03/24/23    Medical Problems     Resolved Problems  Date Reviewed: 3/24/2023          Resolved    * (Principal) Chest pain 3/24/2023     Resolved by  Alejo Donohue MD    Nausea & vomiting 3/24/2023     Resolved by  Alejo Donohue MD          Consultations During Hospital Stay:  · None    Procedures Performed:   · None    Significant Findings / Test Results:   XR chest 1 view portable  · Impression No acute cardiopulmonary disease  Workstation performed: [id number]       · CT AP with contrast  · No acute inflammatory process identified  Hepatic steatosis    Incidental Findings:   · None    Test Results Pending at Discharge (will require follow up): · HbA1c     Outpatient Tests Requested:  · None    Complications:  None    Reason for Admission: Chest pain, nausea, vomiting     History of Present Illness:  Francisco J Mckeon is a 59 y o  female with a PMH of hypertension and type 2 diabetes who presents with ongoping nausea and vomiting for 4 days, no others close contacts are ill, no travel  reports nonbilious nonbloody emesis, poor p o  intolerance, nausea  Denies hematochezia, melena, coffee-ground emesis, diarrhea    Reports midsternal chest pain, worse with cough, cramping in nature (also similar pain in stomach and back)-denies diaphoresis, dyspnea  Hospital Course:   She was admitted to the hospital due to chest pain to rule out ACS  Troponin negative x3  Telemetry did not show any significant event  Patient's symptoms such as chest pain, nausea, vomiting resolved overnight  Patient was advised to follow-up with her primary care physician within 1 week after hospital discharge  Encouraged to take the medication s regularly as prescribed  Ambulatory referral to GI was given for intermittent indigestion and steatohepatitis  Advised to take Pepcid 1-2 times daily as needed  Scopolamine patch was sent to pharmacy for nausea/vomiting  Zofran is avoided given prolonged QTc  Patient is hemodynamically and clinically stable before discharge  Please see above list of diagnoses and related plan for additional information  Condition at Discharge: good     Discharge Day Visit / Exam:     Subjective: Patient reports her symptoms are resolved  No chest pain, shortness of breath, palpitation  Patient reports she is very anxious  No nausea, vomiting  Patient had loose bowel movements before however last bowel movement was yesterday evening  Patient is tolerating diet without any nausea, vomiting  No significant event overnight  Vitals: Blood Pressure: 147/84 (03/24/23 0737)  Pulse: 69 (03/24/23 0737)  Temperature: 97 9 °F (36 6 °C) (03/24/23 0737)  Temp Source: Oral (03/24/23 0737)  Respirations: 18 (03/24/23 0323)  Height: 5' 6" (167 6 cm) (03/23/23 2155)  Weight - Scale: 112 kg (246 lb 14 6 oz) (03/23/23 2155)  SpO2: 93 % (03/24/23 0737)  Exam:   Physical Exam  Vitals and nursing note reviewed  Constitutional:       General: She is not in acute distress  Appearance: She is well-developed  She is obese  She is not ill-appearing  HENT:      Head: Normocephalic and atraumatic     Eyes:      Conjunctiva/sclera: Conjunctivae normal    Cardiovascular:      Rate and Rhythm: Normal rate and regular rhythm  Pulses: Normal pulses  Heart sounds: Normal heart sounds  No murmur heard  No gallop  Pulmonary:      Effort: Pulmonary effort is normal  No respiratory distress  Breath sounds: Normal breath sounds  Abdominal:      General: Bowel sounds are normal  There is no distension  Palpations: Abdomen is soft  Tenderness: There is no abdominal tenderness  Musculoskeletal:         General: No swelling  Cervical back: Neck supple  Right lower leg: No edema  Left lower leg: No edema  Skin:     General: Skin is warm and dry  Capillary Refill: Capillary refill takes less than 2 seconds  Neurological:      Mental Status: She is alert  Psychiatric:         Mood and Affect: Mood normal        Discussion with Family: Discussed with the patient at the bedside and she is agreeable to above plans  Discharge instructions/Information to patient and family:   See after visit summary for information provided to patient and family  Provisions for Follow-Up Care:  See after visit summary for information related to follow-up care and any pertinent home health orders  Disposition:     Home    For Discharges to [address] [facility]:   · Not Applicable to this Patient - Not Applicable to this Patient    Planned Readmission: No     Discharge Statement:  I spent 30 minutes discharging the patient  This time was spent on the day of discharge  I had direct contact with the patient on the day of discharge  Greater than 50% of the total time was spent examining patient, answering all patient questions, arranging and discussing plan of care with patient as well as directly providing post-discharge instructions  Additional time then spent on discharge activities  Discharge Medications:  See after visit summary for reconciled discharge medications provided to patient and family        ** Please Note: This note has been constructed using a voice recognition system **

## 2023-03-24 NOTE — ASSESSMENT & PLAN NOTE
Blood pressure 152/92  · Asymptomatic, no signs of endorgan damage  · Continue home hydrochlorothiazide 12 5 mg daily and losartan 100 mg daily  · Patient has as needed hydralazine at home which could be ordered if needed [IPSS Score (0-40): ___] : IPSS score: [unfilled] [EPIC-CP Score (0-60): ___] : EPIC-CP score: [unfilled] [FreeTextEntry9] : dysuria

## 2023-03-24 NOTE — ASSESSMENT & PLAN NOTE
Blood pressure 140s/80s  · Asymptomatic, no signs of endorgan damage  · Continue home hydrochlorothiazide 12 5 mg daily and losartan 100 mg daily  · Reports she is anxious being in the hospital   · Follow up with PCP in 1 week     · Patient has as needed hydralazine at home which could be ordered if needed

## 2023-03-30 ENCOUNTER — CONSULT (OUTPATIENT)
Dept: GASTROENTEROLOGY | Facility: CLINIC | Age: 58
End: 2023-03-30

## 2023-03-30 VITALS
DIASTOLIC BLOOD PRESSURE: 90 MMHG | BODY MASS INDEX: 39.37 KG/M2 | WEIGHT: 245 LBS | OXYGEN SATURATION: 97 % | HEIGHT: 66 IN | SYSTOLIC BLOOD PRESSURE: 142 MMHG | HEART RATE: 88 BPM

## 2023-03-30 DIAGNOSIS — R19.7 DIARRHEA, UNSPECIFIED TYPE: Primary | ICD-10-CM

## 2023-03-30 DIAGNOSIS — Z86.010 HISTORY OF COLON POLYPS: ICD-10-CM

## 2023-03-30 DIAGNOSIS — R11.2 NAUSEA AND VOMITING, UNSPECIFIED VOMITING TYPE: ICD-10-CM

## 2023-03-30 NOTE — PATIENT INSTRUCTIONS
Scheduled date of EGD/colonoscopy (as of today):6/15/23  Physician performing EGD/colonoscopy:Nina  Location of EGD/colonoscopy:[facility]  Desired bowel prep reviewed with patient:Joan/Miralax  Instructions reviewed with patient by:Mani ovalle  Clearances:   none

## 2023-03-30 NOTE — PROGRESS NOTES
[facility] [address] [facility] - Outpatient Consultation  Chong Wood 59 y o  female MRN: [id number]  Encounter: [id number]          ASSESSMENT AND PLAN:      1  Diarrhea, unspecified type  2  History of colon polyps  3  Nausea and vomiting, unspecified vomiting type  -Patient will be scheduled for an EGD with biopsy as well as a colonoscopy with biopsy   -Patient will begin fiber supplementation and probiotic daily   -Patient will schedule follow-up appointment after endoscopic evaluation is complete to discuss further treatment plans  ______________________________________________________________________    HPI:    57-year-old female with a past medical history significant for hypertension and diabetes mellitus who presents to the GI clinic for consultation  Patient was most recently admitted at [facility] with nausea, vomiting, shortness of breath  Patient reports that she is here to schedule an EGD to further evaluate her symptoms  Overall patient is reporting improvement since she was admitted at [place] [address]  Patient also reports that she has been suffering with fecal urgency and diarrhea for quite some time  She reports an 11 pound weight loss  She reports that she is trying to control her sugars  She reports that about 3 to 4 years ago she did have an umbilical hernia repair in 2021 she did have a total hysterectomy  She reports that since then she does not feel like her bowels have been right  She denies any rectal bleeding or melena  She denies any dysphagia  She denies any hematemesis  Patient's last colonoscopy was 5 years ago and she did have a colon polyp removed at that time  CTAP from 3/23/2023 shows no acute inflammatory process noted  REVIEW OF SYSTEMS:    CONSTITUTIONAL: Denies any fever, chills, rigors, and weight loss  HEENT: No earache or tinnitus  Denies hearing loss or visual disturbances  CARDIOVASCULAR: No chest pain or palpitations     RESPIRATORY: Denies any cough, hemoptysis, shortness of breath or dyspnea on exertion  GASTROINTESTINAL: As noted in the History of Present Illness  GENITOURINARY: No problems with urination  Denies any hematuria or dysuria  NEUROLOGIC: No dizziness or vertigo, denies headaches  MUSCULOSKELETAL: Denies any muscle or joint pain  SKIN: Denies skin rashes or itching  ENDOCRINE: Denies excessive thirst  Denies intolerance to heat or cold  PSYCHOSOCIAL: Denies depression or anxiety  Denies any recent memory loss         Historical Information   Past Medical History:   Diagnosis Date   • Asthma    • Diabetes mellitus ([facility] [address] )    • Hypertension      Past Surgical History:   Procedure Laterality Date   • HERNIA REPAIR     • HYSTERECTOMY  05/2021   • MAMMO STEREOTACTIC BREAST BIOPSY RIGHT (ALL INC) Right 5/27/2022     Social History   Social History     Substance and Sexual Activity   Alcohol Use Yes     Social History     Substance and Sexual Activity   Drug Use Never     Social History     Tobacco Use   Smoking Status Former   Smokeless Tobacco Never     Family History   Problem Relation Age of Onset   • Diabetes Mother    • Diabetes Father    • No Known Problems Maternal Grandmother    • No Known Problems Maternal Grandfather    • No Known Problems Paternal Grandmother    • No Known Problems Paternal Grandfather    • No Known Problems Son    • No Known Problems Son    • No Known Problems Maternal Aunt    • No Known Problems Maternal Aunt    • No Known Problems Maternal Aunt        Meds/Allergies       Current Outpatient Medications:   •  Alcohol Swabs 70 % PADS  •  busPIRone (BUSPAR) 10 mg tablet  •  escitalopram (LEXAPRO) 5 mg tablet  •  hydrALAZINE (APRESOLINE) 25 mg tablet  •  hydrochlorothiazide (HYDRODIURIL) 12 5 mg tablet  •  hydrOXYzine HCL (ATARAX) 25 mg tablet  •  lidocaine (LIDODERM) 5 %  •  losartan (COZAAR) 100 MG tablet  •  metFORMIN (GLUCOPHAGE) 1000 MG tablet  •  scopolamine (TRANSDERM-SCOP) 1 mg/3 days TD 72 hr "patch  •  insulin aspart (NovoLOG FlexPen) 100 UNIT/ML injection pen  •  insulin glargine (LANTUS SOLOSTAR) 100 units/mL injection pen  •  Insulin Pen Needle (Pen Needles) 32G X 4 MM MISC    Allergies   Allergen Reactions   • Sulfa Antibiotics Rash           Objective     Blood pressure 142/90, pulse 88, height 5' 6\" (1 676 m), weight 111 kg (245 lb), SpO2 97 %, not currently breastfeeding  Body mass index is 39 54 kg/m²  PHYSICAL EXAM:      General Appearance:   Alert, cooperative, no distress   HEENT:   Normocephalic, atraumatic, anicteric      Neck:  Supple, symmetrical, trachea midline   Lungs:   Clear to auscultation bilaterally; no rales, rhonchi or wheezing; respirations unlabored    Heart[de-identified]   Regular rate and rhythm; no murmur, rub, or gallop  Abdomen:   Soft, non-tender, non-distended; normal bowel sounds; no masses, no organomegaly    Genitalia:   Deferred    Rectal:   Deferred    Extremities:  No cyanosis, clubbing or edema    Pulses:  2+ and symmetric    Skin:  No jaundice, rashes, or lesions    Lymph nodes:  No palpable cervical lymphadenopathy        Lab Results:   No visits with results within 1 Day(s) from this visit     Latest known visit with results is:   Admission on 03/23/2023, Discharged on 03/24/2023   Component Date Value   • Ventricular Rate 03/23/2023 105    • Atrial Rate 03/23/2023 105    • DE Interval 03/23/2023 150    • QRSD Interval 03/23/2023 88    • QT Interval 03/23/2023 348    • QTC Interval 03/23/2023 459    • P Axis 03/23/2023 67    • QRS Axis 03/23/2023 61    • T Wave Axis 03/23/2023 53    • WBC 03/23/2023 11 23 (H)    • RBC 03/23/2023 4 91    • Hemoglobin 03/23/2023 15 4    • Hematocrit 03/23/2023 45 7    • MCV 03/23/2023 93    • MCH 03/23/2023 31 4    • MCHC 03/23/2023 33 7    • RDW 03/23/2023 13 6    • MPV 03/23/2023 9 4    • Platelets 45/78/4208 336    • nRBC 03/23/2023 0    • Neutrophils Relative 03/23/2023 60    • Immat GRANS % 03/23/2023 1    • Lymphocytes Relative " 03/23/2023 29    • Monocytes Relative 03/23/2023 8    • Eosinophils Relative 03/23/2023 1    • Basophils Relative 03/23/2023 1    • Neutrophils Absolute 03/23/2023 6 76    • Immature Grans Absolute 03/23/2023 0 07    • Lymphocytes Absolute 03/23/2023 3 26    • Monocytes Absolute 03/23/2023 0 92    • Eosinophils Absolute 03/23/2023 0 15    • Basophils Absolute 03/23/2023 0 07    • Sodium 03/23/2023 135    • Potassium 03/23/2023 3 5    • Chloride 03/23/2023 102    • CO2 03/23/2023 21    • ANION GAP 03/23/2023 12    • BUN 03/23/2023 29 (H)    • Creatinine 03/23/2023 0 95    • Glucose 03/23/2023 135    • Calcium 03/23/2023 9 8    • AST 03/23/2023 20    • ALT 03/23/2023 17    • Alkaline Phosphatase 03/23/2023 96    • Total Protein 03/23/2023 8 0    • Albumin 03/23/2023 4 2    • Total Bilirubin 03/23/2023 0 80    • eGFR 03/23/2023 65    • hs TnI 0hr 03/23/2023 15    • Protime 03/23/2023 13 8    • INR 03/23/2023 1 08    • PTT 03/23/2023 28    • SARS-CoV-2 03/23/2023 Negative    • INFLUENZA A PCR 03/23/2023 Negative    • INFLUENZA B PCR 03/23/2023 Negative    • RSV PCR 03/23/2023 Negative    • Lipase 03/23/2023 23    • hs TnI 2hr 03/23/2023 19    • Delta 2hr hsTnI 03/23/2023 4    • hs TnI 4hr 03/23/2023 20    • Delta 4hr hsTnI 03/23/2023 5    • Hemoglobin A1C 03/23/2023 8 2 (H)    • EAG 03/23/2023 189    • POC Glucose 03/23/2023 133    • Sodium 03/24/2023 136    • Potassium 03/24/2023 3 9    • Chloride 03/24/2023 105    • CO2 03/24/2023 22    • ANION GAP 03/24/2023 9    • BUN 03/24/2023 20    • Creatinine 03/24/2023 0 67    • Glucose 03/24/2023 132    • Glucose, Fasting 03/24/2023 132 (H)    • Calcium 03/24/2023 8 6    • AST 03/24/2023 25    • ALT 03/24/2023 17    • Alkaline Phosphatase 03/24/2023 94    • Total Protein 03/24/2023 7 7    • Albumin 03/24/2023 3 9    • Total Bilirubin 03/24/2023 0 68    • eGFR 03/24/2023 96    • WBC 03/24/2023 6 67    • RBC 03/24/2023 4 78    • Hemoglobin 03/24/2023 14 6    • Hematocrit 03/24/2023 44 4    • MCV 03/24/2023 93    • MCH 03/24/2023 30 5    • MCHC 03/24/2023 32 9    • RDW 03/24/2023 13 5    • MPV 03/24/2023 9 4    • Platelets 31/85/0889 280    • nRBC 03/24/2023 0    • Neutrophils Relative 03/24/2023 64    • Immat GRANS % 03/24/2023 1    • Lymphocytes Relative 03/24/2023 25    • Monocytes Relative 03/24/2023 7    • Eosinophils Relative 03/24/2023 2    • Basophils Relative 03/24/2023 1    • Neutrophils Absolute 03/24/2023 4 33    • Immature Grans Absolute 03/24/2023 0 04    • Lymphocytes Absolute 03/24/2023 1 65    • Monocytes Absolute 03/24/2023 0 46    • Eosinophils Absolute 03/24/2023 0 15    • Basophils Absolute 03/24/2023 0 04    • POC Glucose 03/24/2023 120    • POC Glucose 03/24/2023 119          Radiology Results:   XR chest 1 view portable    Result Date: 3/23/2023  Narrative: CHEST INDICATION:   sob  COMPARISON:  CXR 04/09/2021 and chest CT 02/26/2021  EXAM PERFORMED/VIEWS:  XR CHEST PORTABLE  FINDINGS: Cardiomediastinal silhouette normal  Lungs clear  No effusion or pneumothorax  Upper abdomen normal  Bones normal for age  Impression: No acute cardiopulmonary disease  Workstation performed: [id number]     CT abdomen pelvis with contrast    Result Date: 3/23/2023  Narrative: CT ABDOMEN AND PELVIS WITH IV CONTRAST INDICATION:   Abdominal pain, acute, nonlocalized abdominal pain, vomiting  COMPARISON:  4/6/2022 TECHNIQUE:  CT examination of the abdomen and pelvis was performed  Multiplanar 2D reformatted images were created from the source data  Radiation dose length product (DLP) for this visit:  1319 mGy-cm   This examination, like all CT scans performed in the [facility], was performed utilizing techniques to minimize radiation dose exposure, including the use of iterative reconstruction and automated exposure control  IV Contrast:  100 mL of iohexol (OMNIPAQUE) Enteric Contrast:  Enteric contrast was not administered   FINDINGS: ABDOMEN LOWER CHEST:  No clinically significant abnormality identified in the visualized lower chest  LIVER/BILIARY TREE:  Liver is diffusely decreased in density consistent with fatty change  No CT evidence of suspicious hepatic mass  Normal hepatic contours  No biliary dilatation  GALLBLADDER:  No calcified gallstones  No pericholecystic inflammatory change  SPLEEN:  Unremarkable  PANCREAS:  Unremarkable  ADRENAL GLANDS:  Unremarkable  KIDNEYS/URETERS:  No hydronephrosis or urinary tract calculus  One or more sharply circumscribed subcentimeter renal hypodensities are present, too small to accurately characterize, and statistically most likely benign findings  According to recent literature (Radiology 2019) no further workup of these findings is recommended  STOMACH AND BOWEL:  There is colonic diverticulosis without evidence of acute diverticulitis  APPENDIX:  A normal appendix was visualized  ABDOMINOPELVIC CAVITY:  No ascites  No pneumoperitoneum  No lymphadenopathy  VESSELS:  Unremarkable for patient's age  PELVIS REPRODUCTIVE ORGANS:  Surgical changes of prior hysterectomy  URINARY BLADDER:  Unremarkable  ABDOMINAL WALL/INGUINAL REGIONS:  Unremarkable  OSSEOUS STRUCTURES:  No acute fracture or destructive osseous lesion  Impression: No acute inflammatory process identified  Hepatic steatosis   Workstation performed: [id number]

## 2023-06-15 ENCOUNTER — OFFICE VISIT (OUTPATIENT)
Dept: OBGYN CLINIC | Facility: CLINIC | Age: 58
End: 2023-06-15
Payer: COMMERCIAL

## 2023-06-15 ENCOUNTER — ANESTHESIA EVENT (OUTPATIENT)
Dept: GASTROENTEROLOGY | Facility: HOSPITAL | Age: 58
End: 2023-06-15

## 2023-06-15 ENCOUNTER — ANESTHESIA (OUTPATIENT)
Dept: GASTROENTEROLOGY | Facility: HOSPITAL | Age: 58
End: 2023-06-15

## 2023-06-15 ENCOUNTER — HOSPITAL ENCOUNTER (OUTPATIENT)
Dept: GASTROENTEROLOGY | Facility: HOSPITAL | Age: 58
Setting detail: OUTPATIENT SURGERY
End: 2023-06-15
Payer: COMMERCIAL

## 2023-06-15 VITALS
DIASTOLIC BLOOD PRESSURE: 105 MMHG | SYSTOLIC BLOOD PRESSURE: 176 MMHG | HEIGHT: 66 IN | WEIGHT: 244 LBS | BODY MASS INDEX: 39.21 KG/M2 | HEART RATE: 77 BPM

## 2023-06-15 VITALS
WEIGHT: 244.27 LBS | OXYGEN SATURATION: 93 % | HEIGHT: 66 IN | TEMPERATURE: 98.3 F | RESPIRATION RATE: 18 BRPM | SYSTOLIC BLOOD PRESSURE: 161 MMHG | DIASTOLIC BLOOD PRESSURE: 70 MMHG | HEART RATE: 96 BPM | BODY MASS INDEX: 39.26 KG/M2

## 2023-06-15 DIAGNOSIS — R11.2 NAUSEA AND VOMITING, UNSPECIFIED VOMITING TYPE: ICD-10-CM

## 2023-06-15 DIAGNOSIS — G89.29 CHRONIC PAIN OF LEFT KNEE: ICD-10-CM

## 2023-06-15 DIAGNOSIS — M25.562 CHRONIC PAIN OF LEFT KNEE: ICD-10-CM

## 2023-06-15 DIAGNOSIS — G89.29 CHRONIC PAIN OF RIGHT KNEE: ICD-10-CM

## 2023-06-15 DIAGNOSIS — M17.0 BILATERAL PRIMARY OSTEOARTHRITIS OF KNEE: Primary | ICD-10-CM

## 2023-06-15 DIAGNOSIS — Z86.010 HISTORY OF COLON POLYPS: ICD-10-CM

## 2023-06-15 DIAGNOSIS — R19.7 DIARRHEA, UNSPECIFIED TYPE: ICD-10-CM

## 2023-06-15 DIAGNOSIS — M25.561 CHRONIC PAIN OF RIGHT KNEE: ICD-10-CM

## 2023-06-15 PROBLEM — U07.1 ACUTE HYPOXEMIC RESPIRATORY FAILURE DUE TO COVID-19 (HCC): Status: RESOLVED | Noted: 2021-01-11 | Resolved: 2023-06-15

## 2023-06-15 PROBLEM — J96.01 ACUTE HYPOXEMIC RESPIRATORY FAILURE DUE TO COVID-19 (HCC): Status: RESOLVED | Noted: 2021-01-11 | Resolved: 2023-06-15

## 2023-06-15 PROBLEM — E66.9 OBESITY (BMI 35.0-39.9 WITHOUT COMORBIDITY): Status: ACTIVE | Noted: 2023-06-15

## 2023-06-15 LAB — GLUCOSE SERPL-MCNC: 114 MG/DL (ref 65–140)

## 2023-06-15 PROCEDURE — 20610 DRAIN/INJ JOINT/BURSA W/O US: CPT | Performed by: ORTHOPAEDIC SURGERY

## 2023-06-15 PROCEDURE — 88305 TISSUE EXAM BY PATHOLOGIST: CPT | Performed by: STUDENT IN AN ORGANIZED HEALTH CARE EDUCATION/TRAINING PROGRAM

## 2023-06-15 PROCEDURE — 43239 EGD BIOPSY SINGLE/MULTIPLE: CPT | Performed by: INTERNAL MEDICINE

## 2023-06-15 PROCEDURE — 99214 OFFICE O/P EST MOD 30 MIN: CPT | Performed by: ORTHOPAEDIC SURGERY

## 2023-06-15 PROCEDURE — 82948 REAGENT STRIP/BLOOD GLUCOSE: CPT

## 2023-06-15 PROCEDURE — 45378 DIAGNOSTIC COLONOSCOPY: CPT | Performed by: INTERNAL MEDICINE

## 2023-06-15 RX ORDER — BUPIVACAINE HYDROCHLORIDE 2.5 MG/ML
2 INJECTION, SOLUTION INFILTRATION; PERINEURAL
Status: COMPLETED | OUTPATIENT
Start: 2023-06-15 | End: 2023-06-15

## 2023-06-15 RX ORDER — KETAMINE HCL IN NACL, ISO-OSM 100MG/10ML
SYRINGE (ML) INJECTION AS NEEDED
Status: DISCONTINUED | OUTPATIENT
Start: 2023-06-15 | End: 2023-06-15

## 2023-06-15 RX ORDER — LIDOCAINE HYDROCHLORIDE 20 MG/ML
INJECTION, SOLUTION EPIDURAL; INFILTRATION; INTRACAUDAL; PERINEURAL AS NEEDED
Status: DISCONTINUED | OUTPATIENT
Start: 2023-06-15 | End: 2023-06-15

## 2023-06-15 RX ORDER — ACETAMINOPHEN 325 MG/1
650 TABLET ORAL ONCE
Status: COMPLETED | OUTPATIENT
Start: 2023-06-15 | End: 2023-06-15

## 2023-06-15 RX ORDER — LIDOCAINE HYDROCHLORIDE 10 MG/ML
2 INJECTION, SOLUTION INFILTRATION; PERINEURAL
Status: COMPLETED | OUTPATIENT
Start: 2023-06-15 | End: 2023-06-15

## 2023-06-15 RX ORDER — SODIUM CHLORIDE, SODIUM LACTATE, POTASSIUM CHLORIDE, CALCIUM CHLORIDE 600; 310; 30; 20 MG/100ML; MG/100ML; MG/100ML; MG/100ML
INJECTION, SOLUTION INTRAVENOUS CONTINUOUS PRN
Status: DISCONTINUED | OUTPATIENT
Start: 2023-06-15 | End: 2023-06-15

## 2023-06-15 RX ORDER — METHYLPREDNISOLONE ACETATE 40 MG/ML
2 INJECTION, SUSPENSION INTRA-ARTICULAR; INTRALESIONAL; INTRAMUSCULAR; SOFT TISSUE
Status: COMPLETED | OUTPATIENT
Start: 2023-06-15 | End: 2023-06-15

## 2023-06-15 RX ORDER — MIDAZOLAM HYDROCHLORIDE 2 MG/2ML
INJECTION, SOLUTION INTRAMUSCULAR; INTRAVENOUS AS NEEDED
Status: DISCONTINUED | OUTPATIENT
Start: 2023-06-15 | End: 2023-06-15

## 2023-06-15 RX ORDER — GLYCOPYRROLATE 0.2 MG/ML
INJECTION INTRAMUSCULAR; INTRAVENOUS AS NEEDED
Status: DISCONTINUED | OUTPATIENT
Start: 2023-06-15 | End: 2023-06-15

## 2023-06-15 RX ORDER — PROPOFOL 10 MG/ML
INJECTION, EMULSION INTRAVENOUS AS NEEDED
Status: DISCONTINUED | OUTPATIENT
Start: 2023-06-15 | End: 2023-06-15

## 2023-06-15 RX ADMIN — SODIUM CHLORIDE, SODIUM LACTATE, POTASSIUM CHLORIDE, AND CALCIUM CHLORIDE: .6; .31; .03; .02 INJECTION, SOLUTION INTRAVENOUS at 08:29

## 2023-06-15 RX ADMIN — Medication 10 MG: at 08:31

## 2023-06-15 RX ADMIN — Medication 20 MG: at 08:35

## 2023-06-15 RX ADMIN — ACETAMINOPHEN 650 MG: 325 TABLET, FILM COATED ORAL at 09:11

## 2023-06-15 RX ADMIN — BUPIVACAINE HYDROCHLORIDE 2 ML: 2.5 INJECTION, SOLUTION INFILTRATION; PERINEURAL at 13:15

## 2023-06-15 RX ADMIN — Medication 20 MG: at 08:25

## 2023-06-15 RX ADMIN — LIDOCAINE HYDROCHLORIDE 2 ML: 10 INJECTION, SOLUTION INFILTRATION; PERINEURAL at 13:15

## 2023-06-15 RX ADMIN — GLYCOPYRROLATE 0.2 MG: 0.2 INJECTION, SOLUTION INTRAMUSCULAR; INTRAVENOUS at 08:26

## 2023-06-15 RX ADMIN — PROPOFOL 150 MG: 10 INJECTION, EMULSION INTRAVENOUS at 08:15

## 2023-06-15 RX ADMIN — LIDOCAINE HYDROCHLORIDE 100 MG: 20 INJECTION, SOLUTION EPIDURAL; INFILTRATION; INTRACAUDAL at 08:15

## 2023-06-15 RX ADMIN — PROPOFOL 40 MG: 10 INJECTION, EMULSION INTRAVENOUS at 08:27

## 2023-06-15 RX ADMIN — PROPOFOL 30 MG: 10 INJECTION, EMULSION INTRAVENOUS at 08:19

## 2023-06-15 RX ADMIN — SODIUM CHLORIDE, SODIUM LACTATE, POTASSIUM CHLORIDE, AND CALCIUM CHLORIDE: .6; .31; .03; .02 INJECTION, SOLUTION INTRAVENOUS at 07:43

## 2023-06-15 RX ADMIN — MIDAZOLAM 1 MG: 1 INJECTION INTRAMUSCULAR; INTRAVENOUS at 08:30

## 2023-06-15 RX ADMIN — PROPOFOL 30 MG: 10 INJECTION, EMULSION INTRAVENOUS at 08:22

## 2023-06-15 RX ADMIN — METHYLPREDNISOLONE ACETATE 2 ML: 40 INJECTION, SUSPENSION INTRA-ARTICULAR; INTRALESIONAL; INTRAMUSCULAR; SOFT TISSUE at 13:15

## 2023-06-15 RX ADMIN — PROPOFOL 50 MG: 10 INJECTION, EMULSION INTRAVENOUS at 08:16

## 2023-06-15 RX ADMIN — MIDAZOLAM 1 MG: 1 INJECTION INTRAMUSCULAR; INTRAVENOUS at 08:25

## 2023-06-15 NOTE — ANESTHESIA POSTPROCEDURE EVALUATION
Post-Op Assessment Note    CV Status:  Stable    Pain management: adequate     Mental Status:  Alert and awake   Hydration Status:  Euvolemic   PONV Controlled:  Controlled   Airway Patency:  Patent   Two or more mitigation strategies used for obstructive sleep apnea   Post Op Vitals Reviewed: Yes      Staff: CRNA, Anesthesiologist         Encounter Notable Events   Notable Event Outcome Phase Comment   Laryngospasm Resolved Intraoperatively Intraprocedure during EGD, resolved with BVM, required higher doses of sedatives to acheive sedation level but then had issues with obstruction and coughing, required adjuncts to be used other than propofol to safely tolerate MAC       BP  159/99   Temp 98   Pulse 101   Resp 20   SpO2 99

## 2023-06-15 NOTE — ANESTHESIA PREPROCEDURE EVALUATION
Procedure:  COLONOSCOPY  EGD    Relevant Problems   CARDIO   (+) Hypertension      ENDO   (+) Type 2 diabetes mellitus (HCC)      NEURO/PSYCH   (+) Anxiety      PULMONARY   (+) Acute hypoxemic respiratory failure due to COVID-19 (HCC) (Resolved)      Other   (+) Abdominal pain   (+) Obesity (BMI 35 0-39 9 without comorbidity)        Physical Exam    Airway    Mallampati score: II  TM Distance: >3 FB  Neck ROM: full     Dental       Cardiovascular      Pulmonary      Other Findings        Anesthesia Plan  ASA Score- 3     Anesthesia Type- IV sedation with anesthesia with ASA Monitors  Additional Monitors:   Airway Plan:     Comment: Recent labs personally reviewed:  Lab Results       Component                Value               Date                       HGB                      14 6                03/24/2023                 PLT                      280                 03/24/2023                 WBC                      6 67                03/24/2023            Lab Results       Component                Value               Date                       BUN                      20                  03/24/2023                 CREATININE               0 67                03/24/2023                 K                        3 9                 03/24/2023            Lab Results       Component                Value               Date                       PTT                      28                  03/23/2023             Lab Results       Component                Value               Date                       INR                      1 08                03/23/2023              Blood type A    I, Devonte Slater MD, have personally seen and evaluated the patient prior to anesthetic care  I have reviewed the pre-anesthetic record, medical history, allergies, medications and any other medical records if appropriate to the anesthetic care    If a CRNA is involved in the case, I have reviewed the CRNA assessment, if present, and agree  Patient consented for IV Sedation, general anesthesia as back up  Discussed risks of aspiration, IV infiltration, indications for conversion to general anesthesia  All questions and concerns addressed          Plan Factors-Exercise tolerance (METS): >4 METS  Chart reviewed  Existing labs reviewed  Patient summary reviewed  Patient is not a current smoker  Patient did not smoke on day of surgery  Obstructive sleep apnea risk education given perioperatively  Induction- intravenous  Postoperative Plan-     Informed Consent- Anesthetic plan and risks discussed with patient  I personally reviewed this patient with the CRNA  Discussed and agreed on the Anesthesia Plan with the CRNA  Elen Gr

## 2023-06-15 NOTE — PROGRESS NOTES
"Patient Name:  Adrien Capps  MRN:  [id number]    [address] [place] [address]     1  Bilateral primary osteoarthritis of knee  -     Large joint arthrocentesis: L knee  -     Large joint arthrocentesis: R knee    2  Chronic pain of right knee  -     Large joint arthrocentesis: R knee    3  Chronic pain of left knee  -     Large joint arthrocentesis: L knee      Bilateral knee osteoarthritis with worsening pain  · Discussed continued nonoperative treatments and patient wished to move forward with bilateral knee CSI  · Tolerated procedure well  · Continue OTC medications as needed  · Follow up in office in 3 months for reevaluation    History of the Present Illness   Adrien Capps is a 59 y o  female with Bilateral knee osteoarthritis  Today, patient states she would like to have repeat CSI of both knees secondary to pain  She also admits to Right hip pain, as well  She admits the injections do provide significant pain relief  Review of Systems     Review of Systems   Constitutional: Negative for chills and fever  HENT: Negative for congestion  Respiratory: Negative for cough, chest tightness and shortness of breath  Cardiovascular: Negative for chest pain and palpitations  Gastrointestinal: Negative for abdominal pain  Endocrine: Negative for cold intolerance and heat intolerance  Neurological: Negative for syncope  Psychiatric/Behavioral: Negative for confusion  Physical Exam     BP (!) 176/105 (BP Location: Left arm, Patient Position: Sitting, Cuff Size: Large)   Pulse 77   Ht 5' 6\" (1 676 m)   Wt 111 kg (244 lb)   BMI 39 38 kg/m²     Right Knee  Range of motion from 3 to 100 degrees  There is trace effusion  There is no tenderness over the medial or lateral joint line  The patient is able to perform a straight leg raise      Varus stress testing reveals no pain or instability at 0 and 30 degrees   Valgus stress testing reveals no pain or instability at 0 and 30 degrees  The " patient is neurovascular intact distally  Left Knee  Range of motion from 7 to 90 degrees  There is trace effusion  There is no tenderness over the medial or lateral joint lines  The patient is able to perform a straight leg raise  Varus stress testing reveals no pain or instability at 0 and 30 degrees   Valgus stress testing reveals no pain or instability at 0 and 30 degrees  The patient is neurovascular intact distally  Data Review     I have personally reviewed pertinent films in PACS, and my interpretation follows  Previous images demonstrate bilateral knee moderate medial and lateral osteoarthritis, mild to moderate patellofemoral osteoarthritis      Social History     Tobacco Use   • Smoking status: Former   • Smokeless tobacco: Never   Vaping Use   • Vaping Use: Never used   Substance Use Topics   • Alcohol use: Yes     Comment: weekly   • Drug use: Never           Large joint arthrocentesis: L knee  Universal Protocol:  Risks and benefits: risks, benefits and alternatives were discussed  Consent given by: patient  Patient identity confirmed: verbally with patient    Procedure Details  Location: knee - L knee  Needle size: 22 G  Approach: lateral  Medications administered: 2 mL bupivacaine 0 25 %; 2 mL lidocaine 1 %; 2 mL methylPREDNISolone acetate 40 mg/mL    Patient tolerance: patient tolerated the procedure well with no immediate complications  Dressing:  Sterile dressing applied    Large joint arthrocentesis: R knee  Universal Protocol:  Risks and benefits: risks, benefits and alternatives were discussed  Consent given by: patient  Patient identity confirmed: verbally with patient    Procedure Details  Location: knee - R knee  Needle size: 22 G  Approach: lateral  Medications administered: 2 mL bupivacaine 0 25 %; 2 mL lidocaine 1 %; 2 mL methylPREDNISolone acetate 40 mg/mL    Patient tolerance: patient tolerated the procedure well with no immediate complications  Dressing:  Sterile dressing applied            Otis Liner, DO

## 2023-06-15 NOTE — H&P
"History and Physical -  [facility]  Carny Joe 59 y o  female MRN: [id number]      HPI: Caryn Joe is a 59y o  year old female who presents for evaluation of diarrhea, nausea, vomiting, history of colon polyp      REVIEW OF SYSTEMS: Per the HPI, and otherwise unremarkable  Historical Information   Past Medical History:   Diagnosis Date   • Asthma    • Diabetes mellitus ([address] )    • Hypertension      Past Surgical History:   Procedure Laterality Date   • HERNIA REPAIR     • HYSTERECTOMY  05/2021   • MAMMO STEREOTACTIC BREAST BIOPSY RIGHT (ALL INC) Right 5/27/2022     Social History   Social History     Substance and Sexual Activity   Alcohol Use Yes    Comment: weekly     Social History     Substance and Sexual Activity   Drug Use Never     Social History     Tobacco Use   Smoking Status Former   Smokeless Tobacco Never     Family History   Problem Relation Age of Onset   • Diabetes Mother    • Diabetes Father    • No Known Problems Maternal Grandmother    • No Known Problems Maternal Grandfather    • No Known Problems Paternal Grandmother    • No Known Problems Paternal Grandfather    • No Known Problems Son    • No Known Problems Son    • No Known Problems Maternal Aunt    • No Known Problems Maternal Aunt    • No Known Problems Maternal Aunt        Meds/Allergies     (Not in a hospital admission)      Allergies   Allergen Reactions   • Sulfa Antibiotics Rash       Objective     Blood pressure (!) 192/90, pulse 77, temperature 97 8 °F (36 6 °C), temperature source Temporal, resp  rate 18, height 5' 6\" (1 676 m), weight 111 kg (244 lb 4 3 oz), SpO2 97 %, not currently breastfeeding  PHYSICAL EXAM    Gen: NAD  CV: RRR  CHEST: Clear  ABD: soft, NT/ND  EXT: no edema      ASSESSMENT/PLAN:  This is a 59y o  year old female here for EGD with biopsy, colonoscopy, and she is stable and optimized for her procedure          "

## 2023-06-19 PROCEDURE — 88305 TISSUE EXAM BY PATHOLOGIST: CPT | Performed by: STUDENT IN AN ORGANIZED HEALTH CARE EDUCATION/TRAINING PROGRAM

## 2023-06-22 ENCOUNTER — TELEPHONE (OUTPATIENT)
Dept: GASTROENTEROLOGY | Facility: CLINIC | Age: 58
End: 2023-06-22

## 2023-06-22 NOTE — TELEPHONE ENCOUNTER
----- Message from Carlitos Larsen DO sent at 6/21/2023  4:13 PM EDT -----  Please call the patient with the biopsy results  Biopsies of small intestine were benign and negative for celiac disease or cancer  Biopsies stomach were benign and negative for Helicobacter pylori or for cancer

## 2023-06-22 NOTE — TELEPHONE ENCOUNTER
Called and spoke to patient  Gave patient message as per Antoni Walker  Patient is not taking any OTC medications  She stated she felt a little better today and would have tea   Let patient know that if still not feeling better that she should  contact PCP, that as per Antoni Walker those side effects are not usually from having a EGD

## 2023-06-22 NOTE — TELEPHONE ENCOUNTER
Returned patients phone call  Patient states that she had EGD Thursday 6/15  Stefano Guido patient c/o sore throat, throat feels swollen, and feels now she has a chest cold ? Is this side effect of EGD ? If so how long does it last? Please advise  Thank you !

## 2023-07-07 ENCOUNTER — TELEPHONE (OUTPATIENT)
Dept: GASTROENTEROLOGY | Facility: CLINIC | Age: 58
End: 2023-07-07

## 2023-07-07 NOTE — TELEPHONE ENCOUNTER
L/M for patient to call back and schedule a Colonoscopy in September with Dr Maria Del Rosario Sanchez    Her June Colon prepping was inadequate

## 2023-07-14 ENCOUNTER — PREP FOR PROCEDURE (OUTPATIENT)
Dept: GASTROENTEROLOGY | Facility: CLINIC | Age: 58
End: 2023-07-14

## 2023-07-14 DIAGNOSIS — Z86.010 HISTORY OF COLON POLYPS: ICD-10-CM

## 2023-07-14 DIAGNOSIS — R11.2 NAUSEA AND VOMITING, UNSPECIFIED VOMITING TYPE: ICD-10-CM

## 2023-07-14 DIAGNOSIS — R19.7 DIARRHEA, UNSPECIFIED TYPE: Primary | ICD-10-CM

## 2023-07-14 NOTE — TELEPHONE ENCOUNTER
Called and schedule patient Colonsocopy with Dr Aysha Ribeiro  9/7/23   Mailed Dulco/Miralax prep Instructions

## 2023-09-15 ENCOUNTER — DOCUMENTATION (OUTPATIENT)
Dept: PAIN MEDICINE | Facility: CLINIC | Age: 58
End: 2023-09-15

## 2023-09-15 NOTE — PROGRESS NOTES
Preliminary [facility] approval received today via [employer] and [facility] referral was scanned into pts chart.

## 2023-09-21 ENCOUNTER — TELEPHONE (OUTPATIENT)
Dept: PAIN MEDICINE | Facility: CLINIC | Age: 58
End: 2023-09-21

## 2023-09-21 NOTE — TELEPHONE ENCOUNTER
Called lm for pt stating we need a medical release form filled out to get the information from the [place]

## 2023-09-28 ENCOUNTER — OFFICE VISIT (OUTPATIENT)
Dept: OBGYN CLINIC | Facility: CLINIC | Age: 58
End: 2023-09-28
Payer: COMMERCIAL

## 2023-09-28 VITALS
BODY MASS INDEX: 38.83 KG/M2 | DIASTOLIC BLOOD PRESSURE: 100 MMHG | SYSTOLIC BLOOD PRESSURE: 160 MMHG | WEIGHT: 241.6 LBS | HEART RATE: 82 BPM | HEIGHT: 66 IN

## 2023-09-28 DIAGNOSIS — G89.29 CHRONIC PAIN OF LEFT KNEE: ICD-10-CM

## 2023-09-28 DIAGNOSIS — M17.0 BILATERAL PRIMARY OSTEOARTHRITIS OF KNEE: Primary | ICD-10-CM

## 2023-09-28 DIAGNOSIS — G89.29 CHRONIC PAIN OF RIGHT KNEE: ICD-10-CM

## 2023-09-28 DIAGNOSIS — M16.11 PRIMARY OSTEOARTHRITIS OF ONE HIP, RIGHT: ICD-10-CM

## 2023-09-28 DIAGNOSIS — M25.561 CHRONIC PAIN OF RIGHT KNEE: ICD-10-CM

## 2023-09-28 DIAGNOSIS — M25.562 CHRONIC PAIN OF LEFT KNEE: ICD-10-CM

## 2023-09-28 PROCEDURE — 20610 DRAIN/INJ JOINT/BURSA W/O US: CPT | Performed by: ORTHOPAEDIC SURGERY

## 2023-09-28 PROCEDURE — 99214 OFFICE O/P EST MOD 30 MIN: CPT | Performed by: ORTHOPAEDIC SURGERY

## 2023-09-28 RX ORDER — NAPROXEN 375 MG/1
375 TABLET ORAL
COMMUNITY
Start: 2023-09-13

## 2023-09-28 RX ORDER — LIDOCAINE HYDROCHLORIDE 10 MG/ML
2 INJECTION, SOLUTION INFILTRATION; PERINEURAL
Status: COMPLETED | OUTPATIENT
Start: 2023-09-28 | End: 2023-09-28

## 2023-09-28 RX ORDER — BUPIVACAINE HYDROCHLORIDE 2.5 MG/ML
2 INJECTION, SOLUTION INFILTRATION; PERINEURAL
Status: COMPLETED | OUTPATIENT
Start: 2023-09-28 | End: 2023-09-28

## 2023-09-28 RX ORDER — METHYLPREDNISOLONE ACETATE 40 MG/ML
2 INJECTION, SUSPENSION INTRA-ARTICULAR; INTRALESIONAL; INTRAMUSCULAR; SOFT TISSUE
Status: COMPLETED | OUTPATIENT
Start: 2023-09-28 | End: 2023-09-28

## 2023-09-28 RX ORDER — FLUCONAZOLE 150 MG/1
150 TABLET ORAL
COMMUNITY
Start: 2023-06-08

## 2023-09-28 RX ADMIN — LIDOCAINE HYDROCHLORIDE 2 ML: 10 INJECTION, SOLUTION INFILTRATION; PERINEURAL at 07:45

## 2023-09-28 RX ADMIN — BUPIVACAINE HYDROCHLORIDE 2 ML: 2.5 INJECTION, SOLUTION INFILTRATION; PERINEURAL at 07:45

## 2023-09-28 RX ADMIN — METHYLPREDNISOLONE ACETATE 2 ML: 40 INJECTION, SUSPENSION INTRA-ARTICULAR; INTRALESIONAL; INTRAMUSCULAR; SOFT TISSUE at 07:45

## 2023-09-28 NOTE — PROGRESS NOTES
Patient Name:  Estrella Brooks  MRN:  [id number]    [address] [address] [facility]     1. Bilateral primary osteoarthritis of knee  -     Large joint arthrocentesis: R knee  -     Large joint arthrocentesis: L knee    2. Primary osteoarthritis of one hip, right  -     Ambulatory Referral to Orthopedic Surgery; Future    3. Chronic pain of right knee  -     Large joint arthrocentesis: R knee    4. Chronic pain of left knee  -     Large joint arthrocentesis: L knee      Bilateral knee osteoarthritis s/p CSI on 06/15/2023  · In depth discussion had with patient regarding nonoperative vs surgical management of bilateral knee osteoarthritis. · Nonoperative treatment including repeat CSI or visco injections, OTC medications, and topical analgesics. Patient wished to move forward with CSI today, but also wanted to discuss planning for surgical intervention. Advised patient we would hold off on any surgery for at least 3 months after most recent injection. Patient understanding and tolerated procedure well. · Advised patient to check her blood glucose 3 times daily for the next 3 days and reach out to PCP if medication adjustments required. · Surgical intervention by means of total knee arthroplasty was discussed. Advised patient her BMI is under the requirement of 40 to move forward with surgery, but her HGBA1C from March 2023 was 8.2. Strongly suggested patient to reach out to PCP to discuss new labs and reevaluation of blood glucose/HGBA1c.  · Also briefly discussed her Right hip osteoarthritis and suggested patient to follow up with Dr Ishmael Barry for evaluation. · Follow up as needed to discuss surgical intervention vs repeat injections    History of the Present Illness   Estrella Brooks is a 59 y.o. female with Bilateral knee osteoarthritis s/p CSI on 06/15/2023. Today, patient reports she recently went on a cruise, but had a lot of pain in both of her knees. She had to use a scooter throughout the trip.  She admits the Right knee bothers her more than the Left. She also has Right hip pain and known osteoarthritis. She has tried CSI and visco in the past with mild symptomatic improvement. Patient would like to discuss surgery for her knee osteoarthritis. Review of Systems     Review of Systems   Constitutional: Negative for chills and fever. HENT: Negative for congestion. Respiratory: Negative for cough, chest tightness and shortness of breath. Cardiovascular: Negative for chest pain and palpitations. Gastrointestinal: Negative for abdominal pain. Endocrine: Negative for cold intolerance and heat intolerance. Neurological: Negative for syncope. Psychiatric/Behavioral: Negative for confusion. Physical Exam     /100   Pulse 82   Ht 5' 6" (1.676 m)   Wt 110 kg (241 lb 9.6 oz)   BMI 39.00 kg/m²     Right Knee  Range of motion from 3 to 100 degrees. There is trace effusion. There is tenderness over the medial joint line. The patient is able to perform a straight leg raise with 5/5 quad strength. Varus stress testing reveals no pain or instability at 0 and 30 degrees   Valgus stress testing reveals no pain or instability at 0 and 30 degrees  The patient is neurovascular intact distally. Left Knee  Range of motion from 7 to 90 with pain at terminal flexion. There is trace effusion. There is tenderness over the medial and lateral joint line. The patient is able to perform a straight leg raise with 4+/5 quad strength. Varus stress testing reveals no pain or instability at 0 and 30 degrees   Valgus stress testing reveals no pain or instability at 0 and 30 degrees  The patient is neurovascular intact distally. Data Review     I have personally reviewed pertinent films in PACS, and my interpretation follows. X-rays taken 11/11/2021 of Right knee demonstrate moderate patellofemoral and severe lateral compartment joint space narrowing, osteophytes and sclerosis.  No fracture or dislocation noted. X-rays taken 11/11/2021 of Left knee demonstrate moderate tricompartmental degenerative changes, joint space narrowing, and osteophytes. No acute fracture or dislocation noted.      Social History     Tobacco Use   • Smoking status: Former   • Smokeless tobacco: Never   Vaping Use   • Vaping Use: Never used   Substance Use Topics   • Alcohol use: Yes     Comment: weekly   • Drug use: Never           Large joint arthrocentesis: R knee  Universal Protocol:  Risks and benefits: risks, benefits and alternatives were discussed  Consent given by: patient  Patient identity confirmed: verbally with patient    Procedure Details  Location: knee - R knee  Needle size: 22 G  Approach: lateral  Medications administered: 2 mL bupivacaine 0.25 %; 2 mL lidocaine 1 %; 2 mL methylPREDNISolone acetate 40 mg/mL    Patient tolerance: patient tolerated the procedure well with no immediate complications  Dressing:  Sterile dressing applied    Large joint arthrocentesis: L knee  Universal Protocol:  Risks and benefits: risks, benefits and alternatives were discussed  Consent given by: patient  Patient identity confirmed: verbally with patient    Procedure Details  Location: knee - L knee  Needle size: 22 G  Approach: lateral  Medications administered: 2 mL bupivacaine 0.25 %; 2 mL lidocaine 1 %; 2 mL methylPREDNISolone acetate 40 mg/mL    Patient tolerance: patient tolerated the procedure well with no immediate complications  Dressing:  Sterile dressing applied            Beka Blevins DO

## 2023-10-07 DIAGNOSIS — M25.551 RIGHT HIP PAIN: ICD-10-CM

## 2023-10-07 DIAGNOSIS — M25.552 LEFT HIP PAIN: Primary | ICD-10-CM

## 2023-10-10 ENCOUNTER — APPOINTMENT (OUTPATIENT)
Dept: RADIOLOGY | Facility: CLINIC | Age: 58
End: 2023-10-10

## 2023-10-10 ENCOUNTER — OFFICE VISIT (OUTPATIENT)
Dept: OBGYN CLINIC | Facility: CLINIC | Age: 58
End: 2023-10-10
Payer: OTHER GOVERNMENT

## 2023-10-10 VITALS
HEART RATE: 66 BPM | SYSTOLIC BLOOD PRESSURE: 133 MMHG | HEIGHT: 66 IN | DIASTOLIC BLOOD PRESSURE: 84 MMHG | BODY MASS INDEX: 38.73 KG/M2 | WEIGHT: 241 LBS

## 2023-10-10 DIAGNOSIS — M25.551 RIGHT HIP PAIN: ICD-10-CM

## 2023-10-10 DIAGNOSIS — M25.552 LEFT HIP PAIN: ICD-10-CM

## 2023-10-10 DIAGNOSIS — M16.11 PRIMARY OSTEOARTHRITIS OF ONE HIP, RIGHT: Primary | ICD-10-CM

## 2023-10-10 DIAGNOSIS — M54.16 LUMBAR RADICULOPATHY: ICD-10-CM

## 2023-10-10 PROCEDURE — 73522 X-RAY EXAM HIPS BI 3-4 VIEWS: CPT

## 2023-10-10 PROCEDURE — 99214 OFFICE O/P EST MOD 30 MIN: CPT | Performed by: ORTHOPAEDIC SURGERY

## 2023-10-10 NOTE — PROGRESS NOTES
Orthopaedics Office Visit - New Patient Visit    ASSESSMENT/PLAN:    Assessment:   Primary osteoarthritis of right hip  Lumbar radiculopathy, R side with pain radiating into ankle    Plan:   · X-rays reviewed in office today  · Treatment options were discussed including operative and nonoperative treatment options. Risks and benefits were discussed in detail. · Nonoperative treatment includes intra-articular injections and physical therapy  · Operative treatment includes total hip arthropathy   · Patient was referred to Dr. Jason Mcnair for intra-articular injection of R hip as well as office consultation to discuss treatment options for R sided lumbar radiculopathy  · A script was provided for physical therapy  · She was advised to stay in contact with Dr. Liza Patel regarding her hip injection if she wishes to move forward with TKA  · Advised the patient her A1c needs to below below 7 for operative treatment  · Patient will follow up 6-8 weeks    To Do Next Visit:  Reevaluate right hip    _____________________________________________________  CHIEF COMPLAINT:  Chief Complaint   Patient presents with   • Right Hip - Pain         SUBJECTIVE:  Des Mars is a 59 y.o. female who presents for initial evaluation of right hip pain. She has been experiencing this pain for several years. She has been having knee pain and thinks she altered her GAIT, causing right hip pain. Symptoms are worsened with walking and sitting. She takes ibuprofen 800 mg to manage symptoms. She notes clicking in her hip joint. She denies numbness and tingling. She sometimes has pain radiating down her right leg while she is laying down. 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 She is trying to swim more. She is also trying to lose some weight. Patient has groin pain that radiates to the lateral aspect of her hip.     PAST MEDICAL HISTORY:  Past Medical History:   Diagnosis Date   • Asthma    • Diabetes mellitus ([address])    • Hypertension        PAST SURGICAL HISTORY:  Past Surgical History:   Procedure Laterality Date   • HERNIA REPAIR     • HYSTERECTOMY  05/2021   • MAMMO STEREOTACTIC BREAST BIOPSY RIGHT (ALL INC) Right 5/27/2022       FAMILY HISTORY:  Family History   Problem Relation Age of Onset   • Diabetes Mother    • Diabetes Father    • No Known Problems Maternal Grandmother    • No Known Problems Maternal Grandfather    • No Known Problems Paternal Grandmother    • No Known Problems Paternal Grandfather    • No Known Problems Son    • No Known Problems Son    • No Known Problems Maternal Aunt    • No Known Problems Maternal Aunt    • No Known Problems Maternal Aunt        SOCIAL HISTORY:  Social History     Tobacco Use   • Smoking status: Former   • Smokeless tobacco: Never   Vaping Use   • Vaping Use: Never used   Substance Use Topics   • Alcohol use: Yes     Comment: weekly   • Drug use: Never       MEDICATIONS:    Current Outpatient Medications:   •  Alcohol Swabs 70 % PADS, Use 4 (four) times a day, Disp: 120 each, Rfl: 0  •  busPIRone (BUSPAR) 10 mg tablet, Take 1 tablet by mouth 3 (three) times a day, Disp: , Rfl:   •  escitalopram (LEXAPRO) 5 mg tablet, Take 1 tablet by mouth daily, Disp: , Rfl:   •  hydrALAZINE (APRESOLINE) 25 mg tablet, Take 1 tablet (25 mg total) by mouth 3 (three) times a day If BP elevated despite baseline medication.  Only uses IF needed, Disp: 60 tablet, Rfl: 0  •  hydrOXYzine HCL (ATARAX) 25 mg tablet, Take 2 tablets by mouth daily at bedtime, Disp: , Rfl:   •  lidocaine (LIDODERM) 5 %, APPLY 1 PATCH TOPICALLY DAILY FOR PAIN (REMOVE PATCH AFTER 12 HOURS; 12 HOURS ON AND 12 HOURS OFF), Disp: , Rfl:   •  losartan (COZAAR) 100 MG tablet, Take 100 mg by mouth daily before dinner, Disp: , Rfl:   •  metFORMIN (GLUCOPHAGE) 1000 MG tablet, Take 1,000 mg by mouth 2 (two) times a day with meals, Disp: , Rfl:   •  naproxen (NAPROSYN) 375 mg tablet, 375 mg, Disp: , Rfl:   •  fluconazole (DIFLUCAN) 150 mg tablet, 150 mg (Patient not taking: Reported on 9/28/2023), Disp: , Rfl:   •  hydrochlorothiazide (HYDRODIURIL) 12.5 mg tablet, Take 1 tablet by mouth daily (Patient not taking: Reported on 6/15/2023), Disp: , Rfl:   •  insulin aspart (NovoLOG FlexPen) 100 UNIT/ML injection pen, Inject 30 Units under the skin 3 (three) times a day with meals for 14 days (Patient not taking: Reported on 1/3/2022), Disp: 5 pen, Rfl: 0  •  insulin glargine (LANTUS SOLOSTAR) 100 units/mL injection pen, Inject 21 Units under the skin daily at bedtime (Patient not taking: Reported on 3/30/2023), Disp: , Rfl:   •  Insulin Pen Needle (Pen Needles) 32G X 4 MM MISC, Use 4 (four) times a day for 14 days (Patient not taking: Reported on 1/3/2022), Disp: 64 each, Rfl: 0  •  scopolamine (TRANSDERM-SCOP) 1 mg/3 days TD 72 hr patch, Place 1 patch on the skin over 72 hours every third day (Patient not taking: Reported on 6/15/2023), Disp: 3 patch, Rfl: 0    ALLERGIES:  Allergies   Allergen Reactions   • Sulfa Antibiotics Rash       REVIEW OF SYSTEMS:  MSK: as noted in HPI  Neuro: WNL  Pertinent items are otherwise noted in HPI. A comprehensive review of systems was otherwise negative. LABS:  HgA1c:   Lab Results   Component Value Date    HGBA1C 8.2 (H) 03/23/2023     BMP:   Lab Results   Component Value Date    CALCIUM 8.6 03/24/2023    K 3.9 03/24/2023    CO2 22 03/24/2023     03/24/2023    BUN 20 03/24/2023    CREATININE 0.67 03/24/2023     CBC: No components found for: "CBC"    _____________________________________________________  PHYSICAL EXAMINATION:  Vital signs: /84   Pulse 66   Ht 5' 6" (1.676 m)   Wt 109 kg (241 lb)   BMI 38.90 kg/m²   General: No acute distress, awake and alert  Psychiatric: Mood and affect appear appropriate  HEENT: Trachea Midline, No torticollis, no apparent facial trauma  Cardiovascular: No audible murmurs;  Extremities appear perfused  Pulmonary: No audible wheezing or stridor  Skin: No open lesions; see further details (if any) below    MUSCULOSKELETAL EXAMINATION:  Extremities:    Right Hip:  Patient seated comfortably in no apparent distress  Able to resist against hip flexion and abduction  Painful and limited internal and external rotation  No tenderness to palpation over hip  able to perform straight leg raise  positive log roll  Sensation intact to L2-S1 dermatomes   Extremity warm and well perfused         _____________________________________________________  STUDIES REVIEWED:  I personally reviewed the images and interpretation is as follows:  X-rays taken 10/10/23 of the hip/pelvis demonstrate moderate right hip osteoarthritis. Sagittal plane CT of lumbar spine reveal likely canal stenosis, spondylolisthesis of multiple levels    PROCEDURES PERFORMED:  Procedures  None performed.       Scribe Attestation    I,:  Cipriano yL am acting as a scribe while in the presence of the attending physician.:       I,:  Deya Aguilar MD personally performed the services described in this documentation    as scribed in my presence.:

## 2023-11-30 ENCOUNTER — APPOINTMENT (EMERGENCY)
Dept: RADIOLOGY | Facility: HOSPITAL | Age: 58
End: 2023-11-30
Payer: COMMERCIAL

## 2023-11-30 ENCOUNTER — HOSPITAL ENCOUNTER (EMERGENCY)
Facility: HOSPITAL | Age: 58
Discharge: HOME | End: 2023-11-30
Attending: EMERGENCY MEDICINE
Payer: COMMERCIAL

## 2023-11-30 VITALS
OXYGEN SATURATION: 90 % | RESPIRATION RATE: 20 BRPM | SYSTOLIC BLOOD PRESSURE: 145 MMHG | HEART RATE: 82 BPM | TEMPERATURE: 98 F | DIASTOLIC BLOOD PRESSURE: 69 MMHG

## 2023-11-30 DIAGNOSIS — U07.1 COVID-19: Primary | ICD-10-CM

## 2023-11-30 LAB
FLUAV RNA RESP QL NAA+PROBE: NEGATIVE
FLUBV RNA RESP QL NAA+PROBE: NEGATIVE
RSV RNA RESP QL NAA+PROBE: NEGATIVE
SARS-COV-2 RNA RESP QL NAA+PROBE: POSITIVE

## 2023-11-30 PROCEDURE — 99284 EMERGENCY DEPT VISIT MOD MDM: CPT

## 2023-11-30 PROCEDURE — 71046 X-RAY EXAM CHEST 2 VIEWS: CPT

## 2023-11-30 PROCEDURE — 99284 EMERGENCY DEPT VISIT MOD MDM: CPT | Performed by: PHYSICIAN ASSISTANT

## 2023-11-30 PROCEDURE — 0241U HB NFCT DS VIR RESP RNA 4 TRGT: CPT | Performed by: PHYSICIAN ASSISTANT

## 2023-12-01 NOTE — DISCHARGE INSTRUCTIONS
Consider multivitamin, vitamin C and D supplementation. Drink plenty of fluids to maintain hydration. Utilize albuterol inhaler/nebulizer treatments as needed for relief of chest tightness. Close outpatient PCP follow-up for reevaluation. Please return immediately to the emergency department if you experience any new or worsening symptoms as discussed including but not limited to chest pain, shortness of breath, concern for dehydration, or any other worrisome symptoms.

## 2023-12-01 NOTE — ED PROVIDER NOTES
History  Chief Complaint   Patient presents with    Cough     Pt reports that she started with an URI a few days ago and feels like it has moved to her chest denies any fevers     Andi Bell is a 78-year-old female past with including hypertension, diabetes, asthma arriving ambulatory to the emergency department today for evaluation with complaint of URI symptoms. Patient reports that 2 days ago she developed the onset of nasal congestion, cough, and chest tightness with occasional wheeze. Patient reports that today she noticed that the cough is more productive for clear/yellow phlegm. She denies any episodes of hemoptysis. She does report chest discomfort with coughing but otherwise has no active chest pain. She denies any chest discomfort with deep breathing or exertion. She denies any significant shortness of breath or exertional dyspnea. She has had a slight increase in wheezing for which she has been utilizing her prescribed nebulized treatments with improvement. She denies any fevers, chills, sweats. No known sick contact. Regarding the patient's history of asthma she denies prior hospitalization, ICU stay or intubation for exacerbations. She offers no other complaints or concerns at this time. Prior to Admission Medications   Prescriptions Last Dose Informant Patient Reported? Taking?    Alcohol Swabs 70 % PADS  Self No No   Sig: Use 4 (four) times a day   Insulin Pen Needle (Pen Needles) 32G X 4 MM MISC  Self No No   Sig: Use 4 (four) times a day for 14 days   Patient not taking: Reported on 1/3/2022   busPIRone (BUSPAR) 10 mg tablet  Self Yes No   Sig: Take 1 tablet by mouth 3 (three) times a day   escitalopram (LEXAPRO) 5 mg tablet  Self Yes No   Sig: Take 1 tablet by mouth daily   fluconazole (DIFLUCAN) 150 mg tablet  Self Yes No   Si mg   Patient not taking: Reported on 2023   hydrALAZINE (APRESOLINE) 25 mg tablet  Self No No   Sig: Take 1 tablet (25 mg total) by mouth 3 (three) times a day If BP elevated despite baseline medication.  Only uses IF needed   hydrOXYzine HCL (ATARAX) 25 mg tablet  Self Yes No   Sig: Take 2 tablets by mouth daily at bedtime   hydrochlorothiazide (HYDRODIURIL) 12.5 mg tablet  Self Yes No   Sig: Take 1 tablet by mouth daily   Patient not taking: Reported on 6/15/2023   insulin aspart (NovoLOG FlexPen) 100 UNIT/ML injection pen  Self No No   Sig: Inject 30 Units under the skin 3 (three) times a day with meals for 14 days   Patient not taking: Reported on 1/3/2022   insulin glargine (LANTUS SOLOSTAR) 100 units/mL injection pen  Self Yes No   Sig: Inject 21 Units under the skin daily at bedtime   Patient not taking: Reported on 3/30/2023   lidocaine (LIDODERM) 5 %  Self Yes No   Sig: APPLY 1 PATCH TOPICALLY DAILY FOR PAIN (REMOVE PATCH AFTER 12 HOURS; 12 HOURS ON AND 12 HOURS OFF)   losartan (COZAAR) 100 MG tablet  Self Yes No   Sig: Take 100 mg by mouth daily before dinner   metFORMIN (GLUCOPHAGE) 1000 MG tablet  Self Yes No   Sig: Take 1,000 mg by mouth 2 (two) times a day with meals   naproxen (NAPROSYN) 375 mg tablet  Self Yes No   Si mg   scopolamine (TRANSDERM-SCOP) 1 mg/3 days TD 72 hr patch  Self No No   Sig: Place 1 patch on the skin over 72 hours every third day   Patient not taking: Reported on 6/15/2023      Facility-Administered Medications: None       Past Medical History:   Diagnosis Date    Asthma     Diabetes mellitus ([address])     Hypertension        Past Surgical History:   Procedure Laterality Date    HERNIA REPAIR      HYSTERECTOMY  2021    MAMMO STEREOTACTIC BREAST BIOPSY RIGHT (ALL INC) Right 2022       Family History   Problem Relation Age of Onset    Diabetes Mother     Diabetes Father     No Known Problems Maternal Grandmother     No Known Problems Maternal Grandfather     No Known Problems Paternal Grandmother     No Known Problems Paternal Grandfather     No Known Problems Son     No Known Problems Son     No Known Problems Maternal Aunt     No Known Problems Maternal Aunt     No Known Problems Maternal Aunt      I have reviewed and agree with the history as documented. E-Cigarette/Vaping    E-Cigarette Use Never User      E-Cigarette/Vaping Substances    Nicotine No     THC No     CBD No     Flavoring No     Other No     Unknown No      Social History     Tobacco Use    Smoking status: Former    Smokeless tobacco: Never   Vaping Use    Vaping Use: Never used   Substance Use Topics    Alcohol use: Yes     Comment: weekly    Drug use: Never       Review of Systems   Constitutional:  Negative for chills, diaphoresis and fever. HENT:  Positive for congestion. Respiratory:  Positive for cough and wheezing. Negative for shortness of breath. Cardiovascular:  Negative for chest pain. All other systems reviewed and are negative. Physical Exam  Physical Exam  Vitals and nursing note reviewed. Constitutional:       General: She is not in acute distress. Appearance: Normal appearance. She is well-developed. She is not ill-appearing, toxic-appearing or diaphoretic. HENT:      Head: Normocephalic and atraumatic. Right Ear: External ear normal.      Left Ear: External ear normal.   Eyes:      Conjunctiva/sclera: Conjunctivae normal.   Cardiovascular:      Rate and Rhythm: Normal rate and regular rhythm. Pulses: Normal pulses. Pulmonary:      Effort: Pulmonary effort is normal. No respiratory distress. Breath sounds: Normal breath sounds. No wheezing, rhonchi or rales. Comments: Normal respiratory effort. Patient speaking full sentences without conversational dyspnea. Breath sounds are clear to auscultation in all fields. O2 sat is normal on room air. Chest:      Chest wall: No tenderness. Abdominal:      General: There is no distension. Palpations: Abdomen is soft. Tenderness: There is no abdominal tenderness. Musculoskeletal:         General: Normal range of motion. Cervical back: Normal range of motion and neck supple. Skin:     General: Skin is warm and dry. Capillary Refill: Capillary refill takes less than 2 seconds. Neurological:      Mental Status: She is alert. Motor: Motor function is intact. No weakness. Gait: Gait is intact. Psychiatric:         Mood and Affect: Mood normal.       Vital Signs  ED Triage Vitals [11/30/23 1739]   Temperature Pulse Respirations Blood Pressure SpO2   98 °F (36.7 °C) 82 20 145/69 90 %      Temp Source Heart Rate Source Patient Position - Orthostatic VS BP Location FiO2 (%)   Oral Monitor Sitting Left arm --      Pain Score       --           Vitals:    11/30/23 1739   BP: 145/69   Pulse: 82   Patient Position - Orthostatic VS: Sitting         Visual Acuity      ED Medications  Medications - No data to display    Diagnostic Studies  Results Reviewed       Procedure Component Value Units Date/Time    FLU/RSV/COVID - if FLU/RSV clinically relevant [[id number]]  (Abnormal) Collected: 11/30/23 1808    Lab Status: Final result Specimen: Nares from Nose Updated: 11/30/23 1852     SARS-CoV-2 Positive     INFLUENZA A PCR Negative     INFLUENZA B PCR Negative     RSV PCR Negative    Narrative:      FOR PEDIATRIC PATIENTS - copy/paste COVID Guidelines URL to browser: [email or web address]    SARS-CoV-2 assay is a Nucleic Acid Amplification assay intended for the  qualitative detection of nucleic acid from SARS-CoV-2 in nasopharyngeal  swabs. Results are for the presumptive identification of SARS-CoV-2 RNA. Positive results are indicative of infection with SARS-CoV-2, the virus  causing COVID-19, but do not rule out bacterial infection or co-infection  with other viruses. Laboratories within the [facility] and its  territories are required to report all positive results to the appropriate  public health authorities.  Negative results do not preclude SARS-CoV-2  infection and should not be used as the sole basis for treatment or other  patient management decisions. Negative results must be combined with  clinical observations, patient history, and epidemiological information. This test has not been FDA cleared or approved. This test has been authorized by FDA under an Emergency Use Authorization  (EUA). This test is only authorized for the duration of time the  declaration that circumstances exist justifying the authorization of the  emergency use of an in vitro diagnostic tests for detection of SARS-CoV-2  virus and/or diagnosis of COVID-19 infection under section 564(b)(1) of  the Act, 21 U. S.C. 652LMY-6(Y)(6), unless the authorization is terminated  or revoked sooner. The test has been validated but independent review by FDA  and CLIA is pending. Test performed using [employer]: This RT-PCR assay targets N2,  a region unique to SARS-CoV-2. A conserved region in the E-gene was chosen  for pan-Sarbecovirus detection which includes SARS-CoV-2. According to CMS-2020-01-R, this platform meets the definition of high-throughput technology. XR chest pa & lateral    (Results Pending)              Procedures  Procedures         ED Course                               SBIRT 22yo+      Flowsheet Row Most Recent Value   Initial Alcohol Screen: US AUDIT-C     1. How often do you have a drink containing alcohol? 0 Filed at: 11/30/2023 1740   2. How many drinks containing alcohol do you have on a typical day you are drinking? 0 Filed at: 11/30/2023 1740   3b. FEMALE Any Age, or MALE 65+: How often do you have 4 or more drinks on one occassion? 0 Filed at: 11/30/2023 1740   Audit-C Score 0 Filed at: 11/30/2023 1740   RODGER: How many times in the past year have you. .. Used an illegal drug or used a prescription medication for non-medical reasons?  Never Filed at: 11/30/2023 1740                      Medical Decision Making  This is a pleasant and well-appearing 69-year-old female arriving ambulatory to the emergency department for URI symptoms that started 2 days ago. She reports nasal congestion, rhinorrhea, and cough. Admits to occasional chest tightness and wheeze as well which has responded to her prescribed nebulized treatments. She reports chest discomfort while coughing but otherwise has no active chest pain, chest pain with deep breathing or exertion. No f/c. Differential diagnosis includes but is not limited to: covid/flu/rsv, URI, bronchitis, pneumonia    Initial ED plan: covid/flu/rsv swab, CXR    Final ED Assessment: Vital signs reviewed on ED presentation, examination as above. All labs and imaging independently reviewed with imaging interpreted by the Radiologist.  Patient is positive for COVID-19. Chest x-ray shows no evidence of bacterial infiltrate or other acute cardiopulmonary process on my preliminary review. Test results reviewed with patient at bedside. Reviewed supportive measures to include humidified air, continued albuterol treatments as needed for alleviation of chest tightness and wheezing, vitamin D/C supplementation. Recommended outpatient PCP follow-up as needed for reassessment and parameters for ED return discussed at length. Patient comfortable with plan as above and she was discharged in stable condition, ambulating independently from the emergency department today without issue. Amount and/or Complexity of Data Reviewed  Radiology: ordered. Disposition  Final diagnoses:   [id number]     Time reflects when diagnosis was documented in both MDM as applicable and the Disposition within this note       Time User Action Codes Description Comment    11/30/2023  7:09 PM Sina Arcos Add [U07.1] COVID-19           ED Disposition       ED Disposition   Discharge    Condition   Stable    Date/Time   Thu Nov 30, 2023 [facility] [address] discharge to home/self care.                    Follow-up Information Follow up With Specialties Details Why Contact Info Additional 18[address] Juan J Del Angel MD [facility] Medicine   [address]  [address] [facility] [address] [facility] Emergency Medicine  If symptoms worsen [address] [facility] Emergency Department, [place], [place], [address]            Discharge Medication List as of 11/30/2023  7:47 PM        CONTINUE these medications which have NOT CHANGED    Details   Alcohol Swabs 70 % PADS Use 4 (four) times a day, Starting Mon 1/18/2021, Normal      busPIRone (BUSPAR) 10 mg tablet Take 1 tablet by mouth 3 (three) times a day, Starting Wed 12/30/2020, Historical Med      escitalopram (LEXAPRO) 5 mg tablet Take 1 tablet by mouth daily, Starting Wed 12/30/2020, Historical Med      fluconazole (DIFLUCAN) 150 mg tablet 150 mg, Starting Thu 6/8/2023, Historical Med      hydrALAZINE (APRESOLINE) 25 mg tablet Take 1 tablet (25 mg total) by mouth 3 (three) times a day If BP elevated despite baseline medication.  Only uses IF needed, Starting Wed 1/18/2023, Normal      hydrochlorothiazide (HYDRODIURIL) 12.5 mg tablet Take 1 tablet by mouth daily, Starting Thu 2/17/2022, Historical Med      hydrOXYzine HCL (ATARAX) 25 mg tablet Take 2 tablets by mouth daily at bedtime, Starting Tue 2/2/2021, Historical Med      insulin aspart (NovoLOG FlexPen) 100 UNIT/ML injection pen Inject 30 Units under the skin 3 (three) times a day with meals for 14 days, Starting Mon 1/18/2021, Until Mon 1/3/2022, Normal      insulin glargine (LANTUS SOLOSTAR) 100 units/mL injection pen Inject 21 Units under the skin daily at bedtime, Starting Tue 2/23/2021, Historical Med      Insulin Pen Needle (Pen Needles) 32G X 4 MM MISC Use 4 (four) times a day for 14 days, Starting Mon 1/18/2021, Until Mon 1/3/2022, Normal      lidocaine (LIDODERM) 5 % APPLY 1 PATCH TOPICALLY DAILY FOR PAIN (REMOVE PATCH AFTER 12 HOURS; 12 HOURS ON AND 12 HOURS OFF), Historical Med      losartan (COZAAR) 100 MG tablet Take 100 mg by mouth daily before dinner, Historical Med      metFORMIN (GLUCOPHAGE) 1000 MG tablet Take 1,000 mg by mouth 2 (two) times a day with meals, Historical Med      naproxen (NAPROSYN) 375 mg tablet 375 mg, Starting Wed 9/13/2023, Historical Med      scopolamine (TRANSDERM-SCOP) 1 mg/3 days TD 72 hr patch Place 1 patch on the skin over 72 hours every third day, Starting Fri 3/24/2023, Normal             No discharge procedures on file.     PDMP Review       None            ED Provider  Electronically Signed by             Blanca Babin PA-C  11/30/23 2616

## 2024-01-31 ENCOUNTER — TELEPHONE (OUTPATIENT)
Age: 59
End: 2024-01-31

## 2024-01-31 NOTE — TELEPHONE ENCOUNTER
Caller: Patient    Doctor/Office: na    Call regarding :  [facility]     Call was transferred to: Med Records

## 2024-02-23 ENCOUNTER — TELEPHONE (OUTPATIENT)
Dept: OBGYN CLINIC | Facility: HOSPITAL | Age: 59
End: 2024-02-23

## 2024-02-23 NOTE — TELEPHONE ENCOUNTER
Caller: Patient    Doctor: Marina    Reason for call: Patient made a follow up with Dr. Gray, called her PCP to obtain referral. PCP advised to call us back stating we need to put in a request for more services?     Please call patient to advise.     Call back#: [phone]

## 2024-03-25 ENCOUNTER — APPOINTMENT (OUTPATIENT)
Dept: RADIOLOGY | Facility: CLINIC | Age: 59
End: 2024-03-25
Payer: COMMERCIAL

## 2024-03-25 ENCOUNTER — TELEPHONE (OUTPATIENT)
Age: 59
End: 2024-03-25

## 2024-03-25 ENCOUNTER — OFFICE VISIT (OUTPATIENT)
Dept: OBGYN CLINIC | Facility: CLINIC | Age: 59
End: 2024-03-25
Payer: OTHER GOVERNMENT

## 2024-03-25 VITALS
SYSTOLIC BLOOD PRESSURE: 136 MMHG | DIASTOLIC BLOOD PRESSURE: 87 MMHG | HEART RATE: 85 BPM | BODY MASS INDEX: 37.93 KG/M2 | WEIGHT: 236 LBS | HEIGHT: 66 IN

## 2024-03-25 DIAGNOSIS — G89.29 CHRONIC PAIN OF LEFT KNEE: ICD-10-CM

## 2024-03-25 DIAGNOSIS — Z01.818 PREOP TESTING: Primary | ICD-10-CM

## 2024-03-25 DIAGNOSIS — M17.11 PRIMARY OSTEOARTHRITIS OF RIGHT KNEE: ICD-10-CM

## 2024-03-25 DIAGNOSIS — M17.12 PRIMARY OSTEOARTHRITIS OF LEFT KNEE: ICD-10-CM

## 2024-03-25 DIAGNOSIS — G89.29 CHRONIC PAIN OF RIGHT KNEE: ICD-10-CM

## 2024-03-25 DIAGNOSIS — M17.11 PRIMARY OSTEOARTHRITIS OF RIGHT KNEE: Primary | ICD-10-CM

## 2024-03-25 DIAGNOSIS — M25.562 CHRONIC PAIN OF LEFT KNEE: ICD-10-CM

## 2024-03-25 DIAGNOSIS — M25.561 CHRONIC PAIN OF RIGHT KNEE: ICD-10-CM

## 2024-03-25 PROCEDURE — 73562 X-RAY EXAM OF KNEE 3: CPT

## 2024-03-25 PROCEDURE — 99214 OFFICE O/P EST MOD 30 MIN: CPT | Performed by: ORTHOPAEDIC SURGERY

## 2024-03-25 RX ORDER — CHLORHEXIDINE GLUCONATE 4 G/100ML
SOLUTION TOPICAL DAILY PRN
OUTPATIENT
Start: 2024-03-25

## 2024-03-25 RX ORDER — CHLORHEXIDINE GLUCONATE ORAL RINSE 1.2 MG/ML
15 SOLUTION DENTAL ONCE
OUTPATIENT
Start: 2024-03-25 | End: 2024-03-25

## 2024-03-25 RX ORDER — GABAPENTIN 100 MG/1
200 CAPSULE ORAL ONCE
OUTPATIENT
Start: 2024-03-25 | End: 2024-03-25

## 2024-03-25 RX ORDER — ACETAMINOPHEN 325 MG/1
975 TABLET ORAL ONCE
OUTPATIENT
Start: 2024-03-25 | End: 2024-03-25

## 2024-03-25 NOTE — PROGRESS NOTES
Patient Name:  Dory Roberts  MRN:  [id number]    Assessment & Plan     1. Primary osteoarthritis of right knee  -     XR knee 3 vw right non injury; Future; Expected date: 03/25/2024  -     Comprehensive metabolic panel; Future  -     Hemoglobin A1C W/EAG Estimation; Future  -     CBC and differential; Future  -     Anemia Panel w/Reflex; Future  -     Protime-INR; Future  -     APTT; Future  -     Type and screen; Future  -     Ambulatory referral to Family Practice; Future  -     Ambulatory referral to Physical Therapy; Future  -     Case request operating room: ARTHROPLASTY KNEE TOTAL; Standing  -     Ambulatory referral to Cardiology; Future  -     EKG 12 lead; Future  -     XR chest pa & lateral; Future; Expected date: 03/25/2024  -     Case request operating room: ARTHROPLASTY KNEE TOTAL    2. Primary osteoarthritis of left knee    3. Chronic pain of right knee    4. Chronic pain of left knee        Bilateral knee osteoarthritis with worsening pain and degenerative changes, right greater than left.  X-rays of right knee reviewed and discussed with the patient.  Continued non operative treatment was discussed in the form of oral analgesics, activity modification, formal physical therapy, injection therapy, and bracing.  Up to this point the patient has had persistent pain affecting their activities of daily living despite non operative management.  Due to failure of non operative management, the patient would like to proceed forward with total knee arthroplasty.  Risks of surgery, including but not limited to, infection, periprosthetic fracture, aseptic loosening, persistent pain, periprosthetic fracture, wound complications, gait dysfunction, nerve injury, blood vessel injury, DVT/PE, postoperative stiffness, need for subsequent surgery including revision surgery, and anesthesia complications were discussed in great detail.  Patient understands and has elected to proceed forward with right total knee  "arthroplasty.  We will obtain preoperative evaluation and clearance from their primary care physician and cardiologist and obtain laboratory testing, EKG, and chest x-ray. Consent was signed in the office today.  I will see the patient back on the day of surgery, tentatively 4/24/24.        History of the Present Illness   Dory Roberts is a 58 y.o. female with Bilateral knee osteoarthritis status post corticosteroid injections to bilateral knees on 9/28/23. Today, patient reports her last injections provided significant pain relief from previous appointment but pain has returned.  Pain is worse in her right knee than her left.  Pain affects her activities of daily living.  She is interested in pursuing total knee replacement.       Review of Systems     Review of Systems   Constitutional:  Negative for appetite change and unexpected weight change.   HENT:  Negative for congestion and trouble swallowing.    Eyes:  Negative for visual disturbance.   Respiratory:  Negative for cough and shortness of breath.    Cardiovascular:  Negative for chest pain and palpitations.   Gastrointestinal:  Negative for nausea and vomiting.   Endocrine: Negative for cold intolerance and heat intolerance.   Musculoskeletal:  Positive for arthralgias. Negative for joint swelling and myalgias.   Skin:  Negative for rash.   Neurological:  Negative for numbness.       Physical Exam     /87   Pulse 85   Ht 5' 6\" (1.676 m)   Wt 107 kg (236 lb)   BMI 38.09 kg/m²     Right Knee  Range of motion from 3 to 110 with pain.  There is no effusion.    There is  tenderness over the lateral greater than medial joint line.    The patient is able to perform a straight leg raise.    Varus stress testing reveals no instability at 0 and 30 degrees   Valgus stress testing reveals no instability at 0 and 30 degrees  Valgus alignment partially correctable with knee flexion and varus stress.    The patient is neurovascular intact " distally.          Data Review     I have personally reviewed pertinent films in PACS, and my interpretation follows.    X-rays of right knee taken 3/25/2024 independently reviewed and demonstrate tricompartmental degenerative changes with severe lateral joint space narrowing, moderate to severe patellofemoral changes, and progressive medial joint space degenerative change with osteophyte formation present.    X-rays taken 11/11/2021 of Right knee demonstrate moderate patellofemoral and severe lateral compartment joint space narrowing, osteophytes and sclerosis. No fracture or dislocation noted.      X-rays taken 11/11/2021 of Left knee demonstrate moderate tricompartmental degenerative changes, joint space narrowing, and osteophytes. No acute fracture or dislocation noted.       Social History     Tobacco Use    Smoking status: Former    Smokeless tobacco: Never   Vaping Use    Vaping status: Never Used   Substance Use Topics    Alcohol use: Yes     Comment: weekly    Drug use: Never           Procedures      Marcelino Gray,

## 2024-03-26 ENCOUNTER — TELEPHONE (OUTPATIENT)
Dept: OBGYN CLINIC | Facility: CLINIC | Age: 59
End: 2024-03-26

## 2024-03-26 NOTE — TELEPHONE ENCOUNTER
[facility] to [facility] and [facility] Clear[facility]. They advised that the clinic was with patients and that they will me back shortly.

## 2024-03-26 NOTE — TELEPHONE ENCOUNTER
[facility].  Scheduled with Dr. Vizcaino 4-4-24 9:30 am.     I am still waiting on [facility] to [facility] regarding [facility] appt.

## 2024-03-28 ENCOUNTER — TELEPHONE (OUTPATIENT)
Dept: OBGYN CLINIC | Facility: HOSPITAL | Age: 59
End: 2024-03-28

## 2024-03-29 DIAGNOSIS — Z01.818 PREOP TESTING: ICD-10-CM

## 2024-03-29 DIAGNOSIS — M17.12 PRIMARY OSTEOARTHRITIS OF LEFT KNEE: Primary | ICD-10-CM

## 2024-03-29 RX ORDER — ALBUTEROL SULFATE 90 UG/1
2 AEROSOL, METERED RESPIRATORY (INHALATION) EVERY 6 HOURS PRN
COMMUNITY

## 2024-03-29 RX ORDER — METHOCARBAMOL 500 MG/1
500 TABLET, FILM COATED ORAL AS NEEDED
COMMUNITY

## 2024-03-29 RX ORDER — MULTIVITAMIN
1 TABLET ORAL DAILY
Qty: 30 TABLET | Refills: 1 | Status: SHIPPED | OUTPATIENT
Start: 2024-03-29

## 2024-03-29 RX ORDER — MULTIVIT WITH MINERALS/LUTEIN
1000 TABLET ORAL DAILY
Qty: 30 TABLET | Refills: 1 | Status: SHIPPED | OUTPATIENT
Start: 2024-03-29

## 2024-03-29 RX ORDER — CELECOXIB 200 MG/1
200 CAPSULE ORAL AS NEEDED
COMMUNITY
End: 2024-04-25

## 2024-03-29 RX ORDER — CHOLECALCIFEROL (VITAMIN D3) 125 MCG
CAPSULE ORAL
COMMUNITY

## 2024-03-29 NOTE — PRE-PROCEDURE INSTRUCTIONS
Pre-Surgery Instructions:   Medication Instructions    albuterol (PROVENTIL HFA,VENTOLIN HFA) 90 mcg/act inhaler Take Day of Surgery; unless usually taken at night     ATORVASTATIN CALCIUM PO Take Day of Surgery; unless usually taken at night     busPIRone (BUSPAR) 10 mg tablet Take Day of Surgery; unless usually taken at night     celecoxib (CeleBREX) 200 mg capsule Avoid 1 week prior to surgery      Cholecalciferol (Vitamin D) 125 MCG (5000 UT) CAPS Avoid 1 week prior to surgery      cyanocobalamin (VITAMIN B-12) 100 MCG tablet Avoid 1 week prior to surgery      Diclofenac Sodium (VOLTAREN) 1 % Do not take day of surgery; continue as prescribed excluding DOS     escitalopram (LEXAPRO) 5 mg tablet Take Day of Surgery; unless usually taken at night     hydrOXYzine HCL (ATARAX) 25 mg tablet Take Day of Surgery; unless usually taken at night     lidocaine (LIDODERM) 5 % Do not take day of surgery; continue as prescribed excluding DOS     losartan (COZAAR) 100 MG tablet Do not take day of surgery; continue as prescribed excluding DOS     methocarbamol (ROBAXIN) 500 mg tablet Take Day of Surgery; unless usually taken at night     mometasone 220 mcg/actuation inhaler Take Day of Surgery; unless usually taken at night     naproxen (NAPROSYN) 375 mg tablet Avoid 1 week prior to surgery      semaglutide, 1 mg/dose, (Ozempic, 1 MG/DOSE,) 2 mg/1.5 mL injection pen Avoid 1 week prior to surgery      Medication instructions for day surgery reviewed. Please use only a sip of water to take your instructed medications. Avoid all over the counter vitamins, supplements and NSAIDS for one week prior to surgery per anesthesia guidelines. Tylenol is ok to take as needed.     You will receive a call one business day prior to surgery with an arrival time and hospital directions. If your surgery is scheduled on a Monday, the hospital will be calling you on the Friday prior to your surgery. If you have not heard from anyone by 8pm, please  call the hospital supervisor through the hospital  at [phone]. ([place] [phone] or [place] [phone]).    Do not eat or drink anything after midnight the night before your surgery, including candy, mints, lifesavers, or chewing gum. Do not drink alcohol 24hrs before your surgery. Try not to smoke at least 24hrs before your surgery.       Follow the pre surgery showering instructions as listed in the “My Surgical Experience Booklet” or otherwise provided by your surgeon's office. Do not use a blade to shave the surgical area 1 week before surgery. It is okay to use a clean electric clippers up to 24 hours before surgery. Do not apply any lotions, creams, including makeup, cologne, deodorant, or perfumes after showering on the day of your surgery. Do not use dry shampoo, hair spray, hair gel, or any type of hair products.     No contact lenses, eye make-up, or artificial eyelashes. Remove nail polish, including gel polish, and any artificial, gel, or acrylic nails if possible. Remove all jewelry including rings and body piercing jewelry.     Wear causal clothing that is easy to take on and off. Consider your type of surgery.    Keep any valuables, jewelry, piercings at home. Please bring any specially ordered equipment (sling, braces) if indicated.    Arrange for a responsible person to drive you to and from the hospital on the day of your surgery. Please confirm the visitor policy for the day of your procedure when you receive your phone call with an arrival time.     Call the surgeon's office with any new illnesses, exposures, or additional questions prior to surgery.    Please reference your “My Surgical Experience Booklet” for additional information to prepare for your upcoming surgery.

## 2024-04-01 NOTE — TELEPHONE ENCOUNTER
[facility] called and left a voicemail letting me know that the patient is scheduled for [facility] and [facility] on 24 .     Dr Vizcaino ( [facility] ) 1 PM    Dr. Murcia ( [facility] ) 10 AM.    I spoke with the patient, she is aware of her appts and I advised that her labs, CXR and EKG need to be completed prior to seeing her Cardiologist and Pcp.     I asked her to complete prior to 4-10-24 so that the results can be faxed to them prior to her visits.     Patient was also informed that her [facility] authorization has  and that a new authorization will be needed for PT and her right knee. She advised that she will contact the [facility] to have them initiate a new authorization.   no

## 2024-04-04 ENCOUNTER — TELEPHONE (OUTPATIENT)
Age: 59
End: 2024-04-04

## 2024-04-04 NOTE — TELEPHONE ENCOUNTER
Caller: Halle    Doctor: Marina    Reason for call: Patient received a call that she had an appointment for PT tomorrrow.  Nothing in chart stating PT for tomorrow    Call back#: [phone]

## 2024-04-10 ENCOUNTER — OFFICE VISIT (OUTPATIENT)
Dept: LAB | Facility: HOSPITAL | Age: 59
End: 2024-04-10
Payer: COMMERCIAL

## 2024-04-10 ENCOUNTER — APPOINTMENT (OUTPATIENT)
Dept: LAB | Facility: HOSPITAL | Age: 59
End: 2024-04-10
Payer: COMMERCIAL

## 2024-04-10 ENCOUNTER — HOSPITAL ENCOUNTER (OUTPATIENT)
Dept: RADIOLOGY | Facility: HOSPITAL | Age: 59
Discharge: HOME | End: 2024-04-10

## 2024-04-10 DIAGNOSIS — M17.11 PRIMARY OSTEOARTHRITIS OF RIGHT KNEE: ICD-10-CM

## 2024-04-10 DIAGNOSIS — Z01.818 PRE-OP EVALUATION: ICD-10-CM

## 2024-04-10 DIAGNOSIS — Z01.818 PREOP TESTING: ICD-10-CM

## 2024-04-10 DIAGNOSIS — M17.11 PRIMARY OSTEOARTHRITIS OF RIGHT KNEE: Primary | ICD-10-CM

## 2024-04-10 LAB
ABO GROUP BLD: NORMAL
ALBUMIN SERPL BCP-MCNC: 4 G/DL (ref 3.5–5)
ALP SERPL-CCNC: 99 U/L (ref 34–104)
ALT SERPL W P-5'-P-CCNC: 12 U/L (ref 7–52)
ANION GAP SERPL CALCULATED.3IONS-SCNC: 6 MMOL/L (ref 4–13)
APTT PPP: 32 SECONDS (ref 23–37)
AST SERPL W P-5'-P-CCNC: 17 U/L (ref 13–39)
ATRIAL RATE: 75 BPM
BASOPHILS # BLD AUTO: 0.03 THOUSANDS/ÂΜL (ref 0–0.1)
BASOPHILS NFR BLD AUTO: 0 % (ref 0–1)
BILIRUB SERPL-MCNC: 0.83 MG/DL (ref 0.2–1)
BLD GP AB SCN SERPL QL: NEGATIVE
BUN SERPL-MCNC: 8 MG/DL (ref 5–25)
CALCIUM SERPL-MCNC: 9.1 MG/DL (ref 8.4–10.2)
CHLORIDE SERPL-SCNC: 107 MMOL/L (ref 96–108)
CO2 SERPL-SCNC: 29 MMOL/L (ref 21–32)
CREAT SERPL-MCNC: 0.59 MG/DL (ref 0.6–1.3)
CRP SERPL QL: 9.4 MG/L
EOSINOPHIL # BLD AUTO: 0.15 THOUSAND/ÂΜL (ref 0–0.61)
EOSINOPHIL NFR BLD AUTO: 2 % (ref 0–6)
ERYTHROCYTE [DISTWIDTH] IN BLOOD BY AUTOMATED COUNT: 14 % (ref 11.6–15.1)
EST. AVERAGE GLUCOSE BLD GHB EST-MCNC: 126 MG/DL
GFR SERPL CREATININE-BSD FRML MDRD: 99 ML/MIN/1.73SQ M
GLUCOSE P FAST SERPL-MCNC: 97 MG/DL (ref 65–99)
HBA1C MFR BLD: 6 %
HCT VFR BLD AUTO: 41.2 % (ref 34.8–46.1)
HGB BLD-MCNC: 13.9 G/DL (ref 11.5–15.4)
IMM GRANULOCYTES # BLD AUTO: 0.04 THOUSAND/UL (ref 0–0.2)
IMM GRANULOCYTES NFR BLD AUTO: 1 % (ref 0–2)
INR PPP: 1.01 (ref 0.84–1.19)
LYMPHOCYTES # BLD AUTO: 2.63 THOUSANDS/ÂΜL (ref 0.6–4.47)
LYMPHOCYTES NFR BLD AUTO: 38 % (ref 14–44)
MCH RBC QN AUTO: 31.3 PG (ref 26.8–34.3)
MCHC RBC AUTO-ENTMCNC: 33.7 G/DL (ref 31.4–37.4)
MCV RBC AUTO: 93 FL (ref 82–98)
MONOCYTES # BLD AUTO: 0.41 THOUSAND/ÂΜL (ref 0.17–1.22)
MONOCYTES NFR BLD AUTO: 6 % (ref 4–12)
NEUTROPHILS # BLD AUTO: 3.67 THOUSANDS/ÂΜL (ref 1.85–7.62)
NEUTS SEG NFR BLD AUTO: 53 % (ref 43–75)
NRBC BLD AUTO-RTO: 0 /100 WBCS
P AXIS: 50 DEGREES
PLATELET # BLD AUTO: 288 THOUSANDS/UL (ref 149–390)
PMV BLD AUTO: 9.3 FL (ref 8.9–12.7)
POTASSIUM SERPL-SCNC: 3.7 MMOL/L (ref 3.5–5.3)
PR INTERVAL: 174 MS
PROT SERPL-MCNC: 7.5 G/DL (ref 6.4–8.4)
PROTHROMBIN TIME: 13.9 SECONDS (ref 11.6–14.5)
QRS AXIS: 30 DEGREES
QRSD INTERVAL: 86 MS
QT INTERVAL: 412 MS
QTC INTERVAL: 460 MS
RBC # BLD AUTO: 4.44 MILLION/UL (ref 3.81–5.12)
RH BLD: NEGATIVE
SODIUM SERPL-SCNC: 142 MMOL/L (ref 135–147)
SPECIMEN EXPIRATION DATE: NORMAL
T WAVE AXIS: 72 DEGREES
VENTRICULAR RATE: 75 BPM
WBC # BLD AUTO: 6.93 THOUSAND/UL (ref 4.31–10.16)

## 2024-04-10 PROCEDURE — 93010 ELECTROCARDIOGRAM REPORT: CPT | Performed by: INTERNAL MEDICINE

## 2024-04-10 PROCEDURE — 86850 RBC ANTIBODY SCREEN: CPT

## 2024-04-10 PROCEDURE — 80053 COMPREHEN METABOLIC PANEL: CPT

## 2024-04-10 PROCEDURE — 93005 ELECTROCARDIOGRAM TRACING: CPT

## 2024-04-10 PROCEDURE — 36415 COLL VENOUS BLD VENIPUNCTURE: CPT

## 2024-04-10 PROCEDURE — 85730 THROMBOPLASTIN TIME PARTIAL: CPT

## 2024-04-10 PROCEDURE — 86900 BLOOD TYPING SEROLOGIC ABO: CPT

## 2024-04-10 PROCEDURE — 85025 COMPLETE CBC W/AUTO DIFF WBC: CPT

## 2024-04-10 PROCEDURE — 85610 PROTHROMBIN TIME: CPT

## 2024-04-10 PROCEDURE — 71046 X-RAY EXAM CHEST 2 VIEWS: CPT

## 2024-04-10 PROCEDURE — 83036 HEMOGLOBIN GLYCOSYLATED A1C: CPT

## 2024-04-10 PROCEDURE — 86140 C-REACTIVE PROTEIN: CPT

## 2024-04-10 PROCEDURE — 86901 BLOOD TYPING SEROLOGIC RH(D): CPT

## 2024-04-12 LAB
DME PARACHUTE DELIVERY DATE REQUESTED: NORMAL
DME PARACHUTE ITEM DESCRIPTION: NORMAL
DME PARACHUTE ORDER STATUS: NORMAL
DME PARACHUTE SUPPLIER NAME: NORMAL
DME PARACHUTE SUPPLIER PHONE: NORMAL

## 2024-04-22 ENCOUNTER — EVALUATION (OUTPATIENT)
Dept: PHYSICAL THERAPY | Facility: CLINIC | Age: 59
End: 2024-04-22
Payer: COMMERCIAL

## 2024-04-22 DIAGNOSIS — M17.11 PRIMARY OSTEOARTHRITIS OF RIGHT KNEE: ICD-10-CM

## 2024-04-22 PROCEDURE — 97161 PT EVAL LOW COMPLEX 20 MIN: CPT

## 2024-04-22 PROCEDURE — 97112 NEUROMUSCULAR REEDUCATION: CPT

## 2024-04-22 NOTE — PROGRESS NOTES
PT Evaluation     Today's date: 2024  Patient name: Dory Roberts  : 1965  MRN: [id number]  Referring provider: Bridget Hilliard PA-C  Dx:   Encounter Diagnosis     ICD-10-CM    1. Primary osteoarthritis of right knee  M17.11 Ambulatory referral to Physical Therapy          Start Time: 1145  Stop Time: 1230  Total time in clinic (min): 45 minutes    Assessment  Assessment details: Pt is a 59 y/o female presenting to outpatient PT with planned right TKA scheduled for 24. Upon examination, pt presents with limited and painful right knee AROM, decreased knee strength, and antalgic gait pattern. Functionally, these impairments have led to difficulty with ambulation, stair negotiation, and prolonged standing, which limits her ability to perform her usual ADLs and household tasks. Pt has good rehab potential to achieve stated goals and will benefit from skilled PT services to improve functional mobility and strength in order to return to ADLs and household tasks at prior level of function.  Impairments: abnormal or restricted ROM, activity intolerance, impaired physical strength and pain with function  Functional limitations: stair negotiation, ambulationUnderstanding of Dx/Px/POC: good   Prognosis: good    Goals  STG - 4 weeks  1. Right knee flexion at least 110 degrees to improve stair negotiation.  2. R knee strength at least 4/5 throughout to improve ADL activity.    LTG - 8 weeks  1. FOTO score will improve by 10 points to demonstrate improved functional abilities.  2. Pt able to ambulate community distances with LRD.     Plan  Patient would benefit from: PT eval and skilled physical therapy  Planned modality interventions: cryotherapy, thermotherapy: hydrocollator packs and unattended electrical stimulation  Planned therapy interventions: IASTM, joint mobilization, kinesiology taping, manual therapy, neuromuscular re-education, patient education, strengthening, stretching, therapeutic activities,  therapeutic exercise, functional ROM exercises, home exercise program and balance  Frequency: 2x week  Duration in weeks: 8  Plan of Care beginning date: 2024  Plan of Care expiration date: 2024  Treatment plan discussed with: patient      Subjective Evaluation    History of Present Illness  Mechanism of injury: Pt arrives with planned right TKA on 24. She notes the left one is bad too but the right one is worse. She states she is having difficulty walking, negotiating stairs, prolonged standing. 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   Patient Goals  Patient goals for therapy: increased strength, independence with ADLs/IADLs, decreased pain and increased motion    Pain  Current pain ratin  At best pain ratin  At worst pain ratin  Quality: dull ache  Relieving factors: rest and heat  Aggravating factors: stair climbing, standing and walking          Objective     Observations     Additional Observation Details  Antalgic gait pattern, no AD  Mild edema around right knee    Palpation     Additional Palpation Details  Mild tenderness noted around right knee joint    Active Range of Motion   Left Knee   Flexion: 110 degrees   Extension: 0 degrees     Right Knee   Flexion: 110 degrees   Extension: -5 degrees     Strength/Myotome Testing     Left Knee   Flexion: 4  Extension: 4    Right Knee   Flexion: 3+  Extension: 3+  Quadriceps contraction: fair    General Comments:      Knee Comments  Hip strength grossly 4/5             Precautions: TKA 24    POC expires Unit limit Auth Expiration date PT/OT + Visit Limit?    N/A Pending Auth req                 Visit/Unit Tracking  AUTH Status:  Date               Pending Used 1               Remaining                  FOTO      Manuals                                                                 Neuro Re-Ed             Pt education on HEP, surgery, POC after surgery 20'                                                                                           Ther Ex             Ankle pumps x20                                                                                                       Ther Activity                                       Gait Training                                       Modalities

## 2024-04-23 ENCOUNTER — APPOINTMENT (OUTPATIENT)
Dept: PREADMISSION TESTING | Facility: HOSPITAL | Age: 59
End: 2024-04-23
Payer: COMMERCIAL

## 2024-04-24 ENCOUNTER — ANESTHESIA (OUTPATIENT)
Dept: PERIOP | Facility: HOSPITAL | Age: 59
End: 2024-04-24
Payer: COMMERCIAL

## 2024-04-24 ENCOUNTER — HOSPITAL ENCOUNTER (OUTPATIENT)
Facility: HOSPITAL | Age: 59
Setting detail: OUTPATIENT SURGERY
Discharge: HOME | End: 2024-04-25
Attending: ORTHOPAEDIC SURGERY | Admitting: ORTHOPAEDIC SURGERY
Payer: COMMERCIAL

## 2024-04-24 ENCOUNTER — APPOINTMENT (OUTPATIENT)
Dept: RADIOLOGY | Facility: HOSPITAL | Age: 59
End: 2024-04-24
Payer: COMMERCIAL

## 2024-04-24 ENCOUNTER — ANESTHESIA EVENT (OUTPATIENT)
Dept: PERIOP | Facility: HOSPITAL | Age: 59
End: 2024-04-24
Payer: COMMERCIAL

## 2024-04-24 DIAGNOSIS — Z96.651 S/P TOTAL KNEE ARTHROPLASTY, RIGHT: Primary | ICD-10-CM

## 2024-04-24 PROBLEM — J45.909 ASTHMA: Status: ACTIVE | Noted: 2024-04-24

## 2024-04-24 LAB
GLUCOSE SERPL-MCNC: 106 MG/DL (ref 65–140)
GLUCOSE SERPL-MCNC: 116 MG/DL (ref 65–140)

## 2024-04-24 PROCEDURE — 82948 REAGENT STRIP/BLOOD GLUCOSE: CPT

## 2024-04-24 PROCEDURE — 27447 TOTAL KNEE ARTHROPLASTY: CPT | Performed by: PHYSICIAN ASSISTANT

## 2024-04-24 PROCEDURE — C1776 JOINT DEVICE (IMPLANTABLE): HCPCS | Performed by: ORTHOPAEDIC SURGERY

## 2024-04-24 PROCEDURE — C9290 INJ, BUPIVACAINE LIPOSOME: HCPCS | Performed by: ANESTHESIOLOGY

## 2024-04-24 PROCEDURE — 94760 N-INVAS EAR/PLS OXIMETRY 1: CPT

## 2024-04-24 PROCEDURE — C1713 ANCHOR/SCREW BN/BN,TIS/BN: HCPCS | Performed by: ORTHOPAEDIC SURGERY

## 2024-04-24 PROCEDURE — 73560 X-RAY EXAM OF KNEE 1 OR 2: CPT

## 2024-04-24 PROCEDURE — NC001 PR NO CHARGE: Performed by: ORTHOPAEDIC SURGERY

## 2024-04-24 PROCEDURE — 27447 TOTAL KNEE ARTHROPLASTY: CPT | Performed by: ORTHOPAEDIC SURGERY

## 2024-04-24 DEVICE — ATTUNE KNEE SYSTEM TIBIAL BASE AFFIXIUM FIXED BEARING SIZE 5
Type: IMPLANTABLE DEVICE | Site: KNEE | Status: FUNCTIONAL
Brand: ATTUNE AFFIXIUM

## 2024-04-24 DEVICE — ATTUNE KNEE SYSTEM TIBIAL INSERT FIXED BEARING POSTERIOR STABILIZED 6 7MM AOX
Type: IMPLANTABLE DEVICE | Site: KNEE | Status: FUNCTIONAL
Brand: ATTUNE

## 2024-04-24 DEVICE — ATTUNE PATELLA MEDIALIZED DOME 35MM CEMENTED AOX
Type: IMPLANTABLE DEVICE | Site: PATELLA | Status: FUNCTIONAL
Brand: ATTUNE

## 2024-04-24 DEVICE — SMARTSET HIGH PERFORMANCE MV MEDIUM VISCOSITY BONE CEMENT 40G
Type: IMPLANTABLE DEVICE | Site: PATELLA | Status: FUNCTIONAL
Brand: SMARTSET

## 2024-04-24 DEVICE — ATTUNE FEMORAL POROCOAT POSTERIOR STABILIZED SIZE 6N RIGHT CEMENTLESS
Type: IMPLANTABLE DEVICE | Site: KNEE | Status: FUNCTIONAL
Brand: ATTUNE

## 2024-04-24 RX ORDER — ESCITALOPRAM OXALATE 10 MG/1
5 TABLET ORAL DAILY
Status: DISCONTINUED | OUTPATIENT
Start: 2024-04-25 | End: 2024-04-25 | Stop reason: HOSPADM

## 2024-04-24 RX ORDER — LABETALOL HYDROCHLORIDE 5 MG/ML
INJECTION, SOLUTION INTRAVENOUS AS NEEDED
Status: DISCONTINUED | OUTPATIENT
Start: 2024-04-24 | End: 2024-04-24

## 2024-04-24 RX ORDER — ONDANSETRON 2 MG/ML
4 INJECTION INTRAMUSCULAR; INTRAVENOUS ONCE AS NEEDED
Status: DISCONTINUED | OUTPATIENT
Start: 2024-04-24 | End: 2024-04-24 | Stop reason: HOSPADM

## 2024-04-24 RX ORDER — SODIUM CHLORIDE, SODIUM LACTATE, POTASSIUM CHLORIDE, CALCIUM CHLORIDE 600; 310; 30; 20 MG/100ML; MG/100ML; MG/100ML; MG/100ML
125 INJECTION, SOLUTION INTRAVENOUS CONTINUOUS
Status: DISCONTINUED | OUTPATIENT
Start: 2024-04-24 | End: 2024-04-25 | Stop reason: HOSPADM

## 2024-04-24 RX ORDER — MIDAZOLAM HYDROCHLORIDE 2 MG/2ML
INJECTION, SOLUTION INTRAMUSCULAR; INTRAVENOUS AS NEEDED
Status: DISCONTINUED | OUTPATIENT
Start: 2024-04-24 | End: 2024-04-24

## 2024-04-24 RX ORDER — TRAMADOL HYDROCHLORIDE 50 MG/1
50 TABLET ORAL EVERY 6 HOURS SCHEDULED
Status: DISCONTINUED | OUTPATIENT
Start: 2024-04-24 | End: 2024-04-25 | Stop reason: HOSPADM

## 2024-04-24 RX ORDER — OXYCODONE HYDROCHLORIDE 5 MG/1
5 TABLET ORAL ONCE
Status: COMPLETED | OUTPATIENT
Start: 2024-04-24 | End: 2024-04-24

## 2024-04-24 RX ORDER — GABAPENTIN 100 MG/1
100 CAPSULE ORAL 2 TIMES DAILY
Status: DISCONTINUED | OUTPATIENT
Start: 2024-04-24 | End: 2024-04-25 | Stop reason: HOSPADM

## 2024-04-24 RX ORDER — OXYCODONE HYDROCHLORIDE 5 MG/1
5 TABLET ORAL EVERY 4 HOURS PRN
Status: DISCONTINUED | OUTPATIENT
Start: 2024-04-24 | End: 2024-04-25 | Stop reason: HOSPADM

## 2024-04-24 RX ORDER — ACETAMINOPHEN 325 MG/1
650 TABLET ORAL EVERY 6 HOURS SCHEDULED
Status: DISCONTINUED | OUTPATIENT
Start: 2024-04-24 | End: 2024-04-25 | Stop reason: HOSPADM

## 2024-04-24 RX ORDER — CEFAZOLIN SODIUM 2 G/50ML
2000 SOLUTION INTRAVENOUS ONCE
Status: COMPLETED | OUTPATIENT
Start: 2024-04-24 | End: 2024-04-24

## 2024-04-24 RX ORDER — BUPIVACAINE HYDROCHLORIDE 5 MG/ML
INJECTION, SOLUTION EPIDURAL; INTRACAUDAL
Status: COMPLETED | OUTPATIENT
Start: 2024-04-24 | End: 2024-04-24

## 2024-04-24 RX ORDER — TRANEXAMIC ACID 10 MG/ML
1000 INJECTION, SOLUTION INTRAVENOUS ONCE
Status: DISCONTINUED | OUTPATIENT
Start: 2024-04-24 | End: 2024-04-24 | Stop reason: HOSPADM

## 2024-04-24 RX ORDER — PROPOFOL 10 MG/ML
INJECTION, EMULSION INTRAVENOUS AS NEEDED
Status: DISCONTINUED | OUTPATIENT
Start: 2024-04-24 | End: 2024-04-24

## 2024-04-24 RX ORDER — ACETAMINOPHEN 10 MG/ML
1000 INJECTION, SOLUTION INTRAVENOUS ONCE
Status: COMPLETED | OUTPATIENT
Start: 2024-04-24 | End: 2024-04-24

## 2024-04-24 RX ORDER — LOSARTAN POTASSIUM 50 MG/1
100 TABLET ORAL
Status: DISCONTINUED | OUTPATIENT
Start: 2024-04-24 | End: 2024-04-25 | Stop reason: HOSPADM

## 2024-04-24 RX ORDER — SODIUM CHLORIDE 9 MG/ML
1 INJECTION, SOLUTION INTRAVENOUS CONTINUOUS
Status: DISCONTINUED | OUTPATIENT
Start: 2024-04-24 | End: 2024-04-25 | Stop reason: HOSPADM

## 2024-04-24 RX ORDER — ASCORBIC ACID 500 MG
1000 TABLET ORAL DAILY
Status: DISCONTINUED | OUTPATIENT
Start: 2024-04-25 | End: 2024-04-25 | Stop reason: HOSPADM

## 2024-04-24 RX ORDER — OXYCODONE HYDROCHLORIDE 10 MG/1
10 TABLET ORAL EVERY 4 HOURS PRN
Status: DISCONTINUED | OUTPATIENT
Start: 2024-04-24 | End: 2024-04-25 | Stop reason: HOSPADM

## 2024-04-24 RX ORDER — ONDANSETRON 2 MG/ML
INJECTION INTRAMUSCULAR; INTRAVENOUS AS NEEDED
Status: DISCONTINUED | OUTPATIENT
Start: 2024-04-24 | End: 2024-04-24

## 2024-04-24 RX ORDER — KETOROLAC TROMETHAMINE 30 MG/ML
15 INJECTION, SOLUTION INTRAMUSCULAR; INTRAVENOUS ONCE
Status: COMPLETED | OUTPATIENT
Start: 2024-04-24 | End: 2024-04-24

## 2024-04-24 RX ORDER — FENTANYL CITRATE 50 UG/ML
INJECTION, SOLUTION INTRAMUSCULAR; INTRAVENOUS AS NEEDED
Status: DISCONTINUED | OUTPATIENT
Start: 2024-04-24 | End: 2024-04-24

## 2024-04-24 RX ORDER — DIPHENHYDRAMINE HYDROCHLORIDE 50 MG/ML
INJECTION INTRAMUSCULAR; INTRAVENOUS AS NEEDED
Status: DISCONTINUED | OUTPATIENT
Start: 2024-04-24 | End: 2024-04-24

## 2024-04-24 RX ORDER — BUSPIRONE HYDROCHLORIDE 10 MG/1
10 TABLET ORAL 3 TIMES DAILY
Status: DISCONTINUED | OUTPATIENT
Start: 2024-04-24 | End: 2024-04-25 | Stop reason: HOSPADM

## 2024-04-24 RX ORDER — ATORVASTATIN CALCIUM 10 MG/1
5 TABLET, FILM COATED ORAL
Status: DISCONTINUED | OUTPATIENT
Start: 2024-04-24 | End: 2024-04-25 | Stop reason: HOSPADM

## 2024-04-24 RX ORDER — PROMETHAZINE HYDROCHLORIDE 25 MG/ML
25 INJECTION, SOLUTION INTRAMUSCULAR; INTRAVENOUS ONCE AS NEEDED
Status: DISCONTINUED | OUTPATIENT
Start: 2024-04-24 | End: 2024-04-24 | Stop reason: HOSPADM

## 2024-04-24 RX ORDER — MIDAZOLAM HYDROCHLORIDE 2 MG/2ML
INJECTION, SOLUTION INTRAMUSCULAR; INTRAVENOUS
Status: DISCONTINUED | OUTPATIENT
Start: 2024-04-24 | End: 2024-04-24

## 2024-04-24 RX ORDER — GABAPENTIN 100 MG/1
200 CAPSULE ORAL ONCE
Status: COMPLETED | OUTPATIENT
Start: 2024-04-24 | End: 2024-04-24

## 2024-04-24 RX ORDER — ALBUTEROL SULFATE 90 UG/1
2 AEROSOL, METERED RESPIRATORY (INHALATION) EVERY 6 HOURS PRN
Status: DISCONTINUED | OUTPATIENT
Start: 2024-04-24 | End: 2024-04-25 | Stop reason: HOSPADM

## 2024-04-24 RX ORDER — ACETAMINOPHEN 325 MG/1
975 TABLET ORAL ONCE
Status: COMPLETED | OUTPATIENT
Start: 2024-04-24 | End: 2024-04-24

## 2024-04-24 RX ORDER — SODIUM CHLORIDE, SODIUM LACTATE, POTASSIUM CHLORIDE, CALCIUM CHLORIDE 600; 310; 30; 20 MG/100ML; MG/100ML; MG/100ML; MG/100ML
INJECTION, SOLUTION INTRAVENOUS CONTINUOUS PRN
Status: DISCONTINUED | OUTPATIENT
Start: 2024-04-24 | End: 2024-04-24

## 2024-04-24 RX ORDER — CHLORHEXIDINE GLUCONATE ORAL RINSE 1.2 MG/ML
15 SOLUTION DENTAL ONCE
Status: COMPLETED | OUTPATIENT
Start: 2024-04-24 | End: 2024-04-24

## 2024-04-24 RX ORDER — TRANEXAMIC ACID 10 MG/ML
1000 INJECTION, SOLUTION INTRAVENOUS ONCE
Status: COMPLETED | OUTPATIENT
Start: 2024-04-24 | End: 2024-04-24

## 2024-04-24 RX ORDER — KETOROLAC TROMETHAMINE 30 MG/ML
15 INJECTION, SOLUTION INTRAMUSCULAR; INTRAVENOUS EVERY 8 HOURS SCHEDULED
Status: DISCONTINUED | OUTPATIENT
Start: 2024-04-24 | End: 2024-04-25 | Stop reason: HOSPADM

## 2024-04-24 RX ORDER — MAGNESIUM HYDROXIDE 1200 MG/15ML
LIQUID ORAL AS NEEDED
Status: DISCONTINUED | OUTPATIENT
Start: 2024-04-24 | End: 2024-04-24 | Stop reason: HOSPADM

## 2024-04-24 RX ORDER — FENTANYL CITRATE 50 UG/ML
INJECTION, SOLUTION INTRAMUSCULAR; INTRAVENOUS
Status: DISCONTINUED | OUTPATIENT
Start: 2024-04-24 | End: 2024-04-24

## 2024-04-24 RX ORDER — METHOCARBAMOL 500 MG/1
500 TABLET, FILM COATED ORAL EVERY 6 HOURS PRN
Status: DISCONTINUED | OUTPATIENT
Start: 2024-04-24 | End: 2024-04-25 | Stop reason: HOSPADM

## 2024-04-24 RX ORDER — HYDROXYZINE HYDROCHLORIDE 25 MG/1
50 TABLET, FILM COATED ORAL
Status: DISCONTINUED | OUTPATIENT
Start: 2024-04-24 | End: 2024-04-25 | Stop reason: HOSPADM

## 2024-04-24 RX ORDER — HYDROMORPHONE HCL/PF 1 MG/ML
0.5 SYRINGE (ML) INJECTION
Status: DISCONTINUED | OUTPATIENT
Start: 2024-04-24 | End: 2024-04-24 | Stop reason: HOSPADM

## 2024-04-24 RX ADMIN — ACETAMINOPHEN 650 MG: 325 TABLET, FILM COATED ORAL at 22:08

## 2024-04-24 RX ADMIN — HYDROMORPHONE HYDROCHLORIDE 0.5 MG: 1 INJECTION, SOLUTION INTRAMUSCULAR; INTRAVENOUS; SUBCUTANEOUS at 15:10

## 2024-04-24 RX ADMIN — BUPIVACAINE HYDROCHLORIDE 10 ML: 5 INJECTION, SOLUTION EPIDURAL; INTRACAUDAL at 11:08

## 2024-04-24 RX ADMIN — MIDAZOLAM HYDROCHLORIDE 2 MG: 1 INJECTION, SOLUTION INTRAMUSCULAR; INTRAVENOUS at 11:50

## 2024-04-24 RX ADMIN — BUSPIRONE HYDROCHLORIDE 10 MG: 10 TABLET ORAL at 21:59

## 2024-04-24 RX ADMIN — PROPOFOL 50 MG: 10 INJECTION, EMULSION INTRAVENOUS at 12:05

## 2024-04-24 RX ADMIN — ACETAMINOPHEN 975 MG: 325 TABLET, FILM COATED ORAL at 10:21

## 2024-04-24 RX ADMIN — KETOROLAC TROMETHAMINE 15 MG: 30 INJECTION, SOLUTION INTRAMUSCULAR; INTRAVENOUS at 15:43

## 2024-04-24 RX ADMIN — LOSARTAN POTASSIUM 100 MG: 50 TABLET, FILM COATED ORAL at 17:43

## 2024-04-24 RX ADMIN — CHLORHEXIDINE GLUCONATE 0.12% ORAL RINSE 15 ML: 1.2 LIQUID ORAL at 10:21

## 2024-04-24 RX ADMIN — LABETALOL HYDROCHLORIDE 10 MG: 5 INJECTION, SOLUTION INTRAVENOUS at 13:09

## 2024-04-24 RX ADMIN — HYDROXYZINE HYDROCHLORIDE 50 MG: 25 TABLET ORAL at 21:59

## 2024-04-24 RX ADMIN — CEFAZOLIN SODIUM 2000 MG: 2 SOLUTION INTRAVENOUS at 12:01

## 2024-04-24 RX ADMIN — SODIUM CHLORIDE, SODIUM LACTATE, POTASSIUM CHLORIDE, AND CALCIUM CHLORIDE: .6; .31; .03; .02 INJECTION, SOLUTION INTRAVENOUS at 10:51

## 2024-04-24 RX ADMIN — TRAMADOL HYDROCHLORIDE 50 MG: 50 TABLET, COATED ORAL at 17:43

## 2024-04-24 RX ADMIN — LABETALOL HYDROCHLORIDE 10 MG: 5 INJECTION, SOLUTION INTRAVENOUS at 13:24

## 2024-04-24 RX ADMIN — BUSPIRONE HYDROCHLORIDE 10 MG: 10 TABLET ORAL at 17:44

## 2024-04-24 RX ADMIN — ATORVASTATIN CALCIUM 5 MG: 10 TABLET, FILM COATED ORAL at 21:59

## 2024-04-24 RX ADMIN — TRANEXAMIC ACID 1000 MG: 10 INJECTION, SOLUTION INTRAVENOUS at 12:10

## 2024-04-24 RX ADMIN — BUPIVACAINE 10 ML: 13.3 INJECTION, SUSPENSION, LIPOSOMAL INFILTRATION at 11:08

## 2024-04-24 RX ADMIN — ONDANSETRON 4 MG: 2 INJECTION INTRAMUSCULAR; INTRAVENOUS at 14:17

## 2024-04-24 RX ADMIN — DIPHENHYDRAMINE HYDROCHLORIDE 25 MG: 50 INJECTION, SOLUTION INTRAMUSCULAR; INTRAVENOUS at 12:10

## 2024-04-24 RX ADMIN — VANCOMYCIN HYDROCHLORIDE 1500 MG: 5 INJECTION, POWDER, LYOPHILIZED, FOR SOLUTION INTRAVENOUS at 20:05

## 2024-04-24 RX ADMIN — OXYCODONE HYDROCHLORIDE 5 MG: 5 TABLET ORAL at 15:47

## 2024-04-24 RX ADMIN — PROPOFOL 30 MG: 10 INJECTION, EMULSION INTRAVENOUS at 12:13

## 2024-04-24 RX ADMIN — FENTANYL CITRATE 100 MCG: 50 INJECTION, SOLUTION INTRAMUSCULAR; INTRAVENOUS at 11:05

## 2024-04-24 RX ADMIN — ACETAMINOPHEN 1000 MG: 10 INJECTION INTRAVENOUS at 15:41

## 2024-04-24 RX ADMIN — MIDAZOLAM HYDROCHLORIDE 2 MG: 1 INJECTION, SOLUTION INTRAMUSCULAR; INTRAVENOUS at 11:05

## 2024-04-24 RX ADMIN — PROPOFOL 70 MCG/KG/MIN: 10 INJECTION, EMULSION INTRAVENOUS at 12:05

## 2024-04-24 RX ADMIN — SODIUM CHLORIDE, SODIUM LACTATE, POTASSIUM CHLORIDE, AND CALCIUM CHLORIDE 125 ML/HR: .6; .31; .03; .02 INJECTION, SOLUTION INTRAVENOUS at 10:21

## 2024-04-24 RX ADMIN — SODIUM CHLORIDE, SODIUM LACTATE, POTASSIUM CHLORIDE, AND CALCIUM CHLORIDE 125 ML/HR: .6; .31; .03; .02 INJECTION, SOLUTION INTRAVENOUS at 16:32

## 2024-04-24 RX ADMIN — GABAPENTIN 100 MG: 100 CAPSULE ORAL at 17:43

## 2024-04-24 RX ADMIN — GABAPENTIN 200 MG: 100 CAPSULE ORAL at 10:21

## 2024-04-24 RX ADMIN — OXYCODONE HYDROCHLORIDE 10 MG: 10 TABLET ORAL at 20:04

## 2024-04-24 RX ADMIN — SODIUM CHLORIDE 1 ML/KG/HR: 0.9 INJECTION, SOLUTION INTRAVENOUS at 17:23

## 2024-04-24 RX ADMIN — HYDROMORPHONE HYDROCHLORIDE 0.5 MG: 1 INJECTION, SOLUTION INTRAMUSCULAR; INTRAVENOUS; SUBCUTANEOUS at 16:16

## 2024-04-24 RX ADMIN — HYDROMORPHONE HYDROCHLORIDE 0.5 MG: 1 INJECTION, SOLUTION INTRAMUSCULAR; INTRAVENOUS; SUBCUTANEOUS at 15:04

## 2024-04-24 NOTE — OP NOTE
OPERATIVE REPORT  PATIENT NAME: Dory Roberts  : 1965  MRN: [id number]  Pt Location:  [facility] OR ROOM 04    Surgery Date: 2024    Surgeons and Role:     * Marcelino Gray DO - Primary     * Bridget Hilliard PA-C - Assisting     Preop Diagnosis:  Primary osteoarthritis of right knee [M17.11]    Post-Op Diagnosis Codes:     * Primary osteoarthritis of right knee [M17.11]    Procedure(s):  Right - ARTHROPLASTY KNEE TOTAL    Specimens:  * No specimens in log *    Estimated Blood Loss:   100 mL    Drains:  Urethral Catheter Double-lumen;Non-latex;Straight-tip 16 Fr. (Active)   Number of days: 0       Anesthesia Type:   Spinal with adductor canal and iPACK blocks    Operative Indications:  Primary osteoarthritis of right knee [M17.11]  Persistent pain affecting activities of daily living despite nonoperative treatment.    Operative Findings:  Severe tricompartmental osteoarthritis of right knee    Complications:   None    Knee Technique: Suture (direct) Repair  Knee Approach: Medial Parapatellar    Procedure and Technique:    Antibiotics: 2 g Ancef  Tourniquet Time: 122 minutes  Implants: Depuy Attune PS TKA size 6 narrow press-fit femur, 5 press-fit tibia, 35 mm cemented patella, 7 mm polyethylene spacer  TXA: 1 g IV    The patient was seen in the preoperative holding area and the appropriate site of surgery was confirmed and the patient was marked.  Adductor canal and iPACK blocks were performed in the preoperative holding area by the anesthesia team. Upon bringing the patient back to the operating room she was placed on the operating room table and spinal anesthesia was provided by the anesthesiology team. Preoperative exam under anesthesia revealed range of motion from 5 to 110 degrees with a small effusion.  Valgus alignment was partially correctable with varus stress and knee flexion. A tourniquet was placed high on the operative extremity. The right lower extremity was prepped and draped in usual sterile  fashion. Preoperative time-out was performed to once again confirm the site of surgery and procedure. Esmarch was used to exsanguinate the leg and the tourniquet was inflated to 300 mm Hg.    A midline incision was made 3 cm proximal to the patella extending down to the tibial tubercle. Skin flaps were made medially and laterally and the VMO was identified. A fresh 10 blade was used to make a parapatellar arthrotomy. A medial flap was sharply developed along the proximal tibia staying close to the joint line as not to disrupt the medial soft tissue envelope distally.  Fat pad was sharply excised. Synovium was sharply excised from anterior femur.  Rongeur was used to excise medial and lateral femoral osteophytes as well as medial tibial osteophytes.  The patella was everted and the knee was flexed to allow visualization of the cruciates and menisci. Retractors were carefully placed medial and laterally. Medial and lateral meniscus sharply released at their anterior horns. Attention was then turned to preparation of the femur. Intramedullary drill hole was made approximately 1 cm anterior to the PCL and just medial to midline. Intramedullary guide was placed down to the distal femur. Guide was pinned in place and appropriate 9 mm resection of the distal femur was performed in 5 degrees of valgus.     Attention was then turned to tibial exposure.  Anterior and posterior cruciate ligaments were released and excised. The tibia was subluxed anteriorly and remaining visible medial and lateral meniscus tissue was removed. Rongeur was used to remove anterior and medial tibial osteophytes. The tibial guide was set to 3° of posterior slope and clamp was placed appropriately over the mediolateral malleoli. Height was adjusted to approximate the appropriate resection level of the proximal tibia and provisionally pinned in place. Proper coronal alignment was adjusted and proper resection depth was confirmed with guide taking 4 mm  from the medial side. Two more headless pins were placed in the proximal resection guide followed by a threaded headed pin to stabilize the guide for tibial cut. Tibial cut was completed and the proximal tibia was removed. After removal of guide a tibial tray and drop drake were used to confirm proper alignment of the cut. The knee was placed in full extension and extension gap was noted to be tight.  The knee was placed back into flexion and an additional 2 mm was taken off of the proximal tibial cut.  At this time with the knee was placed in full extension and extension gap was noted to be appropriate for the thinnest spacer block but was asymmetric.  Laminar  was placed in the lateral compartment and Bovie was used to gently piecrust tight lateral soft tissue.  Medial and lateral extension gaps were noted to be equal. The knee was placed back into flexion and sizing of the femoral component was performed. The appropriate size was determined to be a 6. Femoral cutting jig was planned in place in 5 degrees of external rotation. After making the remaining femoral cuts the jig was removed along with bony fragments.  Curved osteotome and curette were used to remove posterior femoral osteophytes. Remaining tibial and femoral osteophytes were excised with a rongeur. Femoral box cut guide was then pinned in the appropriate place on the distal femur. Box cut was performed. Attention was turned back to the tibia.    Due to patient's age and bone quality, decision was made to use press-fit fixation forward tibia and femoral components.  A size 5 tibial tray was noted to be the proper size placed at the medial 1/3 of the tibial tubercle in appropriate external rotation.  Drill tower was appropriately impacted and held in place with headed pins.  The medullary canal was drilled followed by keel insertion.  Reamer was then used to ream peripheral holes.  Headed pins were removed and tower was removed.  Femoral trial was  inserted followed by polyethylene spacer. The knee was noted be stable through flexion and extension, achieving full extension with proper patellar tracking through range of motion with 7 mm poly.    Attention was then turned to the patella. Synovium was removed from the periphery of the patella and osteophytes were excised. Caliper measure the patellar with to be 24 mm. Patellar cut guide was used to resect 9.5 mm of patella. Trialing of patellar buttons revealed 35 mm button to the be the appropriate size. Holes for patellar button were drilled appropriately. Trial button was inserted to once again confirm proper range of motion and tracking with trial components. All trial components were removed and the knee was copiously irrigated.  Bovie cautery was used to achieve hemostasis in the posterior capsule.  Any residual meniscal tissue in the posterior joint was removed. Surgical site was then further irrigated and all bony surfaces were appropriately dried. Cement was mixed for insertion of final patellar component.   After proper exposure, press-fit tibial component was impacted.  Femur was then exposed and component was appropriately impacted.  7 mm trial polyethylene was then inserted and the knee was placed in full extension. Cement was then applied to the cut surface of the patella as well as the back of the patellar button. After applying the patella in the appropriate position, patellar clamp was used to secure the button. Excess cement was removed. Upon drying of cement, surgical site was copiously irrigated with normal saline, Irrisept solution, and normal saline again. Final 7 mm polyethylene spacer was inserted and noted to be stable through range of motion.    Surgical incision was once again copiously irrigated. Knee was once again taken through a range of motion and concern confirmed proper range of motion achieving full extension and flexion to beyond 120° with proper patellar tracking. Capsule was  closed with 1 Vicryl and 1 Stratafix suture and tourniquet was released.  Proper hemostasis was achieved.  Skin was then closed with 2-0 Vicryl and 4-0 Monocryl followed by Steri-Strips. A sterile dressing was applied. The patient tolerated the procedure well was transferred to the PACU without complication.     I was present for the entire procedure. A qualified resident physician was not available, and A physician assistant, Bridget Hilliard PA-C,  was required during the procedure for tissue retraction and handling, dissection and suturing.    Postoperatively the patient be weightbearing as tolerated right lower extremity.  Will obtain postoperative x-rays in PACU.  Patient will receive vancomycin 1.5 g every 12 hours x 2 doses postoperatively.  The patient will begin aspirin 81 mg twice daily on postoperative day 1 for 4 weeks total for DVT prophylaxis.  Patient will work with physical occupational therapy while in the hospital.  We will obtain morning laboratory testing.  Tentative plan for discharge home on postoperative day 1.    Patient Disposition:  PACU             SIGNATURE: Marcelino Gray DO  DATE: April 24, 2024  TIME: 2:54 PM

## 2024-04-24 NOTE — ANESTHESIA PREPROCEDURE EVALUATION
Procedure:  ARTHROPLASTY KNEE TOTAL (Right: Knee)    Relevant Problems   CARDIO   (+) Hypertension      ENDO   (+) Type 2 diabetes mellitus (HCC)      NEURO/PSYCH   (+) Anxiety      PULMONARY   (+) Asthma      Other   (+) Obesity (BMI 35.0-39.9 without comorbidity)    On ozempic, last dose >1 week ago  Laryngospasm during EGD last year noted    Labs unremarkable          Physical Exam    Airway    Mallampati score: II  TM Distance: >3 FB  Neck ROM: full     Dental   Comment: Denies loose teeth     Cardiovascular  Cardiovascular exam normal    Pulmonary  Pulmonary exam normal     Other Findings  Portions of exam deferred due to low yield and/or unknown COVID statuspost-pubertal.      Anesthesia Plan  ASA Score- 2     Anesthesia Type- spinal with ASA Monitors.         Additional Monitors:     Airway Plan:            Plan Factors-Exercise tolerance (METS): >4 METS.    Chart reviewed.   Existing labs reviewed. Patient summary reviewed.    Patient is not a current smoker.      Obstructive sleep apnea risk education given perioperatively.        Induction- intravenous.    Postoperative Plan-     Informed Consent- Anesthetic plan and risks discussed with patient.  I personally reviewed this patient with the CRNA. Discussed and agreed on the Anesthesia Plan with the CRNA..

## 2024-04-24 NOTE — ANESTHESIA PROCEDURE NOTES
Peripheral Block    Patient location during procedure: holding area  Start time: 4/24/2024 11:08 AM  Reason for block: at surgeon's request and post-op pain management  Staffing  Performed by: Doris Shabazz MD  Authorized by: Doris Shabazz MD    Preanesthetic Checklist  Completed: patient identified, IV checked, site marked, risks and benefits discussed, surgical consent, monitors and equipment checked, pre-op evaluation and timeout performed  Peripheral Block  Patient position: supine  Prep: ChloraPrep  Patient monitoring: frequent blood pressure checks, continuous pulse oximetry and heart rate  Block type: Adductor Canal  Laterality: right  Injection technique: single-shot  Procedures: ultrasound guided, Ultrasound guidance required for the procedure to increase accuracy and safety of medication placement and decrease risk of complications.  Ultrasound permanent image savedbupivacaine (PF) (MARCAINE) 0.5 % injection 20 mL - Perineural   10 mL - 4/24/2024 11:08:00 AM  bupivacaine liposomal (EXPAREL) 1.3 % injection 20 mL - Perineural   10 mL - 4/24/2024 11:08:00 AM  Needle  Needle type: Stimuplex   Needle gauge: 20 G  Needle length: 4 in  Needle localization: anatomical landmarks and ultrasound guidance  Assessment  Injection assessment: incremental injection, frequent aspiration, injected with ease, negative aspiration, negative for heart rate change, no paresthesia on injection, no symptoms of intraneural/intravenous injection and needle tip visualized at all times  Paresthesia pain: none  Post-procedure:  site cleaned  patient tolerated the procedure well with no immediate complications

## 2024-04-24 NOTE — ANESTHESIA POSTPROCEDURE EVALUATION
Post-Op Assessment Note    CV Status:  Stable  Pain Score: 0    Pain management: adequate       Mental Status:  Sleepy   PONV Controlled:  None   Airway Patency:  Patent     Post Op Vitals Reviewed: Yes    No anethesia notable event occurred.    Staff: CRNA               BP   169/81   Temp   97   Pulse  67   Resp   16   SpO2   96

## 2024-04-24 NOTE — ANESTHESIA PROCEDURE NOTES
Spinal Block    Patient location during procedure: OR  Start time: 4/24/2024 11:56 AM  Reason for block: primary anesthetic  Staffing  Performed by: Sav Carmona CRNA  Authorized by: Doris Shabazz MD    Preanesthetic Checklist  Completed: patient identified, IV checked, site marked, risks and benefits discussed, surgical consent, monitors and equipment checked, pre-op evaluation and timeout performed  Spinal Block  Patient position: sitting  Prep: ChloraPrep  Patient monitoring: heart rate, cardiac monitor, continuous pulse ox and frequent blood pressure checks  Approach: midline  Location: L3-4  Needle  Needle type: Pencan   Needle gauge: 25 G  Needle length: 3.5 in  Assessment  Sensory level: T4  Injection Assessment:  negative aspiration for heme, no paresthesia on injection and positive aspiration for clear CSF.  Additional Notes  Clear free flowing CSF 1.8 ml Bupivacaine and 20 mcg fentanyl injected

## 2024-04-24 NOTE — H&P
Patient was seen and evaluated in the office where continued nonoperative vs surgical management of Right knee osteoarthritis was discussed. In light of patients persistent pain and decreased ADLS/Function despite multiple nonoperative modalities, patient would like to move forward with surgical intervention. Risks of surgical intervention discussed in the office with patient and detailed consent obtained. Patient will go to OR on 04/24/2024 with Dr Gray for Right total knee arthroplasty.     14 point ROS in office   Musculoskeletal Right knee pain      Heart RRR  Lungs CTA BL   Abdomen Present nontender      Vitals:    04/24/24 1032   BP: (!) 195/93   Pulse:    Resp:    Temp:    SpO2:              Right Knee  Range of motion from 3 to 110 with pain at terminal flexion   There is trace effusion.    There is  tenderness over the lateral > medial joint line.    The patient is able to perform a straight leg raise.    Valgus stress testing reveals no instability at 0 and 30 degrees  Valgus alignment partially correctable with knee flexion and varus stress.    The patient is neurovascular intact distally.

## 2024-04-25 VITALS
HEART RATE: 79 BPM | TEMPERATURE: 98.5 F | DIASTOLIC BLOOD PRESSURE: 69 MMHG | WEIGHT: 228.18 LBS | RESPIRATION RATE: 18 BRPM | SYSTOLIC BLOOD PRESSURE: 126 MMHG | HEIGHT: 66 IN | OXYGEN SATURATION: 94 % | BODY MASS INDEX: 36.67 KG/M2

## 2024-04-25 PROBLEM — Z96.651 S/P TOTAL KNEE ARTHROPLASTY, RIGHT: Status: ACTIVE | Noted: 2024-04-25

## 2024-04-25 LAB
ALBUMIN SERPL BCP-MCNC: 3.3 G/DL (ref 3.5–5)
ALP SERPL-CCNC: 68 U/L (ref 34–104)
ALT SERPL W P-5'-P-CCNC: 16 U/L (ref 7–52)
ANION GAP SERPL CALCULATED.3IONS-SCNC: 5 MMOL/L (ref 4–13)
AST SERPL W P-5'-P-CCNC: 16 U/L (ref 13–39)
BILIRUB SERPL-MCNC: 0.41 MG/DL (ref 0.2–1)
BUN SERPL-MCNC: 16 MG/DL (ref 5–25)
CALCIUM ALBUM COR SERPL-MCNC: 8.6 MG/DL (ref 8.3–10.1)
CALCIUM SERPL-MCNC: 8 MG/DL (ref 8.4–10.2)
CHLORIDE SERPL-SCNC: 104 MMOL/L (ref 96–108)
CO2 SERPL-SCNC: 29 MMOL/L (ref 21–32)
CREAT SERPL-MCNC: 0.62 MG/DL (ref 0.6–1.3)
ERYTHROCYTE [DISTWIDTH] IN BLOOD BY AUTOMATED COUNT: 14.3 % (ref 11.6–15.1)
GFR SERPL CREATININE-BSD FRML MDRD: 98 ML/MIN/1.73SQ M
GLUCOSE P FAST SERPL-MCNC: 131 MG/DL (ref 65–99)
GLUCOSE SERPL-MCNC: 131 MG/DL (ref 65–140)
HCT VFR BLD AUTO: 31.2 % (ref 34.8–46.1)
HGB BLD-MCNC: 10.2 G/DL (ref 11.5–15.4)
MCH RBC QN AUTO: 31.7 PG (ref 26.8–34.3)
MCHC RBC AUTO-ENTMCNC: 32.7 G/DL (ref 31.4–37.4)
MCV RBC AUTO: 97 FL (ref 82–98)
PLATELET # BLD AUTO: 232 THOUSANDS/UL (ref 149–390)
PMV BLD AUTO: 9.4 FL (ref 8.9–12.7)
POTASSIUM SERPL-SCNC: 3.2 MMOL/L (ref 3.5–5.3)
PROT SERPL-MCNC: 5.9 G/DL (ref 6.4–8.4)
RBC # BLD AUTO: 3.22 MILLION/UL (ref 3.81–5.12)
SODIUM SERPL-SCNC: 138 MMOL/L (ref 135–147)
WBC # BLD AUTO: 7.45 THOUSAND/UL (ref 4.31–10.16)

## 2024-04-25 PROCEDURE — 85027 COMPLETE CBC AUTOMATED: CPT | Performed by: PHYSICIAN ASSISTANT

## 2024-04-25 PROCEDURE — 97166 OT EVAL MOD COMPLEX 45 MIN: CPT

## 2024-04-25 PROCEDURE — 97163 PT EVAL HIGH COMPLEX 45 MIN: CPT

## 2024-04-25 PROCEDURE — 94760 N-INVAS EAR/PLS OXIMETRY 1: CPT

## 2024-04-25 PROCEDURE — 97116 GAIT TRAINING THERAPY: CPT

## 2024-04-25 PROCEDURE — NC001 PR NO CHARGE: Performed by: ORTHOPAEDIC SURGERY

## 2024-04-25 PROCEDURE — 80053 COMPREHEN METABOLIC PANEL: CPT | Performed by: PHYSICIAN ASSISTANT

## 2024-04-25 RX ORDER — CELECOXIB 200 MG/1
200 CAPSULE ORAL 2 TIMES DAILY
Qty: 40 CAPSULE | Refills: 0 | Status: SHIPPED | OUTPATIENT
Start: 2024-04-25

## 2024-04-25 RX ORDER — GABAPENTIN 100 MG/1
100 CAPSULE ORAL 2 TIMES DAILY
Qty: 40 CAPSULE | Refills: 0 | Status: SHIPPED | OUTPATIENT
Start: 2024-04-25

## 2024-04-25 RX ORDER — OXYCODONE HYDROCHLORIDE 5 MG/1
5 TABLET ORAL EVERY 4 HOURS PRN
Qty: 20 TABLET | Refills: 0 | Status: SHIPPED | OUTPATIENT
Start: 2024-04-25

## 2024-04-25 RX ORDER — DOCUSATE SODIUM 100 MG/1
100 CAPSULE, LIQUID FILLED ORAL 2 TIMES DAILY
Qty: 40 CAPSULE | Refills: 0 | Status: SHIPPED | OUTPATIENT
Start: 2024-04-25

## 2024-04-25 RX ORDER — ACETAMINOPHEN 325 MG/1
650 TABLET ORAL EVERY 6 HOURS SCHEDULED
Qty: 60 TABLET | Refills: 0 | Status: SHIPPED | OUTPATIENT
Start: 2024-04-25

## 2024-04-25 RX ADMIN — B-COMPLEX W/ C & FOLIC ACID TAB 1 TABLET: TAB at 09:56

## 2024-04-25 RX ADMIN — BUSPIRONE HYDROCHLORIDE 10 MG: 10 TABLET ORAL at 16:59

## 2024-04-25 RX ADMIN — ESCITALOPRAM OXALATE 5 MG: 10 TABLET ORAL at 09:56

## 2024-04-25 RX ADMIN — OXYCODONE HYDROCHLORIDE AND ACETAMINOPHEN 1000 MG: 500 TABLET ORAL at 10:09

## 2024-04-25 RX ADMIN — SODIUM CHLORIDE 1 ML/KG/HR: 0.9 INJECTION, SOLUTION INTRAVENOUS at 04:15

## 2024-04-25 RX ADMIN — KETOROLAC TROMETHAMINE 15 MG: 30 INJECTION, SOLUTION INTRAMUSCULAR; INTRAVENOUS at 14:41

## 2024-04-25 RX ADMIN — BUSPIRONE HYDROCHLORIDE 10 MG: 10 TABLET ORAL at 09:56

## 2024-04-25 RX ADMIN — VANCOMYCIN HYDROCHLORIDE 1500 MG: 5 INJECTION, POWDER, LYOPHILIZED, FOR SOLUTION INTRAVENOUS at 04:15

## 2024-04-25 RX ADMIN — ASPIRIN 81 MG: 81 TABLET, COATED ORAL at 09:56

## 2024-04-25 RX ADMIN — FLUTICASONE FUROATE 1 PUFF: 100 POWDER RESPIRATORY (INHALATION) at 09:57

## 2024-04-25 RX ADMIN — OXYCODONE HYDROCHLORIDE 10 MG: 10 TABLET ORAL at 10:08

## 2024-04-25 RX ADMIN — METHOCARBAMOL 500 MG: 500 TABLET ORAL at 01:34

## 2024-04-25 RX ADMIN — GABAPENTIN 100 MG: 100 CAPSULE ORAL at 09:56

## 2024-04-25 RX ADMIN — VITAM B12 100 MCG: 100 TAB at 09:56

## 2024-04-25 RX ADMIN — ACETAMINOPHEN 650 MG: 325 TABLET, FILM COATED ORAL at 17:00

## 2024-04-25 RX ADMIN — GABAPENTIN 100 MG: 100 CAPSULE ORAL at 17:00

## 2024-04-25 RX ADMIN — OXYCODONE HYDROCHLORIDE 10 MG: 10 TABLET ORAL at 04:14

## 2024-04-25 RX ADMIN — ACETAMINOPHEN 650 MG: 325 TABLET, FILM COATED ORAL at 05:40

## 2024-04-25 RX ADMIN — METHOCARBAMOL 500 MG: 500 TABLET ORAL at 14:49

## 2024-04-25 RX ADMIN — LOSARTAN POTASSIUM 100 MG: 50 TABLET, FILM COATED ORAL at 16:59

## 2024-04-25 RX ADMIN — ASPIRIN 81 MG: 81 TABLET, COATED ORAL at 17:00

## 2024-04-25 RX ADMIN — KETOROLAC TROMETHAMINE 15 MG: 30 INJECTION, SOLUTION INTRAMUSCULAR; INTRAVENOUS at 05:40

## 2024-04-25 RX ADMIN — TRAMADOL HYDROCHLORIDE 50 MG: 50 TABLET, COATED ORAL at 17:00

## 2024-04-25 RX ADMIN — TRAMADOL HYDROCHLORIDE 50 MG: 50 TABLET, COATED ORAL at 12:13

## 2024-04-25 RX ADMIN — OXYCODONE HYDROCHLORIDE 10 MG: 10 TABLET ORAL at 00:10

## 2024-04-25 RX ADMIN — ACETAMINOPHEN 650 MG: 325 TABLET, FILM COATED ORAL at 12:13

## 2024-04-25 NOTE — PLAN OF CARE
Problem: PAIN - ADULT  Goal: Verbalizes/displays adequate comfort level or baseline comfort level  Description: Interventions:  - Encourage patient to monitor pain and request assistance  - Assess pain using appropriate pain scale  - Administer analgesics based on type and severity of pain and evaluate response  - Implement non-pharmacological measures as appropriate and evaluate response  - Consider cultural and social influences on pain and pain management  - Notify physician/advanced practitioner if interventions unsuccessful or patient reports new pain  Outcome: Progressing     Problem: INFECTION - ADULT  Goal: Absence or prevention of progression during hospitalization  Description: INTERVENTIONS:  - Assess and monitor for signs and symptoms of infection  - Monitor lab/diagnostic results  - Monitor all insertion sites, i.e. indwelling lines, tubes, and drains  - Monitor endotracheal if appropriate and nasal secretions for changes in amount and color  - [place] appropriate cooling/warming therapies per order  - Administer medications as ordered  - Instruct and encourage patient and family to use good hand hygiene technique  - Identify and instruct in appropriate isolation precautions for identified infection/condition  Outcome: Progressing  Goal: Absence of fever/infection during neutropenic period  Description: INTERVENTIONS:  - Monitor WBC    Outcome: Progressing     Problem: SAFETY ADULT  Goal: Patient will remain free of falls  Description: INTERVENTIONS:  - Educate patient/family on patient safety including physical limitations  - Instruct patient to call for assistance with activity   - Consult OT/PT to assist with strengthening/mobility   - Keep Call bell within reach  - Keep bed low and locked with side rails adjusted as appropriate  - Keep care items and personal belongings within reach  - Initiate and maintain comfort rounds  - Make Fall Risk Sign visible to staff  - Apply yellow socks and bracelet  for high fall risk patients  - Consider moving patient to room near nurses station  Outcome: Progressing  Goal: Maintain or return to baseline ADL function  Description: INTERVENTIONS:  -  Assess patient's ability to carry out ADLs; assess patient's baseline for ADL function and identify physical deficits which impact ability to perform ADLs (bathing, care of mouth/teeth, toileting, grooming, dressing, etc.)  - Assess/evaluate cause of self-care deficits   - Assess range of motion  - Assess patient's mobility; develop plan if impaired  - Assess patient's need for assistive devices and provide as appropriate  - Encourage maximum independence but intervene and supervise when necessary  - Involve family in performance of ADLs  - Assess for home care needs following discharge   - Consider OT consult to assist with ADL evaluation and planning for discharge  - Provide patient education as appropriate  Outcome: Progressing  Goal: Maintains/Returns to pre admission functional level  Description: INTERVENTIONS:  - Perform AM-PAC 6 Click Basic Mobility/ Daily Activity assessment daily.  - Set and communicate daily mobility goal to care team and patient/family/caregiver.   - Collaborate with rehabilitation services on mobility goals if consulted  - Out of bed for toileting  - Record patient progress and toleration of activity level   Outcome: Progressing     Problem: Knowledge Deficit  Goal: Patient/family/caregiver demonstrates understanding of disease process, treatment plan, medications, and discharge instructions  Description: Complete learning assessment and assess knowledge base.  Interventions:  - Provide teaching at level of understanding  - Provide teaching via preferred learning methods  Outcome: Progressing     Problem: RESPIRATORY - ADULT  Goal: Achieves optimal ventilation and oxygenation  Description: INTERVENTIONS:  - Assess for changes in respiratory status  - Assess for changes in mentation and behavior  -  Position to facilitate oxygenation and minimize respiratory effort  - Oxygen administered by appropriate delivery if ordered  - Initiate smoking cessation education as indicated  - Encourage broncho-pulmonary hygiene including cough, deep breathe, Incentive Spirometry  - Assess the need for suctioning and aspirate as needed  - Assess and instruct to report SOB or any respiratory difficulty  - Respiratory Therapy support as indicated  Outcome: Progressing     Problem: SAFETY ADULT  Goal: Patient will remain free of falls  Description: INTERVENTIONS:  - Educate patient/family on patient safety including physical limitations  - Instruct patient to call for assistance with activity   - Consult OT/PT to assist with strengthening/mobility   - Keep Call bell within reach  - Keep bed low and locked with side rails adjusted as appropriate  - Keep care items and personal belongings within reach  - Initiate and maintain comfort rounds  - Make Fall Risk Sign visible to staff  - Apply yellow socks and bracelet for high fall risk patients  - Consider moving patient to room near nurses station  Outcome: Progressing  Goal: Maintain or return to baseline ADL function  Description: INTERVENTIONS:  -  Assess patient's ability to carry out ADLs; assess patient's baseline for ADL function and identify physical deficits which impact ability to perform ADLs (bathing, care of mouth/teeth, toileting, grooming, dressing, etc.)  - Assess/evaluate cause of self-care deficits   - Assess range of motion  - Assess patient's mobility; develop plan if impaired  - Assess patient's need for assistive devices and provide as appropriate  - Encourage maximum independence but intervene and supervise when necessary  - Involve family in performance of ADLs  - Assess for home care needs following discharge   - Consider OT consult to assist with ADL evaluation and planning for discharge  - Provide patient education as appropriate  Outcome: Progressing  Goal:  Maintains/Returns to pre admission functional level  Description: INTERVENTIONS:  - Perform AM-PAC 6 Click Basic Mobility/ Daily Activity assessment daily.  - Set and communicate daily mobility goal to care team and patient/family/caregiver.   - Collaborate with rehabilitation services on mobility goals if consulted  - Out of bed for toileting  - Record patient progress and toleration of activity level   Outcome: Progressing

## 2024-04-25 NOTE — PLAN OF CARE
Problem: OCCUPATIONAL THERAPY ADULT  Goal: Performs self-care activities at highest level of function for planned discharge setting.  See evaluation for individualized goals.  Description: Treatment Interventions: ADL retraining, Functional transfer training, Endurance training, Patient/family training, Compensatory technique education, Activityengagement  Equipment Recommended: Shower transfer bench ($$)       See flowsheet documentation for full assessment, interventions and recommendations.   Note: Limitation: Decreased ADL status, Decreased endurance, Decreased self-care trans, Decreased high-level ADLs  Prognosis: Good  Assessment: Patient is a 58 y.o. female seen for OT evaluation s/p admit to [facility]  on 4/24/2024 w/S/P total knee arthroplasty, right. Commorbidities affecting patient's functional performance at time of assessment include: asthma, DM2, HTN, anxiety, and obesity. Orders placed for OT evaluation and treatment and activity-beginning POD #0. Performed at least two patient identifiers during session including name and wristband.  Prior to admission, Patient reporting being independent with ADLs and lives with sons in a two story house with 1st floor set-up. Personal factors affecting patient at time of initial evaluation include: difficulty performing ADLs and difficulty performing IADLs. Upon evaluation, patient requires supervision assist for UB ADLs, minimal  and moderate assist for LB ADLs.  Patient is oriented x 4.  Occupational performance is affected by the following deficits: decreased muscle strength, dynamic sit/ stand balance deficit with poor standing tolerance time for self care and functional mobility, decreased activity tolerance, increased pain, and delayed righting and equilibrium reactions. Patient to benefit from continued Occupational Therapy treatment while in the hospital to address deficits as defined above and maximize level of functional independence with  ADLs and functional mobility. Occupational Performance areas to address include: grooming , bathing/ shower, dressing, toilet hygiene, transfer to all surfaces, functional ambulation, medication routine/ management, IADLS: Household maintenance, IADLs: safety procedures, and IADLs: meal prep/ clean up. From OT standpoint, recommendation at time of d/c would be Level III (minimim resource intensity).     Rehab Resource Intensity Level, OT: III (Minimum Resource Intensity)        Armida GARG, OTR/L

## 2024-04-25 NOTE — DISCHARGE INSTR - AVS FIRST PAGE
Discharge Instructions - Orthopedics  Dory Roberts 58 y.o. female MRN: [id number]  Unit/Bed#: -01    Weight Bearing Status:                                           Weightbearing as tolerated Right lower extremity.  Walker for safety during ambulation    DVT prophylaxis:  Aspirin 81mg twice daily for 4 weeks post operatively    Pain:  Continue analgesics as directed. Please take with food and water.     Dressing Instructions:   Please keep clean, dry and intact for 7 days post operatively. May shower during this time with dressing intact. If dressing becomes saturated in the shower, please remove immediately. Allow warm, soapy water to run over dressing/surgical incision. No scrubbing, no soaking the knee. Do not remove sutures or steri strips. No application of ointments or creams over or near the incision.     Appt Instructions:   Follow up in office with Dr Gray on 05/06/2024    Contact the office sooner if you experience any increased numbness/tingling in the extremities.

## 2024-04-25 NOTE — OCCUPATIONAL THERAPY NOTE
Occupational Therapy Evaluation      Dory Roberts    4/25/2024    Patient Active Problem List   Diagnosis    Hypokalemia    Elevated glucose    Type 2 diabetes mellitus (HCC)    Vaginal candidiasis    Abdominal pain    Hypertension    Anxiety    Obesity (BMI 35.0-39.9 without comorbidity)    Primary osteoarthritis of one hip, right    Lumbar radiculopathy    Asthma    S/P total knee arthroplasty, right       Past Medical History:   Diagnosis Date    Asthma 04/24/2024    Awareness under anesthesia     EGD    Diabetes mellitus (HCC)     Hypertension        Past Surgical History:   Procedure Laterality Date    EGD      HERNIA REPAIR      umbillical    HYSTERECTOMY  05/2021    MAMMO STEREOTACTIC BREAST BIOPSY RIGHT (ALL INC) Right 05/27/2022    CA ARTHRP KNE CONDYLE&PLATU MEDIAL&LAT COMPARTMENTS Right 4/24/2024    Procedure: ARTHROPLASTY KNEE TOTAL;  Surgeon: Marcelino Gray DO;  Location: [facility];  Service: Orthopedics        04/25/24 0800   OT Last Visit   OT Visit Date 04/25/24   Note Type   Note type Evaluation   Additional Comments Pt agreeable to OT eval. Upon arrival pt supine in bed with HOB elevated.   Pain Assessment   Pain Assessment Tool 0-10   Pain Score 2   Pain Location/Orientation Orientation: Right;Location: Knee   Pain Onset/Description Onset: Ongoing;Frequency: Constant/Continuous   Hospital Pain Intervention(s) Ambulation/increased activity;Repositioned;Medication (See MAR)   Restrictions/Precautions   Weight Bearing Precautions Per Order Yes   RLE Weight Bearing Per Order WBAT   Other Precautions Fall Risk;Pain;O2  (2 L O2 via NC)   Home Living   Type of Home House   Home Layout Two level;Performs ADLs on one level;Able to live on main level with bedroom/bathroom;Access  (0 RADHA)   Bathroom Shower/Tub Tub/shower unit   Bathroom Toilet Standard   Bathroom Equipment Toilet raiser   Bathroom Accessibility Accessible   Home Equipment Cane;Walker;Electric scooter  (utilizes cane PRN in house;  utilizes rollator in community)   Prior Function   Level of [place] Independent with ADLs;Independent with functional mobility;Needs assistance with IADLS   Lives With Son   Receives Help From Family   IADLs Family/Friend/Other provides meals;Independent with driving;Independent with medication management   Falls in the last 6 months 0   Vocational On disability   Lifestyle   Autonomy Patient reporting being independent with ADLs and lives with sons in a two story house with 1st floor set-up   Reciprocal Relationships Sons   Service to Others On disability   ADL   Eating Assistance 6  Modified independent   Grooming Assistance 6  Modified Independent   UB Bathing Assistance 5  Supervision/Setup   LB Bathing Assistance 4  Minimal Assistance   UB Dressing Assistance 5  Supervision/Setup   LB Dressing Assistance 3  Moderate Assistance   Toileting Assistance  4  Minimal Assistance   Additional Comments ADL levels based on functional performance during OT eval.   Bed Mobility   Supine to Sit 4  Minimal assistance   Additional items Assist x 1;HOB elevated;Bedrails;Increased time required;Verbal cues;LE management   Sit to Supine   (DNT: pt seated OOB in recliner at end of session)   Transfers   Sit to Stand 4  Minimal assistance   Additional items Assist x 2;Increased time required;Verbal cues;Bedrails   Stand to Sit 4  Minimal assistance   Additional items Assist x 1;Armrests;Increased time required;Verbal cues   Additional Comments Pt required increased time and assistance c initial sit>stand transfer with postural support to achieve upright standing posture. Denied lightheaded/dizziness c positional changes   Functional Mobility   Functional Mobility 4  Minimal assistance   Additional Comments Pt ambulated household distance in hallway. Pt limited by pain and SOB.   Additional items Rolling walker   Balance   Static Sitting Fair +   Dynamic Sitting Fair   Static Standing Fair -   Dynamic Standing Poor +    Activity Tolerance   Activity Tolerance Patient limited by fatigue;Patient limited by pain   Medical Staff Made Aware Pt seen as a co-eval with PT due to the patient's co-morbidities and clinically unstable presentation indicated by chart review.   RUE Assessment   RUE Assessment WFL   LUE Assessment   LUE Assessment WFL   Hand Function   Gross Motor Coordination Functional   Fine Motor Coordination Functional   Sensation   Light Touch Partial deficits in the RLE  (tingles in R foot since surgery per pt)   Vision-Basic Assessment   Current Vision Wears glasses only for reading   Cognition   Overall Cognitive Status WFL   Arousal/Participation Alert;Responsive;Cooperative   Attention Within functional limits   Orientation Level Oriented X4   Memory Within functional limits   Following Commands Follows all commands and directions without difficulty   Assessment   Limitation Decreased ADL status;Decreased endurance;Decreased self-care trans;Decreased high-level ADLs   Prognosis Good   Assessment Patient is a 58 y.o. female seen for OT evaluation s/p admit to [facility]  on 4/24/2024 w/S/P total knee arthroplasty, right. Commorbidities affecting patient's functional performance at time of assessment include: asthma, DM2, HTN, anxiety, and obesity. Orders placed for OT evaluation and treatment and activity-beginning POD #0. Performed at least two patient identifiers during session including name and wristband.  Prior to admission, Patient reporting being independent with ADLs and lives with sons in a two story house with 1st floor set-up. Personal factors affecting patient at time of initial evaluation include: difficulty performing ADLs and difficulty performing IADLs. Upon evaluation, patient requires supervision assist for UB ADLs, minimal  and moderate assist for LB ADLs.  Patient is oriented x 4.  Occupational performance is affected by the following deficits: decreased muscle strength, dynamic sit/  stand balance deficit with poor standing tolerance time for self care and functional mobility, decreased activity tolerance, increased pain, and delayed righting and equilibrium reactions. Patient to benefit from continued Occupational Therapy treatment while in the hospital to address deficits as defined above and maximize level of functional independence with ADLs and functional mobility. Occupational Performance areas to address include: grooming , bathing/ shower, dressing, toilet hygiene, transfer to all surfaces, functional ambulation, medication routine/ management, IADLS: Household maintenance, IADLs: safety procedures, and IADLs: meal prep/ clean up. From OT standpoint, recommendation at time of d/c would be Level III (minimim resource intensity).   Goals   Patient Goals to have less pain   Plan   Treatment Interventions ADL retraining;Functional transfer training;Endurance training;Patient/family training;Compensatory technique education;Activityengagement   Goal Expiration Date 05/05/24   OT Treatment Day 0   OT Frequency 1-2x/wk   Discharge Recommendation   Rehab Resource Intensity Level, OT III (Minimum Resource Intensity)   Equipment Recommended Shower transfer bench ($$)   AM-PAC Daily Activity Inpatient   Lower Body Dressing 2   Bathing 3   Toileting 3   Upper Body Dressing 3   Grooming 4   Eating 4   Daily Activity Raw Score 19   Daily Activity Standardized Score (Calc for Raw Score >=11) 40.22   AM-PAC Applied Cognition Inpatient   Following a Speech/Presentation 4   Understanding Ordinary Conversation 4   Taking Medications 4   Remembering Where Things Are Placed or Put Away 4   Remembering List of 4-5 Errands 4   Taking Care of Complicated Tasks 4   Applied Cognition Raw Score 24   Applied Cognition Standardized Score 62.21   Modified [place] Scale   Modified [place] Scale 4   End of Consult   Patient Position at End of Consult Bedside chair;Bed/Chair alarm activated;All needs within reach   Nurse  Communication Nurse aware of consult     GOALS:    *ADL transfers with (I) for inc'd independence with ADLs/purposeful tasks    *UB ADL with (I) for inc'd independence with self cares    *LB ADL with (I) using AE prn for inc'd independence with self cares    *Toileting with (I) for clothing management and hygiene for return to PLOF with personal care    *Increase stand tolerance x 5  m for inc'd tolerance with standing purposeful tasks    *Participate in 10m UE therex to increase overall stamina/activity tolerance for purposeful tasks    *Bed mobility- (I) for inc'd independence to manage own comfort and initiate EOB & OOB purposeful tasks    *Patient will verbalize 3 safety awareness/ principles to prevent falls in the home setting.     *Patient will increase OOB/sitting tolerance to 2-4 hours per day to increase participation in self-care and leisure tasks with no s/s of exertion.     Armida Quiñones, OTTAMIKO, OTR/L

## 2024-04-25 NOTE — PLAN OF CARE
Problem: PHYSICAL THERAPY ADULT  Goal: Performs mobility at highest level of function for planned discharge setting.  See evaluation for individualized goals.  Description: Treatment/Interventions: Functional transfer training, LE strengthening/ROM, Therapeutic exercise, Endurance training, Patient/family training, Equipment eval/education, Bed mobility, Gait training, Spoke to nursing, OT  Equipment Recommended: Other (Comment) (3 in 1 commode)       See flowsheet documentation for full assessment, interventions and recommendations.  Note: Prognosis: Good  Problem List: Decreased strength, Decreased range of motion, Decreased endurance, Impaired balance, Decreased mobility, Pain, Orthopedic restrictions  Assessment: pt is a 57y/o f who presents s/p R TKR 4/24/24. ordered WBAT R LE. PMH significant for asthma, DM, + HTN. at baseline, pt mod (I) c functional mobility tasks c SPC or RW prn. resides c family in 2 story home c 1st floor set up. currently requires min (A)x1-2 to perform mobility tasks 2* significant deficits in strength, ROM, balance, gait quality, pain, + activity tolerance noted in PT exam above. Barthel Index 50/100, Modified Rew 4. increased time required to complete mobility tasks along c min verbal cues for safe transfer technique. ambulated 50'x2 c RW min (A)x1 + standing rest 2* fatigue. tolerated sitting in chair at end of session c chair alarm activated. would benefit from skilled PT to maximize functional mobility + return home safely. PT eval of high complexity 2* unstable med status c pt requiring ongoing medical management s/p R TKR. pt currently on supplemental O2 which pt does not use at baseline along c other multiple lines. pt also has significant pain c movement. requires (A) c all functional mobility tasks 2* above impairments.        Rehab Resource Intensity Level, PT: III (Minimum Resource Intensity)    See flowsheet documentation for full assessment.

## 2024-04-25 NOTE — DISCHARGE SUMMARY
ORTHOPEDICS DISCHARGE SUMMARY  Dory Roberts 58 y.o. female MRN: [id number]  Unit/Bed#: -01    Attending Physician: Dr Gray    Admitting diagnosis: Primary osteoarthritis of right knee [M17.11]    Discharge diagnosis: Primary osteoarthritis of right knee [M17.11]    Date of admission: 4/24/2024    Date of discharge: 04/25/24         Procedure: Right total knee arthroplasty    HPI:  This is a 58 y.o. year old female that presented to the office with signs and symptoms of right knee osteoarthritis. They tried and failed conservative treatment measures and wished to proceed with surgical intervention. The risks, benefits, and complications of the procedure were discussed with the patient and informed consent was obtained.    Hospital Course:  The patient was admitted to the hospital on 4/24/2024 and underwent an uncomplicated right total knee arthroplasty. They were transferred to the floor after a brief stay in the post-anesthesia care unit. Their pain was well managed with IV and oral pain medications. They began therapy on post operative day #1. Aspirin 81mg twice daily was also started for DVT prophylaxis 12 hours post operatively. On discharge date pt was cleared by PT and the medicine team and determined to be safe for discharge.  Daily discussion was had with the patient, nursing staff, orthopaedic team, and family members if present.  All questions were answered to the patients satisifaction.      0   Lab Value Date/Time    HGB 10.2 (L) 04/25/2024 0434    HGB 13.9 04/10/2024 1034    HGB 14.6 03/24/2023 0541    HGB 15.4 03/23/2023 1630    HGB 14.2 01/18/2023 1459    HGB 11.7 04/08/2022 0439    HGB 12.1 04/07/2022 0547    HGB 15.3 04/06/2022 1233    HGB 13.5 04/09/2021 1622    HGB 13.6 01/16/2021 0627    HGB 13.2 01/15/2021 0454    HGB 13.2 01/14/2021 0703    HGB 12.8 01/12/2021 0754    HGB 13.6 01/11/2021 0536    HGB 14.1 01/10/2021 0059       Greater than 2 gram drop which qualifies for diagnosis of  acute blood loss anemia.  Vital signs remained stable and pt was resuscitated with IVF as needed   Body mass index is 36.83 kg/m². mildly obese. Recommend behavior modifications and nutrition.    Discharge Instructions:  The patient was discharged weight bearing as tolerated to the right lower extremity. Aspirin 81mg twice daily will be continued for 28 days. Continue PT/OT. Take pain medications as instructed.    Discharge Medications:  For the complete list of discharge medications, please refer to the patient's medication reconciliation.

## 2024-04-25 NOTE — PHYSICAL THERAPY NOTE
"   PT Evaluation (19min)  (8:01-8:20)    Past Medical History:   Diagnosis Date    Asthma 04/24/2024    Awareness under anesthesia     EGD    Diabetes mellitus (HCC)     Hypertension          04/25/24 0820   PT Last Visit   PT Visit Date 04/25/24   Note Type   Note type Evaluation   Pain Assessment   Pain Assessment Tool 0-10   Pain Score 2   Pain Location/Orientation Orientation: Right;Location: Knee   Hospital Pain Intervention(s) Ambulation/increased activity;Repositioned;Cold applied   Restrictions/Precautions   Weight Bearing Precautions Per Order Yes   RLE Weight Bearing Per Order WBAT   Other Precautions Chair Alarm;Bed Alarm;Multiple lines;Telemetry;Fall Risk;Pain   Home Living   Type of Home House   Home Layout Two level;Performs ADLs on one level;Able to live on main level with bedroom/bathroom   Bathroom Shower/Tub Tub/shower unit   Bathroom Toilet Standard   Bathroom Equipment Toilet raiser   Bathroom Accessibility Accessible   Home Equipment Cane;Walker;Electric scooter   Prior Function   Level of [place] Independent with ADLs;Independent with functional mobility;Needs assistance with IADLS  (ambulates c SPC prn in house; RW in community)   Lives With Son   Receives Help From Family   IADLs Family/Friend/Other provides meals;Independent with driving;Independent with medication management   Falls in the last 6 months 0   Vocational On disability   General   Additional Pertinent History pt presents s/p R TKR 4/24/24. ordered WBAT R LE. PT consulted for mobility + d/c planning.   Family/Caregiver Present No   Cognition   Orientation Level Oriented X4   Subjective   Subjective \"I would love to get up\".   RUE Assessment   RUE Assessment WFL   LUE Assessment   LUE Assessment WFL   RLE Assessment   RLE Assessment X  (grossly 75 degrees knee flex seated; MMT 2-/5)   LLE Assessment   LLE Assessment WFL  (4/5)   Vision-Basic Assessment   Current Vision Wears glasses only for reading   Coordination "   Sensation WFL   Bed Mobility   Supine to Sit 4  Minimal assistance   Additional items Assist x 1;HOB elevated;Bedrails;Increased time required;LE management;Verbal cues   Transfers   Sit to Stand 4  Minimal assistance   Additional items Assist x 2;Verbal cues;Increased time required   Stand to Sit 4  Minimal assistance   Additional items Assist x 1;Armrests;Increased time required;Verbal cues   Ambulation/Elevation   Gait pattern Antalgic;Forward Flexion;Decreased foot clearance;Short stride;Excessively slow;Step to   Gait Assistance 4  Minimal assist   Additional items Assist x 1;Verbal cues   Assistive Device Rolling walker   Distance 50'x2 c standing rest   Stair Management Assistance Not tested   Balance   Static Sitting Fair +   Dynamic Sitting Fair   Static Standing Fair -   Dynamic Standing Poor +   Ambulatory Poor +   Endurance Deficit   Endurance Deficit Yes   Activity Tolerance   Activity Tolerance Patient limited by fatigue;Patient limited by pain   Medical Staff Made Aware OT-Armida (co-eval 2* pt's multiple co-morbidities + mobility deficits requiring (A)x2 to complete mobility tasks).   Nurse Made Aware RYAN Do   Assessment   Prognosis Good   Problem List Decreased strength;Decreased range of motion;Decreased endurance;Impaired balance;Decreased mobility;Pain;Orthopedic restrictions   Assessment pt is a 59y/o f who presents s/p R TKR 4/24/24. ordered WBAT R LE. PMH significant for asthma, DM, + HTN. at baseline, pt mod (I) c functional mobility tasks c SPC or RW prn. resides c family in 2 story home c 1st floor set up. currently requires min (A)x1-2 to perform mobility tasks 2* significant deficits in strength, ROM, balance, gait quality, pain, + activity tolerance noted in PT exam above. Barthel Index 50/100, [place] 4. increased time required to complete mobility tasks along c min verbal cues for safe transfer technique. ambulated 50'x2 c RW min (A)x1 + standing rest 2* fatigue. tolerated  "sitting in chair at end of session c chair alarm activated. would benefit from skilled PT to maximize functional mobility + return home safely. PT eval of high complexity 2* unstable med status c pt requiring ongoing medical management s/p R TKR. pt currently on supplemental O2 which pt does not use at baseline along c other multiple lines. pt also has significant pain c movement. requires (A) c all functional mobility tasks 2* above impairments.   Goals   Patient Goals \"to walk s pain in my knee\".   STG Expiration Date 04/30/24   Short Term Goal #1 1. increase strength 1/2 grade to improve overall functional mobility, 2. improve R knee flex ROM to grossly 90 degress to improve overall functional mobility, 3. perform bed mobility mod (I) to decrease caregiver burden, 4. perform transfers mod (I) to safely perform ADLs, 5. ambulate >150' mod (I) c RW to safely navigate home environment   Plan   Treatment/Interventions Functional transfer training;LE strengthening/ROM;Therapeutic exercise;Endurance training;Patient/family training;Equipment eval/education;Bed mobility;Gait training;Spoke to nursing;OT   PT Frequency Twice a day   Discharge Recommendation   Rehab Resource Intensity Level, PT III (Minimum Resource Intensity)   Equipment Recommended Other (Comment)  (3 in 1 commode)   AM-PAC Basic Mobility Inpatient   Turning in Flat Bed Without Bedrails 3   Lying on Back to Sitting on Edge of Flat Bed Without Bedrails 3   Moving Bed to Chair 3   Standing Up From Chair Using Arms 2   Walk in Room 3   Climb 3-5 Stairs With Railing 2   Basic Mobility Inpatient Raw Score 16   Basic Mobility Standardized Score 38.32   [facility] Highest Level Of Mobility   -[facility] Goal 5: Stand one or more mins   -[facility] Achieved 7: Walk 25 feet or more   Modified [place] Scale   Modified [place] Scale 4   Barthel Index   Feeding 10   Bathing 0   Grooming Score 5   Dressing Score 5   Bladder Score 10   Bowels Score 10   Toilet Use Score 5 "   Transfers (Bed/Chair) Score 5   Mobility (Level Surface) Score 0   Stairs Score 0   Barthel Index Score 50   End of Consult   Patient Position at End of Consult Bedside chair;Bed/Chair alarm activated;All needs within reach     TORO ParkerT

## 2024-04-25 NOTE — CASE MANAGEMENT
Case Management Assessment & Discharge Planning Note    Patient name Dory Roberts  Location /-01 MRN [id number]  : 1965 Date 2024       Current Admission Date: 2024  Current Admission Diagnosis:S/P total knee arthroplasty, right   Patient Active Problem List    Diagnosis Date Noted    S/P total knee arthroplasty, right 2024    Asthma 2024    Primary osteoarthritis of one hip, right 10/10/2023    Lumbar radiculopathy 10/10/2023    Obesity (BMI 35.0-39.9 without comorbidity) 06/15/2023    Anxiety 2023    Abdominal pain 2022    Hypertension 2022    Vaginal candidiasis 01/15/2021    Type 2 diabetes mellitus (HCC) 2021    Hypokalemia 01/10/2021    Elevated glucose 01/10/2021      LOS (days): 0  Geometric Mean LOS (GMLOS) (days):   Days to GMLOS:     OBJECTIVE:     Current admission status: Outpatient Surgery       Preferred Pharmacy:   [facility]/pharmacy #[id number] - [place] - [address]  [address]  [place] [address]  Phone: [phone] Fax: [phone]    Primary Care Provider: Lyssa Lambert MD    Primary Insurance: [facility]  Secondary Insurance:     ASSESSMENT:  Active Health Care Proxies       Fish Roberts Health Care Representative - Son   Primary Phone: [phone] (Mobile)                 Advance Directives  Does patient have a Health Care POA?: No  Was patient offered paperwork?: Yes (Patient refused paperwork, because she has a living will and health care proxy identified on there.)  Does patient currently have a Health Care decision maker?: Yes, please see Health Care Proxy section (Patient reported that her son Fish is her health care decision maker.)  Does patient have Advance Directives?: Yes  Advance Directives: Living will, Power of  for health care, Power of  for finance  Primary Contact: Patient's Son (Fish)    Readmission Root Cause  30 Day Readmission:  No    Patient Information  Admitted from:: Home  Mental Status: Alert  During Assessment patient was accompanied by: Not accompanied during assessment  Assessment information provided by:: Patient  Primary Caregiver: Self  Support Systems: Self, Son, Family members  County of Residence: [place]  What [place] do you [place] in[place]  Home entry access options. Select all that apply.: No steps to enter home  Type of Current Residence: 2 story home  Upon entering residence, is there a bedroom on the main floor (no further steps)?: Yes  Upon entering residence, is there a bathroom on the main floor (no further steps)?: Yes  Living Arrangements: Lives w/ Son  Is patient a ?: Yes  Is patient [facility] with [facility] ([place] [facility])?: Yes  Is patient service connected?: Yes (Patient reported that she is 50% service connected through the [facility]. Her PCP is also affiliated with the [facility].)    Activities of Daily Living Prior to Admission  Functional Status: Independent  Completes ADLs independently?: Yes  Ambulates independently?: Yes  Does patient use assisted devices?: Yes  Assisted Devices (DME) used: Straight Cane  Does patient currently own DME?: Yes  What DME does the patient currently own?: Straight Cane, Walker  Does patient have a history of Outpatient Therapy (PT/OT)?: Yes  Does the patient have a history of Short-Term Rehab?: No  Does patient have a history of HHC?: Yes (Patient reported that she has HHC hx with [facility], and [facility] was [facility].)  Does patient currently have HHC?: No    Patient Information Continued  Income Source: SSI/SSD  Does patient have prescription coverage?: Yes  Does patient receive dialysis treatments?: No  Does patient have a history of substance abuse?: No (Patient reported that she drinks alcohol socially.)  Does patient have a history of Mental Health Diagnosis?: Yes (anxiety)  Is patient receiving treatment for mental health?: Yes (Patient sees her PCP for  medication management.)  Has patient received inpatient treatment related to mental health in the last 2 years?: No    PHQ 2/9 Screening   Reviewed PHQ 2/9 Depression Screening Score?: No    Means of Transportation  Means of Transport to Appts:: Drives Self      Social Determinants of Health (SDOH)      Flowsheet Row Most Recent Value   Housing Stability    In the last 12 months, was there a time when you were not able to pay the mortgage or rent on time? N   In the last 12 months, how many places have you lived? 1   In the last 12 months, was there a time when you did not have a steady place to sleep or slept in a shelter (including now)? N   Transportation Needs    In the past 12 months, has lack of transportation kept you from medical appointments or from getting medications? no   In the past 12 months, has lack of transportation kept you from meetings, work, or from getting things needed for daily living? No   Food Insecurity    Within the past 12 months, you worried that your food would run out before you got the money to buy more. Never true   Within the past 12 months, the food you bought just didn't last and you didn't have money to get more. Never true   Utilities    In the past 12 months has the electric, gas, oil, or water company threatened to shut off services in your home? No          DISCHARGE DETAILS:    Discharge planning discussed with:: Patient  Freedom of Choice: Yes  Comments - Freedom of Choice: CM discussed FOC as it pertains to discharge planning. CM reviewed PT/OT Recommendation, Level III. Patient reported that she is already set up with [facility]'s OP PT, and if she has any barriers to getting there, she knows who to contact to arrange home care. Patient wishes to return home and start OP PT. She also confirmed that she has a walker and a cane, so she doesn't need any DME at this time. Her bathroom is only a few steps away from her bed, so a BSC is not needed either. Patient denied any  needs at this time. Her son will provide transportation home at the time of discharge.  CM contacted family/caregiver?: No- see comments (Declined.)  Were Treatment Team discharge recommendations reviewed with patient/caregiver?: Yes  Did patient/caregiver verbalize understanding of patient care needs?: Yes  Were patient/caregiver advised of the risks associated with not following Treatment Team discharge recommendations?: Yes    Requested Home Health Care         Is the patient interested in HHC at discharge?: No    DME Referral Provided  Referral made for DME?: No    Other Referral/Resources/Interventions Provided:  Interventions: None Indicated    Would you like to participate in our [facility] service program?  : No - Declined    Treatment Team Recommendation: Home  Discharge Destination Plan:: Home  Transport at Discharge : Family

## 2024-04-25 NOTE — PLAN OF CARE
Problem: PAIN - ADULT  Goal: Verbalizes/displays adequate comfort level or baseline comfort level  Description: Interventions:  - Encourage patient to monitor pain and request assistance  - Assess pain using appropriate pain scale  - Administer analgesics based on type and severity of pain and evaluate response  - Implement non-pharmacological measures as appropriate and evaluate response  - Consider cultural and social influences on pain and pain management  - Notify physician/advanced practitioner if interventions unsuccessful or patient reports new pain  Outcome: Progressing     Problem: INFECTION - ADULT  Goal: Absence or prevention of progression during hospitalization  Description: INTERVENTIONS:  - Assess and monitor for signs and symptoms of infection  - Monitor lab/diagnostic results  - Monitor all insertion sites, i.e. indwelling lines, tubes, and drains  - Monitor endotracheal if appropriate and nasal secretions for changes in amount and color  - [place] appropriate cooling/warming therapies per order  - Administer medications as ordered  - Instruct and encourage patient and family to use good hand hygiene technique  - Identify and instruct in appropriate isolation precautions for identified infection/condition  Outcome: Progressing  Goal: Absence of fever/infection during neutropenic period  Description: INTERVENTIONS:  - Monitor WBC    Outcome: Progressing     Problem: SAFETY ADULT  Goal: Patient will remain free of falls  Description: INTERVENTIONS:  - Educate patient/family on patient safety including physical limitations  - Instruct patient to call for assistance with activity   - Consult OT/PT to assist with strengthening/mobility   - Keep Call bell within reach  - Keep bed low and locked with side rails adjusted as appropriate  - Keep care items and personal belongings within reach  - Initiate and maintain comfort rounds  - Make Fall Risk Sign visible to staff  - Offer Toileting every Hours, in  advance of need  - Initiate/Maintain alarm  - Obtain necessary fall risk management equipment:  - Apply yellow socks and bracelet for high fall risk patients  - Consider moving patient to room near nurses station  Outcome: Progressing  Goal: Maintain or return to baseline ADL function  Description: INTERVENTIONS:  -  Assess patient's ability to carry out ADLs; assess patient's baseline for ADL function and identify physical deficits which impact ability to perform ADLs (bathing, care of mouth/teeth, toileting, grooming, dressing, etc.)  - Assess/evaluate cause of self-care deficits   - Assess range of motion  - Assess patient's mobility; develop plan if impaired  - Assess patient's need for assistive devices and provide as appropriate  - Encourage maximum independence but intervene and supervise when necessary  - Involve family in performance of ADLs  - Assess for home care needs following discharge   - Consider OT consult to assist with ADL evaluation and planning for discharge  - Provide patient education as appropriate  Outcome: Progressing  Goal: Maintains/Returns to pre admission functional level  Description: INTERVENTIONS:  - Perform AM-PAC 6 Click Basic Mobility/ Daily Activity assessment daily.  - Set and communicate daily mobility goal to care team and patient/family/caregiver.   - Collaborate with rehabilitation services on mobility goals if consulted  - Perform Range of Motion times a day.  - Reposition patient every hours.  - Dangle patient  times a day  - Stand patien times a day  - Ambulate patient times a day  - Out of bed to chair  times a day   - Out of bed for meals times a day  - Out of bed for toileting  - Record patient progress and toleration of activity level   Outcome: Progressing     Problem: DISCHARGE PLANNING  Goal: Discharge to home or other facility with appropriate resources  Description: INTERVENTIONS:  - Identify barriers to discharge w/patient and caregiver  - Arrange for needed  discharge resources and transportation as appropriate  - Identify discharge learning needs (meds, wound care, etc.)  - Arrange for interpretive services to assist at discharge as needed  - Refer to Case Management Department for coordinating discharge planning if the patient needs post-hospital services based on physician/advanced practitioner order or complex needs related to functional status, cognitive ability, or social support system  Outcome: Progressing     Problem: Knowledge Deficit  Goal: Patient/family/caregiver demonstrates understanding of disease process, treatment plan, medications, and discharge instructions  Description: Complete learning assessment and assess knowledge base.  Interventions:  - Provide teaching at level of understanding  - Provide teaching via preferred learning methods  Outcome: Progressing     Problem: RESPIRATORY - ADULT  Goal: Achieves optimal ventilation and oxygenation  Description: INTERVENTIONS:  - Assess for changes in respiratory status  - Assess for changes in mentation and behavior  - Position to facilitate oxygenation and minimize respiratory effort  - Oxygen administered by appropriate delivery if ordered  - Initiate smoking cessation education as indicated  - Encourage broncho-pulmonary hygiene including cough, deep breathe, Incentive Spirometry  - Assess the need for suctioning and aspirate as needed  - Assess and instruct to report SOB or any respiratory difficulty  - Respiratory Therapy support as indicated  Outcome: Progressing     Problem: GASTROINTESTINAL - ADULT  Goal: Maintains or returns to baseline bowel function  Description: INTERVENTIONS:  - Assess bowel function  - Encourage oral fluids to ensure adequate hydration  - Administer IV fluids if ordered to ensure adequate hydration  - Administer ordered medications as needed  - Encourage mobilization and activity  - Consider nutritional services referral to assist patient with adequate nutrition and  appropriate food choices  Outcome: Progressing

## 2024-04-25 NOTE — UTILIZATION REVIEW
"Initial Clinical Review    Elective outpatient  surgical procedure  Age/Sex: 58 y.o. female  Surgery Date: 4/24/24  Procedure:  Right - ARTHROPLASTY KNEE TOTAL   Anesthesia:  Spinal with adductor canal and iPACK blocks   Operative Findings:  Severe tricompartmental osteoarthritis of right knee     POD#1 Progress Note: 4/25/24 status post right total knee arthroplasty.   Today with pain right knee.  Felt pop in right knee with PT.  fatigued.     With working with PT, gait is Antalgic;Decreased foot clearance;Forward Flexion;Inconsistent monica;Step to     Admission Orders: Date/Time/Statement: 4/24/24 1537 Outpatient no charge bed.   No orders of the defined types were placed in this encounter.    Vital Signs: /69 (BP Location: Right arm)   Pulse 79   Temp 98.8 °F (37.1 °C) (Oral)   Resp 18   Ht 5' 6\" (1.676 m)   Wt 104 kg (228 lb 2.8 oz)   SpO2 99%   BMI 36.83 kg/m²   04/25/24 0700 98.8 °F (37.1 °C) 79 18 133/69 90 99 % 28 -- 2 L/min Nasal cannula -- Lying   04/25/24 0526 98.9 °F (37.2 °C) 89 20 150/85 107 97 % -- -- -- Nasal cannula -- Lying   04/25/24 0412 -- -- -- -- -- 96 % -- -- -- EtCO2 nasal cannula -- --   04/25/24 0124 -- -- -- -- -- 93 % 28 -- 2 L/min EtCO2 nasal cannula -- --   04/24/24 2243 98.2 °F (36.8 °C) 82 18 148/68 95 94 % -- -- -- -- -- Lying   04/24/24 2103 -- -- -- -- -- 92 % 28 -- 2 L/min EtCO2 nasal cannula -- --   04/24/24 1800 -- 68 -- 155/88 120 92 % -- -- -- -- -- --   04/24/24 1730 97.5 °F (36.4 °C) 68 -- 169/87 119 96 % -- -- -- None (Room air) --      Pertinent Labs/Diagnostic Test Results:   XR knee 1 or 2 vw right    (Results Pending)       Results from last 7 days   Lab Units 04/25/24  0434   WBC Thousand/uL 7.45   HEMOGLOBIN g/dL 10.2*   HEMATOCRIT % 31.2*   PLATELETS Thousands/uL 232     Results from last 7 days   Lab Units 04/25/24  0434   SODIUM mmol/L 138   POTASSIUM mmol/L 3.2*   CHLORIDE mmol/L 104   CO2 mmol/L 29   ANION GAP mmol/L 5   BUN mg/dL 16   CREATININE " mg/dL 0.62   EGFR ml/min/1.73sq m 98   CALCIUM mg/dL 8.0*     Results from last 7 days   Lab Units 04/25/24  0434   AST U/L 16   ALT U/L 16   ALK PHOS U/L 68   TOTAL PROTEIN g/dL 5.9*   ALBUMIN g/dL 3.3*   TOTAL BILIRUBIN mg/dL 0.41     Results from last 7 days   Lab Units 04/24/24  1530 04/24/24  1024   POC GLUCOSE mg/dl 116 106     Results from last 7 days   Lab Units 04/25/24  0434   GLUCOSE RANDOM mg/dL 131       Diet: regular  Mobility:  Ambulation in room, progressing to hallway with assistive device three times daily, with assistance/supervision PRN.   DVT Prophylaxis: aspirin, 81 mg, Oral, BID.  Bilateral SCDs    Medications/Pain Control:   Scheduled Medications:  acetaminophen, 650 mg, Oral, Q6H ABE  vitamin C, 1,000 mg, Oral, Daily  aspirin, 81 mg, Oral, BID  atorvastatin, 5 mg, Oral, HS  busPIRone, 10 mg, Oral, TID  cyanocobalamin, 100 mcg, Oral, Daily  escitalopram, 5 mg, Oral, Daily  fluticasone, 1 puff, Inhalation, Daily  gabapentin, 100 mg, Oral, BID  hydrOXYzine HCL, 50 mg, Oral, HS  ketorolac, 15 mg, Intravenous, Q8H ABE  losartan, 100 mg, Oral, Before Dinner  multivitamin stress formula, 1 tablet, Oral, Daily  traMADol, 50 mg, Oral, Q6H ABE    acetaminophen (Ofirmev) injection 1,000 mg  Dose: 1,000 mg  Freq: Once Route: IV  Last Dose: 1,000 mg (04/24/24 1541)  Start: 04/24/24 1545 End: 04/24/24 1556   ceFAZolin (ANCEF) IVPB (premix in dextrose) 2,000 mg 50 mL  Dose: 2,000 mg  Freq: Once Route: IV  Start: 04/24/24 1015 End: 04/24/24 1201   oxyCODONE (ROXICODONE) IR tablet 5 mg  Dose: 5 mg  Freq: Once Route: PO  Indications of Use: ACUTE PAIN  Start: 04/24/24 1545 End: 04/24/24 1547   tranexamic acid (CYKLOKAPRON) 1000-0.7 MG/100ML-% injection 1,000 mg  Dose: 1,000 mg  Freq: Once Route: IV  Start: 04/24/24 1145 End: 04/24/24 1210         Continuous IV Infusions:  lactated ringers, 125 mL/hr, Intravenous, Continuous  sodium chloride, 1 mL/kg/hr, Intravenous, Continuous      PRN Meds:  albuterol, 2  puff, Inhalation, Q6H PRN  lactated ringers, 1,000 mL, Intravenous, Once PRN   And  lactated ringers, 1,000 mL, Intravenous, Once PRN  methocarbamol, 500 mg, Oral, Q6H PRN x 1 4/25  oxyCODONE, 10 mg, Oral, Q4H PRN x 1 4/24.  X 2 4/25  oxyCODONE, 5 mg, Oral, Q4H PRN  sodium chloride, 1,000 mL, Intravenous, Once PRN   And  sodium chloride, 1,000 mL, Intravenous, Once PRN        Network Utilization Review Department  ATTENTION: Please call with any questions or concerns to [phone] and carefully listen to the prompts so that you are directed to the right person. All voicemails are confidential.   For Discharge needs, contact Care Management DC Support Team at [phone] opt. 2  Send all requests for admission clinical reviews, approved or denied determinations and any other requests to dedicated fax number below belonging to the campus where the patient is receiving treatment. List of dedicated fax numbers for the Facilities:  FACILITY NAME UR FAX NUMBER   ADMISSION DENIALS (Administrative/Medical Necessity) [phone]   DISCHARGE SUPPORT TEAM (NETWORK) [phone]   PARENT CHILD HEALTH (Maternity/NICU/Pediatrics) [phone]   [facility] [phone]   [facility] [phone]   [facility] [phone]   [facility] [phone]   [facility] [phone]   [facility] [phone]   [facility] [phone]   [facility] [phone]   [facility] [phone]   [facility] [phone]   [facility] [phone]   [facility] [place] [phone]

## 2024-04-25 NOTE — PHYSICAL THERAPY NOTE
"   PT Progress Note (13min)  (13:32-13:45)       04/25/24 0845   PT Last Visit   PT Visit Date 04/25/24   Note Type   Note Type BID visit/treatment   Pain Assessment   Pain Assessment Tool 0-10   Pain Score 4   Pain Location/Orientation Orientation: Right;Location: Knee   Hospital Pain Intervention(s) Repositioned;Ambulation/increased activity   Restrictions/Precautions   Weight Bearing Precautions Per Order Yes   RLE Weight Bearing Per Order WBAT   Other Precautions Chair Alarm;Bed Alarm;Multiple lines;Telemetry;Fall Risk;Pain   General   Chart Reviewed Yes   Response to Previous Treatment Patient with no complaints from previous session.   Family/Caregiver Present No   Cognition   Orientation Level Oriented X4   Subjective   Subjective \"I feel really good. I'm ready to walk\".   Bed Mobility   Supine to Sit Unable to assess  (pt OOB in chair upon arrival)   Transfers   Sit to Stand 4  Minimal assistance   Additional items Assist x 1;Verbal cues;Increased time required  (CG (A)x1)   Stand to Sit 4  Minimal assistance   Additional items Assist x 1;Armrests;Verbal cues;Increased time required  (CG (A)x1)   Ambulation/Elevation   Gait pattern Antalgic;Decreased foot clearance;Forward Flexion;Inconsistent monica;Step to   Gait Assistance 4  Minimal assist   Additional items Assist x 1;Verbal cues  (CG (A)x1)   Assistive Device Rolling walker   Distance 70'x2 c standing rests   Stair Management Assistance Not tested   Ambulation/Elevation Additional Comments during ambulation, pt stated, \"I just felt a pop in my R knee\". pt reported mild pain, however able to cont c ambulation s difficulty.   Balance   Static Sitting Good   Dynamic Sitting Fair +   Static Standing Fair -   Dynamic Standing Fair -   Ambulatory Fair -   Endurance Deficit   Endurance Deficit Yes   Activity Tolerance   Activity Tolerance Patient limited by fatigue;Patient limited by pain   Nurse Made Aware RYAN Do   Exercises   TKR   (reviewed LE HEP) " "  Assessment   Prognosis Good   Problem List Decreased strength;Decreased range of motion;Decreased endurance;Impaired balance;Decreased mobility;Pain;Orthopedic restrictions   Assessment pt demonstrating progress c PT. performed functional mobility tasks min (A)x1/CG (A). progressed ambulation distance to 70'x2 c RW c standing rests throughout. during ambulation, pt reported feeling \"popping\" sensation in R knee c mild pain, however able to cont c ambulation s difficulty. session limited as pt requesting to use restroom. reviewed LE HEP. cont skilled PT recommended to further maximize functional mobility + return home safely.   Barriers to Discharge None   Goals   Patient Goals \"to walk s pain in my knee\".   STG Expiration Date 04/30/24   PT Treatment Day 1   Plan   Treatment/Interventions Functional transfer training;LE strengthening/ROM;Therapeutic exercise;Endurance training;Patient/family training;Equipment eval/education;Bed mobility;Gait training;Spoke to nursing   Progress Progressing toward goals   PT Frequency Twice a day   Discharge Recommendation   Rehab Resource Intensity Level, PT III (Minimum Resource Intensity)   AM-PAC Basic Mobility Inpatient   Turning in Flat Bed Without Bedrails 3   Lying on Back to Sitting on Edge of Flat Bed Without Bedrails 3   Moving Bed to Chair 3   Standing Up From Chair Using Arms 3   Walk in Room 3   Climb 3-5 Stairs With Railing 3   Basic Mobility Inpatient Raw Score 18   Basic Mobility Standardized Score 41.05   [facility] Highest Level Of Mobility   JH-HLM Goal 6: Walk 10 steps or more   JH-HLM Achieved 7: Walk 25 feet or more   Education   Education Provided Mobility training;Assistive device   Patient Demonstrates acceptance/verbal understanding   End of Consult   Patient Position at End of Consult Other (comment)  (on toilet in bathroom c call leroy in reach)     Sara Vale DPT  "

## 2024-04-25 NOTE — PLAN OF CARE
"  Problem: PHYSICAL THERAPY ADULT  Goal: Performs mobility at highest level of function for planned discharge setting.  See evaluation for individualized goals.  Description: Treatment/Interventions: Functional transfer training, LE strengthening/ROM, Therapeutic exercise, Endurance training, Patient/family training, Equipment eval/education, Bed mobility, Gait training, Spoke to nursing  Equipment Recommended: Other (Comment) (3 in 1 commode)       See flowsheet documentation for full assessment, interventions and recommendations.  4/25/2024 1418 by Sara Vale, PT  Outcome: Progressing  Note: Prognosis: Good  Problem List: Decreased strength, Decreased range of motion, Decreased endurance, Impaired balance, Decreased mobility, Pain, Orthopedic restrictions  Assessment: pt demonstrating progress c PT. performed functional mobility tasks min (A)x1/CG (A). progressed ambulation distance to 70'x2 c RW c standing rests throughout. during ambulation, pt reported feeling \"popping\" sensation in R knee c mild pain, however able to cont c ambulation s difficulty. session limited as pt requesting to use restroom. reviewed LE HEP. cont skilled PT recommended to further maximize functional mobility + return home safely.  Barriers to Discharge: None     Rehab Resource Intensity Level, PT: III (Minimum Resource Intensity)    See flowsheet documentation for full assessment.     4/25/2024 0957 by Sara Vale, HARMEET  Note: Prognosis: Good  Problem List: Decreased strength, Decreased range of motion, Decreased endurance, Impaired balance, Decreased mobility, Pain, Orthopedic restrictions  Assessment: pt is a 59y/o f who presents s/p R TKR 4/24/24. ordered WBAT R LE. PMH significant for asthma, DM, + HTN. at baseline, pt mod (I) c functional mobility tasks c SPC or RW prn. resides c family in 2 story home c 1st floor set up. currently requires min (A)x1-2 to perform mobility tasks 2* significant deficits in strength, ROM, " balance, gait quality, pain, + activity tolerance noted in PT exam above. Barthel Index 50/100, Modified Davy 4. increased time required to complete mobility tasks along c min verbal cues for safe transfer technique. ambulated 50'x2 c RW min (A)x1 + standing rest 2* fatigue. tolerated sitting in chair at end of session c chair alarm activated. would benefit from skilled PT to maximize functional mobility + return home safely. PT eval of high complexity 2* unstable med status c pt requiring ongoing medical management s/p R TKR. pt currently on supplemental O2 which pt does not use at baseline along c other multiple lines. pt also has significant pain c movement. requires (A) c all functional mobility tasks 2* above impairments.        Rehab Resource Intensity Level, PT: III (Minimum Resource Intensity)    See flowsheet documentation for full assessment.

## 2024-04-26 ENCOUNTER — TELEPHONE (OUTPATIENT)
Dept: OBGYN CLINIC | Facility: HOSPITAL | Age: 59
End: 2024-04-26

## 2024-04-26 NOTE — TELEPHONE ENCOUNTER
Spoke to pt, confirmed she received medications from pharmacy.     We reviewed patients AVS medication list. Patient is taking Tylenol 650mg every 6 hours, Oxycodone 5mg PRN, Celebrex 200mg BID, Gabapentin 100mg BID, ASA 81mg BID. Patient has not yet had a BM but is passing gas. Pharmacy did not provide Colace 100mg BID. Educated pt on postop constipation, encouraged pt to start Colace 100mg BID. Pt confirms her son would be able to pick this up for her and is agreeable to start.

## 2024-04-26 NOTE — TELEPHONE ENCOUNTER
"Patient contacted for a postoperative follow up assessment. Patient reports doing \"so far, so good.\" Patient states current pain level of a 5/10  when sitting and 8/10 when walking with RW.  Patient denies increase in swelling and dressing is clean, dry and intact. Patient is icing the site regularly. Discussed normal postop swelling, bruising, nerve sensations.     Confirmed upcoming appointments w/ patient:   PT: 4/29  Postop: 5/6     We reviewed patients AVS medication list -- per pt, meds were not available yesterday but she received a call from [facility] this morning that they are ready and will be picking them up within the hour. Will f/u.     Pt is asking when she can resume Ozempic, next scheduled dose 5/6? Will route.     Patient denies nausea, vomiting, abdominal pain, chest pain, shortness of breath, fever, dizziness and calf pain. Patient does not have any other questions or concerns at this time. Pt was encouraged to call with any questions, concerns or issues.    "

## 2024-04-29 ENCOUNTER — OFFICE VISIT (OUTPATIENT)
Dept: PHYSICAL THERAPY | Facility: CLINIC | Age: 59
End: 2024-04-29
Payer: COMMERCIAL

## 2024-04-29 DIAGNOSIS — M17.11 PRIMARY OSTEOARTHRITIS OF RIGHT KNEE: Primary | ICD-10-CM

## 2024-04-29 PROCEDURE — 97110 THERAPEUTIC EXERCISES: CPT

## 2024-04-29 PROCEDURE — 97112 NEUROMUSCULAR REEDUCATION: CPT

## 2024-04-29 PROCEDURE — 97140 MANUAL THERAPY 1/> REGIONS: CPT

## 2024-04-29 NOTE — TELEPHONE ENCOUNTER
"Pt called in w/ swelling concerns. Pt states \"entire leg is swollen.\"    Discussed normal postop swelling and bruising. Instructed pt to continue icing/elevating. Denies any tenderness, redness. Pt reports some \"mild warmth but improving.\" Pt states she is heading to her PT appointment, reviewed that PT will assess as well and instruct her to reach out with any concerns. No further questions at this time.   "

## 2024-04-29 NOTE — PROGRESS NOTES
PT Re-Evaluation     Today's date: 2024  Patient name: Dory Roberts  : 1965  MRN: [id number]  Referring provider: Bridget Hilliard PA-C  Dx:   Encounter Diagnosis     ICD-10-CM    1. Primary osteoarthritis of right knee  M17.11             Start Time: 1500  Stop Time: 1545  Total time in clinic (min): 45 minutes    Assessment  Assessment details: Pt is a 59 y/o female presenting to outpatient PT s/p right TKA scheduled for 24. Upon examination, pt presents with limited and painful right knee AROM, decreased knee strength, and antalgic gait pattern. Functionally, these impairments have led to difficulty with ambulation, stair negotiation, and prolonged standing, which limits her ability to perform her usual ADLs and household tasks. Pt has good rehab potential to achieve stated goals and will benefit from skilled PT services to improve functional mobility and strength in order to return to ADLs and household tasks at prior level of function.  Impairments: abnormal or restricted ROM, activity intolerance, impaired physical strength and pain with function  Functional limitations: stair negotiation, ambulationUnderstanding of Dx/Px/POC: good   Prognosis: good    Goals  STG - 4 weeks  1. Right knee flexion at least 110 degrees to improve stair negotiation.  2. R knee strength at least 4/5 throughout to improve ADL activity.    LTG - 8 weeks  1. FOTO score will improve by 10 points to demonstrate improved functional abilities.  2. Pt able to ambulate community distances with LRD.     Plan  Patient would benefit from: PT eval and skilled physical therapy  Planned modality interventions: cryotherapy, thermotherapy: hydrocollator packs and unattended electrical stimulation  Planned therapy interventions: IASTM, joint mobilization, kinesiology taping, manual therapy, neuromuscular re-education, patient education, strengthening, stretching, therapeutic activities, therapeutic exercise, functional ROM  exercises, home exercise program and balance  Frequency: 3x week  Duration in weeks: 8  Plan of Care beginning date: 2024  Plan of Care expiration date: 2024  Treatment plan discussed with: patient      Subjective Evaluation    History of Present Illness  Mechanism of injury: Pt arrives with planned right TKA on 24. She notes the left one is bad too but the right one is worse. She states she is having difficulty walking, negotiating stairs, prolonged standing. 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     Pt states she has been doing well since surgery and has been moving around with her RW.  Patient Goals  Patient goals for therapy: increased strength, independence with ADLs/IADLs, decreased pain and increased motion    Pain  Current pain ratin  At best pain ratin  At worst pain ratin  Quality: dull ache  Relieving factors: rest and heat  Aggravating factors: stair climbing, standing and walking          Objective     Observations     Additional Observation Details  Antalgic gait pattern, RW  Moderate edema and ecchymosis around right knee/thigh  Incision covered no signs of infection    Palpation     Additional Palpation Details  Mild tenderness noted around right knee joint    Active Range of Motion   Left Knee   Flexion: 110 degrees   Extension: 0 degrees     Right Knee   Flexion: 50 degrees   Extension: -10 degrees     Passive Range of Motion     Right Knee   Flexion: 70 degrees     Strength/Myotome Testing     Left Knee   Flexion: 4  Extension: 4    Right Knee   Flexion: 2-  Extension: 3  Quadriceps contraction: fair    General Comments:      Knee Comments  Hip strength grossly 4/5             Precautions: TKA 24    POC expires Unit limit Auth Expiration date PT/OT + Visit Limit?    N/A 10/24 Auth req                 Visit/Unit Tracking  AUTH Status:  Date  - FOTO             15 visits Used 1 2              Remaining  14 13  "              FOTO - done 4/29        Manuals 4/22 4/29           PROM R knee flex  CG                                                  Neuro Re-Ed             Pt education on HEP, surgery, POC after surgery 20' 8'           QS  15x5\"           QS w/ SLR  nv           Glute set                                                    Ther Ex             Nustep for ROM  5'           Heel slides A/AA  15x5\" ea           LAQ  15x3'           Dock kicks  3'           Heel/toe raises             Standing hip abd/ext                          Ther Activity             Mini squats                          Gait Training                                       Modalities                                            "

## 2024-05-01 ENCOUNTER — TELEPHONE (OUTPATIENT)
Dept: OBGYN CLINIC | Facility: HOSPITAL | Age: 59
End: 2024-05-01

## 2024-05-01 NOTE — TELEPHONE ENCOUNTER
Pt called in w/ swelling concerns. Reports right leg swelling, increased swelling in ankle. Pt confirms she is elevating and icing. Pt denies redness, warmth, tenderness. Confirms she is taking aspirin 81mg BID as prescribed. DVT signs/symptoms reviewed. Pt expresses interest in sending a [employer] message of swelling.     Pt states she is out of Tylenol and has 2 more pills of aspirin. Called [facility] pharmacy, spoke to Khai. He states the Tylenol and Aspirin were both filled on 4/26 from different providers for a shorter supply/quantity. Prescriptions (Tylenol and Aspirin) are available to fill from Bridget Hilliard PA-C.     Spoke to pt again, informed scripts will be available at [facility] pharmacy. Pt appreciative of assistance.

## 2024-05-02 ENCOUNTER — OFFICE VISIT (OUTPATIENT)
Dept: PHYSICAL THERAPY | Facility: CLINIC | Age: 59
End: 2024-05-02
Payer: COMMERCIAL

## 2024-05-02 DIAGNOSIS — M17.11 PRIMARY OSTEOARTHRITIS OF RIGHT KNEE: Primary | ICD-10-CM

## 2024-05-02 DIAGNOSIS — Z96.651 S/P TOTAL KNEE ARTHROPLASTY, RIGHT: Primary | ICD-10-CM

## 2024-05-02 PROCEDURE — 97112 NEUROMUSCULAR REEDUCATION: CPT

## 2024-05-02 PROCEDURE — 97110 THERAPEUTIC EXERCISES: CPT

## 2024-05-02 PROCEDURE — 97140 MANUAL THERAPY 1/> REGIONS: CPT

## 2024-05-02 RX ORDER — TRAMADOL HYDROCHLORIDE 50 MG/1
50 TABLET ORAL EVERY 6 HOURS PRN
Qty: 20 TABLET | Refills: 0 | Status: SHIPPED | OUTPATIENT
Start: 2024-05-02

## 2024-05-02 NOTE — PROGRESS NOTES
"Daily Note     Today's date: 2024  Patient name: Dory Roberts  : 1965  MRN: [id number]  Referring provider: Bridget Hilliard PA-C  Dx:   Encounter Diagnosis     ICD-10-CM    1. Primary osteoarthritis of right knee  M17.11           Start Time: 1230  Stop Time: 1315  Total time in clinic (min): 45 minutes    Subjective: Pt reports her knee wasn't too sore after last session.      Objective: See treatment diary below      Assessment: Tolerated treatment well. Patient demonstrated fatigue post treatment, exhibited good technique with therapeutic exercises, and would benefit from continued PT. Pt continues to work through program with improved knee mobility with PROM this session. She continues to be most limited due to pain but is improving. Plan to progress standing activity next session within tolerance.       Plan: Progress treatment as tolerated.       Precautions: TKA 24    POC expires Unit limit Auth Expiration date PT/OT + Visit Limit?    N/A 10/24 Auth req                 Visit/Unit Tracking  AUTH Status:  Date  - FOTO             15 visits Used 1 2 3             Remaining  14 13 12              FOTO - done         Manuals           PROM R knee flex  CG CG          Knee ext str   CG          STM for edema   CG                       Neuro Re-Ed             Pt education on HEP, surgery, POC after surgery 20' 8'           QS  15x5\" 15x5\"          QS w/ SLR  nv 10x3\"                                                              Ther Ex             Nustep for ROM  5' 5'          Heel slides A/AA  15x5\" ea 15x5\" ea          LAQ  15x3' 15x3\"          Dock kicks  3' 3'          Heel raises   x20          Standing hip abd/ext                          Ther Activity             Mini squats                          Gait Training                                       Modalities                                            "

## 2024-05-03 ENCOUNTER — HOSPITAL ENCOUNTER (EMERGENCY)
Facility: HOSPITAL | Age: 59
Discharge: HOME | End: 2024-05-03
Attending: EMERGENCY MEDICINE
Payer: COMMERCIAL

## 2024-05-03 ENCOUNTER — APPOINTMENT (EMERGENCY)
Dept: VASCULAR ULTRASOUND | Facility: HOSPITAL | Age: 59
End: 2024-05-03
Payer: COMMERCIAL

## 2024-05-03 VITALS
TEMPERATURE: 98 F | SYSTOLIC BLOOD PRESSURE: 181 MMHG | HEART RATE: 65 BPM | OXYGEN SATURATION: 99 % | RESPIRATION RATE: 16 BRPM | DIASTOLIC BLOOD PRESSURE: 87 MMHG

## 2024-05-03 DIAGNOSIS — G89.18 POST-OP PAIN: Primary | ICD-10-CM

## 2024-05-03 DIAGNOSIS — M79.89 LEG SWELLING: ICD-10-CM

## 2024-05-03 PROCEDURE — 99284 EMERGENCY DEPT VISIT MOD MDM: CPT | Performed by: EMERGENCY MEDICINE

## 2024-05-03 PROCEDURE — 93971 EXTREMITY STUDY: CPT

## 2024-05-03 PROCEDURE — 99284 EMERGENCY DEPT VISIT MOD MDM: CPT

## 2024-05-04 PROCEDURE — 93971 EXTREMITY STUDY: CPT | Performed by: SURGERY

## 2024-05-04 NOTE — ED PROVIDER NOTES
History  Chief Complaint   Patient presents with    Post-op Problem     R ankle swelling, knee replacement on 4/24        History provided by:  Patient  Leg Pain  Pain details:     Quality:  Aching and dull    Radiates to:  Does not radiate    Severity:  Moderate    Onset quality:  Gradual    Duration:  1 day    Timing:  Constant    Progression:  Worsening  Chronicity:  New  Dislocation: no    Foreign body present:  No foreign bodies  Tetanus status:  Up to date  Prior injury to area:  No (Pt had a knee replacement, one week ago done here on right knee)  Relieved by:  Nothing  Worsened by:  Nothing  Ineffective treatments:  None tried  Associated symptoms: swelling    Associated symptoms: no back pain, no decreased ROM, no fever, no itching, no neck pain, no numbness, no stiffness and no tingling    Associated symptoms comment:  No CP/SOB      Prior to Admission Medications   Prescriptions Last Dose Informant Patient Reported? Taking?   ATORVASTATIN CALCIUM PO   Yes No   Sig: Take 5 mg by mouth daily at bedtime   Alcohol Swabs 70 % PADS  Self No No   Sig: Use 4 (four) times a day   Ascorbic Acid (vitamin C) 1000 MG tablet   No No   Sig: Take 1 tablet (1,000 mg total) by mouth daily   Cholecalciferol (Vitamin D) 125 MCG (5000 UT) CAPS   Yes No   Sig: Take by mouth   Diclofenac Sodium (VOLTAREN) 1 %   Yes No   Sig: Apply 2 g topically 4 (four) times a day   Insulin Pen Needle (Pen Needles) 32G X 4 MM MISC  Self No No   Sig: Use 4 (four) times a day for 14 days   Patient not taking: Reported on 1/3/2022   Multiple Vitamin (multivitamin) tablet   No No   Sig: Take 1 tablet by mouth daily   acetaminophen (TYLENOL) 325 mg tablet   No No   Sig: Take 2 tablets (650 mg total) by mouth every 6 (six) hours   albuterol (PROVENTIL HFA,VENTOLIN HFA) 90 mcg/act inhaler   Yes No   Sig: Inhale 2 puffs every 6 (six) hours as needed for wheezing   aspirin (ECOTRIN LOW STRENGTH) 81 mg EC tablet   No No   Sig: Take 1 tablet (81 mg  total) by mouth 2 (two) times a day   busPIRone (BUSPAR) 10 mg tablet  Self Yes No   Sig: Take 1 tablet by mouth 3 (three) times a day   celecoxib (CeleBREX) 200 mg capsule   No No   Sig: Take 1 capsule (200 mg total) by mouth 2 (two) times a day   cyanocobalamin (VITAMIN B-12) 100 MCG tablet   Yes No   Sig: Take 100 mcg by mouth daily   docusate sodium (COLACE) 100 mg capsule   No No   Sig: Take 1 capsule (100 mg total) by mouth 2 (two) times a day   escitalopram (LEXAPRO) 5 mg tablet  Self Yes No   Sig: Take 1 tablet by mouth daily   gabapentin (NEURONTIN) 100 mg capsule   No No   Sig: Take 1 capsule (100 mg total) by mouth 2 (two) times a day   hydrOXYzine HCL (ATARAX) 25 mg tablet  Self Yes No   Sig: Take 2 tablets by mouth daily at bedtime   losartan (COZAAR) 100 MG tablet  Self Yes No   Sig: Take 100 mg by mouth daily before dinner   methocarbamol (ROBAXIN) 500 mg tablet   Yes No   Sig: Take 500 mg by mouth if needed for muscle spasms   mometasone 220 mcg/actuation inhaler   Yes No   Sig: Inhale 1 puff if needed Rinse mouth after use.   oxyCODONE (ROXICODONE) 5 immediate release tablet   No No   Sig: Take 1 tablet (5 mg total) by mouth every 4 (four) hours as needed for moderate pain Max Daily Amount: 30 mg   semaglutide, 1 mg/dose, (Ozempic, 1 MG/DOSE,) 2 mg/1.5 mL injection pen   Yes No   Sig: Inject 1 mg under the skin every 7 days Mondays   traMADol (Ultram) 50 mg tablet   No No   Sig: Take 1 tablet (50 mg total) by mouth every 6 (six) hours as needed for moderate pain      Facility-Administered Medications: None       Past Medical History:   Diagnosis Date    Asthma 04/24/2024    Awareness under anesthesia     EGD    Diabetes mellitus (HCC)     Hypertension        Past Surgical History:   Procedure Laterality Date    EGD      HERNIA REPAIR      umbillical    HYSTERECTOMY  05/2021    MAMMO STEREOTACTIC BREAST BIOPSY RIGHT (ALL INC) Right 05/27/2022    CA ARTHRP KNE CONDYLE&PLATU MEDIAL&LAT COMPARTMENTS  Right 4/24/2024    Procedure: ARTHROPLASTY KNEE TOTAL;  Surgeon: Marcelino Gray DO;  Location: [facility];  Service: Orthopedics       Family History   Problem Relation Age of Onset    Diabetes Mother     Diabetes Father     No Known Problems Maternal Grandmother     No Known Problems Maternal Grandfather     No Known Problems Paternal Grandmother     No Known Problems Paternal Grandfather     No Known Problems Son     No Known Problems Son     No Known Problems Maternal Aunt     No Known Problems Maternal Aunt     No Known Problems Maternal Aunt      I have reviewed and agree with the history as documented.    E-Cigarette/Vaping    E-Cigarette Use Never User      E-Cigarette/Vaping Substances    Nicotine No     THC No     CBD No     Flavoring No     Other No     Unknown No      Social History     Tobacco Use    Smoking status: Former    Smokeless tobacco: Never   Vaping Use    Vaping status: Never Used   Substance Use Topics    Alcohol use: Yes     Alcohol/week: 6.0 standard drinks of alcohol     Types: 6 Standard drinks or equivalent per week     Comment: weekly    Drug use: Never       Review of Systems   Constitutional:  Negative for fever.   Musculoskeletal:  Negative for back pain, neck pain and stiffness.   Skin:  Negative for itching.   All other systems reviewed and are negative.      Physical Exam  Physical Exam  Vitals and nursing note reviewed.   Constitutional:       General: She is not in acute distress.     Appearance: She is well-developed. She is not diaphoretic.   HENT:      Head: Normocephalic and atraumatic.      Nose: Nose normal.   Eyes:      Extraocular Movements: Extraocular movements intact.      Conjunctiva/sclera: Conjunctivae normal.   Cardiovascular:      Rate and Rhythm: Normal rate and regular rhythm.      Heart sounds: Normal heart sounds.   Pulmonary:      Effort: Pulmonary effort is normal. No respiratory distress.      Breath sounds: Normal breath sounds.   Abdominal:       General: There is no distension.      Palpations: Abdomen is soft.      Tenderness: There is no abdominal tenderness.   Musculoskeletal:         General: No deformity. Normal range of motion.      Cervical back: Neck supple.      Right knee: Swelling and laceration (vertical surgical scar with dressing, C/D/I, no surrounding redness/erythema) present.   Skin:     General: Skin is warm and dry.   Neurological:      General: No focal deficit present.      Mental Status: She is alert and oriented to person, place, and time.      Cranial Nerves: No cranial nerve deficit.   Psychiatric:         Mood and Affect: Mood normal.         Vital Signs  ED Triage Vitals   Temperature Pulse Respirations Blood Pressure SpO2   05/03/24 2129 05/03/24 2129 05/03/24 2129 05/03/24 2129 05/03/24 2129   98 °F (36.7 °C) 71 18 (!) 180/117 98 %      Temp src Heart Rate Source Patient Position - Orthostatic VS BP Location FiO2 (%)   -- 05/03/24 2200 05/03/24 2200 05/03/24 2200 --    Monitor Lying Right arm       Pain Score       --                  Vitals:    05/03/24 2129 05/03/24 2200   BP: (!) 180/117 (!) 181/87   Pulse: 71 65   Patient Position - Orthostatic VS:  Lying         Visual Acuity  Visual Acuity      Flowsheet Row Most Recent Value   L Pupil Size (mm) 3   R Pupil Size (mm) 3            ED Medications  Medications - No data to display    Diagnostic Studies  Results Reviewed       None                   VAS VENOUS DUPLEX -LOWER LIMB UNILATERAL    (Results Pending)              Procedures  Procedures         ED Course  ED Course as of 05/03/24 2302   Fri May 03, 2024   2217 Verbal report from vascular that study is negative for DVT. Pt has no CP/SOB.                               SBIRT 22yo+      Flowsheet Row Most Recent Value   Initial Alcohol Screen: US AUDIT-C     1. How often do you have a drink containing alcohol? 0 Filed at: 05/03/2024 2129   2. How many drinks containing alcohol do you have on a typical day you are  drinking?  0 Filed at: 05/03/2024 2129   3a. Male UNDER 65: How often do you have five or more drinks on one occasion? 0 Filed at: 05/03/2024 2129   3b. FEMALE Any Age, or MALE 65+: How often do you have 4 or more drinks on one occassion? 0 Filed at: 05/03/2024 2129   Audit-C Score 0 Filed at: 05/03/2024 2129   RODGER: How many times in the past year have you...    Used an illegal drug or used a prescription medication for non-medical reasons? Never Filed at: 05/03/2024 2129                      Medical Decision Making  57yo female is coming in with some right leg swelling, some of her upper leg, also feeling some cramps. No assoc CP/SOB. No fever/chills. No redness. No erythema to post-op knee replacement wound. Is taking  for blood clot prevention and has been up and mobile starting PT. Just wanted to make sure she didn't have a blood clot. Will check labs from pre-op for Hgb/Plts and CR and will get duplex to eval for DVT. May just be post-op swelling vs DVT.     Amount and/or Complexity of Data Reviewed  Radiology: ordered.             Disposition  Final diagnoses:   Post-op pain   Leg swelling     Time reflects when diagnosis was documented in both MDM as applicable and the Disposition within this note       Time User Action Codes Description Comment    5/3/2024 10:18 PM Ariana Croft Add [G89.18] Post-op pain     5/3/2024 10:18 PM Ariana Croft Add [M79.89] Leg swelling           ED Disposition       ED Disposition   Discharge    Condition   Stable    Date/Time   Fri May 3, 2024 2218    Comment   Dory Roberts discharge to home/self care.                   Follow-up Information       Follow up With Specialties Details Why Contact Info    Lyssa Isabel MD Family Medicine Go to  If symptoms worsen [address]  [address]  [place] [address] [phone]              Discharge Medication List as of 5/3/2024 10:18 PM        CONTINUE these medications which have NOT CHANGED    Details    acetaminophen (TYLENOL) 325 mg tablet Take 2 tablets (650 mg total) by mouth every 6 (six) hours, Starting Thu 4/25/2024, Normal      albuterol (PROVENTIL HFA,VENTOLIN HFA) 90 mcg/act inhaler Inhale 2 puffs every 6 (six) hours as needed for wheezing, Historical Med      Alcohol Swabs 70 % PADS Use 4 (four) times a day, Starting Mon 1/18/2021, Normal      Ascorbic Acid (vitamin C) 1000 MG tablet Take 1 tablet (1,000 mg total) by mouth daily, Starting Fri 3/29/2024, Normal      aspirin (ECOTRIN LOW STRENGTH) 81 mg EC tablet Take 1 tablet (81 mg total) by mouth 2 (two) times a day, Starting Thu 4/25/2024, Normal      ATORVASTATIN CALCIUM PO Take 5 mg by mouth daily at bedtime, Historical Med      busPIRone (BUSPAR) 10 mg tablet Take 1 tablet by mouth 3 (three) times a day, Starting Wed 12/30/2020, Historical Med      celecoxib (CeleBREX) 200 mg capsule Take 1 capsule (200 mg total) by mouth 2 (two) times a day, Starting Thu 4/25/2024, Normal      Cholecalciferol (Vitamin D) 125 MCG (5000 UT) CAPS Take by mouth, Historical Med      cyanocobalamin (VITAMIN B-12) 100 MCG tablet Take 100 mcg by mouth daily, Historical Med      Diclofenac Sodium (VOLTAREN) 1 % Apply 2 g topically 4 (four) times a day, Historical Med      docusate sodium (COLACE) 100 mg capsule Take 1 capsule (100 mg total) by mouth 2 (two) times a day, Starting Thu 4/25/2024, Normal      escitalopram (LEXAPRO) 5 mg tablet Take 1 tablet by mouth daily, Starting Wed 12/30/2020, Historical Med      gabapentin (NEURONTIN) 100 mg capsule Take 1 capsule (100 mg total) by mouth 2 (two) times a day, Starting Thu 4/25/2024, Normal      hydrOXYzine HCL (ATARAX) 25 mg tablet Take 2 tablets by mouth daily at bedtime, Starting Tue 2/2/2021, Historical Med      Insulin Pen Needle (Pen Needles) 32G X 4 MM MISC Use 4 (four) times a day for 14 days, Starting Mon 1/18/2021, Until Mon 1/3/2022, Normal      losartan (COZAAR) 100 MG tablet Take 100 mg by mouth daily before  dinner, Historical Med      methocarbamol (ROBAXIN) 500 mg tablet Take 500 mg by mouth if needed for muscle spasms, Historical Med      mometasone 220 mcg/actuation inhaler Inhale 1 puff if needed Rinse mouth after use., Historical Med      Multiple Vitamin (multivitamin) tablet Take 1 tablet by mouth daily, Starting Fri 3/29/2024, Normal      oxyCODONE (ROXICODONE) 5 immediate release tablet Take 1 tablet (5 mg total) by mouth every 4 (four) hours as needed for moderate pain Max Daily Amount: 30 mg, Starting Thu 4/25/2024, Normal      semaglutide, 1 mg/dose, (Ozempic, 1 MG/DOSE,) 2 mg/1.5 mL injection pen Inject 1 mg under the skin every 7 days Mondays, Historical Med      traMADol (Ultram) 50 mg tablet Take 1 tablet (50 mg total) by mouth every 6 (six) hours as needed for moderate pain, Starting Thu 5/2/2024, Normal             No discharge procedures on file.    PDMP Review       None            ED Provider  Electronically Signed by             Ariana Croft MD  05/03/24 6416

## 2024-05-06 ENCOUNTER — OFFICE VISIT (OUTPATIENT)
Dept: PHYSICAL THERAPY | Facility: CLINIC | Age: 59
End: 2024-05-06
Payer: COMMERCIAL

## 2024-05-06 ENCOUNTER — TELEPHONE (OUTPATIENT)
Age: 59
End: 2024-05-06

## 2024-05-06 ENCOUNTER — APPOINTMENT (OUTPATIENT)
Dept: RADIOLOGY | Facility: CLINIC | Age: 59
End: 2024-05-06
Payer: COMMERCIAL

## 2024-05-06 ENCOUNTER — OFFICE VISIT (OUTPATIENT)
Dept: OBGYN CLINIC | Facility: CLINIC | Age: 59
End: 2024-05-06

## 2024-05-06 VITALS
HEIGHT: 66 IN | WEIGHT: 228 LBS | HEART RATE: 75 BPM | BODY MASS INDEX: 36.64 KG/M2 | SYSTOLIC BLOOD PRESSURE: 184 MMHG | DIASTOLIC BLOOD PRESSURE: 83 MMHG

## 2024-05-06 DIAGNOSIS — Z48.89 AFTERCARE FOLLOWING SURGERY: ICD-10-CM

## 2024-05-06 DIAGNOSIS — M17.11 PRIMARY OSTEOARTHRITIS OF RIGHT KNEE: Primary | ICD-10-CM

## 2024-05-06 DIAGNOSIS — Z48.89 AFTERCARE FOLLOWING SURGERY: Primary | ICD-10-CM

## 2024-05-06 PROCEDURE — 99024 POSTOP FOLLOW-UP VISIT: CPT | Performed by: ORTHOPAEDIC SURGERY

## 2024-05-06 PROCEDURE — 97110 THERAPEUTIC EXERCISES: CPT

## 2024-05-06 PROCEDURE — 73562 X-RAY EXAM OF KNEE 3: CPT

## 2024-05-06 PROCEDURE — 97112 NEUROMUSCULAR REEDUCATION: CPT

## 2024-05-06 PROCEDURE — 97140 MANUAL THERAPY 1/> REGIONS: CPT

## 2024-05-06 RX ORDER — METOPROLOL SUCCINATE 50 MG/1
25 TABLET, EXTENDED RELEASE ORAL
COMMUNITY
Start: 2024-05-01

## 2024-05-06 RX ORDER — POTASSIUM CHLORIDE 20 MEQ/1
20 TABLET, EXTENDED RELEASE ORAL
COMMUNITY
Start: 2024-04-29

## 2024-05-06 NOTE — TELEPHONE ENCOUNTER
Caller: Patient     Doctor: Marina    Reason for call: needs a note for the  stating the length of time for convalescent period.     Patient will retrieve through [employer]/needs letter ASAP    Thank you    Call back#: [phone]

## 2024-05-06 NOTE — PROGRESS NOTES
"Daily Note     Today's date: 2024  Patient name: Dory Roberts  : 1965  MRN: [id number]  Referring provider: Bridget Hilliard PA-C  Dx:   Encounter Diagnosis     ICD-10-CM    1. Primary osteoarthritis of right knee  M17.11           Start Time: 1145  Stop Time: 1230  Total time in clinic (min): 45 minutes    Subjective: Pt reports she just came from appt with her surgeon and he is pleased with her progress.       Objective: See treatment diary below      Assessment: Tolerated treatment well. Patient demonstrated fatigue post treatment, exhibited good technique with therapeutic exercises, and would benefit from continued PT. Progressed standing activity this session to improve hip and knee stability and strength. She was challenged by progressions but recovered well with rest breaks between exercises. May trial cane ambulation next session as pt demonstrates improved stability on RLE.      Plan: Progress treatment as tolerated.       Precautions: TKA 24    POC expires Unit limit Auth Expiration date PT/OT + Visit Limit?    N/A 10/24 Auth req                 Visit/Unit Tracking  AUTH Status:  Date  - FOTO            15 visits Used 1 2 3 4            Remaining  14 13 12 11             FOTO - done         Manuals          PROM R knee flex  CG CG CG         Knee ext str   CG CG         STM for edema   CG CG                      Neuro Re-Ed             Pt education on HEP, surgery, POC after surgery 20' 8'  5'         QS  15x5\" 15x5\" 15x5\"         QS w/ SLR  nv 10x3\" 10x3\"                                                             Ther Ex             Nustep for ROM  5' 5' 5'         Heel slides A/AA  15x5\" ea 15x5\" ea 15x5\" ea         LAQ  15x3' 15x3\" 15x3\"         Dock kicks  3' 3' 3'         Heel/toe raises   x20 x20ea         Standing hip abd/ext    2x10 ea                      Ther Activity             Mini squats    x10         Step up @ staircase    R 2x10 "         Gait Training                                       Modalities

## 2024-05-06 NOTE — PROGRESS NOTES
"Patient Name:  Dory Roberts  MRN:  [id number]    Assessment & Plan     1. Aftercare following surgery  -     XR knee 3 vw right non injury; Future; Expected date: 05/06/2024      Approximately 12 day s/p Right total knee arthroplasty performed on 04/24/2024  Overall, patient doing very well s/p surgical intervention  X-rays reviewed  At this time, recommended patient to continue with outpatient PT to work on obtaining full knee extension and improving knee flexion to 90 degrees within the next week  Encouraged patient to perform hamstring stretching 3 times daily for knee extension   Continue ASA 81 BID to complete 4 weeks of DVT prophylaxis  Continue OTC medication as needed for pain relief  Follow up in 4 weeks for ROM check     History of the Present Illness   Dory Roberts is a 58 y.o. female approximately 12 day s/p Right total knee arthroplasty performed on 04/24/2024. Today patient reports she is doing much better than when in the hospital. She was ambulating the stairs the second day after surgery without significant complications. She did report to the ED for concern of blood clot, US negative. She admits she is attending outpatient PT, working on range of motion and gait.            Review of Systems     Review of Systems   Constitutional:  Negative for chills and fever.   HENT:  Negative for congestion.    Respiratory:  Negative for cough, chest tightness and shortness of breath.    Cardiovascular:  Negative for chest pain and palpitations.   Gastrointestinal:  Negative for abdominal pain.   Endocrine: Negative for cold intolerance and heat intolerance.   Neurological:  Negative for syncope.   Psychiatric/Behavioral:  Negative for confusion.        Physical Exam     BP (!) 184/83   Pulse 75   Ht 5' 6\" (1.676 m)   Wt 103 kg (228 lb)   BMI 36.80 kg/m²     Right Knee  Surgical incision well healing, without signs of infection  Range of motion from 5 to 60 degrees with mild discomfort.    There is " trace post operative effusion.    The patient is able to perform a straight leg raise  Calf soft, compressible and nontender   The patient is neurovascular intact distally.      Data Review     I have personally reviewed pertinent films in PACS, and my interpretation follows.    X-rays of right knee taken in the office today, 5/6/2024, independently reviewed and display no fracture or dislocation.  Well aligned total knee arthroplasty without sign of loosening present.    Social History     Tobacco Use    Smoking status: Former    Smokeless tobacco: Never   Vaping Use    Vaping status: Never Used   Substance Use Topics    Alcohol use: Yes     Alcohol/week: 6.0 standard drinks of alcohol     Types: 6 Standard drinks or equivalent per week     Comment: weekly    Drug use: Never           Procedures  None     Bridget Hilliard PA-C

## 2024-05-09 ENCOUNTER — OFFICE VISIT (OUTPATIENT)
Dept: PHYSICAL THERAPY | Facility: CLINIC | Age: 59
End: 2024-05-09
Payer: COMMERCIAL

## 2024-05-09 DIAGNOSIS — M17.11 PRIMARY OSTEOARTHRITIS OF RIGHT KNEE: Primary | ICD-10-CM

## 2024-05-09 PROCEDURE — 97140 MANUAL THERAPY 1/> REGIONS: CPT

## 2024-05-09 PROCEDURE — 97530 THERAPEUTIC ACTIVITIES: CPT

## 2024-05-09 PROCEDURE — 97110 THERAPEUTIC EXERCISES: CPT

## 2024-05-09 NOTE — PROGRESS NOTES
"Daily Note     Today's date: 2024  Patient name: Dory Roberts  : 1965  MRN: [id number]  Referring provider: Bridget Hilliard PA-C  Dx:   Encounter Diagnosis     ICD-10-CM    1. Primary osteoarthritis of right knee  M17.11           Start Time: 1145  Stop Time: 1230  Total time in clinic (min): 45 minutes    Subjective: Pt arrives with SPC this session and states she has been using it more at home for walking.      Objective: See treatment diary below      Assessment: Tolerated treatment well. Patient demonstrated fatigue post treatment, exhibited good technique with therapeutic exercises, and would benefit from continued PT. Continued to progressed standing activity to improve functional strength and stability of RLE. SPC was resized to correct height and pt was educated on proper gait pattern with AD with good understanding noted.      Plan: Progress treatment as tolerated.       Precautions: TKA 24    POC expires Unit limit Auth Expiration date PT/OT + Visit Limit?    N/A 10/24 Auth req                 Visit/Unit Tracking  AUTH Status:  Date  - FOTO           15 visits Used 1 2 3 4 5           Remaining  14 13 12 11 10            FOTO - done         Manuals         PROM R knee flex  CG CG CG CG        Knee ext str   CG CG CG        STM for edema   CG CG CG                     Neuro Re-Ed             Pt education on HEP, surgery, POC after surgery 20' 8'  5' 3'        QS  15x5\" 15x5\" 15x5\"         QS w/ SLR  nv 10x3\" 10x3\" 15x3\"                                                            Ther Ex             Recumbent bike for ROM  5' 5' 5' 5'        Heel slides A/AA  15x5\" ea 15x5\" ea 15x5\" ea 15x5\" ea        LAQ  15x3' 15x3\" 15x3\" 20x3\"        Dock kicks  3' 3' 3' 3'        Heel/toe raises   x20 x20ea x20ea        Standing hip abd/ext    2x10 ea 2x10 ea                     Ther Activity             Mini squats    x10 x15        Step up @ staircase "    R 2x10 R 2x10        Gait Training                                       Modalities

## 2024-05-13 ENCOUNTER — OFFICE VISIT (OUTPATIENT)
Dept: PHYSICAL THERAPY | Facility: CLINIC | Age: 59
End: 2024-05-13
Payer: COMMERCIAL

## 2024-05-13 DIAGNOSIS — M17.11 PRIMARY OSTEOARTHRITIS OF RIGHT KNEE: Primary | ICD-10-CM

## 2024-05-13 DIAGNOSIS — Z96.651 S/P TOTAL KNEE ARTHROPLASTY, RIGHT: ICD-10-CM

## 2024-05-13 PROCEDURE — 97530 THERAPEUTIC ACTIVITIES: CPT

## 2024-05-13 PROCEDURE — 97110 THERAPEUTIC EXERCISES: CPT

## 2024-05-13 PROCEDURE — 97140 MANUAL THERAPY 1/> REGIONS: CPT

## 2024-05-13 RX ORDER — GABAPENTIN 100 MG/1
100 CAPSULE ORAL 2 TIMES DAILY
Qty: 30 CAPSULE | Refills: 0 | Status: SHIPPED | OUTPATIENT
Start: 2024-05-13

## 2024-05-13 NOTE — PROGRESS NOTES
"Daily Note     Today's date: 2024  Patient name: Dory Roberts  : 1965  MRN: [id number]  Referring provider: Bridget Hilliard PA-C  Dx:   Encounter Diagnosis     ICD-10-CM    1. Primary osteoarthritis of right knee  M17.11           Start Time: 1145  Stop Time: 1230  Total time in clinic (min): 45 minutes    Subjective: Pt reports she was in a lot of pain over the weekend but is feeling better now.       Objective: See treatment diary below      Assessment: Tolerated treatment well. Patient demonstrated fatigue post treatment, exhibited good technique with therapeutic exercises, and would benefit from continued PT. Added in STS this session to continue improving functional strength. Pt was able to complete without use of UE but required increased seat height and verbal cues to improve eccentric control.      Plan: Progress treatment as tolerated.       Precautions: TKA 24    POC expires Unit limit Auth Expiration date PT/OT + Visit Limit?    N/A 10/24 Auth req                 Visit/Unit Tracking  AUTH Status:  Date  - FOTO /         15 visits Used 1 2 3 4 5 6          Remaining  14 13 12 11 10 9           FOTO - done         Manuals        PROM R knee flex  CG CG CG CG CG       Knee ext str   CG CG CG CG       STM for edema   CG CG CG CG                    Neuro Re-Ed             Pt education on HEP, surgery, POC after surgery 20' 8'  5' 3'        QS  15x5\" 15x5\" 15x5\"  15x5\"       QS w/ SLR  nv 10x3\" 10x3\" 15x3\" 15x3\"                                                           Ther Ex             Recumbent bike for ROM  5' 5' 5' 5' 5'       Heel slides A/AA  15x5\" ea 15x5\" ea 15x5\" ea 15x5\" ea 15x5\" ea       LAQ  15x3' 15x3\" 15x3\" 20x3\" 2# 20x3\"       Dock kicks  3' 3' 3' 3' 2# 3'       Heel/toe raises   x20 x20ea x20ea X20 ea       Standing hip abd/ext    2x10 ea 2x10 ea 2x10 ea                    Ther Activity             Mini squats    " x10 x15 x20       Step up @ staircase    R 2x10 R 2x10 R 2x10       STS c airex      2x10       Gait Training                                       Modalities

## 2024-05-16 ENCOUNTER — OFFICE VISIT (OUTPATIENT)
Dept: PHYSICAL THERAPY | Facility: CLINIC | Age: 59
End: 2024-05-16
Payer: COMMERCIAL

## 2024-05-16 DIAGNOSIS — M17.11 PRIMARY OSTEOARTHRITIS OF RIGHT KNEE: Primary | ICD-10-CM

## 2024-05-16 PROCEDURE — 97110 THERAPEUTIC EXERCISES: CPT

## 2024-05-16 PROCEDURE — 97530 THERAPEUTIC ACTIVITIES: CPT

## 2024-05-16 PROCEDURE — 97140 MANUAL THERAPY 1/> REGIONS: CPT

## 2024-05-16 NOTE — PROGRESS NOTES
"Daily Note     Today's date: 2024  Patient name: Dory Roberts  : 1965  MRN: [id number]  Referring provider: Bridget Hilliard PA-C  Dx:   Encounter Diagnosis     ICD-10-CM    1. Primary osteoarthritis of right knee  M17.11           Start Time: 1145  Stop Time: 1230  Total time in clinic (min): 45 minutes    Subjective: Pt reports she is having less pain and swelling and is starting to use the cane less at home.      Objective: See treatment diary below      Assessment: Tolerated treatment well. Patient demonstrated fatigue post treatment, exhibited good technique with therapeutic exercises, and would benefit from continued PT. Pt continues to work well through program with noted improvements in mobility and tolerance to interventions. She was most challenged by STS without increased seat height but improved on subsequent repetitions with verbal cuing.       Plan: Progress treatment as tolerated.       Precautions: TKA 24    POC expires Unit limit Auth Expiration date PT/OT + Visit Limit?    N/A 10/24 Auth req                 Visit/Unit Tracking  AUTH Status:  Date  - FOTO  - FOTO         15 visits Used 1 2 3 4 5 6 7         Remaining  14 13 12 11 10 9 8          FOTO - done         Manuals       PROM R knee flex  CG CG CG CG CG CG      Knee ext str   CG CG CG CG CG      STM for edema   CG CG CG CG                    Neuro Re-Ed             Pt education on HEP, surgery, POC after surgery 20' 8'  5' 3'        QS  15x5\" 15x5\" 15x5\"  15x5\" HEP      QS w/ SLR  nv 10x3\" 10x3\" 15x3\" 15x3\" 2x10 3\"                                                          Ther Ex             Recumbent bike for ROM  5' 5' 5' 5' 5' 5'      Heel slides A/AA  15x5\" ea 15x5\" ea 15x5\" ea 15x5\" ea 15x5\" ea 15x5\" ea      LAQ  15x3' 15x3\" 15x3\" 20x3\" 2# 20x3\" 2# 20x3\"      Dock kicks  3' 3' 3' 3' 2# 3' 2# 3'      Heel/toe raises   x20 x20ea x20ea X20 ea     "   Standing hip abd/ext    2x10 ea 2x10 ea 2x10 ea 2x10 ea      Leg press       nv      Ther Activity             Mini squats    x10 x15 x20 x20      Step up @ staircase    R 2x10 R 2x10 R 2x10 R 2x10      STS c airex      2x10 X10 no airex      Step down      NV       Gait Training                                       Modalities

## 2024-05-20 ENCOUNTER — APPOINTMENT (OUTPATIENT)
Dept: PHYSICAL THERAPY | Facility: CLINIC | Age: 59
End: 2024-05-20
Payer: COMMERCIAL

## 2024-05-23 ENCOUNTER — OFFICE VISIT (OUTPATIENT)
Dept: PHYSICAL THERAPY | Facility: CLINIC | Age: 59
End: 2024-05-23
Payer: COMMERCIAL

## 2024-05-23 DIAGNOSIS — M17.11 PRIMARY OSTEOARTHRITIS OF RIGHT KNEE: Primary | ICD-10-CM

## 2024-05-23 PROCEDURE — 97110 THERAPEUTIC EXERCISES: CPT

## 2024-05-23 PROCEDURE — 97530 THERAPEUTIC ACTIVITIES: CPT

## 2024-05-23 NOTE — PROGRESS NOTES
"Daily Note     Today's date: 2024  Patient name: Dory Roberts  : 1965  MRN: [id number]  Referring provider: Bridget Hilliard PA-C  Dx:   Encounter Diagnosis     ICD-10-CM    1. Primary osteoarthritis of right knee  M17.11           Start Time: 1145  Stop Time: 1231  Total time in clinic (min): 46 minutes    Subjective: Pt reports she is more sore and swollen today from being on her feet all weekend.       Objective: See treatment diary below      Assessment: Tolerated treatment well. Patient demonstrated fatigue post treatment, exhibited good technique with therapeutic exercises, and would benefit from continued PT. Pt continues to progress through program with decreased stiffness noted following session. She is most challenged with STS but improves on subsequent reps with verbal cuing.      Plan: Progress treatment as tolerated.       Precautions: TKA 24    POC expires Unit limit Auth Expiration date PT/OT + Visit Limit?    N/A 10/24 Auth req                 Visit/Unit Tracking  AUTH Status:  Date  - FOTO  - FOTO        15 visits Used 1 2 3 4 5 6 7 8        Remaining  14 13 12 11 10 9 8 7         FOTO - done         Manuals      PROM R knee flex  CG CG CG CG CG CG CG     Knee ext str   CG CG CG CG CG CG     STM for edema   CG CG CG CG                    Neuro Re-Ed             Pt education on HEP, surgery, POC after surgery 20' 8'  5' 3'        QS  15x5\" 15x5\" 15x5\"  15x5\" HEP      QS w/ SLR  nv 10x3\" 10x3\" 15x3\" 15x3\" 2x10 3\" 2x10 3\"                                                         Ther Ex             Recumbent bike for ROM  5' 5' 5' 5' 5' 5' 5'     Heel slides A/AA  15x5\" ea 15x5\" ea 15x5\" ea 15x5\" ea 15x5\" ea 15x5\" ea 15x5\" ea     LAQ  15x3' 15x3\" 15x3\" 20x3\" 2# 20x3\" 2# 20x3\"      Dock kicks  3' 3' 3' 3' 2# 3' 2# 3'      Heel/toe raises   x20 x20ea x20ea X20 ea       Standing hip abd/ext    2x10 ea 2x10 ea " 2x10 ea 2x10 ea 2x10 ea     Leg press       nv      Ther Activity             Mini squats    x10 x15 x20 x20 x20     Step up @ staircase    R 2x10 R 2x10 R 2x10 R 2x10 R 2x10     STS c airex      2x10 X10 no airex 2x10 no airex     Step down      NV       Gait Training                                       Modalities

## 2024-05-28 ENCOUNTER — OFFICE VISIT (OUTPATIENT)
Dept: PHYSICAL THERAPY | Facility: CLINIC | Age: 59
End: 2024-05-28
Payer: COMMERCIAL

## 2024-05-28 DIAGNOSIS — M17.11 PRIMARY OSTEOARTHRITIS OF RIGHT KNEE: Primary | ICD-10-CM

## 2024-05-28 PROCEDURE — 97140 MANUAL THERAPY 1/> REGIONS: CPT

## 2024-05-28 PROCEDURE — 97530 THERAPEUTIC ACTIVITIES: CPT

## 2024-05-28 PROCEDURE — 97110 THERAPEUTIC EXERCISES: CPT

## 2024-05-28 NOTE — PROGRESS NOTES
"Daily Note     Today's date: 2024  Patient name: Dory Roberts  : 1965  MRN: [id number]  Referring provider: Bridget Hilliard PA-C  Dx:   Encounter Diagnosis     ICD-10-CM    1. Primary osteoarthritis of right knee  M17.11           Start Time: 1145  Stop Time: 1230  Total time in clinic (min): 45 minutes    Subjective: Pt states her knee has been feeling stronger and more stable.       Objective: See treatment diary below      Assessment: Tolerated treatment well. Patient demonstrated fatigue post treatment, exhibited good technique with therapeutic exercises, and would benefit from continued PT. Added in leg press and step downs this session to continue improving knee strength and stability. She was challenged by progressions but demonstrated improved eccentric control on step downs with verbal cuing to correct form.       Plan: Progress treatment as tolerated.       Precautions: TKA 24    POC expires Unit limit Auth Expiration date PT/OT + Visit Limit?    N/A 10/24 Auth req                 Visit/Unit Tracking  AUTH Status:  Date  - FOTO / - FOTO       15 visits Used 1 2 3 4 5 6 7 8 9       Remaining  14 13 12 11 10 9 8 7 6        FOTO - done         Manuals /    PROM R knee flex  CG CG CG CG CG CG CG CG    Knee ext str   CG CG CG CG CG CG CG    STM for edema   CG CG CG CG                    Neuro Re-Ed             Pt education on HEP, surgery, POC after surgery 20' 8'  5' 3'        QS  15x5\" 15x5\" 15x5\"  15x5\" HEP      QS w/ SLR  nv 10x3\" 10x3\" 15x3\" 15x3\" 2x10 3\" 2x10 3\" 2x10 3\"                                                        Ther Ex             Recumbent bike for ROM  5' 5' 5' 5' 5' 5' 5' 5'    Heel slides A/AA  15x5\" ea 15x5\" ea 15x5\" ea 15x5\" ea 15x5\" ea 15x5\" ea 15x5\" ea 20x5\" ea    LAQ  15x3' 15x3\" 15x3\" 20x3\" 2# 20x3\" 2# 20x3\"  3# x20    Dock kicks  3' 3' 3' 3' 2# 3' 2# 3'      Heel/toe raises   " "x20 x20ea x20ea X20 ea       Standing hip abd/ext    2x10 ea 2x10 ea 2x10 ea 2x10 ea 2x10 ea 2x10 ea    Leg press       nv  65# 2x10    Ther Activity             Mini squats    x10 x15 x20 x20 x20 x20    Step up    R 2x10 R 2x10 R 2x10 R 2x10 R 2x10 6\" x20    STS c airex      2x10 X10 no airex 2x10 no airex 2x10 no airex    Step down      NV   6\" x10    Gait Training                                       Modalities                                                        "

## 2024-05-29 DIAGNOSIS — Z96.651 S/P TOTAL KNEE ARTHROPLASTY, RIGHT: ICD-10-CM

## 2024-05-29 RX ORDER — ASPIRIN 81 MG/1
81 TABLET, COATED ORAL 2 TIMES DAILY
Qty: 60 TABLET | Refills: 0 | Status: SHIPPED | OUTPATIENT
Start: 2024-05-29

## 2024-05-30 ENCOUNTER — OFFICE VISIT (OUTPATIENT)
Dept: PHYSICAL THERAPY | Facility: CLINIC | Age: 59
End: 2024-05-30
Payer: COMMERCIAL

## 2024-05-30 DIAGNOSIS — M17.11 PRIMARY OSTEOARTHRITIS OF RIGHT KNEE: Primary | ICD-10-CM

## 2024-05-30 PROCEDURE — 97140 MANUAL THERAPY 1/> REGIONS: CPT

## 2024-05-30 PROCEDURE — 97110 THERAPEUTIC EXERCISES: CPT

## 2024-05-30 PROCEDURE — 97530 THERAPEUTIC ACTIVITIES: CPT

## 2024-05-30 NOTE — PROGRESS NOTES
"Daily Note     Today's date: 2024  Patient name: Dory Roebrts  : 1965  MRN: [id number]  Referring provider: Bridget Hilliard PA-C  Dx:   Encounter Diagnosis     ICD-10-CM    1. Primary osteoarthritis of right knee  M17.11           Start Time: 1145  Stop Time: 1230  Total time in clinic (min): 45 minutes    Subjective: Pt reports her knee is a little stiff today but no worse than usual. Right hip is bothering her a little more than usual.      Objective: See treatment diary below      Assessment: Tolerated treatment well. Patient demonstrated fatigue post treatment, exhibited good technique with therapeutic exercises, and would benefit from continued PT. Pt continues to work well through program denying any new or increased symptoms during or following session. She continues to be challenged by program but requires less verbal cuing overall to ensure proper form with exercises.       Plan: Progress treatment as tolerated.       Precautions: TKA 24    POC expires Unit limit Auth Expiration date PT/OT + Visit Limit?    N/A 10/24 Auth req                 Visit/Unit Tracking  AUTH Status:  Date  - FOTO  - FOTO      15 visits Used 1 2 3 4 5 6 7 8 9 10      Remaining  14 13 12 11 10 9 8 7 6 5       FOTO - done         Manuals      PROM R knee flex   CG CG CG CG CG CG CG CG   Knee ext str   CG CG CG CG CG CG CG CG   STM for edema   CG CG CG CG                    Neuro Re-Ed             Pt education on HEP, surgery, POC after surgery    5' 3'        QS   15x5\" 15x5\"  15x5\" HEP      QS w/ SLR   10x3\" 10x3\" 15x3\" 15x3\" 2x10 3\" 2x10 3\" 2x10 3\" 2x10 3\"                                                       Ther Ex             Recumbent bike for ROM   5' 5' 5' 5' 5' 5' 5' 5'   Heel slides A/AA   15x5\" ea 15x5\" ea 15x5\" ea 15x5\" ea 15x5\" ea 15x5\" ea 20x5\" ea 20x5\" ea   LAQ   15x3\" 15x3\" 20x3\" 2# 20x3\" 2# 20x3\"  3# x20 3# " "x20   Dock kicks   3' 3' 3' 2# 3' 2# 3'   HEP   Heel/toe raises   x20 x20ea x20ea X20 ea       Standing hip abd/ext    2x10 ea 2x10 ea 2x10 ea 2x10 ea 2x10 ea 2x10 ea 2x10 ea   Leg press       nv  65# 2x10 65# 2x10   Ther Activity             Mini squats    x10 x15 x20 x20 x20 x20 x20   Step up    R 2x10 R 2x10 R 2x10 R 2x10 R 2x10 6\" x20 6\" x20   STS       2x10 X10 no airex 2x10 no airex 2x10 no airex 2x10 no airex   Step down      NV   6\" x10    Gait Training                                       Modalities                                                          "

## 2024-05-31 ENCOUNTER — TELEPHONE (OUTPATIENT)
Dept: OBGYN CLINIC | Facility: MEDICAL CENTER | Age: 59
End: 2024-05-31

## 2024-05-31 NOTE — TELEPHONE ENCOUNTER
Caller: Daryl from [facility]    Doctor: Dr Gray     Reason for call: The patient was on Ozempic prior to surgery. She thinks that she was told not to restart post op.    Per Daryl, the patient is confused in regards to when to begin this medication again.  Thank you        Call back#: [phone]  [facility]    Patient :   [phone]

## 2024-06-03 ENCOUNTER — OFFICE VISIT (OUTPATIENT)
Dept: OBGYN CLINIC | Facility: CLINIC | Age: 59
End: 2024-06-03

## 2024-06-03 ENCOUNTER — OFFICE VISIT (OUTPATIENT)
Dept: PHYSICAL THERAPY | Facility: CLINIC | Age: 59
End: 2024-06-03
Payer: COMMERCIAL

## 2024-06-03 VITALS
HEART RATE: 57 BPM | SYSTOLIC BLOOD PRESSURE: 164 MMHG | WEIGHT: 228 LBS | HEIGHT: 66 IN | BODY MASS INDEX: 36.64 KG/M2 | DIASTOLIC BLOOD PRESSURE: 84 MMHG

## 2024-06-03 DIAGNOSIS — M17.11 PRIMARY OSTEOARTHRITIS OF RIGHT KNEE: Primary | ICD-10-CM

## 2024-06-03 DIAGNOSIS — Z96.651 S/P TOTAL KNEE ARTHROPLASTY, RIGHT: Primary | ICD-10-CM

## 2024-06-03 PROCEDURE — 97530 THERAPEUTIC ACTIVITIES: CPT

## 2024-06-03 PROCEDURE — 97110 THERAPEUTIC EXERCISES: CPT

## 2024-06-03 PROCEDURE — 97140 MANUAL THERAPY 1/> REGIONS: CPT

## 2024-06-03 PROCEDURE — 99024 POSTOP FOLLOW-UP VISIT: CPT | Performed by: ORTHOPAEDIC SURGERY

## 2024-06-03 NOTE — PROGRESS NOTES
"Patient Name:  Dory Roberts  MRN:  [id number]    Assessment & Plan     1. S/P total knee arthroplasty, right      58 y.o. female approximately 6 week s/p Right knee arthroplasty performed on 4/24/2024, overall doing well.  We discussed to continue therapy and to work on ROM to work on getting knee flexion with goal of 120 deg and extension to 0 deg. Continue with HEP as well to improve ROM of her knee.  Continue with OTC pain medication as needed for pain relief  She may ambulate without the cane as she feels comfortable and can ambulate as far as she feels comfortable  Recommend she follow up in 6 weeks with repeat X-rays of right knee.   We can discussed CSI in her Left knee at that visit    History of the Present Illness   Dory Roberts is a 58 y.o. female approximately 6 week s/p Right knee arthroplasty performed on 4/24/2024.   She states her pain is improving. Her knee is still sore and her hip his still affected but she does feel she is improving. Her motion is improving  She is in therapy and making progress but her hip and low back is bothering her from the therapy more  She is using a single point cane for ambulation          Review of Systems     Review of Systems   Constitutional:  Negative for chills and fever.   HENT:  Negative for ear pain and sore throat.    Eyes:  Negative for pain and visual disturbance.   Respiratory:  Negative for cough and shortness of breath.    Cardiovascular:  Negative for chest pain and palpitations.   Gastrointestinal:  Negative for abdominal pain and vomiting.   Genitourinary:  Negative for dysuria and hematuria.   Musculoskeletal:  Negative for arthralgias and back pain.   Skin:  Negative for color change and rash.   Neurological:  Negative for seizures and syncope.   All other systems reviewed and are negative.      Physical Exam     /84   Pulse 57   Ht 5' 6\" (1.676 m)   Wt 103 kg (228 lb)   BMI 36.80 kg/m²     Right Knee  Surgical incision well healing, " without signs of infection Small scab noted mid incision  Range of motion from 2 to 90.    There is trace effusion.    The patient is able to perform a straight leg raise   Calf soft, compressible and nontender   The patient is neurovascular intact distally.      Data Review     I have personally reviewed pertinent films in PACS, and my interpretation follows.    No new images    Social History     Tobacco Use    Smoking status: Former    Smokeless tobacco: Never   Vaping Use    Vaping status: Never Used   Substance Use Topics    Alcohol use: Yes     Alcohol/week: 6.0 standard drinks of alcohol     Types: 6 Standard drinks or equivalent per week     Comment: weekly    Drug use: Never           Procedures  None    Nghia Harrison

## 2024-06-03 NOTE — PROGRESS NOTES
"Daily Note     Today's date: 6/3/2024  Patient name: Dory Roberts  : 1965  MRN: [id number]  Referring provider: Bridget Hilliard PA-C  Dx:   Encounter Diagnosis     ICD-10-CM    1. Primary osteoarthritis of right knee  M17.11           Start Time: 1145  Stop Time: 1230  Total time in clinic (min): 45 minutes    Subjective: Pt reports her knee is stiff today along with her right hip and back.      Objective: See treatment diary below      Assessment: Tolerated treatment well. Patient demonstrated fatigue post treatment, exhibited good technique with therapeutic exercises, and would benefit from continued PT. Pt continues to progress well through program with improved mobility and reduction in stiffness following session. Added in static balance exercises on airex to improve proprioception and stability with no reproduction of painful symptoms noted.       Plan: Progress treatment as tolerated.       Precautions: TKA 24    POC expires Unit limit Auth Expiration date PT/OT + Visit Limit?    N/A 10/24 Auth req                 Visit/Unit Tracking  AUTH Status:  Date  - FOTO / - FOTO  6/3    15 visits Used 1 2 3 4 5 6 7 8 9 10 11     Remaining  14 13 12 11 10 9 8 7 6 5 4      FOTO - done         Manuals 6/3   5/6 5/9 5/13 5/16 5/23 5/28 5/30   PROM R knee flex CG   CG CG CG CG CG CG CG   Knee ext str CG   CG CG CG CG CG CG CG   STM for edema    CG CG CG                    Neuro Re-Ed             Pt education on HEP, surgery, POC after surgery    5' 3'        QS    15x5\"  15x5\" HEP      QS w/ SLR 2x10 3\"   10x3\" 15x3\" 15x3\" 2x10 3\" 2x10 3\" 2x10 3\" 2x10 3\"   NBOS airex EO/EC 1' ea            Tandem airex 1' ea                                      Ther Ex             Recumbent bike for ROM 5'   5' 5' 5' 5' 5' 5' 5'   Heel slides A/AA 20x5\"   15x5\" ea 15x5\" ea 15x5\" ea 15x5\" ea 15x5\" ea 20x5\" ea 20x5\" ea   LAQ 3# x20   15x3\" 20x3\" 2# 20x3\" 2# 20x3\"  3# x20 3# " "x20   Dock kicks    3' 3' 2# 3' 2# 3'   HEP   Heel/toe raises    x20ea x20ea X20 ea       Standing hip abd/ext 2x10 ea   2x10 ea 2x10 ea 2x10 ea 2x10 ea 2x10 ea 2x10 ea 2x10 ea   Leg press 75# 2x10      nv  65# 2x10 65# 2x10   Ther Activity             Mini squats x20   x10 x15 x20 x20 x20 x20 x20   Step up 6\" x20   R 2x10 R 2x10 R 2x10 R 2x10 R 2x10 6\" x20 6\" x20   STS  2x10     2x10 X10 no airex 2x10 no airex 2x10 no airex 2x10 no airex   Step down 6\" x15     NV   6\" x10    Gait Training                                       Modalities                                                            "

## 2024-06-06 ENCOUNTER — OFFICE VISIT (OUTPATIENT)
Dept: PHYSICAL THERAPY | Facility: CLINIC | Age: 59
End: 2024-06-06
Payer: COMMERCIAL

## 2024-06-06 DIAGNOSIS — M17.11 PRIMARY OSTEOARTHRITIS OF RIGHT KNEE: Primary | ICD-10-CM

## 2024-06-06 PROCEDURE — 97140 MANUAL THERAPY 1/> REGIONS: CPT

## 2024-06-06 PROCEDURE — 97530 THERAPEUTIC ACTIVITIES: CPT

## 2024-06-06 PROCEDURE — 97110 THERAPEUTIC EXERCISES: CPT

## 2024-06-06 NOTE — PROGRESS NOTES
"Daily Note     Today's date: 2024  Patient name: Dory Roberts  : 1965  MRN: [id number]  Referring provider: Bridget Hilliard PA-C  Dx:   Encounter Diagnosis     ICD-10-CM    1. Primary osteoarthritis of right knee  M17.11           Start Time: 1145  Stop Time: 1230  Total time in clinic (min): 45 minutes    Subjective: Pt reports she saw doctor who was pleased with her progress but wants her to continue improving knee flexion ROM.      Objective: See treatment diary below      Assessment: Tolerated treatment well. Patient demonstrated fatigue post treatment, exhibited good technique with therapeutic exercises, and would benefit from continued PT. Pt continues to progress well through program achieving 110 degrees of knee flexion PROM this session. Verbal cues provided to ensure proper form and technique with exercises.      Plan: Progress treatment as tolerated.       Precautions: TKA 24    POC expires Unit limit Auth Expiration date PT/OT + Visit Limit?    N/A 10/24 Auth req                 Visit/Unit Tracking  AUTH Status:  Date    - FOTO 5/16 5/23 5/28 5/30 6/3 6/6   15 visits Used   3 4 5 6 7 8 9 10 11 12    Remaining    12 11 10 9 8 7 6 5 4 3     FOTO - done         Manuals 6/3 6/6  5/6 5/9 5/13 5/16 5/23 5/28 5/30   PROM R knee flex CG CG  CG CG CG CG CG CG CG   Knee ext str CG CG  CG CG CG CG CG CG CG   STM for edema    CG CG CG                    Neuro Re-Ed             Pt education on HEP, surgery, POC after surgery    5' 3'        QS    15x5\"  15x5\" HEP      QS w/ SLR 2x10 3\" 2x10 3\"  10x3\" 15x3\" 15x3\" 2x10 3\" 2x10 3\" 2x10 3\" 2x10 3\"   NBOS airex EO/EC 1' ea 1'ea           Tandem airex 1' ea                                      Ther Ex             Recumbent bike for ROM 5' 5'  5' 5' 5' 5' 5' 5' 5'   Heel slides A/AA 20x5\" 20x5\"  15x5\" ea 15x5\" ea 15x5\" ea 15x5\" ea 15x5\" ea 20x5\" ea 20x5\" ea   LAQ 3# x20 3# x20  15x3\" 20x3\" 2# 20x3\" 2# 20x3\"  3# x20 3# x20   Dock " "kicks    3' 3' 2# 3' 2# 3'   HEP   Heel/toe raises    x20ea x20ea X20 ea       Standing hip abd/ext B 2x10 ea 2x10 ea  2x10 ea 2x10 ea 2x10 ea 2x10 ea 2x10 ea 2x10 ea 2x10 ea   Leg press 75# 2x10 75# 2x10     nv  65# 2x10 65# 2x10   Ther Activity             Mini squats x20   x10 x15 x20 x20 x20 x20 x20   Step up 6\" x20 6\" x20  R 2x10 R 2x10 R 2x10 R 2x10 R 2x10 6\" x20 6\" x20   STS  2x10 2x10    2x10 X10 no airex 2x10 no airex 2x10 no airex 2x10 no airex   Step down 6\" x15 6\" x15    NV   6\" x10    Gait Training                                       Modalities                                                              "

## 2024-06-12 ENCOUNTER — EVALUATION (OUTPATIENT)
Dept: PHYSICAL THERAPY | Facility: CLINIC | Age: 59
End: 2024-06-12
Payer: COMMERCIAL

## 2024-06-12 DIAGNOSIS — M17.11 PRIMARY OSTEOARTHRITIS OF RIGHT KNEE: Primary | ICD-10-CM

## 2024-06-12 PROCEDURE — 97140 MANUAL THERAPY 1/> REGIONS: CPT

## 2024-06-12 PROCEDURE — 97530 THERAPEUTIC ACTIVITIES: CPT

## 2024-06-12 PROCEDURE — 97110 THERAPEUTIC EXERCISES: CPT

## 2024-06-12 NOTE — PROGRESS NOTES
PT Re-Evaluation     Today's date: 2024  Patient name: Dory Roberts  : 1965  MRN: [id number]  Referring provider: Bridget Hilliard PA-C  Dx:   Encounter Diagnosis     ICD-10-CM    1. Primary osteoarthritis of right knee  M17.11             Start Time: 1100  Stop Time: 1145  Total time in clinic (min): 45 minutes    Assessment  Impairments: abnormal or restricted ROM, activity intolerance, impaired physical strength and pain with function  Functional limitations: stair negotiation, ambulation    Assessment details: Pt is a 59 y/o female who has been seen in outpatient PT s/p right TKA on 24. Upon reassessment, pt presents with improved knee AROM and strength as well as an overall reduction in painful symptoms. Hip strength continues to be slightly limited and pt has mild antalgic gait pattern with ambulation. Functionally, these improvements have led to increased ease with ambulation and ascending stairs but pt still is limited with prolonged ambulation and descending stairs. She has progressed well towards stated goals and will continue to benefit from skilled PT to address remaining limitations and facilitate return to ADLs ar prior level of function.   Understanding of Dx/Px/POC: good     Prognosis: good    Goals  STG - 4 weeks  1. Right knee flexion at least 110 degrees to improve stair negotiation.  2. R knee strength at least 4/5 throughout to improve ADL activity. - MET    LTG - 8 weeks  1. FOTO score will improve by 10 points to demonstrate improved functional abilities. - MET  2. Pt able to ambulate community distances with LRD. - 75%  3. Hip and knee strength improved to at least 4+/5 throughout to facilitate return to ADLs at [facility].    Plan  Patient would benefit from: PT eval and skilled physical therapy  Planned modality interventions: cryotherapy, thermotherapy: hydrocollator packs and unattended electrical stimulation    Planned therapy interventions: IASTM, joint mobilization,  kinesiology taping, manual therapy, neuromuscular re-education, patient education, strengthening, stretching, therapeutic activities, therapeutic exercise, functional ROM exercises, home exercise program and balance    Frequency: 2x week  Duration in weeks: 8  Plan of Care beginning date: 2024  Plan of Care expiration date: 2024  Treatment plan discussed with: patient      Subjective Evaluation    History of Present Illness  Mechanism of injury: Pt arrives with planned right TKA on 24. She notes the left one is bad too but the right one is worse. She states she is having difficulty walking, negotiating stairs, prolonged standing. 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       Pt reports an overall improvement of about 50% since starting skilled PT. She notes she is having an easier time walking but still walks with a limp. Also notes she still has difficulty with stairs. She is motivated to continue with skilled PT.  Patient Goals  Patient goals for therapy: increased strength, independence with ADLs/IADLs, decreased pain and increased motion    Pain  Current pain ratin  At best pain ratin  At worst pain ratin  Quality: dull ache  Relieving factors: rest and heat  Aggravating factors: stair climbing, standing and walking          Objective     Observations     Additional Observation Details  Mild antalgic gait pattern, no AD  Mild edema around right knee    Palpation     Additional Palpation Details  Mild tenderness noted around right knee joint    Active Range of Motion   Left Hip   Normal active range of motion  Left Knee   Flexion: 110 degrees   Extension: 0 degrees     Right Knee   Flexion: 110 degrees   Extension: -3 degrees     Passive Range of Motion     Right Knee   Flexion: 114 degrees     Strength/Myotome Testing     Right Hip   Planes of Motion   Flexion: 4+  Extension: 4  Abduction: 4    Left Knee   Flexion: 4  Extension: 4    Right  "Knee   Flexion: 4+  Extension: 4+  Quadriceps contraction: good             Precautions: TKA 4/24/24    POC expires Unit limit Auth Expiration date PT/OT + Visit Limit?   6/17 N/A 10/21 Auth req                 Visit/Unit Tracking  AUTH Status:  Date 6/12 - FOTO    5/9 5/13 - FOTO 5/16 5/23 5/28 5/30 6/3 6/6   15 visits Used 13    5 6 7 8 9 10 11 12    Remaining  2    10 9 8 7 6 5 4 3     FOTO - done 6/12        Manuals 6/3 6/6 6/12   5/13 5/16 5/23 5/28 5/30   PROM R knee flex CG CG CG   CG CG CG CG CG   Knee ext str CG CG CG   CG CG CG CG CG   STM for edema      CG                    Neuro Re-Ed             Pt education on HEP, surgery, POC after surgery             QS      15x5\" HEP      QS w/ SLR 2x10 3\" 2x10 3\" 3x10 3\"   15x3\" 2x10 3\" 2x10 3\" 2x10 3\" 2x10 3\"   NBOS airex EO/EC 1' ea 1'ea 1'ea          Tandem airex 1' ea                                      Ther Ex             Recumbent bike for ROM 5' 5' 5'   5' 5' 5' 5' 5'   Heel slides A/AA 20x5\" 20x5\" 20x5\" ea   15x5\" ea 15x5\" ea 15x5\" ea 20x5\" ea 20x5\" ea   LAQ 3# x20 3# x20 3# x20   2# 20x3\" 2# 20x3\"  3# x20 3# x20   Dock kicks      2# 3' 2# 3'   HEP   Heel/toe raises      X20 ea       Standing hip abd/ext B 2x10 ea 2x10 ea 3x10 ea   2x10 ea 2x10 ea 2x10 ea 2x10 ea 2x10 ea   Leg press 75# 2x10 75# 2x10 75# 2x10    nv  65# 2x10 65# 2x10   Ther Activity             Mini squats x20     x20 x20 x20 x20 x20   Step up 6\" x20 6\" x20 6\" x20   R 2x10 R 2x10 R 2x10 6\" x20 6\" x20   STS  2x10 2x10 2x10   2x10 X10 no airex 2x10 no airex 2x10 no airex 2x10 no airex   Step down 6\" x15 6\" x15 6\" x20   NV   6\" x10    Gait Training                                       Modalities                                            "

## 2024-06-13 ENCOUNTER — OFFICE VISIT (OUTPATIENT)
Dept: PHYSICAL THERAPY | Facility: CLINIC | Age: 59
End: 2024-06-13
Payer: COMMERCIAL

## 2024-06-13 DIAGNOSIS — M17.11 PRIMARY OSTEOARTHRITIS OF RIGHT KNEE: Primary | ICD-10-CM

## 2024-06-13 PROCEDURE — 97530 THERAPEUTIC ACTIVITIES: CPT

## 2024-06-13 PROCEDURE — 97140 MANUAL THERAPY 1/> REGIONS: CPT

## 2024-06-13 PROCEDURE — 97110 THERAPEUTIC EXERCISES: CPT

## 2024-06-13 NOTE — PROGRESS NOTES
"Daily Note     Today's date: 2024  Patient name: Dory Roberts  : 1965  MRN: [id number]  Referring provider: Bridget Hilliard PA-C  Dx:   Encounter Diagnosis     ICD-10-CM    1. Primary osteoarthritis of right knee  M17.11           Start Time: 1231  Stop Time: 1315  Total time in clinic (min): 44 minutes    Subjective: Pt reports her usual soreness but is overall feeling better.       Objective: See treatment diary below      Assessment: Tolerated treatment well. Patient demonstrated fatigue post treatment, exhibited good technique with therapeutic exercises, and would benefit from continued PT. Continued to progress program with addition on TB TKE to improve knee stability and quad strength. She was challenged by progression but denied any lasting painful symptoms following session.       Plan: Progress treatment as tolerated.       Precautions: TKA 24    POC expires Unit limit Auth Expiration date PT/OT + Visit Limit?    N/A 10/21 Auth req                 Visit/Unit Tracking  AUTH Status:  Date  - FOTO  - FOTO 5/16 5/23 5/28 5/30 6/3 6/6   15 visits Used 13 14   5 6 7 8 9 10 11 12    Remaining  2 1   10 9 8 7 6 5 4 3     FOTO - done         Manuals 6/3 6/6 6/12 6/13  5/13 5/16 5/23 5/28 5/30   PROM R knee flex CG CG CG CG  CG CG CG CG CG   Knee ext str CG CG CG CG  CG CG CG CG CG   STM for edema      CG                    Neuro Re-Ed             Pt education on HEP, surgery, POC after surgery             QS      15x5\" HEP      QS w/ SLR 2x10 3\" 2x10 3\" 3x10 3\" 3x10 3\"  15x3\" 2x10 3\" 2x10 3\" 2x10 3\" 2x10 3\"   NBOS airex EO/EC 1' ea 1'ea 1'ea          Tandem airex 1' ea            TKE    Grn 20x3\"                      Ther Ex             Recumbent bike for ROM 5' 5' 5' 5'  5' 5' 5' 5' 5'   Heel slides A/AA 20x5\" 20x5\" 20x5\" ea 20x5\" ea  15x5\" ea 15x5\" ea 15x5\" ea 20x5\" ea 20x5\" ea   LAQ 3# x20 3# x20 3# x20 4# x20  2# 20x3\" 2# 20x3\"  3# x20 3# x20   Dock kicks      2# " "3' 2# 3'   HEP   Heel/toe raises      X20 ea       Standing hip abd/ext B 2x10 ea 2x10 ea 3x10 ea 3x10 ea  2x10 ea 2x10 ea 2x10 ea 2x10 ea 2x10 ea   Leg press 75# 2x10 75# 2x10 75# 2x10 75# 3x10   nv  65# 2x10 65# 2x10   Ther Activity             Mini squats x20   x20  x20 x20 x20 x20 x20   Step up 6\" x20 6\" x20 6\" x20 6\" x20  R 2x10 R 2x10 R 2x10 6\" x20 6\" x20   STS  2x10 2x10 2x10 2x10  2x10 X10 no airex 2x10 no airex 2x10 no airex 2x10 no airex   Step down 6\" x15 6\" x15 6\" x20 6\" x20  NV   6\" x10    Gait Training                                       Modalities                                            "

## 2024-06-19 ENCOUNTER — OFFICE VISIT (OUTPATIENT)
Dept: PHYSICAL THERAPY | Facility: CLINIC | Age: 59
End: 2024-06-19
Payer: COMMERCIAL

## 2024-06-19 DIAGNOSIS — M17.11 PRIMARY OSTEOARTHRITIS OF RIGHT KNEE: Primary | ICD-10-CM

## 2024-06-19 PROCEDURE — 97530 THERAPEUTIC ACTIVITIES: CPT

## 2024-06-19 PROCEDURE — 97110 THERAPEUTIC EXERCISES: CPT

## 2024-06-19 NOTE — PROGRESS NOTES
"Daily Note     Today's date: 2024  Patient name: Dory Roberts  : 1965  MRN: [id number]  Referring provider: Bridget Hilliard PA-C  Dx:   Encounter Diagnosis     ICD-10-CM    1. Primary osteoarthritis of right knee  M17.11           Start Time: 930  Stop Time: 1016  Total time in clinic (min): 46 minutes    Subjective: Pt reports she is trying to walk with less of a limp but still notes some stiffness      Objective: See treatment diary below      Assessment: Tolerated treatment well. Patient demonstrated fatigue post treatment, exhibited good technique with therapeutic exercises, and would benefit from continued PT. Pt continues to progress well through program with improved eccentric control noted on step downs this session. Verbal cues provided to ensure proper form with exercises to prevent muscle compensation.       Plan: Progress treatment as tolerated.       Precautions: TKA 24    POC expires Unit limit Auth Expiration date PT/OT + Visit Limit?   8/ N/A 10/21 Auth req                 Visit/Unit Tracking  AUTH Status:  Date  - FOTO  - FOTO 5/16 5/23 5/28 5/30 6/3 6/6   15 visits Used 13 14 15  5 6 7 8 9 10 11 12    Remaining  2 1   10 9 8 7 6 5 4 3     FOTO - done         Manuals 6/3 6/6 6/12 6/13 6/19   5/23 5/28 5/30   PROM R knee flex CG CG CG CG CG   CG CG CG   Knee ext str CG CG CG CG    CG CG CG   STM for edema                          Neuro Re-Ed             Pt education on HEP, surgery, POC after surgery             QS             QS w/ SLR 2x10 3\" 2x10 3\" 3x10 3\" 3x10 3\" 3x10 3\"   2x10 3\" 2x10 3\" 2x10 3\"   NBOS airex EO/EC 1' ea 1'ea 1'ea          Tandem airex 1' ea            TKE    Grn 20x3\" Grn 20x3\"                     Ther Ex             Recumbent bike for ROM 5' 5' 5' 5' 5'   5' 5' 5'   Heel slides A/AA 20x5\" 20x5\" 20x5\" ea 20x5\" ea 20x5\" ea   15x5\" ea 20x5\" ea 20x5\" ea   LAQ 3# x20 3# x20 3# x20 4# x20 4# x20    3# x20 3# x20   Dock kicks         " " HEP   Heel/toe raises             Standing hip abd/ext B 2x10 ea 2x10 ea 3x10 ea 3x10 ea 3x10 ea   2x10 ea 2x10 ea 2x10 ea   Leg press 75# 2x10 75# 2x10 75# 2x10 75# 3x10 85# 3x10    65# 2x10 65# 2x10   Ther Activity             Mini squats x20   x20 x20   x20 x20 x20   Step up 6\" x20 6\" x20 6\" x20 6\" x20 6\" x20   R 2x10 6\" x20 6\" x20   STS  2x10 2x10 2x10 2x10 2x10   2x10 no airex 2x10 no airex 2x10 no airex   Step down 6\" x15 6\" x15 6\" x20 6\" x20 6\" x20    6\" x10    Gait Training                                       Modalities                                              "

## 2024-06-24 ENCOUNTER — OFFICE VISIT (OUTPATIENT)
Dept: PHYSICAL THERAPY | Facility: CLINIC | Age: 59
End: 2024-06-24
Payer: COMMERCIAL

## 2024-06-24 DIAGNOSIS — M17.11 PRIMARY OSTEOARTHRITIS OF RIGHT KNEE: Primary | ICD-10-CM

## 2024-06-24 PROCEDURE — 97110 THERAPEUTIC EXERCISES: CPT

## 2024-06-24 PROCEDURE — 97530 THERAPEUTIC ACTIVITIES: CPT

## 2024-06-24 PROCEDURE — 97112 NEUROMUSCULAR REEDUCATION: CPT

## 2024-06-24 NOTE — PROGRESS NOTES
"Daily Note     Today's date: 2024  Patient name: Dory Roberts  : 1965  MRN: [id number]  Referring provider: Bridget Hilliard PA-C  Dx:   Encounter Diagnosis     ICD-10-CM    1. Primary osteoarthritis of right knee  M17.11           Start Time: 1235  Stop Time: 1315  Total time in clinic (min): 40 minutes    Subjective: Pt reports she is having more pain in her right hip and left knee.       Objective: See treatment diary below      Assessment: Tolerated treatment well. Patient demonstrated fatigue post treatment, exhibited good technique with therapeutic exercises, and would benefit from continued PT. Held on step activity this session to avoid further aggravation of painful symptoms in right hip. She was able to perform all other interventions without reproduction of painful symptoms      Plan: Progress treatment as tolerated.       Precautions: TKA 24    POC expires Unit limit Auth Expiration date PT/OT + Visit Limit?    N/A 10/21 Auth req                 Visit/Unit Tracking  AUTH Status:  Date               6 Used 1               Remaining  5                FOTO - done         Manuals 6/3 6/6 6/12 6/13 6/19 6/24  5/23 5/28 5/30   PROM R knee flex CG CG CG CG CG CG  CG CG CG   Knee ext str CG CG CG CG    CG CG CG   STM for edema                          Neuro Re-Ed             Pt education on HEP, surgery, POC after surgery             QS             QS w/ SLR 2x10 3\" 2x10 3\" 3x10 3\" 3x10 3\" 3x10 3\" 3x10 3\"  2x10 3\" 2x10 3\" 2x10 3\"   NBOS airex EO/EC 1' ea 1'ea 1'ea          Tandem airex 1' ea            TKE with heavy band    Grn 20x3\" Grn 20x3\" Grn 20x3\"                    Ther Ex             Recumbent bike for ROM 5' 5' 5' 5' 5' 5'  5' 5' 5'   Heel slides A/AA 20x5\" 20x5\" 20x5\" ea 20x5\" ea 20x5\" ea 20x5\" ea  15x5\" ea 20x5\" ea 20x5\" ea   LAQ 3# x20 3# x20 3# x20 4# x20 4# x20 4# x20   3# x20 3# x20   Dock kicks          HEP   Heel/toe raises             Standing hip abd/ext B 2x10 " "ea 2x10 ea 3x10 ea 3x10 ea 3x10 ea 3x10 ea  2x10 ea 2x10 ea 2x10 ea   Leg press 75# 2x10 75# 2x10 75# 2x10 75# 3x10 85# 3x10 95# 3x10   65# 2x10 65# 2x10   Ther Activity             Mini squats x20   x20 x20 x20  x20 x20 x20   Step up 6\" x20 6\" x20 6\" x20 6\" x20 6\" x20   R 2x10 6\" x20 6\" x20   STS  2x10 2x10 2x10 2x10 2x10 2x10  2x10 no airex 2x10 no airex 2x10 no airex   Step down 6\" x15 6\" x15 6\" x20 6\" x20 6\" x20    6\" x10    Gait Training                                       Modalities                                                "

## 2024-06-26 DIAGNOSIS — Z96.651 S/P TOTAL KNEE ARTHROPLASTY, RIGHT: ICD-10-CM

## 2024-06-26 RX ORDER — ASPIRIN 81 MG/1
81 TABLET, COATED ORAL 2 TIMES DAILY
Qty: 60 TABLET | Refills: 3 | Status: SHIPPED | OUTPATIENT
Start: 2024-06-26

## 2024-06-27 ENCOUNTER — OFFICE VISIT (OUTPATIENT)
Dept: PHYSICAL THERAPY | Facility: CLINIC | Age: 59
End: 2024-06-27
Payer: COMMERCIAL

## 2024-06-27 DIAGNOSIS — M17.11 PRIMARY OSTEOARTHRITIS OF RIGHT KNEE: Primary | ICD-10-CM

## 2024-06-27 PROCEDURE — 97110 THERAPEUTIC EXERCISES: CPT

## 2024-06-27 PROCEDURE — 97112 NEUROMUSCULAR REEDUCATION: CPT

## 2024-06-27 PROCEDURE — 97530 THERAPEUTIC ACTIVITIES: CPT

## 2024-06-27 NOTE — PROGRESS NOTES
"Daily Note     Today's date: 2024  Patient name: Dory Roberts  : 1965  MRN: [id number]  Referring provider: Bridget Hilliard PA-C  Dx:   Encounter Diagnosis     ICD-10-CM    1. Primary osteoarthritis of right knee  M17.11           Start Time: 1230  Stop Time: 1315  Total time in clinic (min): 45 minutes    Subjective: Pt reports her right knee is achy today for no apparent reason.       Objective: See treatment diary below      Assessment: Tolerated treatment well. Patient demonstrated fatigue post treatment, exhibited good technique with therapeutic exercises, and would benefit from continued PT. Held on step downs this session to avoid further aggravation of painful symptoms. She was able to complete all other exercises with no increase in painful symptoms noted during or following session.      Plan: Progress treatment as tolerated.       Precautions: TKA 24    POC expires Unit limit Auth Expiration date PT/OT + Visit Limit?    N/A 9/15 Auth req                 Visit/Unit Tracking  AUTH Status:  Date              6 Used 1 2              Remaining  5 4               FOTO - done         Manuals 6/3 6/6 6/12 6/13 6/19 6/24 6/27      PROM R knee flex CG CG CG CG CG CG CG      Knee ext str CG CG CG CG         STM for edema                          Neuro Re-Ed             Pt education on HEP, surgery, POC after surgery             QS             QS w/ SLR 2x10 3\" 2x10 3\" 3x10 3\" 3x10 3\" 3x10 3\" 3x10 3\" 3x10 3\"      NBOS airex EO/EC 1' ea 1'ea 1'ea          Tandem airex 1' ea            TKE with heavy band    Grn 20x3\" Grn 20x3\" Grn 20x3\" Grn 20x3\"                   Ther Ex             Recumbent bike for ROM 5' 5' 5' 5' 5' 5' 5'      Heel slides A/AA 20x5\" 20x5\" 20x5\" ea 20x5\" ea 20x5\" ea 20x5\" ea 20x5\"      LAQ 3# x20 3# x20 3# x20 4# x20 4# x20 4# x20 4# x30      Dock kicks             Heel/toe raises             Standing hip abd/ext B 2x10 ea 2x10 ea 3x10 ea 3x10 ea 3x10 ea 3x10 " "ea 3x10 ea      Leg press 75# 2x10 75# 2x10 75# 2x10 75# 3x10 85# 3x10 95# 3x10 105# 3x10      Ther Activity             Mini squats x20   x20 x20 x20 x20      Step up 6\" x20 6\" x20 6\" x20 6\" x20 6\" x20  6\"x20      STS  2x10 2x10 2x10 2x10 2x10 2x10 2x10      Step down 6\" x15 6\" x15 6\" x20 6\" x20 6\" x20  Nv       Gait Training                                       Modalities                                                  "

## 2024-07-01 ENCOUNTER — APPOINTMENT (OUTPATIENT)
Dept: PHYSICAL THERAPY | Facility: CLINIC | Age: 59
End: 2024-07-01
Payer: COMMERCIAL

## 2024-07-01 DIAGNOSIS — Z96.651 S/P TOTAL KNEE ARTHROPLASTY, RIGHT: Primary | ICD-10-CM

## 2024-07-02 ENCOUNTER — APPOINTMENT (OUTPATIENT)
Dept: PHYSICAL THERAPY | Facility: CLINIC | Age: 59
End: 2024-07-02
Payer: COMMERCIAL

## 2024-07-03 ENCOUNTER — OFFICE VISIT (OUTPATIENT)
Dept: PHYSICAL THERAPY | Facility: CLINIC | Age: 59
End: 2024-07-03
Payer: COMMERCIAL

## 2024-07-03 DIAGNOSIS — M17.11 PRIMARY OSTEOARTHRITIS OF RIGHT KNEE: Primary | ICD-10-CM

## 2024-07-03 PROCEDURE — 97530 THERAPEUTIC ACTIVITIES: CPT

## 2024-07-03 PROCEDURE — 97112 NEUROMUSCULAR REEDUCATION: CPT

## 2024-07-03 PROCEDURE — 97110 THERAPEUTIC EXERCISES: CPT

## 2024-07-03 NOTE — PROGRESS NOTES
"Daily Note     Today's date: 7/3/2024  Patient name: Dory Roberts  : 1965  MRN: [id number]  Referring provider: Bridget Hilliard PA-C  Dx:   Encounter Diagnosis     ICD-10-CM    1. Primary osteoarthritis of right knee  M17.11           Start Time: 1237  Stop Time: 1315  Total time in clinic (min): 38 minutes    Subjective: Pt reports her knee felt good over the weekend but is a little sore today.      Objective: See treatment diary below      Assessment: Tolerated treatment well. Patient demonstrated fatigue post treatment, exhibited good technique with therapeutic exercises, and would benefit from continued PT. Continued with interventions as noted emphasizing improving functional strength and mobility. She is challenged by program but notes a reduction in knee stiffness following session. Verbal cues provided to ensure proper form and technique with exercises.      Plan: Progress treatment as tolerated.       Precautions: TKA 24    POC expires Unit limit Auth Expiration date PT/OT + Visit Limit?    N/A 9/15 Auth req                 Visit/Unit Tracking  AUTH Status:  Date  7/3            6 Used 1 2 3             Remaining  5 4 3              FOTO - done         Manuals 6/3 6/6 6/12 6/13 6/19 6/24 6/27 7/3     PROM R knee flex CG CG CG CG CG CG CG      Knee ext str CG CG CG CG         STM for edema                          Neuro Re-Ed             Pt education on HEP, surgery, POC after surgery             QS             QS w/ SLR 2x10 3\" 2x10 3\" 3x10 3\" 3x10 3\" 3x10 3\" 3x10 3\" 3x10 3\" 3x10 3\"     NBOS airex EO/EC 1' ea 1'ea 1'ea          Tandem airex 1' ea            TKE with heavy band    Grn 20x3\" Grn 20x3\" Grn 20x3\" Grn 20x3\" Grn 3x10 3\"                  Ther Ex             Recumbent bike for ROM 5' 5' 5' 5' 5' 5' 5' 5'     Heel slides A/AA 20x5\" 20x5\" 20x5\" ea 20x5\" ea 20x5\" ea 20x5\" ea 20x5\" 20x5\" ea     LAQ 3# x20 3# x20 3# x20 4# x20 4# x20 4# x20 4# x30 4# x30     Dock kicks   " "          Heel/toe raises             Standing hip abd/ext B 2x10 ea 2x10 ea 3x10 ea 3x10 ea 3x10 ea 3x10 ea 3x10 ea 3x10 ea     Leg press 75# 2x10 75# 2x10 75# 2x10 75# 3x10 85# 3x10 95# 3x10 105# 3x10 105# 3x10     Ther Activity             Mini squats x20   x20 x20 x20 x20 x20     Step up 6\" x20 6\" x20 6\" x20 6\" x20 6\" x20  6\"x20 6\" x20     STS  2x10 2x10 2x10 2x10 2x10 2x10 2x10 2x10     Step down 6\" x15 6\" x15 6\" x20 6\" x20 6\" x20  Nv       Gait Training                                       Modalities                                                    "

## 2024-07-08 ENCOUNTER — OFFICE VISIT (OUTPATIENT)
Dept: PHYSICAL THERAPY | Facility: CLINIC | Age: 59
End: 2024-07-08
Payer: COMMERCIAL

## 2024-07-08 DIAGNOSIS — M17.11 PRIMARY OSTEOARTHRITIS OF RIGHT KNEE: Primary | ICD-10-CM

## 2024-07-08 PROCEDURE — 97112 NEUROMUSCULAR REEDUCATION: CPT

## 2024-07-08 PROCEDURE — 97110 THERAPEUTIC EXERCISES: CPT

## 2024-07-08 PROCEDURE — 97530 THERAPEUTIC ACTIVITIES: CPT

## 2024-07-08 NOTE — PROGRESS NOTES
"Daily Note     Today's date: 2024  Patient name: Dory Roberts  : 1965  MRN: [id number]  Referring provider: Bridget Hilliard PA-C  Dx:   Encounter Diagnosis     ICD-10-CM    1. Primary osteoarthritis of right knee  M17.11           Start Time: 1230  Stop Time: 1315  Total time in clinic (min): 45 minutes    Subjective: Pt reports today is a good day and knee is feeling good.      Objective: See treatment diary below      Assessment: Tolerated treatment well. Patient demonstrated fatigue post treatment, exhibited good technique with therapeutic exercises, and would benefit from continued PT. Continued to progress reps for multiple exercises as noted below to continue improving functional strength. She was most challenged by step downs but denied any new or increased symptoms during or following.       Plan: Progress treatment as tolerated.       Precautions: TKA 24    POC expires Unit limit Auth Expiration date PT/OT + Visit Limit?    N/A 9/15 Auth req                 Visit/Unit Tracking  AUTH Status:  Date 6/24 6/27 7/3 7/8           6 Used 1 2 3 4            Remaining  5 4 3 2             FOTO - done         Manuals 6/3 6/6 6/12 6/13 6/19 6/24 6/27 7/3 7/8    PROM R knee flex CG CG CG CG CG CG CG      Knee ext str CG CG CG CG         STM for edema                          Neuro Re-Ed             Pt education on HEP, surgery, POC after surgery             QS             QS w/ SLR 2x10 3\" 2x10 3\" 3x10 3\" 3x10 3\" 3x10 3\" 3x10 3\" 3x10 3\" 3x10 3\" 1# 3x10 3\"    NBOS airex EO/EC 1' ea 1'ea 1'ea          Tandem airex 1' ea            TKE with heavy band    Grn 20x3\" Grn 20x3\" Grn 20x3\" Grn 20x3\" Grn 3x10 3\" Grn 3x10 3\"                 Ther Ex             Recumbent bike for ROM 5' 5' 5' 5' 5' 5' 5' 5' 5'    Heel slides A/AA 20x5\" 20x5\" 20x5\" ea 20x5\" ea 20x5\" ea 20x5\" ea 20x5\" 20x5\" ea 20x5\" ea    LAQ 3# x20 3# x20 3# x20 4# x20 4# x20 4# x20 4# x30 4# x30 4# x30    Dock kicks             Heel/toe " "raises             Standing hip abd/ext B 2x10 ea 2x10 ea 3x10 ea 3x10 ea 3x10 ea 3x10 ea 3x10 ea 3x10 ea 3x10 ea    Leg press 75# 2x10 75# 2x10 75# 2x10 75# 3x10 85# 3x10 95# 3x10 105# 3x10 105# 3x10 115# 3x10    Ther Activity             Mini squats x20   x20 x20 x20 x20 x20 3x10    Step up 6\" x20 6\" x20 6\" x20 6\" x20 6\" x20  6\"x20 6\" x20 6\" x20    STS  2x10 2x10 2x10 2x10 2x10 2x10 2x10 2x10 3x10    Step down 6\" x15 6\" x15 6\" x20 6\" x20 6\" x20  Nv   6\" x20    Gait Training                                       Modalities                                                      "

## 2024-07-15 ENCOUNTER — OFFICE VISIT (OUTPATIENT)
Dept: OBGYN CLINIC | Facility: CLINIC | Age: 59
End: 2024-07-15
Payer: COMMERCIAL

## 2024-07-15 ENCOUNTER — OFFICE VISIT (OUTPATIENT)
Dept: PHYSICAL THERAPY | Facility: CLINIC | Age: 59
End: 2024-07-15
Payer: COMMERCIAL

## 2024-07-15 ENCOUNTER — APPOINTMENT (OUTPATIENT)
Dept: RADIOLOGY | Facility: CLINIC | Age: 59
End: 2024-07-15
Payer: COMMERCIAL

## 2024-07-15 VITALS
WEIGHT: 242.2 LBS | DIASTOLIC BLOOD PRESSURE: 83 MMHG | HEIGHT: 66 IN | BODY MASS INDEX: 38.92 KG/M2 | SYSTOLIC BLOOD PRESSURE: 179 MMHG | HEART RATE: 69 BPM

## 2024-07-15 DIAGNOSIS — M17.12 PRIMARY OSTEOARTHRITIS OF LEFT KNEE: ICD-10-CM

## 2024-07-15 DIAGNOSIS — M16.11 PRIMARY OSTEOARTHRITIS OF ONE HIP, RIGHT: ICD-10-CM

## 2024-07-15 DIAGNOSIS — M17.11 PRIMARY OSTEOARTHRITIS OF RIGHT KNEE: Primary | ICD-10-CM

## 2024-07-15 DIAGNOSIS — Z96.651 S/P TOTAL KNEE ARTHROPLASTY, RIGHT: ICD-10-CM

## 2024-07-15 DIAGNOSIS — Z96.651 S/P TOTAL KNEE ARTHROPLASTY, RIGHT: Primary | ICD-10-CM

## 2024-07-15 PROCEDURE — 99024 POSTOP FOLLOW-UP VISIT: CPT | Performed by: ORTHOPAEDIC SURGERY

## 2024-07-15 PROCEDURE — 73560 X-RAY EXAM OF KNEE 1 OR 2: CPT

## 2024-07-15 PROCEDURE — 97112 NEUROMUSCULAR REEDUCATION: CPT

## 2024-07-15 PROCEDURE — 73564 X-RAY EXAM KNEE 4 OR MORE: CPT

## 2024-07-15 PROCEDURE — 20610 DRAIN/INJ JOINT/BURSA W/O US: CPT | Performed by: ORTHOPAEDIC SURGERY

## 2024-07-15 PROCEDURE — 97110 THERAPEUTIC EXERCISES: CPT

## 2024-07-15 PROCEDURE — 97530 THERAPEUTIC ACTIVITIES: CPT

## 2024-07-15 RX ORDER — BUPIVACAINE HYDROCHLORIDE 2.5 MG/ML
2 INJECTION, SOLUTION INFILTRATION; PERINEURAL
Status: COMPLETED | OUTPATIENT
Start: 2024-07-15 | End: 2024-07-15

## 2024-07-15 RX ORDER — LIDOCAINE HYDROCHLORIDE 10 MG/ML
2 INJECTION, SOLUTION INFILTRATION; PERINEURAL
Status: COMPLETED | OUTPATIENT
Start: 2024-07-15 | End: 2024-07-15

## 2024-07-15 RX ORDER — METHYLPREDNISOLONE ACETATE 40 MG/ML
2 INJECTION, SUSPENSION INTRA-ARTICULAR; INTRALESIONAL; INTRAMUSCULAR; SOFT TISSUE
Status: COMPLETED | OUTPATIENT
Start: 2024-07-15 | End: 2024-07-15

## 2024-07-15 RX ORDER — LIDOCAINE 50 MG/G
PATCH TOPICAL
COMMUNITY
Start: 2024-01-29

## 2024-07-15 RX ADMIN — METHYLPREDNISOLONE ACETATE 2 ML: 40 INJECTION, SUSPENSION INTRA-ARTICULAR; INTRALESIONAL; INTRAMUSCULAR; SOFT TISSUE at 11:45

## 2024-07-15 RX ADMIN — LIDOCAINE HYDROCHLORIDE 2 ML: 10 INJECTION, SOLUTION INFILTRATION; PERINEURAL at 11:45

## 2024-07-15 RX ADMIN — BUPIVACAINE HYDROCHLORIDE 2 ML: 2.5 INJECTION, SOLUTION INFILTRATION; PERINEURAL at 11:45

## 2024-07-15 NOTE — PROGRESS NOTES
"Patient Name:  Dory Roberts  MRN:  [id number]    Assessment & Plan     1. S/P total knee arthroplasty, right  -     XR knee 1 or 2 vw right; Future; Expected date: 07/15/2024  2. Primary osteoarthritis of left knee  -     XR knee 4+ vw left injury; Future; Expected date: 07/15/2024  -     Large joint arthrocentesis: L knee  3. Primary osteoarthritis of one hip, right  -     FL spine and pain procedure; Future; Expected date: 07/15/2024      Approximately 12 week s/p Right total knee arthroplasty performed on 04/24/2024  X-rays of Right knee and hip were reviewed in office today with patient  In regards to Right total knee arthroplasty, patient doing well s/p surgical intervention despite residual discomfort. Advised patient to continue home exercises for range of motion and strengthening.  Advised patient some of her Right lower extremity pain can be referred from low back and Right hip into the knee. Discussed nonoperative management of Right hip and patient wished to move forward with Right hip injection and placed order for Pain management for fluoroscopic guided injection.   Discussed nonoperative management of the Left knee and patient wished to have CSI today and consider total knee in the future. Risks discussed and tolerated well. Advised patient we will postpone total knee arthroplasty at least 3 months. Patient understanding.   At this time, would not prescribe additional narcotic medications. She may continue with OTC topical and oral analgesics, Celebrex and Tylenol.   Patient cleared to swim in the pool.  Follow up in 3 months for reevaluation and new AP and lateral Right knee x-rays upon arrival     History of the Present Illness   Dory Roberts is a 58 y.o. female approximately 12 week s/p Right total knee arthroplasty performed on 04/24/2024. Today, patient reports she is doing well overall since surgery. She does have some \"bad days\" especially with her low back and Right hip. Locates most of " "her pain to the Right groin and low back. She admits the Right knee is less painful than since surgery, but it is still causing some discomfort. She is still taking Celebrex without much pain relief. She continues home exercises for range of motion.             Review of Systems     Review of Systems   Constitutional:  Negative for chills and fever.   HENT:  Negative for congestion, ear pain and sore throat.    Eyes:  Negative for pain and visual disturbance.   Respiratory:  Negative for cough, chest tightness and shortness of breath.    Cardiovascular:  Negative for chest pain and palpitations.   Gastrointestinal:  Negative for abdominal pain and vomiting.   Endocrine: Negative for cold intolerance and heat intolerance.   Genitourinary:  Negative for dysuria and hematuria.   Musculoskeletal:  Negative for arthralgias and back pain.   Skin:  Negative for color change and rash.   Neurological:  Negative for seizures and syncope.   Psychiatric/Behavioral:  Negative for confusion.    All other systems reviewed and are negative.      Physical Exam     BP (!) 179/83 Comment: pt did not take BP medications this morning  Pulse 69   Ht 5' 6\" (1.676 m)   Wt 110 kg (242 lb 3.2 oz)   BMI 39.09 kg/m²     Right Knee  Surgical incision well healed, without signs of infection  Range of motion from 2-0 to 110 degrees without pain.    There is trace postoperative effusion.    Some discomfort over the adductor musculature  The patient is able to perform a straight leg raise with 4+/5 quad strength.    Calf soft, compressible and nontender  The patient is neurovascular intact distally.    Left Knee  Range of motion from 3 to 110 degrees without pain.    There is trace postoperative effusion.    Some discomfort over the adductor musculature  The patient is able to perform a straight leg raise with 4+/5 quad strength.    Calf soft, compressible and nontender  The patient is neurovascular intact distally.    Right Hip:  Range of " motion 95 degrees hip flexion with groin pain  20 degrees external rotation  Neutral degrees internal rotation  able to perform straight leg raise  positive log roll  positive Stinchfield  Sensation intact to L2-S1 dermatomes   Extremity warm and well perfused       Data Review     I have personally reviewed pertinent films in PACS, and my interpretation follows.    X-rays taken 07/15/2024 of Right knee independently reviewed and demonstrate total knee prosthesis in appropriate positioning without evidence of fracture, dislocation, loosening or lucency.     X-rays taken 07/15/2024 of Left knee demonstrates moderate to severe tricompartmental degenerative changes and osteophytes. No obvious acute fracture or dislocation noted.     Social History     Tobacco Use    Smoking status: Former    Smokeless tobacco: Never   Vaping Use    Vaping status: Never Used   Substance Use Topics    Alcohol use: Yes     Alcohol/week: 6.0 standard drinks of alcohol     Types: 6 Standard drinks or equivalent per week     Comment: weekly    Drug use: Never           Large joint arthrocentesis: L knee  Universal Protocol:  Risks and benefits: risks, benefits and alternatives were discussed  Consent given by: patient  Patient identity confirmed: verbally with patient  Procedure Details  Location: knee - L knee  Needle size: 22 G  Approach: lateral  Medications administered: 2 mL bupivacaine 0.25 %; 2 mL lidocaine 1 %; 2 mL methylPREDNISolone acetate 40 mg/mL    Patient tolerance: patient tolerated the procedure well with no immediate complications  Dressing:  Sterile dressing applied        Bridget Hilliard PA-C

## 2024-07-15 NOTE — PROGRESS NOTES
"PT - Discharge    Today's date: 7/15/2024  Patient name: Dory Roberts  : 1965  MRN: [id number]  Referring provider: Bridget Hilliard PA-C  Dx:   Encounter Diagnosis     ICD-10-CM    1. Primary osteoarthritis of right knee  M17.11           Start Time: 1415  Stop Time: 1455  Total time in clinic (min): 40 minutes    Subjective: Pt reports she saw the doctor who is pleased with her progress.       Objective: See treatment diary below      Assessment: Pt has been seen in outpatient PT s/p right TKA on 24. She has progressed well with therapy with noted improvements in knee mobility and LE strength. Functionally, these improvements have led to increased ease with ambulation, stair negotiation, and performance of usual ADLs. Pt is agreeable to discharge to HEP this session to continue symptom management. HEP was reviewed and pt verbalized good understanding of HEP. She was instructed to call if she needs anything in the future.       Plan:  Discharge to HEP.     Precautions: TKA 24    POC expires Unit limit Auth Expiration date PT/OT + Visit Limit?    N/A 9/15 Auth req                 Visit/Unit Tracking  AUTH Status:  Date 6/24 6/27 7/3 7/8 7/15 - FOTO          6 Used 1 2 3 4 5           Remaining  5 4 3 2 1            FOTO - done 7/15        Manuals 6/3 6/6 6/12 6/13 6/19 6/24 6/27 7/3 7/8 7/15   PROM R knee flex CG CG CG CG CG CG CG      Knee ext str CG CG CG CG         STM for edema                          Neuro Re-Ed             Pt education on HEP, surgery, POC after surgery             QS             QS w/ SLR 2x10 3\" 2x10 3\" 3x10 3\" 3x10 3\" 3x10 3\" 3x10 3\" 3x10 3\" 3x10 3\" 1# 3x10 3\" 1# 3x10 3\"   NBOS airex EO/EC 1' ea 1'ea 1'ea          Tandem airex 1' ea            TKE with heavy band    Grn 20x3\" Grn 20x3\" Grn 20x3\" Grn 20x3\" Grn 3x10 3\" Grn 3x10 3\" Grn 3x10 3\"                Ther Ex             Recumbent bike for ROM 5' 5' 5' 5' 5' 5' 5' 5' 5' 5'   Heel slides A/AA 20x5\" 20x5\" 20x5\" ea " "20x5\" ea 20x5\" ea 20x5\" ea 20x5\" 20x5\" ea 20x5\" ea 20x5\" ea   LAQ 3# x20 3# x20 3# x20 4# x20 4# x20 4# x20 4# x30 4# x30 4# x30 5# x30   Dock kicks             Heel/toe raises             Standing hip abd/ext B 2x10 ea 2x10 ea 3x10 ea 3x10 ea 3x10 ea 3x10 ea 3x10 ea 3x10 ea 3x10 ea 3x10 ea   Leg press 75# 2x10 75# 2x10 75# 2x10 75# 3x10 85# 3x10 95# 3x10 105# 3x10 105# 3x10 115# 3x10 115# 3x10   Ther Activity             Mini squats x20   x20 x20 x20 x20 x20 3x10 3x10   Step up 6\" x20 6\" x20 6\" x20 6\" x20 6\" x20  6\"x20 6\" x20 6\" x20 6\"x20   STS  2x10 2x10 2x10 2x10 2x10 2x10 2x10 2x10 3x10 2x10   Step down 6\" x15 6\" x15 6\" x20 6\" x20 6\" x20  Nv   6\" x20    Gait Training                                       Modalities                                                        "

## 2024-08-16 ENCOUNTER — HOSPITAL ENCOUNTER (EMERGENCY)
Facility: HOSPITAL | Age: 59
Discharge: HOME | End: 2024-08-16
Attending: STUDENT IN AN ORGANIZED HEALTH CARE EDUCATION/TRAINING PROGRAM
Payer: COMMERCIAL

## 2024-08-16 ENCOUNTER — APPOINTMENT (EMERGENCY)
Dept: CT IMAGING | Facility: HOSPITAL | Age: 59
End: 2024-08-16
Payer: COMMERCIAL

## 2024-08-16 ENCOUNTER — APPOINTMENT (EMERGENCY)
Dept: RADIOLOGY | Facility: HOSPITAL | Age: 59
End: 2024-08-16
Payer: COMMERCIAL

## 2024-08-16 ENCOUNTER — TELEPHONE (OUTPATIENT)
Age: 59
End: 2024-08-16

## 2024-08-16 VITALS
DIASTOLIC BLOOD PRESSURE: 88 MMHG | HEART RATE: 67 BPM | OXYGEN SATURATION: 97 % | TEMPERATURE: 97.6 F | RESPIRATION RATE: 18 BRPM | SYSTOLIC BLOOD PRESSURE: 177 MMHG
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
## 2024-08-16 DIAGNOSIS — S89.90XA KNEE INJURY: ICD-10-CM

## 2024-08-16 DIAGNOSIS — S09.90XA INJURY OF HEAD, INITIAL ENCOUNTER: ICD-10-CM

## 2024-08-16 DIAGNOSIS — S01.81XA LACERATION OF FOREHEAD, INITIAL ENCOUNTER: ICD-10-CM

## 2024-08-16 LAB
ALBUMIN SERPL BCG-MCNC: 4.1 G/DL (ref 3.5–5)
ALP SERPL-CCNC: 98 U/L (ref 34–104)
ALT SERPL W P-5'-P-CCNC: 11 U/L (ref 7–52)
ANION GAP SERPL CALCULATED.3IONS-SCNC: 8 MMOL/L (ref 4–13)
APTT PPP: 31 SECONDS (ref 23–34)
AST SERPL W P-5'-P-CCNC: 17 U/L (ref 13–39)
ATRIAL RATE: 59 BPM
BASOPHILS # BLD AUTO: 0.06 THOUSANDS/ÂΜL (ref 0–0.1)
BASOPHILS NFR BLD AUTO: 1 % (ref 0–1)
BILIRUB SERPL-MCNC: 0.32 MG/DL (ref 0.2–1)
BUN SERPL-MCNC: 16 MG/DL (ref 5–25)
CALCIUM SERPL-MCNC: 9 MG/DL (ref 8.4–10.2)
CHLORIDE SERPL-SCNC: 104 MMOL/L (ref 96–108)
CO2 SERPL-SCNC: 26 MMOL/L (ref 21–32)
CREAT SERPL-MCNC: 0.79 MG/DL (ref 0.6–1.3)
EOSINOPHIL # BLD AUTO: 0.21 THOUSAND/ÂΜL (ref 0–0.61)
EOSINOPHIL NFR BLD AUTO: 2 % (ref 0–6)
ERYTHROCYTE [DISTWIDTH] IN BLOOD BY AUTOMATED COUNT: 14 % (ref 11.6–15.1)
GFR SERPL CREATININE-BSD FRML MDRD: 81 ML/MIN/1.73SQ M
GLUCOSE SERPL-MCNC: 89 MG/DL (ref 65–140)
GLUCOSE SERPL-MCNC: 89 MG/DL (ref 65–140)
HCT VFR BLD AUTO: 39.7 % (ref 34.8–46.1)
HGB BLD-MCNC: 13.3 G/DL (ref 11.5–15.4)
IMM GRANULOCYTES # BLD AUTO: 0.04 THOUSAND/UL (ref 0–0.2)
IMM GRANULOCYTES NFR BLD AUTO: 0 % (ref 0–2)
INR PPP: 0.99 (ref 0.85–1.19)
LYMPHOCYTES # BLD AUTO: 2.91 THOUSANDS/ÂΜL (ref 0.6–4.47)
LYMPHOCYTES NFR BLD AUTO: 30 % (ref 14–44)
MCH RBC QN AUTO: 30.9 PG (ref 26.8–34.3)
MCHC RBC AUTO-ENTMCNC: 33.5 G/DL (ref 31.4–37.4)
MCV RBC AUTO: 92 FL (ref 82–98)
MONOCYTES # BLD AUTO: 0.82 THOUSAND/ÂΜL (ref 0.17–1.22)
MONOCYTES NFR BLD AUTO: 8 % (ref 4–12)
NEUTROPHILS # BLD AUTO: 5.7 THOUSANDS/ÂΜL (ref 1.85–7.62)
NEUTS SEG NFR BLD AUTO: 59 % (ref 43–75)
NRBC BLD AUTO-RTO: 0 /100 WBCS
P AXIS: 47 DEGREES
PLATELET # BLD AUTO: 299 THOUSANDS/UL (ref 149–390)
PMV BLD AUTO: 9.3 FL (ref 8.9–12.7)
POTASSIUM SERPL-SCNC: 3.8 MMOL/L (ref 3.5–5.3)
PR INTERVAL: 188 MS
PROT SERPL-MCNC: 7.5 G/DL (ref 6.4–8.4)
PROTHROMBIN TIME: 13.8 SECONDS (ref 12.3–15)
QRS AXIS: 58 DEGREES
QRSD INTERVAL: 86 MS
QT INTERVAL: 430 MS
QTC INTERVAL: 425 MS
RBC # BLD AUTO: 4.3 MILLION/UL (ref 3.81–5.12)
SODIUM SERPL-SCNC: 138 MMOL/L (ref 135–147)
T WAVE AXIS: 54 DEGREES
VENTRICULAR RATE: 59 BPM
WBC # BLD AUTO: 9.74 THOUSAND/UL (ref 4.31–10.16)

## 2024-08-16 PROCEDURE — 82948 REAGENT STRIP/BLOOD GLUCOSE: CPT

## 2024-08-16 PROCEDURE — 71045 X-RAY EXAM CHEST 1 VIEW: CPT

## 2024-08-16 PROCEDURE — 93005 ELECTROCARDIOGRAM TRACING: CPT

## 2024-08-16 PROCEDURE — 85025 COMPLETE CBC W/AUTO DIFF WBC: CPT | Performed by: STUDENT IN AN ORGANIZED HEALTH CARE EDUCATION/TRAINING PROGRAM

## 2024-08-16 PROCEDURE — 85610 PROTHROMBIN TIME: CPT | Performed by: STUDENT IN AN ORGANIZED HEALTH CARE EDUCATION/TRAINING PROGRAM

## 2024-08-16 PROCEDURE — 80053 COMPREHEN METABOLIC PANEL: CPT | Performed by: STUDENT IN AN ORGANIZED HEALTH CARE EDUCATION/TRAINING PROGRAM

## 2024-08-16 PROCEDURE — 85730 THROMBOPLASTIN TIME PARTIAL: CPT | Performed by: STUDENT IN AN ORGANIZED HEALTH CARE EDUCATION/TRAINING PROGRAM

## 2024-08-16 PROCEDURE — 99284 EMERGENCY DEPT VISIT MOD MDM: CPT | Performed by: STUDENT IN AN ORGANIZED HEALTH CARE EDUCATION/TRAINING PROGRAM

## 2024-08-16 PROCEDURE — 70450 CT HEAD/BRAIN W/O DYE: CPT

## 2024-08-16 PROCEDURE — 96374 THER/PROPH/DIAG INJ IV PUSH: CPT

## 2024-08-16 PROCEDURE — 99285 EMERGENCY DEPT VISIT HI MDM: CPT

## 2024-08-16 PROCEDURE — 93010 ELECTROCARDIOGRAM REPORT: CPT | Performed by: INTERNAL MEDICINE

## 2024-08-16 PROCEDURE — 73564 X-RAY EXAM KNEE 4 OR MORE: CPT

## 2024-08-16 PROCEDURE — 96375 TX/PRO/DX INJ NEW DRUG ADDON: CPT

## 2024-08-16 PROCEDURE — 36415 COLL VENOUS BLD VENIPUNCTURE: CPT | Performed by: STUDENT IN AN ORGANIZED HEALTH CARE EDUCATION/TRAINING PROGRAM

## 2024-08-16 RX ORDER — LIDOCAINE HYDROCHLORIDE 10 MG/ML
5 INJECTION, SOLUTION EPIDURAL; INFILTRATION; INTRACAUDAL; PERINEURAL ONCE
Status: COMPLETED | OUTPATIENT
Start: 2024-08-16 | End: 2024-08-16

## 2024-08-16 RX ORDER — OXYCODONE HYDROCHLORIDE 5 MG/1
5 TABLET ORAL ONCE
Status: COMPLETED | OUTPATIENT
Start: 2024-08-16 | End: 2024-08-16

## 2024-08-16 RX ORDER — OXYCODONE AND ACETAMINOPHEN 5; 325 MG/1; MG/1
1 TABLET ORAL ONCE
Status: COMPLETED | OUTPATIENT
Start: 2024-08-16 | End: 2024-08-16

## 2024-08-16 RX ORDER — MORPHINE SULFATE 4 MG/ML
4 INJECTION, SOLUTION INTRAMUSCULAR; INTRAVENOUS ONCE
Status: COMPLETED | OUTPATIENT
Start: 2024-08-16 | End: 2024-08-16

## 2024-08-16 RX ORDER — OXYCODONE AND ACETAMINOPHEN 7.5; 325 MG/1; MG/1
1 TABLET ORAL EVERY 8 HOURS PRN
Qty: 15 TABLET | Refills: 0 | Status: SHIPPED | OUTPATIENT
Start: 2024-08-16

## 2024-08-16 RX ORDER — ONDANSETRON 2 MG/ML
4 INJECTION INTRAMUSCULAR; INTRAVENOUS ONCE
Status: COMPLETED | OUTPATIENT
Start: 2024-08-16 | End: 2024-08-16

## 2024-08-16 RX ADMIN — MORPHINE SULFATE 4 MG: 4 INJECTION, SOLUTION INTRAMUSCULAR; INTRAVENOUS at 04:10

## 2024-08-16 RX ADMIN — ONDANSETRON 4 MG: 2 INJECTION INTRAMUSCULAR; INTRAVENOUS at 04:10

## 2024-08-16 RX ADMIN — OXYCODONE AND ACETAMINOPHEN 1 TABLET: 5; 325 TABLET ORAL at 07:01

## 2024-08-16 RX ADMIN — LIDOCAINE HYDROCHLORIDE 5 ML: 10 INJECTION, SOLUTION EPIDURAL; INFILTRATION; INTRACAUDAL; PERINEURAL at 06:25

## 2024-08-16 RX ADMIN — OXYCODONE HYDROCHLORIDE 5 MG: 5 TABLET ORAL at 05:34

## 2024-08-16 NOTE — DISCHARGE INSTRUCTIONS
Continue use Tylenol and Motrin as needed for discomfort.  You been prescribed medication for breakthrough pain.  Rest, ice, compression, and elevation of leg to help with swelling. Sutures should stay in place for the next 5 days.  Any healthcare practitioner can remove them.    Follow-up with your orthopedic surgeon.    Return for any new or worsening symptoms.

## 2024-08-16 NOTE — ED NOTES
Returned from CT and x-ray. Pt insist on utilizing bathroom on the way back to room.     Claudia London RN  08/16/24 [id number]

## 2024-08-16 NOTE — TELEPHONE ENCOUNTER
Caller: Patient     Doctor: Marina     Reason for call: Patient had a fall last night and was treated at [facility]. She states that her left knee gave out, causing her to fall onto her right knee and her face. Patient had right TKA sx 4/24/24. She was told the hardware is intact.  She just wanted to let the doctor know what happened.    Call back#: [phone]

## 2024-08-16 NOTE — ED NOTES
Provider at bedside -wound suturing/care in progress, pt tolerating well.     Claudia London RN  08/16/24 [id number]

## 2024-08-16 NOTE — ED PROVIDER NOTES
History  Chief Complaint   Patient presents with    Fall     Patient arrives to the ER from home with complaints of trip and fall. - loc -thinners. GCS 15. Small lac to forehead, bleeding controlled. Pt c/o right knee pain ( right knee surgery April 2024). + swelling to right knee      HPI    Patient is a 58-year-old female present emerged department after having mechanical fall down a few stairs.  Patient landed on her knee and hit her forehead.  Patient denies any loss of consciousness.  Patient is on an aspirin daily to prevent blood clots.  Patient had a knee replacement performed at the end of April.  She had just finished doing all her physical therapy for it.  Swelling is on the anterior portion of the right knee.  Patient is able to ambulate with this.  Denies any headache, nausea vomiting, chest pain or shortness of breath.  Additional history includes asthma, diabetes and hypertension.    Prior to Admission Medications   Prescriptions Last Dose Informant Patient Reported? Taking?   ATORVASTATIN CALCIUM PO  Self Yes No   Sig: Take 5 mg by mouth daily at bedtime   Alcohol Swabs 70 % PADS  Self No No   Sig: Use 4 (four) times a day   Patient not taking: Reported on 7/15/2024   Ascorbic Acid (vitamin C) 1000 MG tablet  Self No No   Sig: Take 1 tablet (1,000 mg total) by mouth daily   Cholecalciferol (Vitamin D) 125 MCG (5000 UT) CAPS  Self Yes No   Sig: Take by mouth   Diclofenac Sodium (VOLTAREN) 1 %  Self Yes No   Sig: Apply 2 g topically 4 (four) times a day   Insulin Pen Needle (Pen Needles) 32G X 4 MM MISC  Self No No   Sig: Use 4 (four) times a day for 14 days   Patient not taking: Reported on 1/3/2022   Multiple Vitamin (multivitamin) tablet  Self No No   Sig: Take 1 tablet by mouth daily   acetaminophen (TYLENOL) 325 mg tablet  Self No No   Sig: Take 2 tablets (650 mg total) by mouth every 6 (six) hours   albuterol (PROVENTIL HFA,VENTOLIN HFA) 90 mcg/act inhaler  Self Yes No   Sig: Inhale 2 puffs every  6 (six) hours as needed for wheezing   aspirin (Aspirin Low Dose) 81 mg EC tablet   No No   Sig: TAKE 1 TABLET BY MOUTH 2 TIMES A DAY.   busPIRone (BUSPAR) 10 mg tablet  Self Yes No   Sig: Take 1 tablet by mouth 3 (three) times a day   celecoxib (CeleBREX) 200 mg capsule  Self No No   Sig: Take 1 capsule (200 mg total) by mouth 2 (two) times a day   cyanocobalamin (VITAMIN B-12) 100 MCG tablet  Self Yes No   Sig: Take 100 mcg by mouth daily   docusate sodium (COLACE) 100 mg capsule  Self No No   Sig: Take 1 capsule (100 mg total) by mouth 2 (two) times a day   escitalopram (LEXAPRO) 5 mg tablet  Self Yes No   Sig: Take 1 tablet by mouth daily   gabapentin (NEURONTIN) 100 mg capsule  Self No No   Sig: Take 1 capsule (100 mg total) by mouth 2 (two) times a day   hydrOXYzine HCL (ATARAX) 25 mg tablet  Self Yes No   Sig: Take 2 tablets by mouth daily at bedtime   lidocaine (LIDODERM) 5 %   Yes No   Sig: Apply topically   losartan (COZAAR) 100 MG tablet  Self Yes No   Sig: Take 100 mg by mouth daily before dinner   methocarbamol (ROBAXIN) 500 mg tablet  Self Yes No   Sig: Take 500 mg by mouth if needed for muscle spasms   metoprolol succinate (TOPROL-XL) 50 mg 24 hr tablet  Self Yes No   Si mg   mometasone 220 mcg/actuation inhaler  Self Yes No   Sig: Inhale 1 puff if needed Rinse mouth after use.   oxyCODONE (ROXICODONE) 5 immediate release tablet  Self No No   Sig: Take 1 tablet (5 mg total) by mouth every 4 (four) hours as needed for moderate pain Max Daily Amount: 30 mg   potassium chloride (Klor-Con M20) 20 mEq tablet  Self Yes No   Si mEq   semaglutide, 1 mg/dose, (Ozempic, 1 MG/DOSE,) 2 mg/1.5 mL injection pen  Self Yes No   Sig: Inject 1 mg under the skin every 7 days    traMADol (Ultram) 50 mg tablet  Self No No   Sig: Take 1 tablet (50 mg total) by mouth every 6 (six) hours as needed for moderate pain      Facility-Administered Medications: None       Past Medical History:   Diagnosis Date     Asthma 04/24/2024    Awareness under anesthesia     EGD    Diabetes mellitus (HCC)     Hypertension        Past Surgical History:   Procedure Laterality Date    EGD      HERNIA REPAIR      umbillical    HYSTERECTOMY  05/2021    JOINT REPLACEMENT Right 04/24/2024    knee    KNEE SURGERY      MAMMO STEREOTACTIC BREAST BIOPSY RIGHT (ALL INC) Right 05/27/2022    [place] ARTHRP KNE CONDYLE&PLATU MEDIAL&LAT COMPARTMENTS Right 04/24/2024    Procedure: ARTHROPLASTY KNEE TOTAL;  Surgeon: Marcelino Gray DO;  Location: [facility];  Service: Orthopedics       Family History   Problem Relation Age of Onset    Diabetes Mother     Diabetes Father     No Known Problems Maternal Grandmother     No Known Problems Maternal Grandfather     No Known Problems Paternal Grandmother     No Known Problems Paternal Grandfather     No Known Problems Son     No Known Problems Son     No Known Problems Maternal Aunt     No Known Problems Maternal Aunt     No Known Problems Maternal Aunt      I have reviewed and agree with the history as documented.    E-Cigarette/Vaping    E-Cigarette Use Never User      E-Cigarette/Vaping Substances    Nicotine No     THC No     CBD No     Flavoring No     Other No     Unknown No      Social History     Tobacco Use    Smoking status: Former    Smokeless tobacco: Never   Vaping Use    Vaping status: Never Used   Substance Use Topics    Alcohol use: Yes     Alcohol/week: 6.0 standard drinks of alcohol     Types: 6 Standard drinks or equivalent per week     Comment: weekly    Drug use: Never       Review of Systems   Constitutional:  Negative for chills and fever.   HENT:  Negative for ear pain and sore throat.    Eyes:  Negative for pain and visual disturbance.   Respiratory:  Negative for cough and shortness of breath.    Cardiovascular:  Negative for chest pain and palpitations.   Gastrointestinal:  Negative for abdominal pain and vomiting.   Genitourinary:  Negative for dysuria and hematuria.   Musculoskeletal:   Negative for arthralgias and back pain.        Right knee pain   Skin:  Positive for wound. Negative for color change and rash.   Neurological:  Negative for seizures and syncope.   All other systems reviewed and are negative.      Physical Exam  Physical Exam  Vitals and nursing note reviewed.   Constitutional:       General: She is not in acute distress.     Appearance: She is well-developed.   HENT:      Head: Normocephalic and atraumatic.      Right Ear: Tympanic membrane normal.      Left Ear: Tympanic membrane normal.      Nose: Nose normal.      Mouth/Throat:      Mouth: Mucous membranes are dry.      Pharynx: Oropharynx is clear.   Eyes:      Extraocular Movements: Extraocular movements intact.      Conjunctiva/sclera: Conjunctivae normal.      Pupils: Pupils are equal, round, and reactive to light.   Cardiovascular:      Rate and Rhythm: Normal rate and regular rhythm.      Heart sounds: No murmur heard.  Pulmonary:      Effort: Pulmonary effort is normal. No respiratory distress.      Breath sounds: Normal breath sounds.   Abdominal:      Palpations: Abdomen is soft.      Tenderness: There is no abdominal tenderness.   Musculoskeletal:         General: No swelling.      Cervical back: Neck supple.   Skin:     General: Skin is warm and dry.      Capillary Refill: Capillary refill takes less than 2 seconds.      Comments: Forehead hematoma, 1 cm forehead laceration   Neurological:      General: No focal deficit present.      Mental Status: She is alert and oriented to person, place, and time.      Comments: Cranial nerves II through XII intact.  Strength, sensation range of motion intact in bilateral upper and lower extremities.  Negative finger-nose-finger and heel-to-shin.     Psychiatric:         Mood and Affect: Mood normal.         Vital Signs  ED Triage Vitals   Temperature Pulse Respirations Blood Pressure SpO2   08/16/24 0343 08/16/24 0344 08/16/24 0344 08/16/24 0344 08/16/24 0344   97.6 °F (36.4 °C)  60 20 (!) 192/92 98 %      Temp src Heart Rate Source Patient Position - Orthostatic VS BP Location FiO2 (%)   -- 08/16/24 0405 08/16/24 0405 08/16/24 0615 --    Monitor Sitting Right arm       Pain Score       08/16/24 0405       10 - Worst Possible Pain           Vitals:    08/16/24 0600 08/16/24 0615 08/16/24 0630 08/16/24 0645   BP: 156/77 159/74 169/81 (!) 177/88   Pulse: (!) 54 55 61 67   Patient Position - Orthostatic VS: Lying Lying Lying Lying         Visual Acuity  Visual Acuity      Flowsheet Row Most Recent Value   L Pupil Size (mm) 3   R Pupil Size (mm) 3            ED Medications  Medications   morphine injection 4 mg (4 mg Intravenous Given 8/16/24 0410)   ondansetron (ZOFRAN) injection 4 mg (4 mg Intravenous Given 8/16/24 0410)   lidocaine (PF) (XYLOCAINE-MPF) 1 % injection 5 mL (5 mL Infiltration Given 8/16/24 0625)   oxyCODONE (ROXICODONE) IR tablet 5 mg (5 mg Oral Given 8/16/24 0534)   oxyCODONE-acetaminophen (PERCOCET) 5-325 mg per tablet 1 tablet (1 tablet Oral Given 8/16/24 0701)       Diagnostic Studies  Results Reviewed       Procedure Component Value Units Date/Time    Comprehensive metabolic panel [[id number]] Collected: 08/16/24 0429    Lab Status: Final result Specimen: Blood from Arm, Right Updated: 08/16/24 0452     Sodium 138 mmol/L      Potassium 3.8 mmol/L      Chloride 104 mmol/L      CO2 26 mmol/L      ANION GAP 8 mmol/L      BUN 16 mg/dL      Creatinine 0.79 mg/dL      Glucose 89 mg/dL      Calcium 9.0 mg/dL      AST 17 U/L      ALT 11 U/L      Alkaline Phosphatase 98 U/L      Total Protein 7.5 g/dL      Albumin 4.1 g/dL      Total Bilirubin 0.32 mg/dL      eGFR 81 ml/min/1.73sq m     Narrative:      National Kid[employer] Disease Foundation guidelines for Chronic Kidney Disease (CKD):     Stage 1 with normal or high GFR (GFR > 90 mL/min/1.73 square meters)    Stage 2 Mild CKD (GFR = 60-89 mL/min/1.73 square meters)    Stage 3A Moderate CKD (GFR = 45-59 mL/min/1.73 square meters)    Stage  3B Moderate CKD (GFR = 30-44 mL/min/1.73 square meters)    Stage 4 Severe CKD (GFR = 15-29 mL/min/1.73 square meters)    Stage 5 End Stage CKD (GFR <15 mL/min/1.73 square meters)  Note: GFR calculation is accurate only with a steady state creatinine    APTT [[id number]]  (Normal) Collected: 08/16/24 0429    Lab Status: Final result Specimen: Blood from Arm, Right Updated: 08/16/24 0446     PTT 31 seconds     Protime-INR [[id number]]  (Normal) Collected: 08/16/24 0429    Lab Status: Final result Specimen: Blood from Arm, Right Updated: 08/16/24 0446     Protime 13.8 seconds      INR 0.99    Narrative:      INR Therapeutic Range    Indication                                             INR Range      Atrial Fibrillation                                               2.0-3.0  Hypercoagulable State                                    2.0.2.3  Left Ventricular Asist Device                            2.0-3.0  Mechanical Heart Valve                                  -    Aortic(with afib, MI, embolism, HF, LA enlargement,    and/or coagulopathy)                                     2.0-3.0 (2.5-3.5)     Mitral                                                             2.5-3.5  Prosthetic/Bioprosthetic Heart Valve               2.0-3.0  Venous thromboembolism (VTE: VT, PE        2.0-3.0    CBC and differential [[id number]] Collected: 08/16/24 0429    Lab Status: Final result Specimen: Blood from Arm, Right Updated: 08/16/24 0433     WBC 9.74 Thousand/uL      RBC 4.30 Million/uL      Hemoglobin 13.3 g/dL      Hematocrit 39.7 %      MCV 92 fL      MCH 30.9 pg      MCHC 33.5 g/dL      RDW 14.0 %      MPV 9.3 fL      Platelets 299 Thousands/uL      nRBC 0 /100 WBCs      Segmented % 59 %      Immature Grans % 0 %      Lymphocytes % 30 %      Monocytes % 8 %      Eosinophils Relative 2 %      Basophils Relative 1 %      Absolute Neutrophils 5.70 Thousands/µL      Absolute Immature Grans 0.04 Thousand/uL      Absolute Lymphocytes 2.91  Thousands/µL      Absolute Monocytes 0.82 Thousand/µL      Eosinophils Absolute 0.21 Thousand/µL      Basophils Absolute 0.06 Thousands/µL     Fingerstick Glucose (POCT) [[id number]]  (Normal) Collected: 08/16/24 0408    Lab Status: Final result Specimen: Blood Updated: 08/16/24 0409     POC Glucose 89 mg/dl                    CT head without contrast   Final Result by Ashlyn Martinez MD (08/16 0503)      Extracranial soft tissue swelling/scalp hematoma. No evidence of acute intracranial abnormality.                  Workstation performed: [id number]         XR knee 4+ vw right injury    (Results Pending)   XR chest 1 view portable    (Results Pending)              Procedures  Procedures         ED Course       Trauma: Evaluation/Alert    Mechanism of Injury: mechanical fall  LOC:  no  Airway:  clear and patent  Breathing:  clear to auscultation juancho  Circulation: +2 dorsalis pedis and radial  Disability: alert and oriented   Exposure: bruise to right knee      Numbers; 3-15 (gcs): 15                          SBIRT 20yo+      Flowsheet Row Most Recent Value   Initial Alcohol Screen: US AUDIT-C     1. How often do you have a drink containing alcohol? 0 Filed at: 08/16/2024 0348   2. How many drinks containing alcohol do you have on a typical day you are drinking?  0 Filed at: 08/16/2024 0348   3b. FEMALE Any Age, or MALE 65+: How often do you have 4 or more drinks on one occassion? 0 Filed at: 08/16/2024 0348   Audit-C Score 0 Filed at: 08/16/2024 0348   RODGER: How many times in the past year have you...    Used an illegal drug or used a prescription medication for non-medical reasons? Never Filed at: 08/16/2024 0348                      Medical Decision Making  EKG: rate 59 Sinus bradycardia without acute ischemic change. Appears similar to prior 4/24    Amount and/or Complexity of Data Reviewed  Labs: ordered.  Radiology: ordered.    Risk  Prescription drug management.                 Disposition  Final diagnoses:    Fall, initial encounter   Injury of head, initial encounter   Laceration of forehead, initial encounter   Knee injury     Time reflects when diagnosis was documented in both MDM as applicable and the Disposition within this note       Time User Action Codes Description Comment    8/16/2024  4:12 AM Taylor Hook Add [W19.XXXA] Fall, initial encounter     8/16/2024  4:12 AM Taylor Hook Add [S09.90XA] Injury of head, initial encounter     8/16/2024  4:27 AM Taylor Hook Add [S01.81XA] Laceration of forehead, initial encounter     8/16/2024  4:27 AM Taylor Hook Add [S89.90XA] Knee injury           ED Disposition       ED Disposition   Discharge    Condition   Stable    Date/Time   Fri Aug 16, 2024 0650    Comment   Dory Roberts discharge to home/self care.                   Follow-up Information       Follow up With Specialties Details Why Contact Info    Lyssa Isabel MD Family Medicine   [address] [facility]  [place] [id number]  [phone]              Patient's Medications   Discharge Prescriptions    OXYCODONE-ACETAMINOPHEN (PERCOCET) 7.5-325 MG PER TABLET    Take 1 tablet by mouth every 8 (eight) hours as needed for moderate pain for up to 15 doses Max Daily Amount: 3 tablets       Start Date: 8/16/2024 End Date: --       Order Dose: 1 tablet       Quantity: 15 tablet    Refills: 0       No discharge procedures on file.    PDMP Review       None            ED Provider  Electronically Signed by           [place] [id number]  [phone]              Discharge Medication List as of 8/16/2024  6:56 AM        START taking these medications    Details   oxyCODONE-acetaminophen (Percocet) 7.5-325 MG per tablet Take 1 tablet by mouth every 8 (eight) hours as needed for moderate pain for up to 15 doses Max Daily Amount: 3 tablets, Starting Fri 8/16/2024, Normal           CONTINUE these medications which have NOT CHANGED    Details   acetaminophen (TYLENOL) 325 mg tablet Take 2 tablets (650 mg total) by mouth every 6 (six) hours, Starting Thu 4/25/2024, Normal      albuterol (PROVENTIL HFA,VENTOLIN HFA) 90 mcg/act inhaler Inhale 2 puffs every 6 (six) hours as needed for wheezing, Historical Med      Alcohol Swabs 70 % PADS Use 4 (four) times a day, Starting Mon 1/18/2021, Normal      Ascorbic Acid (vitamin C) 1000 MG tablet Take 1 tablet (1,000 mg total) by mouth daily, Starting Fri 3/29/2024, Normal      aspirin (Aspirin Low Dose) 81 mg EC tablet TAKE 1 TABLET BY MOUTH 2 TIMES A DAY., Starting Wed 6/26/2024, Normal      ATORVASTATIN CALCIUM PO Take 5 mg by mouth daily at bedtime, Historical Med      busPIRone (BUSPAR) 10 mg tablet Take 1 tablet by mouth 3 (three) times a day, Starting Wed 12/30/2020, Historical Med      celecoxib (CeleBREX) 200 mg capsule Take 1 capsule (200 mg total) by mouth 2 (two) times a day, Starting Thu 4/25/2024, Normal      Cholecalciferol (Vitamin D) 125 MCG (5000 UT) CAPS Take by mouth, Historical Med      cyanocobalamin (VITAMIN B-12) 100 MCG tablet Take 100 mcg by mouth daily, Historical Med      Diclofenac Sodium (VOLTAREN) 1 % Apply 2 g topically 4 (four) times a day, Historical Med      docusate sodium (COLACE) 100 mg capsule Take 1 capsule (100 mg total) by mouth 2 (two) times a day, Starting Thu 4/25/2024, Normal      escitalopram (LEXAPRO) 5 mg tablet Take 1 tablet by mouth daily, Starting Wed 12/30/2020, Historical Med      gabapentin (NEURONTIN) 100 mg capsule Take 1 capsule (100 mg total)  by mouth 2 (two) times a day, Starting Mon 5/13/2024, Normal      hydrOXYzine HCL (ATARAX) 25 mg tablet Take 2 tablets by mouth daily at bedtime, Starting Tue 2/2/2021, Historical Med      Insulin Pen Needle (Pen Needles) 32G X 4 MM MISC Use 4 (four) times a day for 14 days, Starting Mon 1/18/2021, Until Mon 1/3/2022, Normal      lidocaine (LIDODERM) 5 % Apply topically, Starting Mon 1/29/2024, Historical Med      losartan (COZAAR) 100 MG tablet Take 100 mg by mouth daily before dinner, Historical Med      methocarbamol (ROBAXIN) 500 mg tablet Take 500 mg by mouth if needed for muscle spasms, Historical Med      metoprolol succinate (TOPROL-XL) 50 mg 24 hr tablet 25 mg, Starting Wed 5/1/2024, Historical Med      mometasone 220 mcg/actuation inhaler Inhale 1 puff if needed Rinse mouth after use., Historical Med      Multiple Vitamin (multivitamin) tablet Take 1 tablet by mouth daily, Starting Fri 3/29/2024, Normal      oxyCODONE (ROXICODONE) 5 immediate release tablet Take 1 tablet (5 mg total) by mouth every 4 (four) hours as needed for moderate pain Max Daily Amount: 30 mg, Starting Thu 4/25/2024, Normal      potassium chloride (Klor-Con M20) 20 mEq tablet 20 mEq, Starting Mon 4/29/2024, Historical Med      semaglutide, 1 mg/dose, (Ozempic, 1 MG/DOSE,) 2 mg/1.5 mL injection pen Inject 1 mg under the skin every 7 days Mondays, Historical Med      traMADol (Ultram) 50 mg tablet Take 1 tablet (50 mg total) by mouth every 6 (six) hours as needed for moderate pain, Starting Thu 5/2/2024, Normal             No discharge procedures on file.    PDMP Review       None            ED Provider  Electronically Signed by             Taylor Hook DO  08/24/24 [id number]

## 2024-08-22 ENCOUNTER — HOSPITAL ENCOUNTER (EMERGENCY)
Facility: HOSPITAL | Age: 59
Discharge: HOME | End: 2024-08-22
Attending: EMERGENCY MEDICINE
Payer: OTHER GOVERNMENT

## 2024-08-22 ENCOUNTER — APPOINTMENT (EMERGENCY)
Dept: VASCULAR ULTRASOUND | Facility: HOSPITAL | Age: 59
End: 2024-08-22
Payer: OTHER GOVERNMENT

## 2024-08-22 VITALS
SYSTOLIC BLOOD PRESSURE: 141 MMHG | HEART RATE: 69 BPM | DIASTOLIC BLOOD PRESSURE: 68 MMHG | TEMPERATURE: 97 F | RESPIRATION RATE: 20 BRPM | OXYGEN SATURATION: 99 %

## 2024-08-22 DIAGNOSIS — Z48.02 VISIT FOR SUTURE REMOVAL: Primary | ICD-10-CM

## 2024-08-22 DIAGNOSIS — M25.569 KNEE PAIN: ICD-10-CM

## 2024-08-22 PROCEDURE — 99284 EMERGENCY DEPT VISIT MOD MDM: CPT | Performed by: EMERGENCY MEDICINE

## 2024-08-22 PROCEDURE — 99283 EMERGENCY DEPT VISIT LOW MDM: CPT

## 2024-08-22 PROCEDURE — 93971 EXTREMITY STUDY: CPT | Performed by: SURGERY

## 2024-08-22 PROCEDURE — 93971 EXTREMITY STUDY: CPT

## 2024-08-22 RX ORDER — OXYCODONE HYDROCHLORIDE 5 MG/1
5 TABLET ORAL ONCE
Status: COMPLETED | OUTPATIENT
Start: 2024-08-22 | End: 2024-08-22

## 2024-08-22 RX ORDER — OXYCODONE HYDROCHLORIDE 5 MG/1
5 TABLET ORAL EVERY 4 HOURS PRN
Qty: 20 TABLET | Refills: 0 | Status: SHIPPED | OUTPATIENT
Start: 2024-08-22 | End: 2024-08-27

## 2024-08-22 RX ADMIN — OXYCODONE HYDROCHLORIDE 5 MG: 5 TABLET ORAL at 15:31

## 2024-08-22 NOTE — DISCHARGE INSTRUCTIONS
Do not take oxycodone with percocet out of concern for further breathing depression.   Follow up pattie Gray

## 2024-08-23 NOTE — ED PROVIDER NOTES
History  Chief Complaint   Patient presents with    Suture / Staple Removal     Sutures placed 6 days ago to medial forehead at this facility, coming in for removal.      58 y.o. F presents for suture removal from trauma.  Wound is c/d/I. Sutures removed without incident.     R knee swelling, bruising, and pain.  Recent knee replacement.  Xrays were without fracture, + for hemorrhagic bursitis at the time of trauma.    Eval for DVT - no hx of DVT/PE. No CP, no SOB, no NV deficit distal to injury.  Able to bear weight but it hurts to walk.       Suture / Staple Removal         Prior to Admission Medications   Prescriptions Last Dose Informant Patient Reported? Taking?   ATORVASTATIN CALCIUM PO  Self Yes No   Sig: Take 5 mg by mouth daily at bedtime   Alcohol Swabs 70 % PADS  Self No No   Sig: Use 4 (four) times a day   Patient not taking: Reported on 7/15/2024   Ascorbic Acid (vitamin C) 1000 MG tablet  Self No No   Sig: Take 1 tablet (1,000 mg total) by mouth daily   Cholecalciferol (Vitamin D) 125 MCG (5000 UT) CAPS  Self Yes No   Sig: Take by mouth   Diclofenac Sodium (VOLTAREN) 1 %  Self Yes No   Sig: Apply 2 g topically 4 (four) times a day   Insulin Pen Needle (Pen Needles) 32G X 4 MM MISC  Self No No   Sig: Use 4 (four) times a day for 14 days   Patient not taking: Reported on 1/3/2022   Multiple Vitamin (multivitamin) tablet  Self No No   Sig: Take 1 tablet by mouth daily   acetaminophen (TYLENOL) 325 mg tablet  Self No No   Sig: Take 2 tablets (650 mg total) by mouth every 6 (six) hours   albuterol (PROVENTIL HFA,VENTOLIN HFA) 90 mcg/act inhaler  Self Yes No   Sig: Inhale 2 puffs every 6 (six) hours as needed for wheezing   aspirin (Aspirin Low Dose) 81 mg EC tablet   No No   Sig: TAKE 1 TABLET BY MOUTH 2 TIMES A DAY.   busPIRone (BUSPAR) 10 mg tablet  Self Yes No   Sig: Take 1 tablet by mouth 3 (three) times a day   celecoxib (CeleBREX) 200 mg capsule  Self No No   Sig: Take 1 capsule (200 mg total) by  mouth 2 (two) times a day   cyanocobalamin (VITAMIN B-12) 100 MCG tablet  Self Yes No   Sig: Take 100 mcg by mouth daily   docusate sodium (COLACE) 100 mg capsule  Self No No   Sig: Take 1 capsule (100 mg total) by mouth 2 (two) times a day   escitalopram (LEXAPRO) 5 mg tablet  Self Yes No   Sig: Take 1 tablet by mouth daily   gabapentin (NEURONTIN) 100 mg capsule  Self No No   Sig: Take 1 capsule (100 mg total) by mouth 2 (two) times a day   hydrOXYzine HCL (ATARAX) 25 mg tablet  Self Yes No   Sig: Take 2 tablets by mouth daily at bedtime   lidocaine (LIDODERM) 5 %   Yes No   Sig: Apply topically   losartan (COZAAR) 100 MG tablet  Self Yes No   Sig: Take 100 mg by mouth daily before dinner   methocarbamol (ROBAXIN) 500 mg tablet  Self Yes No   Sig: Take 500 mg by mouth if needed for muscle spasms   metoprolol succinate (TOPROL-XL) 50 mg 24 hr tablet  Self Yes No   Si mg   mometasone 220 mcg/actuation inhaler  Self Yes No   Sig: Inhale 1 puff if needed Rinse mouth after use.   oxyCODONE (ROXICODONE) 5 immediate release tablet  Self No No   Sig: Take 1 tablet (5 mg total) by mouth every 4 (four) hours as needed for moderate pain Max Daily Amount: 30 mg   oxyCODONE-acetaminophen (Percocet) 7.5-325 MG per tablet   No No   Sig: Take 1 tablet by mouth every 8 (eight) hours as needed for moderate pain for up to 15 doses Max Daily Amount: 3 tablets   potassium chloride (Klor-Con M20) 20 mEq tablet  Self Yes No   Si mEq   semaglutide, 1 mg/dose, (Ozempic, 1 MG/DOSE,) 2 mg/1.5 mL injection pen  Self Yes No   Sig: Inject 1 mg under the skin every 7 days    traMADol (Ultram) 50 mg tablet  Self No No   Sig: Take 1 tablet (50 mg total) by mouth every 6 (six) hours as needed for moderate pain      Facility-Administered Medications: None       Past Medical History:   Diagnosis Date    Asthma 2024    Awareness under anesthesia     EGD    Diabetes mellitus (HCC)     Hypertension        Past Surgical History:    Procedure Laterality Date    EGD      HERNIA REPAIR      umbillical    HYSTERECTOMY  05/2021    JOINT REPLACEMENT Right 04/24/2024    knee    KNEE SURGERY      MAMMO STEREOTACTIC BREAST BIOPSY RIGHT (ALL INC) Right 05/27/2022    MS ARTHRP KNE CONDYLE&PLATU MEDIAL&LAT COMPARTMENTS Right 04/24/2024    Procedure: ARTHROPLASTY KNEE TOTAL;  Surgeon: Marcelino Gray DO;  Location: [facility] OR;  Service: Orthopedics       Family History   Problem Relation Age of Onset    Diabetes Mother     Diabetes Father     No Known Problems Maternal Grandmother     No Known Problems Maternal Grandfather     No Known Problems Paternal Grandmother     No Known Problems Paternal Grandfather     No Known Problems Son     No Known Problems Son     No Known Problems Maternal Aunt     No Known Problems Maternal Aunt     No Known Problems Maternal Aunt      I have reviewed and agree with the history as documented.    E-Cigarette/Vaping    E-Cigarette Use Never User      E-Cigarette/Vaping Substances    Nicotine No     THC No     CBD No     Flavoring No     Other No     Unknown No      Social History     Tobacco Use    Smoking status: Former    Smokeless tobacco: Never   Vaping Use    Vaping status: Never Used   Substance Use Topics    Alcohol use: Yes     Alcohol/week: 6.0 standard drinks of alcohol     Types: 6 Standard drinks or equivalent per week     Comment: weekly    Drug use: Never       Review of Systems    Physical Exam  Physical Exam  Vitals reviewed.   Constitutional:       General: She is not in acute distress.     Appearance: She is well-developed. She is not diaphoretic.   HENT:      Head: Normocephalic.      Comments: 1 cm laceration to midline forehead, 2 sutures are removed without incident, wound is clean, dry, intact.  No signs of infection.  Eyes:      Conjunctiva/sclera: Conjunctivae normal.   Pulmonary:      Effort: Pulmonary effort is normal. No respiratory distress.   Musculoskeletal:         General: Swelling,  tenderness and signs of injury present.      Cervical back: Normal range of motion.      Comments: Right knee swelling, bruising, tenderness   Skin:     General: Skin is warm and dry.      Coloration: Skin is not pale.      Findings: Bruising present.   Neurological:      General: No focal deficit present.      Mental Status: She is alert and oriented to person, place, and time.      Cranial Nerves: No cranial nerve deficit.   Psychiatric:         Behavior: Behavior normal.         Vital Signs  ED Triage Vitals   Temperature Pulse Respirations Blood Pressure SpO2   08/22/24 1343 08/22/24 1343 08/22/24 1343 08/22/24 1343 08/22/24 1343   (!) 97 °F (36.1 °C) 69 20 141/68 99 %      Temp Source Heart Rate Source Patient Position - Orthostatic VS BP Location FiO2 (%)   08/22/24 1343 08/22/24 1343 08/22/24 1343 08/22/24 1343 --   Temporal Monitor Sitting Left arm       Pain Score       08/22/24 1531       8           Vitals:    08/22/24 1343   BP: 141/68   Pulse: 69   Patient Position - Orthostatic VS: Sitting         Visual Acuity      ED Medications  Medications   oxyCODONE (ROXICODONE) IR tablet 5 mg (5 mg Oral Given 8/22/24 1531)       Diagnostic Studies  Results Reviewed       None                   VAS lower limb venous duplex study, unilateral/limited   Final Result by Jovan Cotton DO (08/22 1540)                 Procedures  Suture removal    Date/Time: 8/22/2024 4:13 PM    Performed by: Edwige Irwin DO  Authorized by: Edwige Irwin DO  [place] Protocol:  Consent given by: patient      Patient location:  ED  Location:     Location:  Head/neck    Head/neck location:  Forehead  Procedure details:     Tools used:  Suture removal kit    Wound appearance:  No sign(s) of infection    Number of sutures removed:  2  Post-procedure details:     Patient tolerance of procedure:  Tolerated well, no immediate complications           ED Course  ED Course as of 08/22/24 2119   Thu Aug 22, 2024   1500  Negative for DVT or SVT.  Will discharge                                 SBIRT 22yo+      Flowsheet Row Most Recent Value   Initial Alcohol Screen: US AUDIT-C     1. How often do you have a drink containing alcohol? 0 Filed at: 08/22/2024 1409   2. How many drinks containing alcohol do you have on a typical day you are drinking?  0 Filed at: 08/22/2024 1409   3b. FEMALE Any Age, or MALE 65+: How often do you have 4 or more drinks on one occassion? 0 Filed at: 08/22/2024 1409   Audit-C Score 0 Filed at: 08/22/2024 1409   RODGER: How many times in the past year have you...    Used an illegal drug or used a prescription medication for non-medical reasons? Never Filed at: 08/22/2024 1409                      Medical Decision Making  Visit for suture removal, sutures from forehead removed without incident, wound is clean, dry, intact, no signs of infection.    Patient also concern for swelling, bruising, pain to right lower extremity and patient is concerned for a blood clot.  DVT study is performed and negative for DVT, SVT.  Discharged with pain control and advised follow-up with orthopedics as she had a recent knee replacement.    Risk  Prescription drug management.                 Disposition  Final diagnoses:   Visit for suture removal   Knee pain     Time reflects when diagnosis was documented in both MDM as applicable and the Disposition within this note       Time User Action Codes Description Comment    8/22/2024  2:00 PM Edwige Irwin Add [Z48.02] Visit for suture removal     8/22/2024  3:26 PM Edwige Irwin Add [M25.569] Knee pain           ED Disposition       ED Disposition   Discharge    Condition   Stable    Date/Time   Thu Aug 22, 2024 1400    Comment   Dory Roberts discharge to home/self care.                   Follow-up Information       Follow up With Specialties Details Why Contact Info Additional Information    [facility] Emergency Department Emergency Medicine  If  symptoms worsen: sign of infection, pus drainage, redness, pain, etc [address] [facility] [id number] [phone] [facility], [address] [place], [place], [address]    [facility] [place] [facility]   [address]  [facility] [id number] [phone] [facility] [place], [address], [place], [place], [address] [phone]    Marcelino Gray, DO Orthopedic Surgery Schedule an appointment as soon as possible for a visit  For follow up to ensure improvement, and for further testing and treatment as needed [address] [facility] [address]  [facility] [address]  [phone]               Discharge Medication List as of 8/22/2024  3:59 PM        START taking these medications    Details   !! oxyCODONE (Roxicodone) 5 immediate release tablet Take 1 tablet (5 mg total) by mouth every 4 (four) hours as needed for moderate pain for up to 5 days Max Daily Amount: 30 mg, Starting Thu 8/22/2024, Until Tue 8/27/2024 at 2359, Normal       !! - Potential duplicate medications found. Please discuss with provider.        CONTINUE these medications which have NOT CHANGED    Details   acetaminophen (TYLENOL) 325 mg tablet Take 2 tablets (650 mg total) by mouth every 6 (six) hours, Starting Thu 4/25/2024, Normal      albuterol (PROVENTIL HFA,VENTOLIN HFA) 90 mcg/act inhaler Inhale 2 puffs every 6 (six) hours as needed for wheezing, Historical Med      Alcohol Swabs 70 % PADS Use 4 (four) times a day, Starting Mon 1/18/2021, Normal      Ascorbic Acid (vitamin C) 1000 MG tablet Take 1 tablet (1,000 mg total) by mouth daily, Starting Fri 3/29/2024, Normal      aspirin (Aspirin Low Dose) 81 mg EC tablet TAKE 1 TABLET BY MOUTH 2 TIMES A DAY., Starting Wed 6/26/2024, Normal      ATORVASTATIN CALCIUM PO Take 5 mg by mouth daily at bedtime,  Historical Med      busPIRone (BUSPAR) 10 mg tablet Take 1 tablet by mouth 3 (three) times a day, Starting Wed 12/30/2020, Historical Med      celecoxib (CeleBREX) 200 mg capsule Take 1 capsule (200 mg total) by mouth 2 (two) times a day, Starting Thu 4/25/2024, Normal      Cholecalciferol (Vitamin D) 125 MCG (5000 UT) CAPS Take by mouth, Historical Med      cyanocobalamin (VITAMIN B-12) 100 MCG tablet Take 100 mcg by mouth daily, Historical Med      Diclofenac Sodium (VOLTAREN) 1 % Apply 2 g topically 4 (four) times a day, Historical Med      docusate sodium (COLACE) 100 mg capsule Take 1 capsule (100 mg total) by mouth 2 (two) times a day, Starting Thu 4/25/2024, Normal      escitalopram (LEXAPRO) 5 mg tablet Take 1 tablet by mouth daily, Starting Wed 12/30/2020, Historical Med      gabapentin (NEURONTIN) 100 mg capsule Take 1 capsule (100 mg total) by mouth 2 (two) times a day, Starting Mon 5/13/2024, Normal      hydrOXYzine HCL (ATARAX) 25 mg tablet Take 2 tablets by mouth daily at bedtime, Starting Tue 2/2/2021, Historical Med      Insulin Pen Needle (Pen Needles) 32G X 4 MM MISC Use 4 (four) times a day for 14 days, Starting Mon 1/18/2021, Until Mon 1/3/2022, Normal      lidocaine (LIDODERM) 5 % Apply topically, Starting Mon 1/29/2024, Historical Med      losartan (COZAAR) 100 MG tablet Take 100 mg by mouth daily before dinner, Historical Med      methocarbamol (ROBAXIN) 500 mg tablet Take 500 mg by mouth if needed for muscle spasms, Historical Med      metoprolol succinate (TOPROL-XL) 50 mg 24 hr tablet 25 mg, Starting Wed 5/1/2024, Historical Med      mometasone 220 mcg/actuation inhaler Inhale 1 puff if needed Rinse mouth after use., Historical Med      Multiple Vitamin (multivitamin) tablet Take 1 tablet by mouth daily, Starting Fri 3/29/2024, Normal      !! oxyCODONE (ROXICODONE) 5 immediate release tablet Take 1 tablet (5 mg total) by mouth every 4 (four) hours as needed for moderate pain Max Daily  Amount: 30 mg, Starting Thu 4/25/2024, Normal      oxyCODONE-acetaminophen (Percocet) 7.5-325 MG per tablet Take 1 tablet by mouth every 8 (eight) hours as needed for moderate pain for up to 15 doses Max Daily Amount: 3 tablets, Starting Fri 8/16/2024, Normal      potassium chloride (Klor-Con M20) 20 mEq tablet 20 mEq, Starting Mon 4/29/2024, Historical Med      semaglutide, 1 mg/dose, (Ozempic, 1 MG/DOSE,) 2 mg/1.5 mL injection pen Inject 1 mg under the skin every 7 days Mondays, Historical Med      traMADol (Ultram) 50 mg tablet Take 1 tablet (50 mg total) by mouth every 6 (six) hours as needed for moderate pain, Starting Thu 5/2/2024, Normal       !! - Potential duplicate medications found. Please discuss with provider.          No discharge procedures on file.    PDMP Review       None            ED Provider  Electronically Signed by             Edwige Irwin DO  08/22/24 7281

## 2024-08-27 ENCOUNTER — TELEPHONE (OUTPATIENT)
Dept: OBGYN CLINIC | Facility: HOSPITAL | Age: 59
End: 2024-08-27

## 2024-08-27 NOTE — TELEPHONE ENCOUNTER
Caller: Dory    Doctor: Marina    Reason for call: Needs new script for Physical Therapy and more visits, [facility] This [facility] be for her right knee since she fell recently and had a re-injury.    Call back#: [phone]   
Normal for race

## 2024-09-09 ENCOUNTER — TELEPHONE (OUTPATIENT)
Age: 59
End: 2024-09-09

## 2024-09-09 ENCOUNTER — OFFICE VISIT (OUTPATIENT)
Dept: OBGYN CLINIC | Facility: CLINIC | Age: 59
End: 2024-09-09
Payer: OTHER GOVERNMENT

## 2024-09-09 VITALS
DIASTOLIC BLOOD PRESSURE: 72 MMHG | HEART RATE: 61 BPM | WEIGHT: 242 LBS | SYSTOLIC BLOOD PRESSURE: 116 MMHG | BODY MASS INDEX: 38.89 KG/M2 | HEIGHT: 66 IN

## 2024-09-09 DIAGNOSIS — S80.01XA CONTUSION OF RIGHT KNEE, INITIAL ENCOUNTER: ICD-10-CM

## 2024-09-09 DIAGNOSIS — Z91.81 STATUS POST FALL: ICD-10-CM

## 2024-09-09 DIAGNOSIS — Z96.651 S/P TOTAL KNEE ARTHROPLASTY, RIGHT: Primary | ICD-10-CM

## 2024-09-09 PROCEDURE — 99213 OFFICE O/P EST LOW 20 MIN: CPT | Performed by: ORTHOPAEDIC SURGERY

## 2024-09-09 NOTE — TELEPHONE ENCOUNTER
Caller: patient    Doctor: Marina    Reason for call: patient will take the 2:45 pm appt today with Dr Gray. Per the [employer] message in chart    Call back#: [phone]

## 2024-09-09 NOTE — PROGRESS NOTES
Patient Name:  Dory Roberts  MRN:  [id number]    Assessment & Plan     1. S/P total knee arthroplasty, right  2. Status post fall  3. Contusion of right knee, initial encounter      59 y.o. female approximately 4.5 months s/p Right Total Knee Arthroplasty performed on  4/24/24 status post fall from standing height onto knees 3 weeks ago on 8/16/2024 with resolving hemorrhagic prepatellar bursitis/ecchymosis.    X-rays from the date of injury reviewed and discussed displaying well aligned prosthesis with the anterior knee soft tissue swelling.  Patient reports pain and range of motion continue to improve status post fall.  Patient may alternate between heat/cold compresses.   Advised to work with range of motion to work increase her flexion. Patient will continue with PT and HEP.  Patient will follow up in 1 month for repeat evaluation 6-month postoperative jolly and new x-rays of right knee.        History of the Present Illness   Dory Roberts is a 59 y.o. female approximately 4.5 months s/p Right Total Knee Arthroplasty performed on 4/24/24.  She was recently seen  in the ED for a fall on 8/16/2024. Patient states she went to get something to eat at 3 in the morning when her left left leg buckled and gave way resulting in a fall face forward to the ground. Increased swelling occurred in her right knee and a laceration to the face occurred that required stitches. She denied hearing a pop in her knee or numbness/tingling in her toes. She was experiencing calf pain prior to her fall on 8/10/24. An US was performed showing no evidence of a DVT and has resolved since then. She reports today using a single point cane to assist with ambulation. She feels the swelling has decreased since the fall but is still present with pain located anterior medially. [facility]   Patients son is present at today's office visit.    Review of Systems     Review of Systems   Constitutional:  Negative  "for chills and fever.   HENT:  Negative for ear pain and sore throat.    Eyes:  Negative for pain and visual disturbance.   Respiratory:  Negative for cough and shortness of breath.    Cardiovascular:  Negative for chest pain and palpitations.   Gastrointestinal:  Negative for abdominal pain and vomiting.   Genitourinary:  Negative for dysuria and hematuria.   Musculoskeletal:  Positive for arthralgias. Negative for back pain.   Skin:  Negative for color change and rash.   Neurological:  Negative for seizures and syncope.   All other systems reviewed and are negative.      Physical Exam     /72   Pulse 61   Ht 5' 6\" (1.676 m)   Wt 110 kg (242 lb)   BMI 39.06 kg/m²     Right Knee  Surgical incision well healed, without erythema or warmth.  There is tenderness over the inferior half of incision medially with associated resolving ecchymosis extending into lower leg and ankle    Range of motion from 3 to 90.    There is a trace effusion.    The patient is able to perform a straight leg raise     Calf soft, compressible and non tender  The patient is neurovascular intact distally.      Data Review     I have personally reviewed pertinent films in PACS, and my interpretation follows.    X-ray's right knee 8/16/2024 independently reviewed and displays no fracture or dislocation.  Hardware is intact status post total knee arthroplasty with no sign of loosening.  Anterior soft tissue swelling in the prepatellar region noted.        Social History     Tobacco Use    Smoking status: Former    Smokeless tobacco: Never   Vaping Use    Vaping status: Never Used   Substance Use Topics    Alcohol use: Yes     Alcohol/week: 6.0 standard drinks of alcohol     Types: 6 Standard drinks or equivalent per week     Comment: weekly    Drug use: Never           Procedures  None performed today  Scribe Attestation      I,:  Janeth Barrios am acting as a scribe while in the presence of the attending physician.:       I,:  " Marcelino Gray DO personally performed the services described in this documentation    as scribed in my presence.:              Marcelino Gray DO

## 2024-09-30 ENCOUNTER — EVALUATION (OUTPATIENT)
Dept: PHYSICAL THERAPY | Facility: CLINIC | Age: 59
End: 2024-09-30
Payer: COMMERCIAL

## 2024-09-30 DIAGNOSIS — Z96.651 S/P TOTAL KNEE ARTHROPLASTY, RIGHT: ICD-10-CM

## 2024-09-30 DIAGNOSIS — M25.561 RIGHT KNEE PAIN, UNSPECIFIED CHRONICITY: Primary | ICD-10-CM

## 2024-09-30 PROCEDURE — 97110 THERAPEUTIC EXERCISES: CPT

## 2024-09-30 PROCEDURE — 97112 NEUROMUSCULAR REEDUCATION: CPT

## 2024-09-30 PROCEDURE — 97161 PT EVAL LOW COMPLEX 20 MIN: CPT

## 2024-09-30 NOTE — PROGRESS NOTES
PT Evaluation     Today's date: 2024  Patient name: Dory Roberts  : 1965  MRN: [id number]  Referring provider: Samuel Vizcaino MD  Dx:   Encounter Diagnosis     ICD-10-CM    1. Right knee pain, unspecified chronicity  M25.561           Start Time: 1700  Stop Time: 1745  Total time in clinic (min): 45 minutes    Assessment  Impairments: abnormal or restricted ROM, activity intolerance, impaired physical strength, lacks appropriate home exercise program and pain with function  Functional limitations: squating, stair negotiation, standing  Symptom irritability: moderate    Assessment details: Pt is a 60 y/o female presenting to outpatient PT with a diagnosis of right knee pain following a fall about a month ago. Pt has history of right TKA in April with good response to therapy following. Upon examination, pt presents with mild knee edema, limited and painful knee AROM, decreased hip and knee strength, and right knee pain. Functionally, these impairments have led to difficulty with squatting, stair negotiation, and ambulation, which limits her ability to perform her usual ADLs and household tasks. Pt has good rehab potential to achieve stated goals and will benefit from skilled PT services to address aforementioned impairments in order to facilitate return to ADLs at prior level of function.    Understanding of Dx/Px/POC: good     Prognosis: good    Goals  STG - 4 weeks  1. Independent with HEP.  2. R knee flexion improved to at least 110 degrees with minimal to no pain to facilitate return to ADLs at prior level of function.     LTG - 8 weeks  1. Right hip and knee strength improved to at least 4/5 to facilitate return to ADLs at prior level of function.  2. FOTO score will improve by at least 10 points to demonstrate improved functional abilities.    Plan  Patient would benefit from: skilled physical therapy and PT eval  Planned modality interventions: cryotherapy and unattended electrical  stimulation    Planned therapy interventions: IASTM, joint mobilization, kinesiology taping, manual therapy, neuromuscular re-education, patient/caregiver education, strengthening, stretching, therapeutic activities, therapeutic exercise, functional ROM exercises, graded exercise, graded activity and home exercise program    Frequency: 1-2x week  Plan of Care beginning date: 2024  Plan of Care expiration date: 2024  Treatment plan discussed with: patient        Subjective Evaluation    History of Present Illness  Date of onset: 2024  Mechanism of injury: Pt current complaint is right knee pain following a fall. She was previously treated for R TKA a few months ago with good response to treatment. She notes the pain has gotten better since the initial injury. She notes difficulty with walking, stair negotiation. She notes she still feels a little unsteady and continues to have pain in left knee and right hip that was there prior to injury. She notes she uses the [facility] from time to time.  Quality of life: good    Patient Goals  Patient goals for therapy: increased strength, independence with ADLs/IADLs, increased motion and decreased pain    Pain  Current pain ratin  At best pain ratin  At worst pain ratin  Quality: dull ache and sharp  Aggravating factors: stair climbing and walking          Objective     Observations     Additional Observation Details  Mild ecchymosis and edema right knee    Palpation     Additional Palpation Details  TTP medial right knee    Active Range of Motion   Left Knee   Flexion: 105 degrees   Extension: -5 degrees     Right Knee   Flexion: 80 degrees   Extension: -3 degrees     Strength/Myotome Testing     Left Hip   Planes of Motion   Flexion: 3+    Right Hip   Planes of Motion   Flexion: 3+    Left Knee   Flexion: 4  Extension: 4    Right Knee   Flexion: 3+  Extension: 3+    Additional Strength Details  Pain with MMT both knees             Precautions: Hx of R  "TKA    POC expires Unit limit Auth Expiration date PT/OT + Visit Limit?   11/25 N/A 12/15 Auth req                 Visit/Unit Tracking  AUTH Status:  Date 9/30              6 Used 1               Remaining  5                  FOTO      Manuals 9/30            Knee flex PROM CG                                                   Neuro Re-Ed             Pt education on HEP, POC 8'                                                                                          Ther Ex             Heel slides A/AA 15x5\" ea            Rec bike for mobility 5'                                                                                          Ther Activity                                       Gait Training                                       Modalities                                            "

## 2024-09-30 NOTE — LETTER
2024    Samuel Vizcaino MD  [address] [place] [address]    Patient: Dory Roberts   YOB: 1965   Date of Visit: 2024     Encounter Diagnosis     ICD-10-CM    1. Right knee pain, unspecified chronicity  M25.561           Dear Dr. Vizcaino:    Thank you for your recent referral of Dory Roberts. Please review the attached evaluation summary from Dory's recent visit.     Please verify that you agree with the plan of care by signing the attached order.     If you have any questions or concerns, please do not hesitate to call.     I sincerely appreciate the opportunity to share in the care of one of your patients and hope to have another opportunity to work with you in the near future.       Sincerely,    Rossy Guerin, PT      Referring Provider:      I certify that I have read the below Plan of Care and certify the need for these services furnished under this plan of treatment while under my care.                    Samuel Vizcaino MD  [address] [place] [id number]  Via Fax: [phone]          PT Evaluation     Today's date: 2024  Patient name: Dory Roberts  : 1965  MRN: [id number]  Referring provider: Samuel Vizcaino MD  Dx:   Encounter Diagnosis     ICD-10-CM    1. Right knee pain, unspecified chronicity  M25.561           Start Time: 1700  Stop Time: 1745  Total time in clinic (min): 45 minutes    Assessment  Impairments: abnormal or restricted ROM, activity intolerance, impaired physical strength, lacks appropriate home exercise program and pain with function  Functional limitations: squating, stair negotiation, standing  Symptom irritability: moderate    Assessment details: Pt is a 60 y/o female presenting to outpatient PT with a diagnosis of right knee pain following a fall about a month ago. Pt has history of right TKA in April with good response to therapy following. Upon examination, pt presents with mild knee edema, limited and painful  knee AROM, decreased hip and knee strength, and right knee pain. Functionally, these impairments have led to difficulty with squatting, stair negotiation, and ambulation, which limits her ability to perform her usual ADLs and household tasks. Pt has good rehab potential to achieve stated goals and will benefit from skilled PT services to address aforementioned impairments in order to facilitate return to ADLs at prior level of function.    Understanding of Dx/Px/POC: good     Prognosis: good    Goals  STG - 4 weeks  1. Independent with HEP.  2. R knee flexion improved to at least 110 degrees with minimal to no pain to facilitate return to ADLs at prior level of function.     LTG - 8 weeks  1. Right hip and knee strength improved to at least 4/5 to facilitate return to ADLs at prior level of function.  2. FOTO score will improve by at least 10 points to demonstrate improved functional abilities.    Plan  Patient would benefit from: skilled physical therapy and PT eval  Planned modality interventions: cryotherapy and unattended electrical stimulation    Planned therapy interventions: IASTM, joint mobilization, kinesiology taping, manual therapy, neuromuscular re-education, patient/caregiver education, strengthening, stretching, therapeutic activities, therapeutic exercise, functional ROM exercises, graded exercise, graded activity and home exercise program    Frequency: 1-2x week  Plan of Care beginning date: 9/30/2024  Plan of Care expiration date: 11/25/2024  Treatment plan discussed with: patient        Subjective Evaluation    History of Present Illness  Date of onset: 8/16/2024  Mechanism of injury: Pt current complaint is right knee pain following a fall. She was previously treated for R TKA a few months ago with good response to treatment. She notes the pain has gotten better since the initial injury. She notes difficulty with walking, stair negotiation. She notes she still feels a little unsteady and continues  "to have pain in left knee and right hip that was there prior to injury. She notes she uses the [facility] from time to time.  Quality of life: good    Patient Goals  Patient goals for therapy: increased strength, independence with ADLs/IADLs, increased motion and decreased pain    Pain  Current pain ratin  At best pain ratin  At worst pain ratin  Quality: dull ache and sharp  Aggravating factors: stair climbing and walking          Objective     Observations     Additional Observation Details  Mild ecchymosis and edema right knee    Palpation     Additional Palpation Details  TTP medial right knee    Active Range of Motion   Left Knee   Flexion: 105 degrees   Extension: -5 degrees     Right Knee   Flexion: 80 degrees   Extension: -3 degrees     Strength/Myotome Testing     Left Hip   Planes of Motion   Flexion: 3+    Right Hip   Planes of Motion   Flexion: 3+    Left Knee   Flexion: 4  Extension: 4    Right Knee   Flexion: 3+  Extension: 3+    Additional Strength Details  Pain with MMT both knees             Precautions: Hx of R TKA    POC expires Unit limit Auth Expiration date PT/OT + Visit Limit?    N/A 12/15 Auth req                 Visit/Unit Tracking  AUTH Status:  Date               6 Used 1               Remaining  5                  FOTO      Manuals             Knee flex PROM CG                                                   Neuro Re-Ed             Pt education on HEP, POC 8'                                                                                          Ther Ex             Heel slides A/AA 15x5\" ea            Rec bike for mobility 5'                                                                                          Ther Activity                                       Gait Training                                       Modalities                                                            "

## 2024-10-08 ENCOUNTER — OFFICE VISIT (OUTPATIENT)
Dept: PHYSICAL THERAPY | Facility: CLINIC | Age: 59
End: 2024-10-08
Payer: COMMERCIAL

## 2024-10-08 DIAGNOSIS — M25.561 RIGHT KNEE PAIN, UNSPECIFIED CHRONICITY: Primary | ICD-10-CM

## 2024-10-08 DIAGNOSIS — Z96.651 S/P TOTAL KNEE ARTHROPLASTY, RIGHT: ICD-10-CM

## 2024-10-08 PROCEDURE — 97110 THERAPEUTIC EXERCISES: CPT

## 2024-10-08 PROCEDURE — 97112 NEUROMUSCULAR REEDUCATION: CPT

## 2024-10-08 NOTE — PROGRESS NOTES
"Daily Note     Today's date: 10/8/2024  Patient name: Dory Roberts  : 1965  MRN: [id number]  Referring provider: Samuel Vizcaino MD  Dx:   Encounter Diagnosis     ICD-10-CM    1. Right knee pain, unspecified chronicity  M25.561       2. S/P total knee arthroplasty, right  Z96.651           Start Time: 1800  Stop Time: 1840  Total time in clinic (min): 40 minutes    Subjective: Pt reports knee feels about the same since IE.       Objective: See treatment diary below      Assessment: Tolerated treatment well. Patient demonstrated fatigue post treatment, exhibited good technique with therapeutic exercises, and would benefit from continued PT. The patient is provided with cuing and demonstration with initiation of program to ensure proper form and technique with exercises. She was challenged by program but noted a reduction in stiffness and painful symptoms at end of session.       Plan: Progress treatment as tolerated.       Precautions: Hx of R TKA    POC expires Unit limit Auth Expiration date PT/OT + Visit Limit?    N/A 12/15 Auth req                 Visit/Unit Tracking  AUTH Status:  Date 9/30 10/8             6 Used 1 2              Remaining  5 4                 FOTO      Manuals 9/30 10/8           Knee flex PROM CG CG           STM for edema and pain  CG                                     Neuro Re-Ed             Pt education on HEP, POC 8'            QS w/ SLR  2x10 3\"           LAQ  2# 2x10           TKE thick band  Purple 2x10                                                  Ther Ex             Heel slides A/AA 15x5\" ea 15x5\"           Rec bike for mobility 5' 5'           HS stretch w/ strap R  2x1'           SL leg press  55# 2x10           Mini squats  2x10           Step up @ staircase  2x10                                     Ther Activity                                       Gait Training                                       Modalities                                            "

## 2024-10-15 ENCOUNTER — OFFICE VISIT (OUTPATIENT)
Dept: PHYSICAL THERAPY | Facility: CLINIC | Age: 59
End: 2024-10-15
Payer: COMMERCIAL

## 2024-10-15 DIAGNOSIS — Z96.651 S/P TOTAL KNEE ARTHROPLASTY, RIGHT: ICD-10-CM

## 2024-10-15 DIAGNOSIS — M25.561 RIGHT KNEE PAIN, UNSPECIFIED CHRONICITY: Primary | ICD-10-CM

## 2024-10-15 PROCEDURE — 97110 THERAPEUTIC EXERCISES: CPT

## 2024-10-15 PROCEDURE — 97112 NEUROMUSCULAR REEDUCATION: CPT

## 2024-10-15 NOTE — PROGRESS NOTES
"Daily Note     Today's date: 10/15/2024  Patient name: Dory Roberts  : 1965  MRN: [id number]  Referring provider: Samuel Vizcaino MD  Dx:   Encounter Diagnosis     ICD-10-CM    1. Right knee pain, unspecified chronicity  M25.561       2. S/P total knee arthroplasty, right  Z96.651           Start Time: 1600  Stop Time: 1640  Total time in clinic (min): 40 minutes    Subjective: Pt thinks her knee is getting better but still feels like there's \"fluid in the knee.\"      Objective: See treatment diary below      Assessment: Tolerated treatment well. Patient demonstrated fatigue post treatment, exhibited good technique with therapeutic exercises, and would benefit from continued PT. Pt continues to progress well through program with improved knee mobility and decreased stiffness at end of session. Verbal cues provided to ensure proper form and technique with exercises with good carryover noted.       Plan: Progress treatment as tolerated.       Precautions: Hx of R TKA    POC expires Unit limit Auth Expiration date PT/OT + Visit Limit?    N/A 12/15 Auth req                 Visit/Unit Tracking  AUTH Status:  Date 9/30 10/8 10/15            6 Used 1 2 3             Remaining  5 4 3                FOTO      Manuals 9/30 10/8 10/15          Knee flex PROM CG CG CG          STM for edema and pain  CG CG                                    Neuro Re-Ed             Pt education on HEP, POC 8'            QS w/ SLR  2x10 3\" 2x10 3\"           LAQ  2# 2x10 2# 2x10          TKE thick band  Purple 2x10 Purple 2x10                                                 Ther Ex             Heel slides A/AA 15x5\" ea 15x5\" 15x5\" ea          Rec bike for mobility 5' 5' 5'          HS stretch w/ strap R  2x1' 2x1'          SL leg press  55# 2x10 55# 2x10          Mini squats  2x10 2x10          Step up @ staircase  2x10 2x10                                    Ther Activity                                       Gait Training         "                               Modalities

## 2024-10-22 ENCOUNTER — OFFICE VISIT (OUTPATIENT)
Dept: PHYSICAL THERAPY | Facility: CLINIC | Age: 59
End: 2024-10-22
Payer: COMMERCIAL

## 2024-10-22 DIAGNOSIS — Z96.651 S/P TOTAL KNEE ARTHROPLASTY, RIGHT: ICD-10-CM

## 2024-10-22 DIAGNOSIS — M25.561 RIGHT KNEE PAIN, UNSPECIFIED CHRONICITY: Primary | ICD-10-CM

## 2024-10-22 PROCEDURE — 97112 NEUROMUSCULAR REEDUCATION: CPT

## 2024-10-22 PROCEDURE — 97110 THERAPEUTIC EXERCISES: CPT

## 2024-10-22 NOTE — PROGRESS NOTES
"Daily Note     Today's date: 10/22/2024  Patient name: Dory Roberts  : 1965  MRN: [id number]  Referring provider: Samuel Vizcaino MD  Dx:   Encounter Diagnosis     ICD-10-CM    1. Right knee pain, unspecified chronicity  M25.561       2. S/P total knee arthroplasty, right  Z96.651           Start Time: 1800  Stop Time: 1840  Total time in clinic (min): 40 minutes    Subjective: Pt reports she feels about the same since last week.       Objective: See treatment diary below      Assessment: Tolerated treatment well. Patient demonstrated fatigue post treatment, exhibited good technique with therapeutic exercises, and would benefit from continued PT. Added in knee flexion stretch at step to continue improving knee mobility within tolerance. She demonstrates improved knee mobility but continues to have mild edema around knee joint. May trial K tape for swelling next session.       Plan: Progress treatment as tolerated.       Precautions: Hx of R TKA    POC expires Unit limit Auth Expiration date PT/OT + Visit Limit?    N/A 12/15 Auth req                 Visit/Unit Tracking  AUTH Status:  Date 9/30 10/8 10/15 10/22           6 Used 1 2 3 4            Remaining  5 4 3 2               FOTO      Manuals 9/30 10/8 10/15 10/22         Knee flex PROM CG CG CG          STM for edema and pain  CG CG CG                                   Neuro Re-Ed             Pt education on HEP, POC 8'            QS w/ SLR  2x10 3\" 2x10 3\"  2x10 3\"         LAQ  2# 2x10 2# 2x10 2# 2x10         TKE thick band  Purple 2x10 Purple 2x10 Purple 2x10         Bridge    x10                                   Ther Ex             Heel slides A/AA 15x5\" ea 15x5\" 15x5\" ea 15x5\" ea         Rec bike for mobility 5' 5' 5' 5'         HS stretch w/ strap R  2x1' 2x1' 2x1'         SL leg press  55# 2x10 55# 2x10 55# 2x10         Mini squats  2x10 2x10 2x10         Step up @ staircase  2x10 2x10 2x10         Knee flexion str @ step    10x5\"           "            Ther Activity                                       Gait Training                                       Modalities

## 2024-10-31 ENCOUNTER — OFFICE VISIT (OUTPATIENT)
Dept: PHYSICAL THERAPY | Facility: CLINIC | Age: 59
End: 2024-10-31
Payer: COMMERCIAL

## 2024-10-31 DIAGNOSIS — Z96.651 S/P TOTAL KNEE ARTHROPLASTY, RIGHT: ICD-10-CM

## 2024-10-31 DIAGNOSIS — M25.561 RIGHT KNEE PAIN, UNSPECIFIED CHRONICITY: Primary | ICD-10-CM

## 2024-10-31 PROCEDURE — 97112 NEUROMUSCULAR REEDUCATION: CPT

## 2024-10-31 PROCEDURE — 97110 THERAPEUTIC EXERCISES: CPT

## 2024-10-31 NOTE — PROGRESS NOTES
"Daily Note     Today's date: 10/31/2024  Patient name: Dory Roberts  : 1965  MRN: [id number]  Referring provider: Samuel Vizcaino MD  Dx:   Encounter Diagnosis     ICD-10-CM    1. Right knee pain, unspecified chronicity  M25.561       2. S/P total knee arthroplasty, right  Z96.651           Start Time: 1230  Stop Time: 1316  Total time in clinic (min): 46 minutes    Subjective: Pt reports her knee still feels about the same, still has some bruising.      Objective: See treatment diary below      Assessment: Tolerated treatment well. Patient demonstrated fatigue post treatment, exhibited good technique with therapeutic exercises, and would benefit from continued PT. Progressed weights for multiple exercises as noted below to continue improving functional strength. Edema and ecchymosis continue to be present; applied k tape post tx and will assess affect next session.       Plan: Progress treatment as tolerated.       Precautions: Hx of R TKA    POC expires Unit limit Auth Expiration date PT/OT + Visit Limit?    N/A 12/15 Auth req                 Visit/Unit Tracking  AUTH Status:  Date 9/30 10/8 10/15 10/22 10/31          6 Used 1 2 3 4 5           Remaining  5 4 3 2 1              FOTO      Manuals 9/30 10/8 10/15 10/22 10/31        Knee flex PROM CG CG CG          STM for edema and pain  CG CG CG CG        K tape for edema     CG                     Neuro Re-Ed             Pt education on HEP, POC 8'            QS w/ SLR  2x10 3\" 2x10 3\"  2x10 3\" 2x10 3\"        LAQ  2# 2x10 2# 2x10 2# 2x10 3# 2x10        TKE thick band  Purple 2x10 Purple 2x10 Purple 2x10 Purple 2x10        Bridge    x10 x15                                  Ther Ex             Heel slides A/AA 15x5\" ea 15x5\" 15x5\" ea 15x5\" ea 15x5\"        Rec bike for mobility 5' 5' 5' 5' 5'        HS stretch w/ strap R  2x1' 2x1' 2x1' 2x1'        SL leg press  55# 2x10 55# 2x10 55# 2x10 65# 2x10        Mini squats  2x10 2x10 2x10 2x10        Step up " "@ staircase  2x10 2x10 2x10 2x10        Knee flexion str @ step    10x5\" 10\"x5                     Ther Activity                                       Gait Training                                       Modalities                                                  "

## 2024-11-04 ENCOUNTER — APPOINTMENT (OUTPATIENT)
Dept: PHYSICAL THERAPY | Facility: CLINIC | Age: 59
End: 2024-11-04
Payer: COMMERCIAL

## 2024-11-05 ENCOUNTER — EVALUATION (OUTPATIENT)
Dept: PHYSICAL THERAPY | Facility: CLINIC | Age: 59
End: 2024-11-05
Payer: COMMERCIAL

## 2024-11-05 DIAGNOSIS — M25.561 RIGHT KNEE PAIN, UNSPECIFIED CHRONICITY: Primary | ICD-10-CM

## 2024-11-05 DIAGNOSIS — Z96.651 S/P TOTAL KNEE ARTHROPLASTY, RIGHT: ICD-10-CM

## 2024-11-05 PROCEDURE — 97112 NEUROMUSCULAR REEDUCATION: CPT

## 2024-11-05 PROCEDURE — 97110 THERAPEUTIC EXERCISES: CPT

## 2024-11-05 NOTE — PROGRESS NOTES
PT Re-Evaluation     Today's date: 2024  Patient name: Dory Roberts  : 1965  MRN: [id number]  Referring provider: Samuel Vizcaino MD  Dx:   Encounter Diagnosis     ICD-10-CM    1. Right knee pain, unspecified chronicity  M25.561       2. S/P total knee arthroplasty, right  Z96.651             Start Time: 1800  Stop Time: 1840  Total time in clinic (min): 40 minutes    Assessment  Impairments: activity intolerance, impaired physical strength and pain with function  Functional limitations: squating, stair negotiation,  Symptom irritability: low    Assessment details: Pt is a 58 y/o female who has been seen in outpatient PT for right knee pain following a fall. Pt has history of right TKA in April with good response to therapy following. Upon reassessment, pt demonstrates improved knee AROM and strength as well as an overall reduction in painful symptoms. Ecchymosis and edema have decreased significantly with use of K tape. Functionally, these improvements have led to increased ease with squatting, standing, and stair negotiation. She still continues to feel limited with ambulation as knee still has occasional buckling and instability. She will continue to benefit from skilled PT to address remaining limitations in order to return to all ADLs at prior level of function.  Understanding of Dx/Px/POC: good     Prognosis: good    Goals  STG - 4 weeks  1. Independent with HEP. - met  2. R knee flexion improved to at least 110 degrees with minimal to no pain to facilitate return to ADLs at prior level of function. - in progress    LTG - 8 weeks  1. Right hip and knee strength improved to at least 4+/5 to facilitate return to ADLs at prior level of function. - in progress  2. FOTO score will improve by at least 10 points to demonstrate improved functional abilities. - met    Plan  Patient would benefit from: skilled physical therapy and PT eval  Planned modality interventions: cryotherapy and unattended  electrical stimulation    Planned therapy interventions: IASTM, joint mobilization, kinesiology taping, manual therapy, neuromuscular re-education, patient/caregiver education, strengthening, stretching, therapeutic activities, therapeutic exercise, functional ROM exercises, graded exercise, graded activity and home exercise program    Frequency: 1-2x week  Plan of Care beginning date: 2024  Plan of Care expiration date: 2024  Treatment plan discussed with: patient        Subjective Evaluation    History of Present Illness  Date of onset: 2024  Mechanism of injury: Pt current complaint is right knee pain following a fall. She was previously treated for R TKA a few months ago with good response to treatment. She notes the pain has gotten better since the initial injury. She notes difficulty with walking, stair negotiation. She notes she still feels a little unsteady and continues to have pain in left knee and right hip that was there prior to injury. She notes she uses the [facility] from time to time.     - Pt reports she has improved since starting skilled PT. She notes improved tolerance for walking and standing due to less pain. She states bruising has gone down with use of K tape. She still feels limited due to weakness and feels like the knee still zena occasionally. She is pleased with her progress and motivated to continue with PT.  Quality of life: good    Patient Goals  Patient goals for therapy: increased strength, independence with ADLs/IADLs, increased motion and decreased pain    Pain  Current pain ratin  At best pain ratin  At worst pain ratin  Quality: dull ache and sharp  Aggravating factors: stair climbing and walking          Objective     Observations     Additional Observation Details  Mild ecchymosis and edema right knee    Palpation     Additional Palpation Details  TTP medial right knee    Active Range of Motion   Left Knee   Flexion: 105 degrees   Extension: -5  "degrees     Right Knee   Flexion: 107 degrees   Extension: 0 degrees     Strength/Myotome Testing     Left Hip   Planes of Motion   Flexion: 3+    Right Hip   Planes of Motion   Flexion: 4+    Left Knee   Flexion: 4  Extension: 4    Right Knee   Flexion: 4+  Extension: 4    Additional Strength Details  Pain with resisted knee ext R             Precautions: Hx of R TKA    POC expires Unit limit Auth Expiration date PT/OT + Visit Limit?   12/31 N/A 12/15 Auth req                 Visit/Unit Tracking  AUTH Status:  Date 9/30 10/8 10/15 10/22 10/31 11/5 - FOTO         6 Used 1 2 3 4 5 6          Remaining  5 4 3 2 1              FOTO      Manuals 9/30 10/8 10/15 10/22 10/31 11/5       Knee flex PROM CG CG CG          STM for edema and pain  CG CG CG CG        K tape for edema     CG CG                    Neuro Re-Ed             Pt education on HEP, POC 8'            QS w/ SLR  2x10 3\" 2x10 3\"  2x10 3\" 2x10 3\" 2x10 3\"       LAQ  2# 2x10 2# 2x10 2# 2x10 3# 2x10 4# 2x10       TKE thick band  Purple 2x10 Purple 2x10 Purple 2x10 Purple 2x10 Purple 2x10       Bridge    x10 x15 2x10                                 Ther Ex             Heel slides A/AA 15x5\" ea 15x5\" 15x5\" ea 15x5\" ea 15x5\" 15x5\" ea       Rec bike for mobility 5' 5' 5' 5' 5' 5'       HS stretch w/ strap R  2x1' 2x1' 2x1' 2x1' 2x1'       SL leg press  55# 2x10 55# 2x10 55# 2x10 65# 2x10 65# 2x10       Mini squats  2x10 2x10 2x10 2x10 2x10       Step up @ staircase  2x10 2x10 2x10 2x10 2x10       Knee flexion str @ step    10x5\" 10\"x5 10\"x5                    Ther Activity                                       Gait Training                                       Modalities                                            "

## 2024-11-05 NOTE — LETTER
2024    Samuel Vizcaino MD  [address] [place] [id number]    Patient: Dory Roberts   YOB: 1965   Date of Visit: 2024     Encounter Diagnosis     ICD-10-CM    1. Right knee pain, unspecified chronicity  M25.561       2. S/P total knee arthroplasty, right  Z96.651           Dear Dr. Vizcaino:    Thank you for your recent referral of Dory Roberts. Please review the attached evaluation summary from Dory's recent visit.     Please verify that you agree with the plan of care by signing the attached order.     If you have any questions or concerns, please do not hesitate to call.     I sincerely appreciate the opportunity to share in the care of one of your patients and hope to have another opportunity to work with you in the near future.       Sincerely,    Rossy Guerin, PT      Referring Provider:      I certify that I have read the below Plan of Care and certify the need for these services furnished under this plan of treatment while under my care.                    Samuel Vizcaino MD  [address] [place] [id number]  Via Fax: [phone]          PT Re-Evaluation     Today's date: 2024  Patient name: Dory Roberts  : 1965  MRN: [id number]  Referring provider: Samuel Vizcaino MD  Dx:   Encounter Diagnosis     ICD-10-CM    1. Right knee pain, unspecified chronicity  M25.561       2. S/P total knee arthroplasty, right  Z96.651             Start Time: 1800  Stop Time: 1840  Total time in clinic (min): 40 minutes    Assessment  Impairments: activity intolerance, impaired physical strength and pain with function  Functional limitations: squating, stair negotiation,  Symptom irritability: low    Assessment details: Pt is a 58 y/o female who has been seen in outpatient PT for right knee pain following a fall. Pt has history of right TKA in April with good response to therapy following. Upon reassessment, pt demonstrates improved knee AROM and strength as  well as an overall reduction in painful symptoms. Ecchymosis and edema have decreased significantly with use of K tape. Functionally, these improvements have led to increased ease with squatting, standing, and stair negotiation. She still continues to feel limited with ambulation as knee still has occasional buckling and instability. She will continue to benefit from skilled PT to address remaining limitations in order to return to all ADLs at prior level of function.  Understanding of Dx/Px/POC: good     Prognosis: good    Goals  STG - 4 weeks  1. Independent with HEP. - met  2. R knee flexion improved to at least 110 degrees with minimal to no pain to facilitate return to ADLs at prior level of function. - in progress    LTG - 8 weeks  1. Right hip and knee strength improved to at least 4+/5 to facilitate return to ADLs at prior level of function. - in progress  2. FOTO score will improve by at least 10 points to demonstrate improved functional abilities. - met    Plan  Patient would benefit from: skilled physical therapy and PT eval  Planned modality interventions: cryotherapy and unattended electrical stimulation    Planned therapy interventions: IASTM, joint mobilization, kinesiology taping, manual therapy, neuromuscular re-education, patient/caregiver education, strengthening, stretching, therapeutic activities, therapeutic exercise, functional ROM exercises, graded exercise, graded activity and home exercise program    Frequency: 1-2x week  Plan of Care beginning date: 11/5/2024  Plan of Care expiration date: 12/31/2024  Treatment plan discussed with: patient        Subjective Evaluation    History of Present Illness  Date of onset: 8/16/2024  Mechanism of injury: Pt current complaint is right knee pain following a fall. She was previously treated for R TKA a few months ago with good response to treatment. She notes the pain has gotten better since the initial injury. She notes difficulty with walking, stair  negotiation. She notes she still feels a little unsteady and continues to have pain in left knee and right hip that was there prior to injury. She notes she uses the [facility] from time to time.     - Pt reports she has improved since starting skilled PT. She notes improved tolerance for walking and standing due to less pain. She states bruising has gone down with use of K tape. She still feels limited due to weakness and feels like the knee still zena occasionally. She is pleased with her progress and motivated to continue with PT.  Quality of life: good    Patient Goals  Patient goals for therapy: increased strength, independence with ADLs/IADLs, increased motion and decreased pain    Pain  Current pain ratin  At best pain ratin  At worst pain ratin  Quality: dull ache and sharp  Aggravating factors: stair climbing and walking          Objective     Observations     Additional Observation Details  Mild ecchymosis and edema right knee    Palpation     Additional Palpation Details  TTP medial right knee    Active Range of Motion   Left Knee   Flexion: 105 degrees   Extension: -5 degrees     Right Knee   Flexion: 107 degrees   Extension: 0 degrees     Strength/Myotome Testing     Left Hip   Planes of Motion   Flexion: 3+    Right Hip   Planes of Motion   Flexion: 4+    Left Knee   Flexion: 4  Extension: 4    Right Knee   Flexion: 4+  Extension: 4    Additional Strength Details  Pain with resisted knee ext R             Precautions: Hx of R TKA    POC expires Unit limit Auth Expiration date PT/OT + Visit Limit?    N/A 12/15 Auth req                 Visit/Unit Tracking  AUTH Status:  Date 9/30 10/8 10/15 10/22 10/31 11/5 - FOTO         6 Used 1 2 3 4 5 6          Remaining  5 4 3 2 1              FOTO      Manuals 9/30 10/8 10/15 10/22 10/31 11/5       Knee flex PROM CG CG CG          STM for edema and pain  CG CG CG CG        K tape for edema     CG CG                    Neuro Re-Ed             Pt  "education on HEP, POC 8'            QS w/ SLR  2x10 3\" 2x10 3\"  2x10 3\" 2x10 3\" 2x10 3\"       LAQ  2# 2x10 2# 2x10 2# 2x10 3# 2x10 4# 2x10       TKE thick band  Purple 2x10 Purple 2x10 Purple 2x10 Purple 2x10 Purple 2x10       Bridge    x10 x15 2x10                                 Ther Ex             Heel slides A/AA 15x5\" ea 15x5\" 15x5\" ea 15x5\" ea 15x5\" 15x5\" ea       Rec bike for mobility 5' 5' 5' 5' 5' 5'       HS stretch w/ strap R  2x1' 2x1' 2x1' 2x1' 2x1'       SL leg press  55# 2x10 55# 2x10 55# 2x10 65# 2x10 65# 2x10       Mini squats  2x10 2x10 2x10 2x10 2x10       Step up @ staircase  2x10 2x10 2x10 2x10 2x10       Knee flexion str @ step    10x5\" 10\"x5 10\"x5                    Ther Activity                                       Gait Training                                       Modalities                                                            "

## 2024-11-18 ENCOUNTER — OFFICE VISIT (OUTPATIENT)
Dept: PHYSICAL THERAPY | Facility: CLINIC | Age: 59
End: 2024-11-18
Payer: COMMERCIAL

## 2024-11-18 DIAGNOSIS — Z96.651 S/P TOTAL KNEE ARTHROPLASTY, RIGHT: ICD-10-CM

## 2024-11-18 DIAGNOSIS — M25.561 RIGHT KNEE PAIN, UNSPECIFIED CHRONICITY: Primary | ICD-10-CM

## 2024-11-18 PROCEDURE — 97110 THERAPEUTIC EXERCISES: CPT

## 2024-11-18 PROCEDURE — 97112 NEUROMUSCULAR REEDUCATION: CPT

## 2024-11-18 NOTE — PROGRESS NOTES
"Daily Note     Today's date: 2024  Patient name: Dory Roberts  : 1965  MRN: [id number]  Referring provider: Samuel Vizcaino MD  Dx:   Encounter Diagnosis     ICD-10-CM    1. Right knee pain, unspecified chronicity  M25.561       2. S/P total knee arthroplasty, right  Z96.651           Start Time: 1715  Stop Time: 1755  Total time in clinic (min): 40 minutes    Subjective: Pt reports her knee is stiff today but overall feels good.       Objective: See treatment diary below      Assessment: Tolerated treatment well. Patient demonstrated fatigue post treatment, exhibited good technique with therapeutic exercises, and would benefit from continued PT. Multiple progressions for weights and resistance added this session to continue improving functional strength and stability. She continues to be challenged by program but notes a reduction in stiffness at end of session.       Plan: Progress treatment as tolerated.       Precautions: Hx of R TKA    POC expires Unit limit Auth Expiration date PT/OT + Visit Limit?    N/A  Auth req                 Visit/Unit Tracking  AUTH Status:  Date               6 Used 1               Remaining  5                  FOTO      Manuals 9/30 10/8 10/15 10/22 10/31 11/5 11/18      Knee flex PROM CG CG CG          STM for edema and pain  CG CG CG CG        K tape for edema     CG CG CG                   Neuro Re-Ed             Pt education on HEP, POC 8'            QS w/ SLR  2x10 3\" 2x10 3\"  2x10 3\" 2x10 3\" 2x10 3\" 2x10 3\"      LAQ  2# 2x10 2# 2x10 2# 2x10 3# 2x10 4# 2x10 4# 2x10      TKE thick band  Purple 2x10 Purple 2x10 Purple 2x10 Purple 2x10 Purple 2x10 Grn 2x10      Bridge    x10 x15 2x10 2x10                                Ther Ex             Heel slides A/AA 15x5\" ea 15x5\" 15x5\" ea 15x5\" ea 15x5\" 15x5\" ea 15x5\" ea      Rec bike for mobility 5' 5' 5' 5' 5' 5' 5'      HS stretch w/ strap R  2x1' 2x1' 2x1' 2x1' 2x1' 2x1'      SL leg press  55# 2x10 55# 2x10 " "55# 2x10 65# 2x10 65# 2x10 75# 2x10      Mini squats  2x10 2x10 2x10 2x10 2x10 2x10      Step up @ staircase  2x10 2x10 2x10 2x10 2x10 2x10 8\"     Knee flexion str @ step    10x5\" 10\"x5 10\"x5 10\"x5                   Ther Activity                                       Gait Training                                       Modalities                                            "

## 2024-11-26 ENCOUNTER — OFFICE VISIT (OUTPATIENT)
Dept: PHYSICAL THERAPY | Facility: CLINIC | Age: 59
End: 2024-11-26
Payer: COMMERCIAL

## 2024-11-26 DIAGNOSIS — Z96.651 S/P TOTAL KNEE ARTHROPLASTY, RIGHT: ICD-10-CM

## 2024-11-26 DIAGNOSIS — M25.561 RIGHT KNEE PAIN, UNSPECIFIED CHRONICITY: Primary | ICD-10-CM

## 2024-11-26 PROCEDURE — 97112 NEUROMUSCULAR REEDUCATION: CPT

## 2024-11-26 PROCEDURE — 97110 THERAPEUTIC EXERCISES: CPT

## 2024-11-26 NOTE — PROGRESS NOTES
"Daily Note     Today's date: 2024  Patient name: Dory Roberts  : 1965  MRN: [id number]  Referring provider: Samuel Vizcaino MD  Dx:   Encounter Diagnosis     ICD-10-CM    1. Right knee pain, unspecified chronicity  M25.561       2. S/P total knee arthroplasty, right  Z96.651           Start Time: 1730  Stop Time: 1810  Total time in clinic (min): 40 minutes    Subjective: Pt reports her knee is feeling better and she is moving better.       Objective: See treatment diary below      Assessment: Tolerated treatment well. Patient demonstrated fatigue post treatment, exhibited good technique with therapeutic exercises, and would benefit from continued PT. Pt demonstrates improved knee mobility and decreased antalgic gait pattern upon observation this session. Progressed step height for step ups to continue improving functional strength with only fatigue noted at end of session.       Plan: Progress treatment as tolerated.       Precautions: Hx of R TKA    POC expires Unit limit Auth Expiration date PT/OT + Visit Limit?    N/A  Auth req                 Visit/Unit Tracking  AUTH Status:  Date              6 Used 1 2              Remaining  5 4                 FOTO      Manuals 9/30 10/8 10/15 10/22 10/31 11/5 11/18 11/25     Knee flex PROM CG CG CG          STM for edema and pain  CG CG CG CG        K tape for edema     CG CG CG CG                  Neuro Re-Ed             Pt education on HEP, POC 8'            QS w/ SLR  2x10 3\" 2x10 3\"  2x10 3\" 2x10 3\" 2x10 3\" 2x10 3\" 2x10 3\"     LAQ  2# 2x10 2# 2x10 2# 2x10 3# 2x10 4# 2x10 4# 2x10 4# 2x10     TKE thick band  Purple 2x10 Purple 2x10 Purple 2x10 Purple 2x10 Purple 2x10 Grn 2x10 Grn 2x10     Bridge    x10 x15 2x10 2x10 2x10                               Ther Ex             Heel slides A/AA 15x5\" ea 15x5\" 15x5\" ea 15x5\" ea 15x5\" 15x5\" ea 15x5\" ea 15x5\" ea     Rec bike for mobility 5' 5' 5' 5' 5' 5' 5' 5'     HS stretch w/ strap R  2x1' " "2x1' 2x1' 2x1' 2x1' 2x1' 2x1'     SL leg press  55# 2x10 55# 2x10 55# 2x10 65# 2x10 65# 2x10 75# 2x10 75# 3x10     Mini squats  2x10 2x10 2x10 2x10 2x10 2x10 2x10     Step up   2x10 2x10 2x10 2x10 2x10 2x10 8\" 2x10     Knee flexion str @ step    10x5\" 10\"x5 10\"x5 10\"x5 15x5\"                  Ther Activity                                       Gait Training                                       Modalities                                              "

## 2024-12-02 ENCOUNTER — OFFICE VISIT (OUTPATIENT)
Dept: URGENT CARE | Facility: CLINIC | Age: 59
End: 2024-12-02
Payer: COMMERCIAL

## 2024-12-02 VITALS
SYSTOLIC BLOOD PRESSURE: 120 MMHG | TEMPERATURE: 98 F | DIASTOLIC BLOOD PRESSURE: 82 MMHG | BODY MASS INDEX: 36.48 KG/M2 | WEIGHT: 226 LBS | RESPIRATION RATE: 18 BRPM | OXYGEN SATURATION: 98 %

## 2024-12-02 DIAGNOSIS — L03.039 PARONYCHIA OF GREAT TOE: ICD-10-CM

## 2024-12-02 DIAGNOSIS — B35.1 FUNGAL NAIL INFECTION: Primary | ICD-10-CM

## 2024-12-02 PROCEDURE — 99202 OFFICE O/P NEW SF 15 MIN: CPT | Performed by: PHYSICIAN ASSISTANT

## 2024-12-02 RX ORDER — CLINDAMYCIN HYDROCHLORIDE 300 MG/1
300 CAPSULE ORAL 3 TIMES DAILY
Qty: 21 CAPSULE | Refills: 0 | Status: SHIPPED | OUTPATIENT
Start: 2024-12-02 | End: 2024-12-09

## 2024-12-02 NOTE — PATIENT INSTRUCTIONS
Discussed with patient how she likely has a bacterial that great toe as well as a fungal infection of the nail itself.  The nail is loose, would advise against removing it today. Recommended referral to podiatry for further evaluation and consultation.  Recommended oral antibiotics for bacterial infection.      Follow up with PCP in 3-5 days.  Proceed to  ER if symptoms worsen.    If tests are performed, our office will contact you with results only if changes need to made to the care plan discussed with you at the visit. You can review your full results on [facility].

## 2024-12-02 NOTE — PROGRESS NOTES
[facility]        NAME: Dory Roberts is a 59 y.o. female  : 1965    MRN: [id number]  DATE: 2024  TIME: 6:16 PM    Assessment and Plan   Fungal nail infection [B35.1]  1. Fungal nail infection  Ambulatory Referral to Podiatry      2. Paronychia of great toe  clindamycin (CLEOCIN) 300 MG capsule            Patient Instructions     Patient Instructions   Discussed with patient how she likely has a bacterial that great toe as well as a fungal infection of the nail itself.  The nail is loose, would advise against removing it today. Recommended referral to podiatry for further evaluation and consultation.  Recommended oral antibiotics for bacterial infection.      Follow up with PCP in 3-5 days.  Proceed to  ER if symptoms worsen.    If tests are performed, our office will contact you with results only if changes need to made to the care plan discussed with you at the visit. You can review your full results on [facility].        Chief Complaint     Chief Complaint   Patient presents with    Fall     Fall from August had some toe issue.  Now it seems nail is infected from gel nail polish          History of Present Illness       Patient presents for evaluation of possible toe infection.  She states she had fallen in August and was having some foot/toe pain, but she was having more pain in her knee so she focused on that.  She states that she had gel polish on her toenails for a long time and she is not sure if that is what is causing the infection, but she has noticed some bad smells from her toe.  She states that her left great toe and the third toe have become discolored around the nail.  There is also some drainage from the nail of the great toe.  That toenail is very loose and it feels like it is about to fall off.        Review of Systems   Review of Systems   Skin:  Positive for wound.   All other systems reviewed and are negative.        Current Medications       Current  Outpatient Medications:     acetaminophen (TYLENOL) 325 mg tablet, Take 2 tablets (650 mg total) by mouth every 6 (six) hours, Disp: 60 tablet, Rfl: 0    albuterol (PROVENTIL HFA,VENTOLIN HFA) 90 mcg/act inhaler, Inhale 2 puffs every 6 (six) hours as needed for wheezing, Disp: , Rfl:     Ascorbic Acid (vitamin C) 1000 MG tablet, Take 1 tablet (1,000 mg total) by mouth daily, Disp: 30 tablet, Rfl: 1    aspirin (Aspirin Low Dose) 81 mg EC tablet, TAKE 1 TABLET BY MOUTH 2 TIMES A DAY., Disp: 60 tablet, Rfl: 3    ATORVASTATIN CALCIUM PO, Take 5 mg by mouth daily at bedtime, Disp: , Rfl:     busPIRone (BUSPAR) 10 mg tablet, Take 1 tablet by mouth 3 (three) times a day, Disp: , Rfl:     celecoxib (CeleBREX) 200 mg capsule, Take 1 capsule (200 mg total) by mouth 2 (two) times a day, Disp: 40 capsule, Rfl: 0    Cholecalciferol (Vitamin D) 125 MCG (5000 UT) CAPS, Take by mouth, Disp: , Rfl:     clindamycin (CLEOCIN) 300 MG capsule, Take 1 capsule (300 mg total) by mouth 3 (three) times a day for 7 days, Disp: 21 capsule, Rfl: 0    cyanocobalamin (VITAMIN B-12) 100 MCG tablet, Take 100 mcg by mouth daily, Disp: , Rfl:     Diclofenac Sodium (VOLTAREN) 1 %, Apply 2 g topically 4 (four) times a day, Disp: , Rfl:     docusate sodium (COLACE) 100 mg capsule, Take 1 capsule (100 mg total) by mouth 2 (two) times a day, Disp: 40 capsule, Rfl: 0    escitalopram (LEXAPRO) 5 mg tablet, Take 1 tablet by mouth daily, Disp: , Rfl:     gabapentin (NEURONTIN) 100 mg capsule, Take 1 capsule (100 mg total) by mouth 2 (two) times a day, Disp: 30 capsule, Rfl: 0    hydrOXYzine HCL (ATARAX) 25 mg tablet, Take 2 tablets by mouth daily at bedtime, Disp: , Rfl:     lidocaine (LIDODERM) 5 %, Apply topically, Disp: , Rfl:     losartan (COZAAR) 100 MG tablet, Take 100 mg by mouth daily before dinner, Disp: , Rfl:     methocarbamol (ROBAXIN) 500 mg tablet, Take 500 mg by mouth if needed for muscle spasms, Disp: , Rfl:     metoprolol succinate  (TOPROL-XL) 50 mg 24 hr tablet, 25 mg, Disp: , Rfl:     mometasone 220 mcg/actuation inhaler, Inhale 1 puff if needed Rinse mouth after use., Disp: , Rfl:     Multiple Vitamin (multivitamin) tablet, Take 1 tablet by mouth daily, Disp: 30 tablet, Rfl: 1    potassium chloride (Klor-Con M20) 20 mEq tablet, 20 mEq, Disp: , Rfl:     semaglutide, 1 mg/dose, (Ozempic, 1 MG/DOSE,) 2 mg/1.5 mL injection pen, Inject 1 mg under the skin every 7 days Mondays, Disp: , Rfl:     Alcohol Swabs 70 % PADS, Use 4 (four) times a day (Patient not taking: Reported on 7/15/2024), Disp: 120 each, Rfl: 0    Insulin Pen Needle (Pen Needles) 32G X 4 MM MISC, Use 4 (four) times a day for 14 days (Patient not taking: Reported on 1/3/2022), Disp: 56 each, Rfl: 0    oxyCODONE (ROXICODONE) 5 immediate release tablet, Take 1 tablet (5 mg total) by mouth every 4 (four) hours as needed for moderate pain Max Daily Amount: 30 mg (Patient not taking: Reported on 9/9/2024), Disp: 20 tablet, Rfl: 0    oxyCODONE-acetaminophen (Percocet) 7.5-325 MG per tablet, Take 1 tablet by mouth every 8 (eight) hours as needed for moderate pain for up to 15 doses Max Daily Amount: 3 tablets (Patient not taking: Reported on 9/9/2024), Disp: 15 tablet, Rfl: 0    traMADol (Ultram) 50 mg tablet, Take 1 tablet (50 mg total) by mouth every 6 (six) hours as needed for moderate pain (Patient not taking: Reported on 9/9/2024), Disp: 20 tablet, Rfl: 0    Current Allergies     Allergies as of 12/02/2024 - Reviewed 12/02/2024   Allergen Reaction Noted    Ancef [cefazolin] Itching and Cough 04/24/2024    Sulfa antibiotics Rash 07/26/2016            The following portions of the patient's history were reviewed and updated as appropriate: allergies, current medications, past family history, past medical history, past social history, past surgical history and problem list.     Past Medical History:   Diagnosis Date    Asthma 04/24/2024    Awareness under anesthesia     EGD    Diabetes  mellitus (HCC)     Hypertension        Past Surgical History:   Procedure Laterality Date    EGD      HERNIA REPAIR      umbillical    HYSTERECTOMY  05/2021    JOINT REPLACEMENT Right 04/24/2024    knee    KNEE SURGERY      MAMMO STEREOTACTIC BREAST BIOPSY RIGHT (ALL INC) Right 05/27/2022    OH ARTHRP KNE CONDYLE&PLATU MEDIAL&LAT COMPARTMENTS Right 04/24/2024    Procedure: ARTHROPLASTY KNEE TOTAL;  Surgeon: Marcelino Gray DO;  Location: [facility];  Service: Orthopedics       Family History   Problem Relation Age of Onset    Diabetes Mother     Diabetes Father     No Known Problems Maternal Grandmother     No Known Problems Maternal Grandfather     No Known Problems Paternal Grandmother     No Known Problems Paternal Grandfather     No Known Problems Son     No Known Problems Son     No Known Problems Maternal Aunt     No Known Problems Maternal Aunt     No Known Problems Maternal Aunt          Medications have been verified.        Objective   /82   Temp 98 °F (36.7 °C)   Resp 18   Wt 103 kg (226 lb)   SpO2 98%   BMI 36.48 kg/m²        Physical Exam     Physical Exam  Vitals and nursing note reviewed.   Constitutional:       Appearance: Normal appearance.   Skin:     General: Skin is warm and dry.      Comments: Left great toe nail is very loosely attached to the nailbed.  Nail is discolored yellow and thick.  Some slight purulent drainage and foul odor from the toe.  Left third toe nail is discolored yellow and thick.  No drainage.  No looseness of the nail.   Neurological:      General: No focal deficit present.      Mental Status: She is alert and oriented to person, place, and time.   Psychiatric:         Mood and Affect: Mood normal.         Behavior: Behavior normal.

## 2024-12-03 ENCOUNTER — OFFICE VISIT (OUTPATIENT)
Dept: PHYSICAL THERAPY | Facility: CLINIC | Age: 59
End: 2024-12-03
Payer: COMMERCIAL

## 2024-12-03 DIAGNOSIS — M25.561 RIGHT KNEE PAIN, UNSPECIFIED CHRONICITY: Primary | ICD-10-CM

## 2024-12-03 DIAGNOSIS — Z96.651 S/P TOTAL KNEE ARTHROPLASTY, RIGHT: ICD-10-CM

## 2024-12-03 PROCEDURE — 97112 NEUROMUSCULAR REEDUCATION: CPT

## 2024-12-03 PROCEDURE — 97110 THERAPEUTIC EXERCISES: CPT

## 2024-12-03 NOTE — PROGRESS NOTES
"Daily Note     Today's date: 12/3/2024  Patient name: Dory Roberts  : 1965  MRN: [id number]  Referring provider: Samuel Vizcaino MD  Dx:   Encounter Diagnosis     ICD-10-CM    1. Right knee pain, unspecified chronicity  M25.561       2. S/P total knee arthroplasty, right  Z96.651           Start Time: 1755  Stop Time: 1833  Total time in clinic (min): 38 minutes    Subjective: Pt reports her left knee is bothering her more than the right now.      Objective: See treatment diary below      Assessment: Tolerated treatment well. Patient demonstrated fatigue post treatment, exhibited good technique with therapeutic exercises, and would benefit from continued PT. Added in fwd step down this session to continue improving eccentric control and knee stability. She was challenged by progression but noted only muscle fatigue without reproduction of painful symptoms.       Plan: Progress treatment as tolerated.       Precautions: Hx of R TKA    POC expires Unit limit Auth Expiration date PT/OT + Visit Limit?    N/A  Auth req                 Visit/Unit Tracking  AUTH Status:  Date 11/18 11/25 12/3            6 Used 1 2 3             Remaining  5 4 3                FOTO      Manuals 9/30 10/8 10/15 10/22 10/31 11/5 11/18 11/25 12/3    Knee flex PROM CG CG CG          STM for edema and pain  CG CG CG CG        K tape for edema     CG CG CG CG                  Neuro Re-Ed             Pt education on HEP, POC 8'            QS w/ SLR  2x10 3\" 2x10 3\"  2x10 3\" 2x10 3\" 2x10 3\" 2x10 3\" 2x10 3\" 2x10 3\"    LAQ  2# 2x10 2# 2x10 2# 2x10 3# 2x10 4# 2x10 4# 2x10 4# 2x10 4# 2x10    TKE thick band  Purple 2x10 Purple 2x10 Purple 2x10 Purple 2x10 Purple 2x10 Grn 2x10 Grn 2x10 Grn 2x10    Bridge    x10 x15 2x10 2x10 2x10 2x10    Fwd step down         4\" x15                 Ther Ex             Heel slides A/AA 15x5\" ea 15x5\" 15x5\" ea 15x5\" ea 15x5\" 15x5\" ea 15x5\" ea 15x5\" ea 15x5\" ea    Rec bike for mobility 5' 5' 5' 5' 5' 5' " "5' 5' 5'    HS stretch w/ strap R  2x1' 2x1' 2x1' 2x1' 2x1' 2x1' 2x1' 2x1'    SL leg press  55# 2x10 55# 2x10 55# 2x10 65# 2x10 65# 2x10 75# 2x10 75# 3x10 75# 3x10    Mini squats  2x10 2x10 2x10 2x10 2x10 2x10 2x10 2x10    Step up   2x10 2x10 2x10 2x10 2x10 2x10 8\" 2x10 8\" 2x10    Knee flexion str @ step    10x5\" 10\"x5 10\"x5 10\"x5 15x5\" 15x5\"                 Ther Activity                                       Gait Training                                       Modalities                                                "

## 2024-12-10 ENCOUNTER — OFFICE VISIT (OUTPATIENT)
Dept: PHYSICAL THERAPY | Facility: CLINIC | Age: 59
End: 2024-12-10
Payer: COMMERCIAL

## 2024-12-10 DIAGNOSIS — Z96.651 S/P TOTAL KNEE ARTHROPLASTY, RIGHT: ICD-10-CM

## 2024-12-10 DIAGNOSIS — M25.561 RIGHT KNEE PAIN, UNSPECIFIED CHRONICITY: Primary | ICD-10-CM

## 2024-12-10 PROCEDURE — 97112 NEUROMUSCULAR REEDUCATION: CPT

## 2024-12-10 PROCEDURE — 97110 THERAPEUTIC EXERCISES: CPT

## 2024-12-10 NOTE — PROGRESS NOTES
"Daily Note     Today's date: 12/10/2024  Patient name: Dory Roberts  : 1965  MRN: [id number]  Referring provider: Samuel Vizcaino MD  Dx:   Encounter Diagnosis     ICD-10-CM    1. Right knee pain, unspecified chronicity  M25.561       2. S/P total knee arthroplasty, right  Z96.651                      Subjective: Pt reports her knee had increased pain over the weekend for no apparent reason.      Objective: See treatment diary below      Assessment: Tolerated treatment well. Patient demonstrated fatigue post treatment, exhibited good technique with therapeutic exercises, and would benefit from continued PT. Pt continues to work well through program noting a reduction in stiffness following session. Minimal verbal cuing required to ensure proper form and technique with exercises.       Plan: Progress treatment as tolerated.       Precautions: Hx of R TKA    POC expires Unit limit Auth Expiration date PT/OT + Visit Limit?    N/A  Auth req                 Visit/Unit Tracking  AUTH Status:  Date 11/18 11/25 12/3            6 Used 1 2 3             Remaining  5 4 3                FOTO      Manuals  10/8 10/15 10/22 10/31 11/5 11/18 11/25 12/3 12/10   Knee flex PROM  CG CG          STM for edema and pain  CG CG CG CG        K tape for edema     CG CG CG CG                  Neuro Re-Ed             Pt education on HEP, POC             QS w/ SLR  2x10 3\" 2x10 3\"  2x10 3\" 2x10 3\" 2x10 3\" 2x10 3\" 2x10 3\" 2x10 3\" 2x10 3\"   LAQ  2# 2x10 2# 2x10 2# 2x10 3# 2x10 4# 2x10 4# 2x10 4# 2x10 4# 2x10 4# 2x10   TKE thick band  Purple 2x10 Purple 2x10 Purple 2x10 Purple 2x10 Purple 2x10 Grn 2x10 Grn 2x10 Grn 2x10 GRN 3X10   Bridge    x10 x15 2x10 2x10 2x10 2x10 2x10   Fwd step down         4\" x15 4\" x20                Ther Ex             Heel slides A/AA  15x5\" 15x5\" ea 15x5\" ea 15x5\" 15x5\" ea 15x5\" ea 15x5\" ea 15x5\" ea 15x5\" ea   Rec bike for mobility  5' 5' 5' 5' 5' 5' 5' 5' 5'   HS stretch w/ strap R  2x1' 2x1' 2x1' " "2x1' 2x1' 2x1' 2x1' 2x1' 2x1'   SL leg press  55# 2x10 55# 2x10 55# 2x10 65# 2x10 65# 2x10 75# 2x10 75# 3x10 75# 3x10 75# 3X10   Mini squats  2x10 2x10 2x10 2x10 2x10 2x10 2x10 2x10 2X10   Step up   2x10 2x10 2x10 2x10 2x10 2x10 8\" 2x10 8\" 2x10 8\" 2x10   Knee flexion str @ step    10x5\" 10\"x5 10\"x5 10\"x5 15x5\" 15x5\" 15X5\"                Ther Activity                                       Gait Training                                       Modalities                                                  "

## 2024-12-17 ENCOUNTER — APPOINTMENT (OUTPATIENT)
Dept: PHYSICAL THERAPY | Facility: CLINIC | Age: 59
End: 2024-12-17
Payer: COMMERCIAL

## 2024-12-23 ENCOUNTER — APPOINTMENT (OUTPATIENT)
Dept: PHYSICAL THERAPY | Facility: CLINIC | Age: 59
End: 2024-12-23
Payer: COMMERCIAL

## 2024-12-26 ENCOUNTER — OFFICE VISIT (OUTPATIENT)
Dept: PHYSICAL THERAPY | Facility: CLINIC | Age: 59
End: 2024-12-26
Payer: COMMERCIAL

## 2024-12-26 DIAGNOSIS — M25.561 RIGHT KNEE PAIN, UNSPECIFIED CHRONICITY: Primary | ICD-10-CM

## 2024-12-26 DIAGNOSIS — Z96.651 S/P TOTAL KNEE ARTHROPLASTY, RIGHT: ICD-10-CM

## 2024-12-26 PROCEDURE — 97110 THERAPEUTIC EXERCISES: CPT

## 2024-12-26 PROCEDURE — 97112 NEUROMUSCULAR REEDUCATION: CPT

## 2024-12-26 NOTE — PROGRESS NOTES
"PT - Discharge    Today's date: 2024  Patient name: Dory Roberts  : 1965  MRN: [id number]  Referring provider:   Dx:   Encounter Diagnosis     ICD-10-CM    1. Right knee pain, unspecified chronicity  M25.561       2. S/P total knee arthroplasty, right  Z96.651           Start Time: 0830  Stop Time: 0910  Total time in clinic (min): 40 minutes    Subjective: Pt reports she is a little stiff today from the holiday but is ready fro discharge.      Objective: See treatment diary below      Assessment: Pt has progressed well with improved mobility and strength secondary to skilled PT. Functionally, these improvements have led to increased ease with ambulation, standing, and performance of her usual ADLs and household tasks. She feels prepared to be discharged to [facility]. HEP was reviewed and pt demonstrates good understanding of program. She was instructed to call with any concerns or if she needs to return in the future,      Plan:  Discharge to HEP.     Precautions: Hx of R TKA    POC expires Unit limit Auth Expiration date PT/OT + Visit Limit?    N/A  Auth req                 Visit/Unit Tracking  AUTH Status:  Date 11/18 11/25 12/3 12/26           6 Used 1 2 3 4            Remaining  5 4 3 2               FOTO - done       Manuals 12/26  10/15 10/22 10/31 11/5 11/18 11/25 12/3 12/10   Knee flex PROM   CG          STM for edema and pain   CG CG CG        K tape for edema     CG CG CG CG                  Neuro Re-Ed             Pt education on HEP, POC 4'            QS w/ SLR 2x10 3\"  2x10 3\"  2x10 3\" 2x10 3\" 2x10 3\" 2x10 3\" 2x10 3\" 2x10 3\" 2x10 3\"   LAQ 4# 2x10  2# 2x10 2# 2x10 3# 2x10 4# 2x10 4# 2x10 4# 2x10 4# 2x10 4# 2x10   TKE thick band Grn 2x10  Purple 2x10 Purple 2x10 Purple 2x10 Purple 2x10 Grn 2x10 Grn 2x10 Grn 2x10 GRN 3X10   Bridge 2x10   x10 x15 2x10 2x10 2x10 2x10 2x10   Fwd step down 4\" x20        4\" x15 4\" x20                Ther Ex             Heel slides A/AA 15x5\" ea  15x5\" " Physical Therapy  Cash Based Dry Needling Session    Dry Needle Visit: 1  Dry Needling Informed Consent Signed: Yes  Patient has been made aware of the cash based cost associated with each of the above procedures: Yes    DESCRIPTION OF IMPAIRMENT   Diagnosis: Right hip pain, muscle weakness, decreased ROM/joint stiffness   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    Patient is currently being treated in physical therapy, physical therapy initial evaluation completed on: 2/5/19  Based on assessment and response in physical therapy, it is recommended that the patient be seen for dry needle session to further assist patient in meeting their therapy goals.     SUBJECTIVE   Immediate relief with dry needling.  Really worked.  Helped for about a week.  Would like to continue with dry needling and use regular PT prn.  Walking on hills are still hard.  Some soreness from painting hallway.   Current Pain: 2/10    OBJECTIVE    Palpation:  Restrictions through gluteals and piriformis on right     Treatment   Education about indications, contraindications and potential side effects completed with patient.  Screen Completed   Precautions Contraindications   Local skin lesions  Lyme disease  Local lymphedema  Severe hyperalgesia/allodynia  Metal allergies: nickel and chromium  Abnormal bleeding tendency  Immunodeficiency and/or compromised immune system  Second or third trimester of pregnancy  Vascular Disease  History of spontaneous pneumothorax Local or systemic infections; including the flu  Over implants  Active cancer  Area of lymphatic compromise  Area of lumpectomy/mastectomy  First trimester of pregnancy     The following potential risks and adverse effects were reviewed  "ea 15x5\" ea 15x5\" 15x5\" ea 15x5\" ea 15x5\" ea 15x5\" ea 15x5\" ea   Rec bike for mobility 5'  5' 5' 5' 5' 5' 5' 5' 5'   HS stretch w/ strap R   2x1' 2x1' 2x1' 2x1' 2x1' 2x1' 2x1' 2x1'   SL leg press 75# 3x10  55# 2x10 55# 2x10 65# 2x10 65# 2x10 75# 2x10 75# 3x10 75# 3x10 75# 3X10   Mini squats 2x10  2x10 2x10 2x10 2x10 2x10 2x10 2x10 2X10   Step up  8\" 2x10  2x10 2x10 2x10 2x10 2x10 8\" 2x10 8\" 2x10 8\" 2x10   Knee flexion str @ step 15x5\"   10x5\" 10\"x5 10\"x5 10\"x5 15x5\" 15x5\" 15X5\"                Ther Activity                                       Gait Training                                       Modalities                                                    " with the patient:  bleeding, broken/retained needle, bruising, fainting/dizziness, infection, internal organ injury, nerve injury, pain during and after treatment, sweating, vasovagal response including cardiac arrest    Patient has consented to proceed with the intervention.    Dry Needling:   Needles Inserted 5   Needles Removed 5   Locations and needle sizes right   1. Glut med middle: 60 mm  2. Glut med posterior: 75 mm  3. Glut max superior medial with perpendicular placement: 40 mm  4. Glut min superior: 60 mm  5. Piriformis: 75 mm   Treatment Duration 15 minutes (8 minutes in active mobilization)   Patient Response Patient with twitches present in posterior glut med and piriformis muscles, with tenderness present at other locations.  Erythema WNL      Advised patient to monitor symptoms as day progresses, and to pay attention to changes in function or pain patterns.  Advised ok to apply ice as necessary to treat any soreness present in area.       ASSESSMENT AND FUTURE RECOMMENDATIONS    Response to treatment:  Improvement in pain and mobility noted after session  Re-assessment of dysfunctions following intervention demonstrates: Improved ease of transfers and gait.    Based on the above recommendation is to: Continue cash based service and Continue skilled therapy services prn    CASH BASED THERAPY DAILY BILLING   Untimed Procedures:  Dry Needling - Unlisted Physical Medicine/Rehabilitation Service Or Procedure  Total time spent with patient: 25 minutes

## 2025-03-03 ENCOUNTER — OFFICE VISIT (OUTPATIENT)
Dept: OBGYN CLINIC | Facility: CLINIC | Age: 60
End: 2025-03-03
Payer: COMMERCIAL

## 2025-03-03 ENCOUNTER — APPOINTMENT (OUTPATIENT)
Dept: RADIOLOGY | Facility: CLINIC | Age: 60
End: 2025-03-03
Payer: COMMERCIAL

## 2025-03-03 VITALS — WEIGHT: 215.6 LBS | BODY MASS INDEX: 34.65 KG/M2 | HEIGHT: 66 IN

## 2025-03-03 DIAGNOSIS — M17.12 PRIMARY OSTEOARTHRITIS OF LEFT KNEE: ICD-10-CM

## 2025-03-03 DIAGNOSIS — Z96.651 S/P TOTAL KNEE ARTHROPLASTY, RIGHT: ICD-10-CM

## 2025-03-03 DIAGNOSIS — Z96.651 S/P TOTAL KNEE ARTHROPLASTY, RIGHT: Primary | ICD-10-CM

## 2025-03-03 PROCEDURE — 20610 DRAIN/INJ JOINT/BURSA W/O US: CPT | Performed by: ORTHOPAEDIC SURGERY

## 2025-03-03 PROCEDURE — 99213 OFFICE O/P EST LOW 20 MIN: CPT | Performed by: ORTHOPAEDIC SURGERY

## 2025-03-03 PROCEDURE — 73560 X-RAY EXAM OF KNEE 1 OR 2: CPT

## 2025-03-03 RX ORDER — LIDOCAINE HYDROCHLORIDE 10 MG/ML
2 INJECTION, SOLUTION INFILTRATION; PERINEURAL
Status: COMPLETED | OUTPATIENT
Start: 2025-03-03 | End: 2025-03-03

## 2025-03-03 RX ORDER — METHYLPREDNISOLONE ACETATE 40 MG/ML
2 INJECTION, SUSPENSION INTRA-ARTICULAR; INTRALESIONAL; INTRAMUSCULAR; SOFT TISSUE
Status: COMPLETED | OUTPATIENT
Start: 2025-03-03 | End: 2025-03-03

## 2025-03-03 RX ORDER — BUPIVACAINE HYDROCHLORIDE 2.5 MG/ML
2 INJECTION, SOLUTION INFILTRATION; PERINEURAL
Status: COMPLETED | OUTPATIENT
Start: 2025-03-03 | End: 2025-03-03

## 2025-03-03 RX ADMIN — LIDOCAINE HYDROCHLORIDE 2 ML: 10 INJECTION, SOLUTION INFILTRATION; PERINEURAL at 13:00

## 2025-03-03 RX ADMIN — METHYLPREDNISOLONE ACETATE 2 ML: 40 INJECTION, SUSPENSION INTRA-ARTICULAR; INTRALESIONAL; INTRAMUSCULAR; SOFT TISSUE at 13:00

## 2025-03-03 RX ADMIN — BUPIVACAINE HYDROCHLORIDE 2 ML: 2.5 INJECTION, SOLUTION INFILTRATION; PERINEURAL at 13:00

## 2025-03-03 NOTE — PROGRESS NOTES
Name: Dory Roberts      : 1965      MRN: [id number]  Encounter Provider: Marcelino Gray DO  Encounter Date: 3/3/2025   Encounter department: [facility] [place]  :  Assessment & Plan  S/P total knee arthroplasty, right  DOS 2024   Doing well overall  Mild quadriceps weakness   Residual pain could be from hip OA   Orders:    XR knee 1 or 2 vw right; Future    Primary osteoarthritis of left knee  Quadriceps weakness   Orders:    Large joint arthrocentesis: L knee      Approximately 10 months s/p Right Total Knee Arthroplasty performed on 24,   X-rays reviewed and discussed with patient  Patient to be full weightbearing to BLE (Bilateral Lower Extremity)  ROM as tolerated to  BLE (Bilateral Lower Extremity)  Discussed treatment options moving forward including over the counter analgesics and topical creams, gentle range of motion, physical therapy, bracing, corticosteroid injections, viscosupplementation, total knee arthoplasty   Patient to begin home exercise plan for strength and mobility training, exercises placed in AVS  Patient to continue at home analgesic regimen with Tylenol   The patient was offered a corticosteroid injection for their left knee. They tolerated the procedure well.  The patient was educated they may have some irritation in the next few days and should rest, ice, elevate and perform gentle range of motion exercises. They were advised the medicine should begin to work in a few days time.  The patient was informed corticosteroid injections can be repeated at the earliest every 3 months. corticosteroid injection.    Patient to follow up as needed.          History of the Present Illness   History of Present Illness   HPI   Dory Roberts is a 59 y.o. female approximately 10 months s/p Right Total Knee Arthroplasty performed on  24. The patient is doing well overall. She notes she continues to have discomfort in the anterior right knee  "from her fall back in August. She notes left knee and low back pain as well.  She continues to have anterolateral right knee numbness from surgery. She has not been keeping up with her home exercise plan regularly. She has increased her walking and is able to walk 10 minutes now. She is using oxycodone 5 mg by [facility] and Tylenol as needed for symptom management.  Left knee pain is worsened with walking and strenuous activities. She requests a repeat injection today.         Review of Systems     Review of Systems   Constitutional:  Negative for appetite change and unexpected weight change.   HENT:  Negative for congestion and trouble swallowing.    Eyes:  Negative for visual disturbance.   Respiratory:  Negative for cough and shortness of breath.    Cardiovascular:  Negative for chest pain and palpitations.   Gastrointestinal:  Negative for nausea and vomiting.   Endocrine: Negative for cold intolerance and heat intolerance.   Musculoskeletal:  Negative for joint swelling and myalgias.   Skin:  Negative for rash.   Neurological:  Negative for numbness.       Physical Exam   Objective   Ht 5' 6\" (1.676 m)   Wt 97.8 kg (215 lb 9.6 oz)   BMI 34.80 kg/m²        Right Knee  Surgical incision is well healed.   Chronic  No hematoma   Range of motion from 0 to 120.    There is no effusion.    There is minimal tenderness over the anterior knee.    The patient is able to perform a straight leg raise with 4+/5.    Varus stress testing reveals no instability at 0 and 30 degrees   Valgus stress testing reveals no instability at 0 and 30 degrees  The patient is neurovascular intact distally.    Left Knee  Range of motion from 5 to 110 with pain at terminal flexion.    There is crepitus with range of motion.   There is no effusion.    There is tenderness over the medial joint line.    There is 4/5 quadriceps strength .  The patient is able to perform a straight leg raise.    positive  patellar grind " "test.  Varus stress testing reveals no instability at 0 and 30 degrees   Valgus stress testing reveals no instability at 0 and 30 degrees  The patient is neurovascular intact distally.        Data Review     I have personally reviewed pertinent films in PACS, and my interpretation follows.    X-rays taken 07/15/2024 of Left knee demonstrates moderate to severe tricompartmental degenerative changes and osteophytes. No obvious acute fracture or dislocation noted.     X-rays taken 3/3/2025 of Right knee independently reviewed and demonstrate total knee arthroplasty in anatomic alignment without evidence of loosening, failure, dislocation or fracture.      Social History     Tobacco Use    Smoking status: Former    Smokeless tobacco: Never   Vaping Use    Vaping status: Never Used   Substance Use Topics    Alcohol use: Yes     Alcohol/week: 6.0 standard drinks of alcohol     Types: 6 Standard drinks or equivalent per week     Comment: weekly    Drug use: Never           Large joint arthrocentesis: L knee  Universal Protocol:  Consent: Verbal consent obtained.  Risks and benefits: risks, benefits and alternatives were discussed  Consent given by: patient  Time out: Immediately prior to procedure a \"time out\" was called to verify the correct patient, procedure, equipment, support staff and site/side marked as required.  Patient understanding: patient states understanding of the procedure being performed  Site marked: the operative site was marked  Patient identity confirmed: verbally with patient  Supporting Documentation  Indications: pain   Procedure Details  Location: knee - L knee  Preparation: Patient was prepped and draped in the usual sterile fashion  Needle size: 22 G  Ultrasound guidance: no  Approach: anterolateral (seated)  Medications administered: 2 mL lidocaine 1 %; 2 mL methylPREDNISolone acetate 40 mg/mL; 2 mL bupivacaine 0.25 %    Patient tolerance: patient tolerated the procedure well with no immediate " complications  Dressing:  Sterile dressing applied            Fatemeh Spring   Scribe Attestation      I,:  Fatemeh Spring am acting as a scribe while in the presence of the attending physician.:       I,:  Marcelino Gray DO personally performed the services described in this documentation    as scribed in my presence.:

## 2025-03-03 NOTE — ASSESSMENT & PLAN NOTE
DOS 4/24/2024   Doing well overall  Mild quadriceps weakness   Residual pain could be from hip OA   Orders:    XR knee 1 or 2 vw right; Future

## 2025-03-03 NOTE — PATIENT INSTRUCTIONS
Home exercises     Quad sets:  Flatten out knee by tightening quad muscles.  Hold for 3 seconds.  10-15 reps  3 sets          Also perform above exercises with toes turned toward the outside.        Also perform above exercises with toes turned toward the outside.        Short arc quad sets:  Hold for 3 seconds at the top.  Perform 10-15 reps  3 sets           Glute bridges:  Hips up and squeeze buttocks  Advance with single leg glute bridge once glute bridges are easy.  Hold buttocks for 3-5 seconds at the top  Perform 10-15 reps  3 sets         Clam Shells  Lay on your side.  Perform without resistance at knees to begin.  Engage your core and lift your side off the floor.  Lift your knee in the air by using your glute muscles. Foot/ankle does not come off the other ankle.  3 x 10  Hold for 3 seconds at the top.  Progress to resisted clam shell with resiance band at knees           Hamstring stretch  Place heel on chair and apply overpressure to your quad  Hold for 10-30 seconds  Perform 5-10 times         Knee Flexion  Use a belt and loop it around your foot or ankle. Slide your heel on the ground to bend your knee and hold at a point of tension.  Hold stretch for 20-30 seconds   Perform 10-20 times.    Knee Flexion  Use your opposite leg and place over the involved side. Use the uninjured leg to bend the involved side.  Hold for 20-30 seconds  Perform 10-20 times

## 2025-06-02 NOTE — PROGRESS NOTES
PT Evaluation     Today's date: 6/3/2025  Patient name: Dory Roberts  : 1965  MRN: [id number]  Referring provider: Samuel Vizcaino MD  Dx:   Encounter Diagnosis     ICD-10-CM    1. Primary osteoarthritis of right knee  M17.11             Start Time: 930  Stop Time: 1015  Total time in clinic (min): 45 minutes    Assessment  Impairments: abnormal gait, activity intolerance, impaired physical strength, lacks appropriate home exercise program and pain with function  Functional limitations: squating, stair negotiation, ambulation  Symptom irritability: moderate    Assessment details: Pt is a 58 y/o female presenting to outpatient PT with a diagnosis of right knee OA. Upon examination, pt presents with normal knee AROM bilateral, decreased hip and knee strength, and bilateral knee pain. Functionally, these impairments have led to difficulty with squatting, stair negotiation, and ambulation, which limits her ability to perform her usual ADLs and household tasks. Pt has good rehab potential to achieve stated goals and will benefit from skilled PT services to address aforementioned impairments in order to facilitate return to ADLs at prior level of function.    Understanding of Dx/Px/POC: good     Prognosis: good    Goals  STG - 4 weeks  1. Independent with HEP.  2. Pt able to ambulate with minimal to no gait deviations.    LTG - 8 weeks  1. Hip and knee strength improved to at least 4+/5 to facilitate return to ADLs at prior level of function.  2. FOTO score will improve by at least 10 points to demonstrate improved functional abilities.    Plan  Patient would benefit from: skilled physical therapy and PT eval  Planned modality interventions: cryotherapy and unattended electrical stimulation    Planned therapy interventions: IASTM, joint mobilization, kinesiology taping, manual therapy, neuromuscular re-education, patient/caregiver education, strengthening, stretching, therapeutic activities, therapeutic  exercise, functional ROM exercises, graded exercise, graded activity and home exercise program    Frequency: 1-2x week  Plan of Care beginning date: 6/3/2025  Plan of Care expiration date: 2025  Treatment plan discussed with: patient        Subjective Evaluation    History of Present Illness  Date of onset: 2025  Mechanism of injury: Pt current complaint is right knee pain following a fall. She was previously treated for right knee multiple times over the last year. Pt current complaint is bilateral knee pain. Pt notes recent weight loss after being on Ozempic. Pt reports some difficulty with prolonged walking, stair negotiation, sit to stand. She currently is walking with a SPC for assistance.   Quality of life: good    Patient Goals  Patient goals for therapy: increased strength, independence with ADLs/IADLs, increased motion and decreased pain    Pain  Current pain ratin  At best pain ratin  At worst pain ratin  Quality: dull ache and sharp  Aggravating factors: stair climbing and walking          Objective     Observations     Additional Observation Details  Mild antalgic gait pattern    Active Range of Motion   Left Knee   Flexion: 115 degrees   Extension: 5 degrees     Right Knee   Flexion: 117 degrees   Extension: 3 degrees     Strength/Myotome Testing     Left Hip   Planes of Motion   Flexion: 3+  Extension: 3+  Abduction: 3+    Right Hip   Planes of Motion   Flexion: 3+  Extension: 3+  Abduction: 3+    Left Knee   Flexion: 3+  Extension: 3+    Right Knee   Flexion: 4  Extension: 4             Precautions: Hx of R TKA    POC expires Unit limit Auth Expiration date PT/OT + Visit Limit?    N/A  Auth req                 Visit/Unit Tracking  AUTH Status:  Date 6/3              6 Used 1               Remaining  5                  FOTO      Manuals 6/3                                                                Neuro Re-Ed             Pt education on HEP, POC 8'            Bridge  "2x10 3\"                                                                             Ther Ex             Rec bike for mobility 5'            SLR B 2x10 ea            S/L hip abd B 2x10 ea                                                                Ther Activity                                       Gait Training                                       Modalities                                            "

## 2025-06-03 ENCOUNTER — EVALUATION (OUTPATIENT)
Dept: PHYSICAL THERAPY | Facility: CLINIC | Age: 60
End: 2025-06-03
Payer: COMMERCIAL

## 2025-06-03 DIAGNOSIS — M17.11 PRIMARY OSTEOARTHRITIS OF RIGHT KNEE: Primary | ICD-10-CM

## 2025-06-03 PROCEDURE — 97112 NEUROMUSCULAR REEDUCATION: CPT

## 2025-06-03 PROCEDURE — 97161 PT EVAL LOW COMPLEX 20 MIN: CPT

## 2025-06-03 PROCEDURE — 97110 THERAPEUTIC EXERCISES: CPT

## 2025-06-03 NOTE — LETTER
Nina 3, 2025    Samuel Vizcaino MD  [address] [address]    Patient: Dory Roberts   YOB: 1965   Date of Visit: 6/3/2025     Encounter Diagnosis     ICD-10-CM    1. Primary osteoarthritis of right knee  M17.11           Dear Dr. Samuel Vizcaino MD:    Thank you for your recent referral of Dory Roberts. Please review the attached evaluation summary from Dory's recent visit.     Please verify that you agree with the plan of care by signing the attached order.     If you have any questions or concerns, please do not hesitate to call.     I sincerely appreciate the opportunity to share in the care of one of your patients and hope to have another opportunity to work with you in the near future.       Sincerely,    Rossy Guerin, PT      Referring Provider:      I certify that I have read the below Plan of Care and certify the need for these services furnished under this plan of treatment while under my care.                    Samuel Vizcaino MD  [address] [address]  Via Fax: [phone]          PT Evaluation     Today's date: 6/3/2025  Patient name: Dory Roberts  : 1965  MRN: [id number]  Referring provider: Samuel Vizcaino MD  Dx:   Encounter Diagnosis     ICD-10-CM    1. Primary osteoarthritis of right knee  M17.11             Start Time: 930  Stop Time: 1015  Total time in clinic (min): 45 minutes    Assessment  Impairments: abnormal gait, activity intolerance, impaired physical strength, lacks appropriate home exercise program and pain with function  Functional limitations: squating, stair negotiation, ambulation  Symptom irritability: moderate    Assessment details: Pt is a 58 y/o female presenting to outpatient PT with a diagnosis of right knee OA. Upon examination, pt presents with normal knee AROM bilateral, decreased hip and knee strength, and bilateral knee pain. Functionally, these impairments have  led to difficulty with squatting, stair negotiation, and ambulation, which limits her ability to perform her usual ADLs and household tasks. Pt has good rehab potential to achieve stated goals and will benefit from skilled PT services to address aforementioned impairments in order to facilitate return to ADLs at prior level of function.    Understanding of Dx/Px/POC: good     Prognosis: good    Goals  STG - 4 weeks  1. Independent with HEP.  2. Pt able to ambulate with minimal to no gait deviations.    LTG - 8 weeks  1. Hip and knee strength improved to at least 4+/5 to facilitate return to ADLs at prior level of function.  2. FOTO score will improve by at least 10 points to demonstrate improved functional abilities.    Plan  Patient would benefit from: skilled physical therapy and PT eval  Planned modality interventions: cryotherapy and unattended electrical stimulation    Planned therapy interventions: IASTM, joint mobilization, kinesiology taping, manual therapy, neuromuscular re-education, patient/caregiver education, strengthening, stretching, therapeutic activities, therapeutic exercise, functional ROM exercises, graded exercise, graded activity and home exercise program    Frequency: 1-2x week  Plan of Care beginning date: 6/3/2025  Plan of Care expiration date: 7/29/2025  Treatment plan discussed with: patient        Subjective Evaluation    History of Present Illness  Date of onset: 4/5/2025  Mechanism of injury: Pt current complaint is right knee pain following a fall. She was previously treated for right knee multiple times over the last year. Pt current complaint is bilateral knee pain. Pt notes recent weight loss after being on Ozempic. Pt reports some difficulty with prolonged walking, stair negotiation, sit to stand. She currently is walking with a SPC for assistance.   Quality of life: good    Patient Goals  Patient goals for therapy: increased strength, independence with ADLs/IADLs, increased motion  "and decreased pain    Pain  Current pain ratin  At best pain ratin  At worst pain ratin  Quality: dull ache and sharp  Aggravating factors: stair climbing and walking          Objective     Observations     Additional Observation Details  Mild antalgic gait pattern    Active Range of Motion   Left Knee   Flexion: 115 degrees   Extension: 5 degrees     Right Knee   Flexion: 117 degrees   Extension: 3 degrees     Strength/Myotome Testing     Left Hip   Planes of Motion   Flexion: 3+  Extension: 3+  Abduction: 3+    Right Hip   Planes of Motion   Flexion: 3+  Extension: 3+  Abduction: 3+    Left Knee   Flexion: 3+  Extension: 3+    Right Knee   Flexion: 4  Extension: 4             Precautions: Hx of R TKA    POC expires Unit limit Auth Expiration date PT/OT + Visit Limit?    N/A  Auth req                 Visit/Unit Tracking  AUTH Status:  Date 6/3              6 Used 1               Remaining  5                  FOTO      Manuals 6/3                                                                Neuro Re-Ed             Pt education on HEP, POC 8'            Bridge 2x10 3\"                                                                             Ther Ex             Rec bike for mobility 5'            SLR B 2x10 ea            S/L hip abd B 2x10 ea                                                                Ther Activity                                       Gait Training                                       Modalities                                                            "

## 2025-06-10 ENCOUNTER — OFFICE VISIT (OUTPATIENT)
Dept: PHYSICAL THERAPY | Facility: CLINIC | Age: 60
End: 2025-06-10
Attending: INTERNAL MEDICINE
Payer: COMMERCIAL

## 2025-06-10 DIAGNOSIS — M17.11 PRIMARY OSTEOARTHRITIS OF RIGHT KNEE: Primary | ICD-10-CM

## 2025-06-10 PROCEDURE — 97110 THERAPEUTIC EXERCISES: CPT

## 2025-06-10 PROCEDURE — 97112 NEUROMUSCULAR REEDUCATION: CPT

## 2025-06-10 PROCEDURE — 97530 THERAPEUTIC ACTIVITIES: CPT

## 2025-06-10 NOTE — PROGRESS NOTES
"Daily Note     Today's date: 6/10/2025  Patient name: Dory Roberts  : 1965  MRN: [id number]  Referring provider: Samuel Vizcaino MD  Dx:   Encounter Diagnosis     ICD-10-CM    1. Primary osteoarthritis of right knee  M17.11           Start Time: 1755  Stop Time: 1833  Total time in clinic (min): 38 minutes    Subjective: Pt reports she felt good after IE.      Objective: See treatment diary below      Assessment: Tolerated treatment well. Patient demonstrated fatigue post treatment, exhibited good technique with therapeutic exercises, and would benefit from continued PT. The patient was provided with cuing and demonstration with initiation of program to ensure proper form and technique with exercises. She was challenged by program but noted only fatigue at end of session.       Plan: Progress treatment as tolerated.       Precautions: Hx of R TKA    POC expires Unit limit Auth Expiration date PT/OT + Visit Limit?    N/A  Auth req                 Visit/Unit Tracking  AUTH Status:  Date 6/3 6/10             6 Used 1 2              Remaining  5 4                 FOTO      Manuals 6/3 6/10                                                               Neuro Re-Ed             Pt education  8' 3'           Bridge 2x10 3\" 2x10 3\"           LAQ  3# 2x10 ea                                                               Ther Ex             Rec bike for mobility 5' 5'           SLR B 2x10 ea 2x10 ea           S/L hip abd B 2x10 ea 2x10 ea           Leg press  105# 2x10           TRX squat  2x10                                     Ther Activity             STS  2x10           Step up  6\" 2x10 ea           Gait Training                                       Modalities                                            "

## 2025-06-17 ENCOUNTER — OFFICE VISIT (OUTPATIENT)
Dept: PHYSICAL THERAPY | Facility: CLINIC | Age: 60
End: 2025-06-17
Attending: INTERNAL MEDICINE
Payer: COMMERCIAL

## 2025-06-17 DIAGNOSIS — M17.11 PRIMARY OSTEOARTHRITIS OF RIGHT KNEE: Primary | ICD-10-CM

## 2025-06-17 PROCEDURE — 97530 THERAPEUTIC ACTIVITIES: CPT

## 2025-06-17 PROCEDURE — 97110 THERAPEUTIC EXERCISES: CPT

## 2025-06-17 PROCEDURE — 97112 NEUROMUSCULAR REEDUCATION: CPT

## 2025-06-17 NOTE — PROGRESS NOTES
"Daily Note     Today's date: 2025  Patient name: Dory Roberts  : 1965  MRN: [id number]  Referring provider: Samuel Vizcaino MD  Dx:   Encounter Diagnosis     ICD-10-CM    1. Primary osteoarthritis of right knee  M17.11           Start Time: 1631  Stop Time: 1709  Total time in clinic (min): 38 minutes    Subjective: Pt reports her left knee is very achy today and thinks it could be due to the weather.       Objective: See treatment diary below      Assessment: Tolerated treatment well. Patient demonstrated fatigue post treatment, exhibited good technique with therapeutic exercises, and would benefit from continued PT. Held on progressing program this session to avoid further aggravation of painful symptoms. Pt continues to be adequately challenged by program requiring verbal cues to improve motor control and prevent muscle compensation.       Plan: Progress treatment as tolerated.       Precautions: Hx of R TKA    POC expires Unit limit Auth Expiration date PT/OT + Visit Limit?    N/A  Auth req                 Visit/Unit Tracking  AUTH Status:  Date 6/3 6/10 6/17            6 Used 1 2 3             Remaining  5 4 3                FOTO      Manuals 6/3 6/10 6/17                                                              Neuro Re-Ed             Pt education  8' 3'           Bridge 2x10 3\" 2x10 3\" 2x10 3\"          LAQ  3# 2x10 ea 3# 2x10 ea                                                              Ther Ex             Rec bike for mobility 5' 5' 5'          SLR B 2x10 ea 2x10 ea 2x10 ea          S/L hip abd B 2x10 ea 2x10 ea 2x10 ea          Leg press  105# 2x10 105# 2x10          TRX squat  2x10 2x10                                    Ther Activity             STS  2x10 2x10          Step up  6\" 2x10 ea 6\" 2x10 ea          Lat             Gait Training                                       Modalities                                              "

## 2025-06-24 ENCOUNTER — OFFICE VISIT (OUTPATIENT)
Dept: PHYSICAL THERAPY | Facility: CLINIC | Age: 60
End: 2025-06-24
Attending: INTERNAL MEDICINE
Payer: COMMERCIAL

## 2025-06-24 DIAGNOSIS — M17.11 PRIMARY OSTEOARTHRITIS OF RIGHT KNEE: Primary | ICD-10-CM

## 2025-06-24 PROCEDURE — 97530 THERAPEUTIC ACTIVITIES: CPT

## 2025-06-24 PROCEDURE — 97112 NEUROMUSCULAR REEDUCATION: CPT

## 2025-06-24 PROCEDURE — 97110 THERAPEUTIC EXERCISES: CPT

## 2025-06-24 NOTE — PROGRESS NOTES
"Daily Note     Today's date: 2025  Patient name: Dory Roberts  : 1965  MRN: [id number]  Referring provider: Samuel Vizcaino MD  Dx:   Encounter Diagnosis     ICD-10-CM    1. Primary osteoarthritis of right knee  M17.11           Start Time: 1635  Stop Time: 1715  Total time in clinic (min): 40 minutes    Subjective: Pt reports her right knee is a little achier today for no apparent reason.       Objective: See treatment diary below      Assessment: Progressed reps for multiple exercises to continue improving functional strength within pt tolerance. She continues to be adequately challenged by program but notes only muscle fatigue at end of session without reproduction of painful symptoms. Patient demonstrated fatigue post treatment, exhibited good technique with therapeutic exercises, and would benefit from continued PT.      Plan: Progress treatment as tolerated.       Precautions: Hx of R TKA    POC expires Unit limit Auth Expiration date PT/OT + Visit Limit?    N/A  Auth req                 Visit/Unit Tracking  AUTH Status:  Date 6/3 6/10 6/17 6/24           6 Used 1 2 3 4            Remaining  5 4 3 2               FOTO      Manuals 6/3 6/10 6/17 6/24                                                             Neuro Re-Ed             Pt education  8' 3'           Bridge 2x10 3\" 2x10 3\" 2x10 3\" 2x10 3\"         LAQ  3# 2x10 ea 3# 2x10 ea 3# 2x10 ea                                                             Ther Ex             Rec bike for mobility 5' 5' 5' 5'         SLR B 2x10 ea 2x10 ea 2x10 ea 3x10 ea         S/L hip abd B 2x10 ea 2x10 ea 2x10 ea 2x10 ea         Leg press  105# 2x10 105# 2x10 105# 3x10         TRX squat  2x10 2x10 2x10                                   Ther Activity             STS  2x10 2x10 2x10         Step up  6\" 2x10 ea 6\" 2x10 ea 6\" 2x10 ea         Lat step up    nv         Gait Training                                       Modalities                               "

## 2025-07-01 ENCOUNTER — APPOINTMENT (OUTPATIENT)
Dept: PHYSICAL THERAPY | Facility: CLINIC | Age: 60
End: 2025-07-01
Attending: INTERNAL MEDICINE
Payer: COMMERCIAL

## 2025-07-01 NOTE — TELEPHONE ENCOUNTER
01-Jul-2025 10:36 Caller: Dory    Doctor: Marina    Reason for call: Running late aware of 15   Min rule  Call back#: [phone]

## 2025-07-08 ENCOUNTER — OFFICE VISIT (OUTPATIENT)
Dept: PHYSICAL THERAPY | Facility: CLINIC | Age: 60
End: 2025-07-08
Attending: INTERNAL MEDICINE
Payer: COMMERCIAL

## 2025-07-08 DIAGNOSIS — M17.11 PRIMARY OSTEOARTHRITIS OF RIGHT KNEE: Primary | ICD-10-CM

## 2025-07-08 PROCEDURE — 97530 THERAPEUTIC ACTIVITIES: CPT

## 2025-07-08 PROCEDURE — 97110 THERAPEUTIC EXERCISES: CPT

## 2025-07-08 NOTE — PROGRESS NOTES
"Daily Note     Today's date: 2025  Patient name: Dory Roberts  : 1965  MRN: [id number]  Referring provider: Samuel Vizcaino MD  Dx:   Encounter Diagnosis     ICD-10-CM    1. Primary osteoarthritis of right knee  M17.11           Start Time: 1414  Stop Time: 1500  Total time in clinic (min): 46 minutes    Subjective: Pt reports she is doing well.       Objective: See treatment diary below      Assessment: Tolerated treatment well. Pt demonstrates good effort throughout session. Minimal cueing given to facilitate proper exercise performance and technique. She repots more difficulty with step downs laterally vs step ups. Held TRX squats due to shoulder pain this visit. Continue to progress as tolerated.  Patient demonstrated fatigue post treatment, exhibited good technique with therapeutic exercises, and would benefit from continued PT      Plan: Continue per plan of care.      Precautions: Hx of R TKA    POC expires Unit limit Auth Expiration date PT/OT + Visit Limit?    N/A  Auth req                 Visit/Unit Tracking  AUTH Status:  Date 6/3 6/10 6/17 6/24 7/8          6 Used 1 2 3 4 5           Remaining  5 4 3 2 1              FOTO      Manuals 6/3 6/10 6/17 6/24 7                                                            Neuro Re-Ed             Pt education  8' 3'           Bridge 2x10 3\" 2x10 3\" 2x10 3\" 2x10 3\" 2x10 3\"        LAQ  3# 2x10 ea 3# 2x10 ea 3# 2x10 ea 3# 2x10 ea                                                            Ther Ex             Rec bike for mobility 5' 5' 5' 5' 5'        SLR B 2x10 ea 2x10 ea 2x10 ea 3x10 ea 3x10 ea        S/L hip abd B 2x10 ea 2x10 ea 2x10 ea 2x10 ea 2x10 ea        Leg press  105# 2x10 105# 2x10 105# 3x10 105# 3x10        TRX squat  2x10 2x10 2x10 held                                  Ther Activity             STS  2x10 2x10 2x10 2x10        Step up  6\" 2x10 ea 6\" 2x10 ea 6\" 2x10 ea 6\" 2x10 ea        Lat step down     6\" 1x10        Lat step up   " " nv 6\" 2x10 ea        Gait Training                                       Modalities                                                  "

## 2025-07-15 ENCOUNTER — EVALUATION (OUTPATIENT)
Dept: PHYSICAL THERAPY | Facility: CLINIC | Age: 60
End: 2025-07-15
Attending: INTERNAL MEDICINE
Payer: COMMERCIAL

## 2025-07-15 DIAGNOSIS — M17.11 PRIMARY OSTEOARTHRITIS OF RIGHT KNEE: Primary | ICD-10-CM

## 2025-07-15 PROCEDURE — 97530 THERAPEUTIC ACTIVITIES: CPT

## 2025-07-15 PROCEDURE — 97110 THERAPEUTIC EXERCISES: CPT

## 2025-07-15 PROCEDURE — 97112 NEUROMUSCULAR REEDUCATION: CPT

## (undated) DEVICE — CHLORAPREP HI-LITE 26ML ORANGE

## (undated) DEVICE — RECIPROCATING BLADE, DOUBLE SIDED, OFFSET  (70.0 X 0.64 X 12.6MM)

## (undated) DEVICE — HOOD: Brand: FLYTE, SURGICOOL

## (undated) DEVICE — IMPERVIOUS STOCKINETTE: Brand: DEROYAL

## (undated) DEVICE — STRL PENROSE DRAIN 18" X 1/2": Brand: CARDINAL HEALTH

## (undated) DEVICE — 3M™ IOBAN™ 2 ANTIMICROBIAL INCISE DRAPE 6650EZ: Brand: IOBAN™ 2

## (undated) DEVICE — U-DRAPE: Brand: CONVERTORS

## (undated) DEVICE — LIGHT HANDLE COVER SLEEVE DISP BLUE STELLAR

## (undated) DEVICE — TIBURON SPLIT SHEET: Brand: CONVERTORS

## (undated) DEVICE — TRAY FOLEY 16FR URIMETER SURESTEP

## (undated) DEVICE — PLUMEPEN PRO 10FT

## (undated) DEVICE — SUT VICRYL 1 CT-1 27 IN J261H

## (undated) DEVICE — SUT MONOCRYL 4-0 PS-2 27 IN Y426H

## (undated) DEVICE — DRESSING MEPILEX AG BORDER 4 X 10 IN

## (undated) DEVICE — DUAL CUT SAGITTAL BLADE

## (undated) DEVICE — 3M™ STERI-STRIP™ REINFORCED ADHESIVE SKIN CLOSURES, R1547, 1/2 IN X 4 IN (12 MM X 100 MM), 6 STRIPS/ENVELOPE: Brand: 3M™ STERI-STRIP™

## (undated) DEVICE — DRAPE EQUIPMENT RF WAND

## (undated) DEVICE — SCD SEQUENTIAL COMPRESSION COMFORT SLEEVE MEDIUM KNEE LENGTH: Brand: KENDALL SCD

## (undated) DEVICE — SUT VICRYL 2-0 CT-1 27 IN J259H

## (undated) DEVICE — NO-SCRATCH ™ SMALL WHITNEY CURETTE ™ IS A SINGLE-USE, PLASTIC CURETTE FOR QUICKLY APPLYING, MANIPULATING AND REMOVING BONE CEMENT DURING HIP AND KNEE REPLACEMENT SURGERY. THE PLASTIC IS SOFTER THAN STEEL INSTRUMENTS, REDUCING THE RISK OF DAMAGING THE PROSTHESIS WITH METAL INSTRUMENTS.  THE CURETTE’S 6MM TIP REMOVES EXCESS CEMENT FROM REPLACEMENT HIPS AND KNEES. EASY-TO-MANEUVER, THE SMALL BLUE CURETTE LETS YOU REMOVE CEMENT FROM ALL EDGES OF THE PROSTHESIS.NO-SCRATCH WHITNEY SMALL CURETTE FEATURES:SAFER THAN STEEL- MADE OF PLASTIC - STURDY YET SOFTER THAN SURGICAL STEEL.HANDIER- EACH TOOL HAS A MOLDED-IN THUMB INDENTATION INSTANTLY ORIENTING THE TOOL.- EASIER TO MANEUVER IN HARD TO SEE PLACES.- COLOR-CODED FOR EASY IDENTIFICATION.FASTER- COMES INDIVIDUALLY PACKAGED IN STERILE, PEEL OPEN POUCH, READY TO GO.- APPLIES, MANIPULATES, OR REMOVES CEMENT WITH FINGERTIP PRECISION.ECONOMICAL- THE COST OF A SINGLE REVISION DWARFS THE COST OF A SINGLE-USE CURETTE. - DISPOSABLE – THERE’S NO NEED TO WASTE TIME REMOVING HARDENED CEMENT OR RE-STERILIZING TOOLS.- LESS EXPENSIVE TO BUY AND INVENTORY - ORDER ONLY THE TOOL YOU USE.- PACKAGED 25 INDIVIDUALLY WRAPPED TOOLS TO A CARTON FOR CONVENIENT SHELF STORAGE.: Brand: WHITNEY NO-SCRATCH CURETTE (SMALL)

## (undated) DEVICE — DRESSING MEPILEX AG BORDER POST-OP 4 X 10 IN

## (undated) DEVICE — CAPIT KNEE ATTUNE FB AOX POR

## (undated) DEVICE — COBAN 4 IN STERILE

## (undated) DEVICE — GLOVE INDICATOR PI UNDERGLOVE SZ 7.5 BLUE

## (undated) DEVICE — MAYO STAND COVER: Brand: CONVERTORS

## (undated) DEVICE — BETHLEHEM UNIV TOTAL KNEE, KIT: Brand: CARDINAL HEALTH

## (undated) DEVICE — GLOVE INDICATOR PI UNDERGLOVE SZ 7 BLUE

## (undated) DEVICE — CEMENT MIXING SYSTEM WITH FEMORAL BREAKWAY NOZZLE: Brand: REVOLUTION

## (undated) DEVICE — SUT STRATAFIX SPIRAL PDS PLUS 1 CTX 18 IN SXPP1A400

## (undated) DEVICE — 450 ML BOTTLE OF 0.05% CHLORHEXIDINE GLUCONATE IN 99.95% STERILE WATER FOR IRRIGATION, USP AND APPLICATOR.: Brand: IRRISEPT ANTIMICROBIAL WOUND LAVAGE

## (undated) DEVICE — LINER FOOT PAD STERILE DISPOSABLE

## (undated) DEVICE — GLOVE SRG BIOGEL ORTHOPEDIC 7

## (undated) DEVICE — HANDPIECE SET WITH RETRACTABLE COAXIAL FAN SPRAY TIP AND SUCTION TUBE: Brand: INTERPULSE

## (undated) DEVICE — 4-PORT MANIFOLD: Brand: NEPTUNE 2

## (undated) DEVICE — SPONGE LAP 18 X 18 IN

## (undated) DEVICE — GLOVE SRG BIOGEL 7.5